# Patient Record
Sex: FEMALE | Race: WHITE | NOT HISPANIC OR LATINO | Employment: OTHER | URBAN - METROPOLITAN AREA
[De-identification: names, ages, dates, MRNs, and addresses within clinical notes are randomized per-mention and may not be internally consistent; named-entity substitution may affect disease eponyms.]

---

## 2017-05-05 ENCOUNTER — WALK IN (OUTPATIENT)
Dept: URGENT CARE | Age: 83
End: 2017-05-05

## 2017-05-05 VITALS
SYSTOLIC BLOOD PRESSURE: 91 MMHG | HEIGHT: 61 IN | RESPIRATION RATE: 20 BRPM | TEMPERATURE: 97.8 F | WEIGHT: 132.28 LBS | DIASTOLIC BLOOD PRESSURE: 52 MMHG | HEART RATE: 72 BPM | BODY MASS INDEX: 24.97 KG/M2 | OXYGEN SATURATION: 98 %

## 2017-05-05 DIAGNOSIS — A09 TRAVELER'S DIARRHEA: ICD-10-CM

## 2017-05-05 DIAGNOSIS — R19.7 DIARRHEA, UNSPECIFIED TYPE: Primary | ICD-10-CM

## 2017-05-05 PROCEDURE — G8420 CALC BMI NORM PARAMETERS: HCPCS | Performed by: EMERGENCY MEDICINE

## 2017-05-05 PROCEDURE — G8732 NO DOC OF PAIN: HCPCS | Performed by: EMERGENCY MEDICINE

## 2017-05-05 PROCEDURE — 99214 OFFICE O/P EST MOD 30 MIN: CPT | Performed by: EMERGENCY MEDICINE

## 2017-05-05 PROCEDURE — G8427 DOCREV CUR MEDS BY ELIG CLIN: HCPCS | Performed by: EMERGENCY MEDICINE

## 2017-05-05 PROCEDURE — 1036F TOBACCO NON-USER: CPT | Performed by: EMERGENCY MEDICINE

## 2017-05-05 PROCEDURE — 96360 HYDRATION IV INFUSION INIT: CPT | Performed by: EMERGENCY MEDICINE

## 2017-05-05 RX ORDER — METRONIDAZOLE 500 MG/1
750 TABLET ORAL 3 TIMES DAILY
Qty: 23 TABLET | Refills: 0 | Status: SHIPPED | OUTPATIENT
Start: 2017-05-05 | End: 2017-05-10

## 2017-05-05 RX ORDER — CIPROFLOXACIN 500 MG/1
500 TABLET, FILM COATED ORAL 2 TIMES DAILY
Qty: 6 TABLET | Refills: 0 | Status: SHIPPED | OUTPATIENT
Start: 2017-05-05 | End: 2017-05-08

## 2017-05-05 RX ORDER — DIGOXIN 125 MCG
125 TABLET ORAL EVERY OTHER DAY
COMMUNITY

## 2017-05-08 ENCOUNTER — LAB SERVICES (OUTPATIENT)
Dept: LAB | Age: 83
End: 2017-05-08

## 2017-05-08 DIAGNOSIS — A09 TRAVELER'S DIARRHEA: ICD-10-CM

## 2017-05-08 DIAGNOSIS — R19.7 DIARRHEA, UNSPECIFIED TYPE: ICD-10-CM

## 2017-05-08 LAB
C DIFF DNA SPEC QL NAA+PROBE: NOT DETECTED
SPECIMEN SOURCE: NORMAL
WBC STL QL MICRO: NEGATIVE

## 2017-05-08 PROCEDURE — 36415 COLL VENOUS BLD VENIPUNCTURE: CPT | Performed by: INTERNAL MEDICINE

## 2017-05-10 LAB
ANNOTATION COMMENT IMP: NORMAL
BACTERIA STL CULT: NORMAL
REPORT STATUS (RPT): NORMAL
SPECIMEN SOURCE: NORMAL

## 2017-05-18 ASSESSMENT — ENCOUNTER SYMPTOMS
CHEST TIGHTNESS: 0
UNEXPECTED WEIGHT CHANGE: 0
FEVER: 0
APPETITE CHANGE: 0
FATIGUE: 1
NUMBNESS: 0
COUGH: 0
ACTIVITY CHANGE: 0
DIZZINESS: 0
SHORTNESS OF BREATH: 0
CHILLS: 0
SINUS PRESSURE: 0
DIARRHEA: 1
RHINORRHEA: 0
WHEEZING: 0

## 2018-12-17 ENCOUNTER — WALK IN (OUTPATIENT)
Dept: URGENT CARE | Age: 84
End: 2018-12-17

## 2018-12-17 ENCOUNTER — IMAGING SERVICES (OUTPATIENT)
Dept: GENERAL RADIOLOGY | Age: 84
End: 2018-12-17
Attending: PHYSICIAN ASSISTANT

## 2018-12-17 VITALS
RESPIRATION RATE: 16 BRPM | WEIGHT: 116 LBS | BODY MASS INDEX: 22.78 KG/M2 | HEIGHT: 60 IN | SYSTOLIC BLOOD PRESSURE: 116 MMHG | OXYGEN SATURATION: 97 % | DIASTOLIC BLOOD PRESSURE: 64 MMHG | TEMPERATURE: 98.4 F | HEART RATE: 98 BPM

## 2018-12-17 DIAGNOSIS — M25.572 ACUTE LEFT ANKLE PAIN: Primary | ICD-10-CM

## 2018-12-17 DIAGNOSIS — M25.572 ACUTE LEFT ANKLE PAIN: ICD-10-CM

## 2018-12-17 DIAGNOSIS — S93.402A SPRAIN OF LEFT ANKLE, UNSPECIFIED LIGAMENT, INITIAL ENCOUNTER: ICD-10-CM

## 2018-12-17 PROCEDURE — 73610 X-RAY EXAM OF ANKLE: CPT | Performed by: RADIOLOGY

## 2018-12-17 PROCEDURE — 99213 OFFICE O/P EST LOW 20 MIN: CPT | Performed by: PHYSICIAN ASSISTANT

## 2020-01-01 ENCOUNTER — APPOINTMENT (EMERGENCY)
Dept: RADIOLOGY | Facility: HOSPITAL | Age: 85
DRG: 698 | End: 2020-01-01
Attending: EMERGENCY MEDICINE
Payer: MEDICARE

## 2020-01-01 ENCOUNTER — APPOINTMENT (INPATIENT)
Dept: CARDIOLOGY | Facility: HOSPITAL | Age: 85
DRG: 698 | End: 2020-01-01
Attending: NURSE PRACTITIONER
Payer: MEDICARE

## 2020-01-01 ENCOUNTER — HOSPITAL ENCOUNTER (INPATIENT)
Facility: HOSPITAL | Age: 85
LOS: 11 days | DRG: 698 | End: 2021-01-08
Attending: EMERGENCY MEDICINE | Admitting: INTERNAL MEDICINE
Payer: MEDICARE

## 2020-01-01 ENCOUNTER — APPOINTMENT (INPATIENT)
Dept: RADIOLOGY | Facility: HOSPITAL | Age: 85
DRG: 698 | End: 2020-01-01
Attending: NURSE PRACTITIONER
Payer: MEDICARE

## 2020-01-01 DIAGNOSIS — E87.5 HYPERKALEMIA: Primary | ICD-10-CM

## 2020-01-01 DIAGNOSIS — N30.01 ACUTE CYSTITIS WITH HEMATURIA: ICD-10-CM

## 2020-01-01 DIAGNOSIS — I48.91 ATRIAL FIBRILLATION, UNSPECIFIED TYPE (CMS/HCC): ICD-10-CM

## 2020-01-01 DIAGNOSIS — N17.9 ACUTE RENAL FAILURE, UNSPECIFIED ACUTE RENAL FAILURE TYPE (CMS/HCC): ICD-10-CM

## 2020-01-01 DIAGNOSIS — R65.20 SEVERE SEPSIS (CMS/HCC): ICD-10-CM

## 2020-01-01 DIAGNOSIS — A41.9 SEVERE SEPSIS (CMS/HCC): ICD-10-CM

## 2020-01-01 DIAGNOSIS — I48.20 CHRONIC ATRIAL FIBRILLATION (CMS/HCC): ICD-10-CM

## 2020-01-01 LAB
A BAUMANNII DNA SPEC QL NAA+PROBE: NOT DETECTED
ACANTHOCYTES BLD QL SMEAR: ABNORMAL
ALBUMIN SERPL-MCNC: 1.5 G/DL (ref 3.4–5)
ALBUMIN SERPL-MCNC: 1.5 G/DL (ref 3.4–5)
ALBUMIN SERPL-MCNC: 2.7 G/DL (ref 3.4–5)
ALP SERPL-CCNC: 145 IU/L (ref 35–126)
ALP SERPL-CCNC: 78 IU/L (ref 35–126)
ALP SERPL-CCNC: 93 IU/L (ref 35–126)
ALT SERPL-CCNC: 18 IU/L (ref 11–54)
ALT SERPL-CCNC: 19 IU/L (ref 11–54)
ALT SERPL-CCNC: 24 IU/L (ref 11–54)
ANION GAP SERPL CALC-SCNC: 13 MEQ/L (ref 3–15)
ANION GAP SERPL CALC-SCNC: 15 MEQ/L (ref 3–15)
ANION GAP SERPL CALC-SCNC: 16 MEQ/L (ref 3–15)
ANION GAP SERPL CALC-SCNC: 19 MEQ/L (ref 3–15)
ANION GAP SERPL CALC-SCNC: 20 MEQ/L (ref 3–15)
ANION GAP SERPL CALC-SCNC: 22 MEQ/L (ref 3–15)
AORTIC ROOT ANNULUS: 2.2 CM
AORTIC VALVE MEAN VELOCITY: 1.38 M/S
AORTIC VALVE VELOCITY TIME INTEGRAL: 37.7 CM
APTT PPP: 107 SEC (ref 23–35)
APTT PPP: 213 SEC (ref 23–35)
APTT PPP: 47 SEC (ref 23–35)
APTT PPP: 64 SEC (ref 23–35)
APTT PPP: >230 SEC (ref 23–35)
APTT PPP: >230 SEC (ref 23–35)
ASCENDING AORTA: 3.5 CM
AST SERPL-CCNC: 20 IU/L (ref 15–41)
AST SERPL-CCNC: 23 IU/L (ref 15–41)
AST SERPL-CCNC: 27 IU/L (ref 15–41)
ATRIAL RATE: 104
AV MEAN GRADIENT: 9 MMHG
AV PEAK GRADIENT: 14 MMHG
AV PEAK VELOCITY-S: 1.9 M/S
AV REG PEAK VEL: 3.29 M/S
AV REGURGITATION PRESSURE HALF TIME: 378 MS
AV VALVE AREA: 1.17 CM2
B FRAGILIS DNA BLD QL NAA+PROBE: NOT DETECTED
BACTERIA #/AREA URNS HPF: 3 /HPF
BACTERIA UR CULT: ABNORMAL
BACTERIA UR CULT: ABNORMAL
BASE EXCESS BLDA CALC-SCNC: -1.6 MEQ/L
BASE EXCESS BLDA CALC-SCNC: -14.7 MEQ/L
BASE EXCESS BLDA CALC-SCNC: -15.9 MEQ/L
BASE EXCESS BLDV CALC-SCNC: -18.7 MEQ/L
BASOPHILS # BLD: 0 K/UL (ref 0.01–0.1)
BASOPHILS # BLD: 0.02 K/UL (ref 0.01–0.1)
BASOPHILS # BLD: 0.03 K/UL (ref 0.01–0.1)
BASOPHILS # BLD: 0.05 K/UL (ref 0.01–0.1)
BASOPHILS NFR BLD: 0 %
BASOPHILS NFR BLD: 0.1 %
BASOPHILS NFR BLD: 0.1 %
BASOPHILS NFR BLD: 0.2 %
BILIRUB SERPL-MCNC: 0.7 MG/DL (ref 0.3–1.2)
BILIRUB SERPL-MCNC: 0.7 MG/DL (ref 0.3–1.2)
BILIRUB SERPL-MCNC: 1.3 MG/DL (ref 0.3–1.2)
BILIRUB UR QL STRIP.AUTO: ABNORMAL MG/DL
BLACTX-M ISLT/SPM QL: NOT DETECTED
BLAIMP ISLT/SPM QL: NOT DETECTED
BLAOXA-48-LIKE ISLT/SPM QL: NOT DETECTED
BLAVIM ISLT/SPM QL: NOT DETECTED
BSA FOR ECHO PROCEDURE: 1.49 M2
BUN SERPL-MCNC: 100 MG/DL (ref 8–20)
BUN SERPL-MCNC: 113 MG/DL (ref 8–20)
BUN SERPL-MCNC: 121 MG/DL (ref 8–20)
BUN SERPL-MCNC: 126 MG/DL (ref 8–20)
BUN SERPL-MCNC: 127 MG/DL (ref 8–20)
BUN SERPL-MCNC: 148 MG/DL (ref 8–20)
BUN SERPL-MCNC: 160 MG/DL (ref 8–20)
BUN SERPL-MCNC: 161 MG/DL (ref 8–20)
BURR CELLS BLD QL SMEAR: ABNORMAL
C ALBICANS DNA BLD QL NAA+PROBE: NOT DETECTED
C AURIS DNA BLD POS QL NAA+PROBE: NOT DETECTED
C GATTII+NEOFOR DNA BLD POS QL NAA+N-PRB: NOT DETECTED
C GLABRATA DNA BLD QL NAA+PROBE: NOT DETECTED
C KRUSEI DNA BLD QL NAA+PROBE: NOT DETECTED
C PARAP DNA BLD QL NAA+PROBE: NOT DETECTED
C TROPICLS DNA BLD QL NAA+PROBE: NOT DETECTED
CA-I BLD-SCNC: 1 MMOL/L (ref 1.15–1.27)
CA-I BLD-SCNC: 1.17 MMOL/L (ref 1.15–1.27)
CA-I BLD-SCNC: 1.22 MMOL/L (ref 1.15–1.27)
CALCIUM SERPL-MCNC: 6.6 MG/DL (ref 8.9–10.3)
CALCIUM SERPL-MCNC: 6.8 MG/DL (ref 8.9–10.3)
CALCIUM SERPL-MCNC: 7.2 MG/DL (ref 8.9–10.3)
CALCIUM SERPL-MCNC: 7.3 MG/DL (ref 8.9–10.3)
CALCIUM SERPL-MCNC: 7.4 MG/DL (ref 8.9–10.3)
CALCIUM SERPL-MCNC: 8 MG/DL (ref 8.9–10.3)
CALCIUM SERPL-MCNC: 8.8 MG/DL (ref 8.9–10.3)
CALCIUM SERPL-MCNC: 9.6 MG/DL (ref 8.9–10.3)
CHLORIDE BLDA-SCNC: 102 MEQ/L (ref 98–109)
CHLORIDE BLDA-SCNC: 103 MEQ/L (ref 98–109)
CHLORIDE BLDA-SCNC: 104 MEQ/L (ref 98–109)
CHLORIDE SERPL-SCNC: 100 MEQ/L (ref 98–109)
CHLORIDE SERPL-SCNC: 101 MEQ/L (ref 98–109)
CHLORIDE SERPL-SCNC: 101 MEQ/L (ref 98–109)
CHLORIDE SERPL-SCNC: 102 MEQ/L (ref 98–109)
CHLORIDE SERPL-SCNC: 104 MEQ/L (ref 98–109)
CHLORIDE SERPL-SCNC: 105 MEQ/L (ref 98–109)
CHLORIDE SERPL-SCNC: 97 MEQ/L (ref 98–109)
CHLORIDE SERPL-SCNC: 98 MEQ/L (ref 98–109)
CLARITY UR REFRACT.AUTO: ABNORMAL
CO2 BLDA-SCNC: 10 MEQ/L (ref 22–32)
CO2 BLDA-SCNC: 20 MEQ/L (ref 22–32)
CO2 BLDA-SCNC: 8 MEQ/L (ref 22–32)
CO2 BLDV-SCNC: 10.9 MEQ/L (ref 22–32)
CO2 SERPL-SCNC: 10 MEQ/L (ref 22–32)
CO2 SERPL-SCNC: 12 MEQ/L (ref 22–32)
CO2 SERPL-SCNC: 17 MEQ/L (ref 22–32)
CO2 SERPL-SCNC: 19 MEQ/L (ref 22–32)
CO2 SERPL-SCNC: 21 MEQ/L (ref 22–32)
CO2 SERPL-SCNC: 22 MEQ/L (ref 22–32)
CO2 SERPL-SCNC: 23 MEQ/L (ref 22–32)
CO2 SERPL-SCNC: 9 MEQ/L (ref 22–32)
COLISTIN RES MCR-1 ISLT/SPM QL: NOT DETECTED
COLOR UR AUTO: ABNORMAL
CPR GENES ISLT NAA+PROBE: NOT DETECTED
CREAT SERPL-MCNC: 4 MG/DL (ref 0.6–1.1)
CREAT SERPL-MCNC: 4.3 MG/DL (ref 0.6–1.1)
CREAT SERPL-MCNC: 4.8 MG/DL (ref 0.6–1.1)
CREAT SERPL-MCNC: 5 MG/DL (ref 0.6–1.1)
CREAT SERPL-MCNC: 5.3 MG/DL (ref 0.6–1.1)
CREAT SERPL-MCNC: 6 MG/DL (ref 0.6–1.1)
CREAT SERPL-MCNC: 6.2 MG/DL (ref 0.6–1.1)
CREAT SERPL-MCNC: 6.4 MG/DL (ref 0.6–1.1)
DIFFERENTIAL METHOD BLD: ABNORMAL
DOP CALC LVOT STROKE VOLUME: 50.82 ML
E CLOAC COMP DNA BLD POS NAA+NON-PROBE: NOT DETECTED
E COLI DNA SPEC QL NAA+PROBE: NOT DETECTED
E FAECALIS DNA SPEC QL NAA+PROBE: NOT DETECTED
E FAECIUM DNA SPEC QL NAA+PROBE: NOT DETECTED
EOSINOPHIL # BLD: 0 K/UL (ref 0.04–0.36)
EOSINOPHIL # BLD: 0.01 K/UL (ref 0.04–0.36)
EOSINOPHIL # BLD: 0.02 K/UL (ref 0.04–0.36)
EOSINOPHIL # BLD: 0.2 K/UL (ref 0.04–0.36)
EOSINOPHIL NFR BLD: 0 %
EOSINOPHIL NFR BLD: 0 %
EOSINOPHIL NFR BLD: 0.1 %
EOSINOPHIL NFR BLD: 0.9 %
ERYTHROCYTE [DISTWIDTH] IN BLOOD BY AUTOMATED COUNT: 16.7 % (ref 11.7–14.4)
ERYTHROCYTE [DISTWIDTH] IN BLOOD BY AUTOMATED COUNT: 16.9 % (ref 11.7–14.4)
ERYTHROCYTE [DISTWIDTH] IN BLOOD BY AUTOMATED COUNT: 16.9 % (ref 11.7–14.4)
ERYTHROCYTE [DISTWIDTH] IN BLOOD BY AUTOMATED COUNT: 17.9 % (ref 11.7–14.4)
ESTIMATED PASP: 51 MMHG
FIO2 ON VENT: ABNORMAL %
FRACTIONAL SHORTENING: 30.8 %
GFR SERPL CREATININE-BSD FRML MDRD: 10.4 ML/MIN/1.73M*2
GFR SERPL CREATININE-BSD FRML MDRD: 6 ML/MIN/1.73M*2
GFR SERPL CREATININE-BSD FRML MDRD: 6.3 ML/MIN/1.73M*2
GFR SERPL CREATININE-BSD FRML MDRD: 6.5 ML/MIN/1.73M*2
GFR SERPL CREATININE-BSD FRML MDRD: 7.5 ML/MIN/1.73M*2
GFR SERPL CREATININE-BSD FRML MDRD: 8 ML/MIN/1.73M*2
GFR SERPL CREATININE-BSD FRML MDRD: 8.4 ML/MIN/1.73M*2
GFR SERPL CREATININE-BSD FRML MDRD: 9.6 ML/MIN/1.73M*2
GLUCOSE BLD-MCNC: 104 MG/DL (ref 70–99)
GLUCOSE BLD-MCNC: 105 MG/DL (ref 70–99)
GLUCOSE BLD-MCNC: 109 MG/DL (ref 70–99)
GLUCOSE BLD-MCNC: 127 MG/DL (ref 70–99)
GLUCOSE BLD-MCNC: 128 MG/DL (ref 70–99)
GLUCOSE BLD-MCNC: 135 MG/DL (ref 70–99)
GLUCOSE BLD-MCNC: 144 MG/DL (ref 70–99)
GLUCOSE BLD-MCNC: 165 MG/DL (ref 70–99)
GLUCOSE BLD-MCNC: 170 MG/DL (ref 70–99)
GLUCOSE BLD-MCNC: 177 MG/DL (ref 70–99)
GLUCOSE BLD-MCNC: 181 MG/DL (ref 70–99)
GLUCOSE BLD-MCNC: 219 MG/DL (ref 70–99)
GLUCOSE BLD-MCNC: 233 MG/DL (ref 70–99)
GLUCOSE BLD-MCNC: 296 MG/DL (ref 70–99)
GLUCOSE BLD-MCNC: 37 MG/DL (ref 70–99)
GLUCOSE BLD-MCNC: 76 MG/DL (ref 70–99)
GLUCOSE BLD-MCNC: 81 MG/DL (ref 70–99)
GLUCOSE BLDA-MCNC: 231 MG/DL (ref 70–99)
GLUCOSE BLDA-MCNC: 242 MG/DL (ref 70–99)
GLUCOSE BLDA-MCNC: 252 MG/DL (ref 70–99)
GLUCOSE SERPL-MCNC: 123 MG/DL (ref 70–99)
GLUCOSE SERPL-MCNC: 132 MG/DL (ref 70–99)
GLUCOSE SERPL-MCNC: 180 MG/DL (ref 70–99)
GLUCOSE SERPL-MCNC: 225 MG/DL (ref 70–99)
GLUCOSE SERPL-MCNC: 281 MG/DL (ref 70–99)
GLUCOSE SERPL-MCNC: 288 MG/DL (ref 70–99)
GLUCOSE SERPL-MCNC: 79 MG/DL (ref 70–99)
GLUCOSE SERPL-MCNC: 88 MG/DL (ref 70–99)
GLUCOSE UR STRIP.AUTO-MCNC: ABNORMAL MG/DL
GP B STREP DNA SPEC QL NAA+PROBE: NOT DETECTED
HAEM INFLU DNA SPEC QL NAA+PROBE: NOT DETECTED
HCO3 BLDA-SCNC: 13 MEQ/L (ref 21–28)
HCO3 BLDA-SCNC: 14 MEQ/L (ref 21–28)
HCO3 BLDA-SCNC: 24 MEQ/L (ref 21–28)
HCO3 BLDV-SCNC: 9.5 MEQ/L (ref 21–28)
HCT VFR BLDCO AUTO: 23.9 % (ref 35–45)
HCT VFR BLDCO AUTO: 24 % (ref 35–45)
HCT VFR BLDCO AUTO: 24.7 % (ref 35–45)
HCT VFR BLDCO AUTO: 25 % (ref 35–45)
HCT VFR BLDCO AUTO: 25.9 % (ref 35–45)
HCT VFR BLDCO AUTO: 26.5 % (ref 35–45)
HCT VFR BLDCO AUTO: 26.6 % (ref 35–45)
HCT VFR BLDCO AUTO: 26.7 % (ref 35–45)
HCT VFR BLDCO AUTO: 26.9 % (ref 35–45)
HCT VFR BLDCO AUTO: 27.3 % (ref 35–45)
HCT VFR BLDCO AUTO: 28 % (ref 35–45)
HCT VFR BLDCO AUTO: 44.2 % (ref 35–45)
HEART RATE: 110 BPM
HGB BLD-MCNC: 13.8 G/DL (ref 11.8–15.7)
HGB BLD-MCNC: 7.5 G/DL (ref 11.8–15.7)
HGB BLD-MCNC: 7.6 G/DL (ref 11.8–15.7)
HGB BLD-MCNC: 7.7 G/DL (ref 11.8–15.7)
HGB BLD-MCNC: 7.9 G/DL (ref 11.8–15.7)
HGB BLD-MCNC: 8.2 G/DL (ref 11.8–15.7)
HGB BLD-MCNC: 8.4 G/DL (ref 11.8–15.7)
HGB BLD-MCNC: 8.5 G/DL (ref 11.8–15.7)
HGB BLD-MCNC: 8.5 G/DL (ref 11.8–15.7)
HGB BLD-MCNC: 8.6 G/DL (ref 11.8–15.7)
HGB BLD-MCNC: 9.1 G/DL (ref 11.8–15.7)
HGB BLD-MCNC: 9.3 G/DL (ref 11.8–15.7)
HGB BLDA-MCNC: 10.8 G/DL (ref 12–16)
HGB BLDA-MCNC: 9.1 G/DL (ref 12–16)
HGB BLDA-MCNC: 9.3 G/DL (ref 12–16)
HGB UR QL STRIP.AUTO: 3
HYALINE CASTS #/AREA URNS LPF: ABNORMAL /LPF
IMM GRANULOCYTES # BLD AUTO: 0.15 K/UL (ref 0–0.08)
IMM GRANULOCYTES # BLD AUTO: 0.23 K/UL (ref 0–0.08)
IMM GRANULOCYTES # BLD AUTO: 0.25 K/UL (ref 0–0.08)
IMM GRANULOCYTES NFR BLD AUTO: 0.6 %
IMM GRANULOCYTES NFR BLD AUTO: 0.9 %
IMM GRANULOCYTES NFR BLD AUTO: 1.1 %
INHALED O2 CONCENTRATION: ABNORMAL %
INR PPP: 1.2 INR
INTERVENTRICULAR SEPTUM: 0.99 CM
K OXYTOCA DNA BLD QL NAA+PROBE: NOT DETECTED
K PNEUMON DNA SPEC QL NAA+PROBE: DETECTED
K. AEROGENES DNA SPEC QL NAA+PROBE: NOT DETECTED
KETONES UR STRIP.AUTO-MCNC: ABNORMAL MG/DL
L MONOCYTOG DNA SPEC QL NAA+PROBE: NOT DETECTED
LA/AORTA RATIO: 1.64
LACTATE BLDA-SCNC: 2.2 MMOL/L (ref 0.4–1.6)
LACTATE BLDA-SCNC: 4.4 MMOL/L (ref 0.4–1.6)
LACTATE BLDA-SCNC: 5.7 MMOL/L (ref 0.4–1.6)
LACTATE SERPL-SCNC: 1.3 MMOL/L (ref 0.4–2)
LACTATE SERPL-SCNC: 2.3 MMOL/L (ref 0.4–2)
LACTATE SERPL-SCNC: 2.4 MMOL/L (ref 0.4–2)
LACTATE SERPL-SCNC: 4.1 MMOL/L (ref 0.4–2)
LACTATE SERPL-SCNC: 4.8 MMOL/L (ref 0.4–2)
LACTATE SERPL-SCNC: 6.5 MMOL/L (ref 0.4–2)
LACTATE SERPL-SCNC: 7.1 MMOL/L (ref 0.4–2)
LAD 2D: 3.6 CM
LEFT INTERNAL DIMENSION IN SYSTOLE: 1.75 CM (ref 2.23–3.37)
LEFT VENTRICULAR INTERNAL DIMENSION IN DIASTOLE: 2.53 CM (ref 3.75–5.2)
LEFT VENTRICULAR POSTERIOR WALL IN END DIASTOLE: 0.91 CM (ref 0.48–0.88)
LEUKOCYTE ESTERASE UR QL STRIP.AUTO: 3
LVOT 2D: 1.7 CM
LVOT A: 2.27 CM2
LVOT MG: 3 MMHG
LVOT MV: 0.72 M/S
LVOT PEAK VELOCITY: 0.98 M/S
LVOT VTI: 22.4 CM
LYMPHOCYTES # BLD: 0.61 K/UL (ref 1.2–3.5)
LYMPHOCYTES # BLD: 0.91 K/UL (ref 1.2–3.5)
LYMPHOCYTES # BLD: 0.95 K/UL (ref 1.2–3.5)
LYMPHOCYTES # BLD: 1.23 K/UL (ref 1.2–3.5)
LYMPHOCYTES NFR BLD: 2.1 %
LYMPHOCYTES NFR BLD: 3.9 %
LYMPHOCYTES NFR BLD: 4 %
LYMPHOCYTES NFR BLD: 4.2 %
MAGNESIUM SERPL-MCNC: 1.8 MG/DL (ref 1.8–2.5)
MAGNESIUM SERPL-MCNC: 1.8 MG/DL (ref 1.8–2.5)
MAGNESIUM SERPL-MCNC: 1.9 MG/DL (ref 1.8–2.5)
MAGNESIUM SERPL-MCNC: 2 MG/DL (ref 1.8–2.5)
MAGNESIUM SERPL-MCNC: 2.1 MG/DL (ref 1.8–2.5)
MAGNESIUM SERPL-MCNC: 2.1 MG/DL (ref 1.8–2.5)
MAGNESIUM SERPL-MCNC: 2.4 MG/DL (ref 1.8–2.5)
MAGNESIUM SERPL-MCNC: 2.6 MG/DL (ref 1.8–2.5)
MCH RBC QN AUTO: 25.3 PG (ref 28–33.2)
MCH RBC QN AUTO: 25.8 PG (ref 28–33.2)
MCH RBC QN AUTO: 26.1 PG (ref 28–33.2)
MCH RBC QN AUTO: 26.1 PG (ref 28–33.2)
MCHC RBC AUTO-ENTMCNC: 31.2 G/DL (ref 32.2–35.5)
MCHC RBC AUTO-ENTMCNC: 31.4 G/DL (ref 32.2–35.5)
MCHC RBC AUTO-ENTMCNC: 32 G/DL (ref 32.2–35.5)
MCHC RBC AUTO-ENTMCNC: 33.3 G/DL (ref 32.2–35.5)
MCV RBC AUTO: 78.2 FL (ref 83–98)
MCV RBC AUTO: 80.6 FL (ref 83–98)
MCV RBC AUTO: 81.1 FL (ref 83–98)
MCV RBC AUTO: 83.3 FL (ref 83–98)
MECA ISLT/SPM QL: ABNORMAL
MECA+MECC+MREJ ISLT/SPM QL: ABNORMAL
MONOCYTES # BLD: 0.61 K/UL (ref 0.28–0.8)
MONOCYTES # BLD: 1.44 K/UL (ref 0.28–0.8)
MONOCYTES # BLD: 1.56 K/UL (ref 0.28–0.8)
MONOCYTES # BLD: 1.83 K/UL (ref 0.28–0.8)
MONOCYTES NFR BLD: 2 %
MONOCYTES NFR BLD: 4.9 %
MONOCYTES NFR BLD: 7.2 %
MONOCYTES NFR BLD: 7.5 %
MV MEAN GRADIENT: 10 MMHG
MV PEAK GRADIENT: 20 MMHG
MV VALVE AREA BY CONTINUITY EQUATION: 1.59 CM2
MV VTI: 32 CM
N MEN DNA BLD QL NAA+PROBE: NOT DETECTED
NDM: NOT DETECTED
NEUTROPHILS # BLD: 18.61 K/UL (ref 1.7–7)
NEUTROPHILS # BLD: 21.31 K/UL (ref 1.7–7)
NEUTROPHILS # BLD: 27.04 K/UL (ref 1.7–7)
NEUTS BAND # BLD: 1.23 K/UL (ref 0–0.53)
NEUTS BAND # BLD: 27.61 K/UL (ref 1.7–7)
NEUTS BAND NFR BLD: 4 %
NEUTS SEG NFR BLD: 86.5 %
NEUTS SEG NFR BLD: 87.9 %
NEUTS SEG NFR BLD: 90 %
NEUTS SEG NFR BLD: 91.8 %
NITRITE UR QL STRIP.AUTO: ABNORMAL
NRBC BLD-RTO: 0 %
OVALOCYTES BLD QL SMEAR: ABNORMAL
P AERUGINOSA DNA SPEC QL NAA+PROBE: NOT DETECTED
PATH REV BLD -IMP: NORMAL
PCO2 BLDA: 14 MM HG (ref 35–48)
PCO2 BLDA: 18 MM HG (ref 35–48)
PCO2 BLDA: 24 MM HG (ref 35–48)
PCO2 BLDV: 32 MM HG (ref 41–51)
PDW BLD AUTO: 9.3 FL (ref 9.4–12.3)
PDW BLD AUTO: 9.4 FL (ref 9.4–12.3)
PDW BLD AUTO: 9.5 FL (ref 9.4–12.3)
PDW BLD AUTO: 9.7 FL (ref 9.4–12.3)
PH BLDA: 7.31 PH (ref 7.35–7.45)
PH BLDA: 7.33 PH (ref 7.35–7.45)
PH BLDA: 7.52 PH (ref 7.35–7.45)
PH BLDV: 7.1 [PH] (ref 7.32–7.42)
PH UR STRIP.AUTO: ABNORMAL [PH]
PLATELET # BLD AUTO: 158 K/UL (ref 150–369)
PLATELET # BLD AUTO: 204 K/UL (ref 150–369)
PLATELET # BLD AUTO: 209 K/UL (ref 150–369)
PLATELET # BLD AUTO: 319 K/UL (ref 150–369)
PLATELET # BLD EST: ABNORMAL 10*3/UL
PLATELET CLUMP BLD QL SMEAR: PRESENT
PO2 BLDA: 118 MM HG (ref 83–100)
PO2 BLDA: 85 MM HG (ref 83–100)
PO2 BLDA: 93 MM HG (ref 83–100)
PO2 BLDV: 46 MM HG (ref 25–40)
POCT PATIENT TEMPERATURE: 94.4 °F (ref 97–99)
POCT PATIENT TEMPERATURE: 97.1 °F (ref 97–99)
POCT PATIENT TEMPERATURE: 98.6 °F (ref 97–99)
POCT TEST (BLD GAS): ABNORMAL
POCT TEST: ABNORMAL
POCT TEST: NORMAL
POCT TEST: NORMAL
POSTERIOR WALL: 0.91 CM
POTASSIUM BLDA-SCNC: 4.2 MEQ/L (ref 3.4–4.5)
POTASSIUM BLDA-SCNC: 4.8 MEQ/L (ref 3.4–4.5)
POTASSIUM BLDA-SCNC: 5.9 MEQ/L (ref 3.4–4.5)
POTASSIUM SERPL-SCNC: 3.9 MEQ/L (ref 3.6–5.1)
POTASSIUM SERPL-SCNC: 3.9 MEQ/L (ref 3.6–5.1)
POTASSIUM SERPL-SCNC: 4.1 MEQ/L (ref 3.6–5.1)
POTASSIUM SERPL-SCNC: 4.3 MEQ/L (ref 3.6–5.1)
POTASSIUM SERPL-SCNC: 4.3 MEQ/L (ref 3.6–5.1)
POTASSIUM SERPL-SCNC: 5.8 MEQ/L (ref 3.6–5.1)
POTASSIUM SERPL-SCNC: 6.7 MEQ/L (ref 3.6–5.1)
POTASSIUM SERPL-SCNC: 7.9 MEQ/L (ref 3.6–5.1)
PROT SERPL-MCNC: 3.9 G/DL (ref 6–8.2)
PROT SERPL-MCNC: 4.1 G/DL (ref 6–8.2)
PROT SERPL-MCNC: 7.2 G/DL (ref 6–8.2)
PROT UR QL STRIP.AUTO: ABNORMAL
PROTEUS SP DNA BLD POS QL NAA+NON-PROBE: NOT DETECTED
PROTHROMBIN TIME: 15.2 SEC (ref 12.2–14.5)
QRS DURATION: 92
QT INTERVAL: 358
QTC CALCULATION(BAZETT): 454
R AXIS: 146
RAP: 3 MMHG
RBC # BLD AUTO: 2.87 M/UL (ref 3.93–5.22)
RBC # BLD AUTO: 3.3 M/UL (ref 3.93–5.22)
RBC # BLD AUTO: 3.49 M/UL (ref 3.93–5.22)
RBC # BLD AUTO: 5.45 M/UL (ref 3.93–5.22)
RBC #/AREA URNS HPF: ABNORMAL /HPF
S AUREUS DNA SPEC QL NAA+PROBE: NOT DETECTED
S AUREUS+CONS DNA BLD POS NAA+NON-PROBE: NOT DETECTED
S EPIDERMIDIS DNA SPEC QL NAA+PROBE: NOT DETECTED
S HAEMOLYTICUS DNA BLD QL NAA+PROBE: NOT DETECTED
S MALTOPH DNA SPEC QL NAA+PROBE: NOT DETECTED
S MARCESCENS DNA SPEC QL NAA+PROBE: NOT DETECTED
S PNEUM DNA BLD QL NAA+PROBE: NOT DETECTED
S PYO DNA SPEC NAA+PROBE: NOT DETECTED
SALMONELLA DNA SPEC QL NAA+PROBE: NOT DETECTED
SAO2 % BLDA: 100 % (ref 93–98)
SARS-COV-2 RNA RESP QL NAA+PROBE: NEGATIVE
SODIUM BLDA-SCNC: 125 MEQ/L (ref 136–145)
SODIUM BLDA-SCNC: 130 MEQ/L (ref 136–145)
SODIUM BLDA-SCNC: 136 MEQ/L (ref 136–145)
SODIUM SERPL-SCNC: 127 MEQ/L (ref 136–144)
SODIUM SERPL-SCNC: 127 MEQ/L (ref 136–144)
SODIUM SERPL-SCNC: 132 MEQ/L (ref 136–144)
SODIUM SERPL-SCNC: 132 MEQ/L (ref 136–144)
SODIUM SERPL-SCNC: 136 MEQ/L (ref 136–144)
SODIUM SERPL-SCNC: 143 MEQ/L (ref 136–144)
SODIUM SERPL-SCNC: 143 MEQ/L (ref 136–144)
SODIUM SERPL-SCNC: 144 MEQ/L (ref 136–144)
SP GR UR REFRACT.AUTO: 1.02
SQUAMOUS #/AREA URNS HPF: ABNORMAL /HPF
STREPTOCOCCUS DNA BLD QL NAA+PROBE: NOT DETECTED
T WAVE AXIS: 30
TEST PERFORMANCE INFO SPEC: ABNORMAL
TOXIC GRANULES BLD QL SMEAR: SLIGHT
TR MAX PG: 49 MMHG
TRICUSPID VALVE PEAK REGURGITATION VELOCITY: 3.51 M/S
TROPONIN I SERPL-MCNC: 0.04 NG/ML
TROPONIN I SERPL-MCNC: 0.05 NG/ML
TROPONIN I SERPL-MCNC: 0.05 NG/ML
TROPONIN I SERPL-MCNC: 0.06 NG/ML
TROPONIN I SERPL-MCNC: 0.07 NG/ML
TROPONIN I SERPL-MCNC: 0.15 NG/ML
TROPONIN I SERPL-MCNC: 0.19 NG/ML
TROPONIN I SERPL-MCNC: 0.2 NG/ML
TROPONIN I SERPL-MCNC: 0.23 NG/ML
UROBILINOGEN UR STRIP-ACNC: ABNORMAL EU/DL
VANA+VANB ISLT/SPM QL: ABNORMAL
VENTRICULAR RATE: 97
WBC # BLD AUTO: 21.53 K/UL (ref 3.8–10.5)
WBC # BLD AUTO: 24.28 K/UL (ref 3.8–10.5)
WBC # BLD AUTO: 29.41 K/UL (ref 3.8–10.5)
WBC # BLD AUTO: 30.68 K/UL (ref 3.8–10.5)
WBC #/AREA URNS HPF: ABNORMAL /HPF
Z-SCORE OF LEFT VENTRICULAR DIMENSION IN END DIASTOLE: -5.56
Z-SCORE OF LEFT VENTRICULAR DIMENSION IN END SYSTOLE: -3.54
Z-SCORE OF LEFT VENTRICULAR POSTERIOR WALL IN END DIASTOLE: 1.78

## 2020-01-01 PROCEDURE — 25800000 HC PHARMACY IV SOLUTIONS: Performed by: NURSE PRACTITIONER

## 2020-01-01 PROCEDURE — 63600000 HC DRUGS/DETAIL CODE: Performed by: INTERNAL MEDICINE

## 2020-01-01 PROCEDURE — 85025 COMPLETE CBC W/AUTO DIFF WBC: CPT | Performed by: EMERGENCY MEDICINE

## 2020-01-01 PROCEDURE — 94640 AIRWAY INHALATION TREATMENT: CPT

## 2020-01-01 PROCEDURE — 84484 ASSAY OF TROPONIN QUANT: CPT | Performed by: NURSE PRACTITIONER

## 2020-01-01 PROCEDURE — 36600 WITHDRAWAL OF ARTERIAL BLOOD: CPT

## 2020-01-01 PROCEDURE — 200200 PR NO CHARGE: Performed by: THORACIC SURGERY (CARDIOTHORACIC VASCULAR SURGERY)

## 2020-01-01 PROCEDURE — 85730 THROMBOPLASTIN TIME PARTIAL: CPT | Performed by: INTERNAL MEDICINE

## 2020-01-01 PROCEDURE — 63600000 HC DRUGS/DETAIL CODE: Performed by: NURSE PRACTITIONER

## 2020-01-01 PROCEDURE — 25000000 HC PHARMACY GENERAL: Performed by: NURSE PRACTITIONER

## 2020-01-01 PROCEDURE — 83605 ASSAY OF LACTIC ACID: CPT | Performed by: INTERNAL MEDICINE

## 2020-01-01 PROCEDURE — 25800000 HC PHARMACY IV SOLUTIONS: Performed by: INTERNAL MEDICINE

## 2020-01-01 PROCEDURE — 96365 THER/PROPH/DIAG IV INF INIT: CPT

## 2020-01-01 PROCEDURE — 85018 HEMOGLOBIN: CPT | Performed by: NURSE PRACTITIONER

## 2020-01-01 PROCEDURE — 85025 COMPLETE CBC W/AUTO DIFF WBC: CPT | Performed by: INTERNAL MEDICINE

## 2020-01-01 PROCEDURE — 83735 ASSAY OF MAGNESIUM: CPT | Performed by: NURSE PRACTITIONER

## 2020-01-01 PROCEDURE — 96361 HYDRATE IV INFUSION ADD-ON: CPT | Mod: 59

## 2020-01-01 PROCEDURE — 20000000 HC ROOM AND CARE ICU

## 2020-01-01 PROCEDURE — 80053 COMPREHEN METABOLIC PANEL: CPT | Performed by: NURSE PRACTITIONER

## 2020-01-01 PROCEDURE — 25800000 HC PHARMACY IV SOLUTIONS: Performed by: EMERGENCY MEDICINE

## 2020-01-01 PROCEDURE — 25000000 HC PHARMACY GENERAL: Performed by: EMERGENCY MEDICINE

## 2020-01-01 PROCEDURE — 80048 BASIC METABOLIC PNL TOTAL CA: CPT | Performed by: NURSE PRACTITIONER

## 2020-01-01 PROCEDURE — 93306 TTE W/DOPPLER COMPLETE: CPT

## 2020-01-01 PROCEDURE — 99285 EMERGENCY DEPT VISIT HI MDM: CPT | Mod: 25

## 2020-01-01 PROCEDURE — 83735 ASSAY OF MAGNESIUM: CPT | Performed by: INTERNAL MEDICINE

## 2020-01-01 PROCEDURE — 63700000 HC SELF-ADMINISTRABLE DRUG: Performed by: INTERNAL MEDICINE

## 2020-01-01 PROCEDURE — 85014 HEMATOCRIT: CPT | Performed by: NURSE PRACTITIONER

## 2020-01-01 PROCEDURE — 99223 1ST HOSP IP/OBS HIGH 75: CPT | Performed by: INTERNAL MEDICINE

## 2020-01-01 PROCEDURE — 63600000 HC DRUGS/DETAIL CODE: Performed by: EMERGENCY MEDICINE

## 2020-01-01 PROCEDURE — 63700000 HC SELF-ADMINISTRABLE DRUG: Performed by: NURSE PRACTITIONER

## 2020-01-01 PROCEDURE — 82803 BLOOD GASES ANY COMBINATION: CPT | Performed by: EMERGENCY MEDICINE

## 2020-01-01 PROCEDURE — 71045 X-RAY EXAM CHEST 1 VIEW: CPT

## 2020-01-01 PROCEDURE — 36415 COLL VENOUS BLD VENIPUNCTURE: CPT | Performed by: EMERGENCY MEDICINE

## 2020-01-01 PROCEDURE — 06HY33Z INSERTION OF INFUSION DEVICE INTO LOWER VEIN, PERCUTANEOUS APPROACH: ICD-10-PCS | Performed by: THORACIC SURGERY (CARDIOTHORACIC VASCULAR SURGERY)

## 2020-01-01 PROCEDURE — 25000000 HC PHARMACY GENERAL: Performed by: INTERNAL MEDICINE

## 2020-01-01 PROCEDURE — 36415 COLL VENOUS BLD VENIPUNCTURE: CPT | Performed by: INTERNAL MEDICINE

## 2020-01-01 PROCEDURE — 87077 CULTURE AEROBIC IDENTIFY: CPT | Performed by: EMERGENCY MEDICINE

## 2020-01-01 PROCEDURE — 83605 ASSAY OF LACTIC ACID: CPT | Performed by: EMERGENCY MEDICINE

## 2020-01-01 PROCEDURE — 93005 ELECTROCARDIOGRAM TRACING: CPT | Performed by: NURSE PRACTITIONER

## 2020-01-01 PROCEDURE — 27200121 HC CATH FOLEY

## 2020-01-01 PROCEDURE — 85730 THROMBOPLASTIN TIME PARTIAL: CPT | Performed by: NURSE PRACTITIONER

## 2020-01-01 PROCEDURE — 81001 URINALYSIS AUTO W/SCOPE: CPT | Performed by: EMERGENCY MEDICINE

## 2020-01-01 PROCEDURE — 36415 COLL VENOUS BLD VENIPUNCTURE: CPT | Performed by: NURSE PRACTITIONER

## 2020-01-01 PROCEDURE — 93005 ELECTROCARDIOGRAM TRACING: CPT | Performed by: EMERGENCY MEDICINE

## 2020-01-01 PROCEDURE — 74176 CT ABD & PELVIS W/O CONTRAST: CPT | Mod: MF

## 2020-01-01 PROCEDURE — 87150 DNA/RNA AMPLIFIED PROBE: CPT | Performed by: EMERGENCY MEDICINE

## 2020-01-01 PROCEDURE — 85610 PROTHROMBIN TIME: CPT | Performed by: NURSE PRACTITIONER

## 2020-01-01 PROCEDURE — 80053 COMPREHEN METABOLIC PANEL: CPT | Performed by: EMERGENCY MEDICINE

## 2020-01-01 PROCEDURE — U0002 COVID-19 LAB TEST NON-CDC: HCPCS | Performed by: EMERGENCY MEDICINE

## 2020-01-01 PROCEDURE — 84484 ASSAY OF TROPONIN QUANT: CPT | Performed by: EMERGENCY MEDICINE

## 2020-01-01 PROCEDURE — 3E033XZ INTRODUCTION OF VASOPRESSOR INTO PERIPHERAL VEIN, PERCUTANEOUS APPROACH: ICD-10-PCS | Performed by: INTERNAL MEDICINE

## 2020-01-01 RX ORDER — DEXTROSE 40 %
15-30 GEL (GRAM) ORAL AS NEEDED
Status: DISCONTINUED | OUTPATIENT
Start: 2020-01-01 | End: 2021-01-01 | Stop reason: HOSPADM

## 2020-01-01 RX ORDER — FUROSEMIDE 10 MG/ML
40 INJECTION INTRAMUSCULAR; INTRAVENOUS ONCE
Status: DISCONTINUED | OUTPATIENT
Start: 2020-01-01 | End: 2020-01-01

## 2020-01-01 RX ORDER — SODIUM CHLORIDE, SODIUM LACTATE, POTASSIUM CHLORIDE, CALCIUM CHLORIDE 600; 310; 30; 20 MG/100ML; MG/100ML; MG/100ML; MG/100ML
INJECTION, SOLUTION INTRAVENOUS CONTINUOUS
Status: DISCONTINUED | OUTPATIENT
Start: 2020-01-01 | End: 2020-01-01

## 2020-01-01 RX ORDER — SODIUM BICARBONATE 1 MEQ/ML
50 SYRINGE (ML) INTRAVENOUS ONCE
Status: COMPLETED | OUTPATIENT
Start: 2020-01-01 | End: 2020-01-01

## 2020-01-01 RX ORDER — INSULIN ASPART 100 [IU]/ML
3-11 INJECTION, SOLUTION INTRAVENOUS; SUBCUTANEOUS EVERY 6 HOURS
Status: DISCONTINUED | OUTPATIENT
Start: 2020-01-01 | End: 2021-01-01

## 2020-01-01 RX ORDER — METOPROLOL TARTRATE 1 MG/ML
5 INJECTION, SOLUTION INTRAVENOUS ONCE
Status: COMPLETED | OUTPATIENT
Start: 2020-01-01 | End: 2020-01-01

## 2020-01-01 RX ORDER — IPRATROPIUM BROMIDE 0.5 MG/2.5ML
0.5 SOLUTION RESPIRATORY (INHALATION) EVERY 6 HOURS
Status: DISCONTINUED | OUTPATIENT
Start: 2020-01-01 | End: 2020-01-01

## 2020-01-01 RX ORDER — HEPARIN SODIUM 10000 [USP'U]/100ML
0-4000 INJECTION, SOLUTION INTRAVENOUS
Status: DISCONTINUED | OUTPATIENT
Start: 2020-01-01 | End: 2020-01-01

## 2020-01-01 RX ORDER — DEXTROSE 50 % IN WATER (D50W) INTRAVENOUS SYRINGE
25 ONCE
Status: COMPLETED | OUTPATIENT
Start: 2020-01-01 | End: 2020-01-01

## 2020-01-01 RX ORDER — SODIUM CHLORIDE 9 MG/ML
INJECTION, SOLUTION INTRAVENOUS CONTINUOUS
Status: DISCONTINUED | OUTPATIENT
Start: 2020-01-01 | End: 2021-01-01

## 2020-01-01 RX ORDER — DEXTROSE MONOHYDRATE 50 MG/ML
INJECTION, SOLUTION INTRAVENOUS CONTINUOUS
Status: DISCONTINUED | OUTPATIENT
Start: 2020-01-01 | End: 2020-01-01

## 2020-01-01 RX ORDER — INSULIN ASPART 100 [IU]/ML
3-5 INJECTION, SOLUTION INTRAVENOUS; SUBCUTANEOUS EVERY 6 HOURS
Status: DISPENSED | OUTPATIENT
Start: 2020-01-01 | End: 2020-01-01

## 2020-01-01 RX ORDER — METOPROLOL TARTRATE 25 MG/1
25 TABLET, FILM COATED ORAL ONCE
Status: ON HOLD | COMMUNITY
End: 2020-01-01 | Stop reason: ENTERED-IN-ERROR

## 2020-01-01 RX ORDER — ALBUTEROL SULFATE 0.83 MG/ML
2.5 SOLUTION RESPIRATORY (INHALATION)
Status: DISCONTINUED | OUTPATIENT
Start: 2020-01-01 | End: 2020-01-01

## 2020-01-01 RX ORDER — IBUPROFEN 200 MG
16-32 TABLET ORAL AS NEEDED
Status: DISCONTINUED | OUTPATIENT
Start: 2020-01-01 | End: 2021-01-01 | Stop reason: HOSPADM

## 2020-01-01 RX ORDER — DEXTROSE 50 % IN WATER (D50W) INTRAVENOUS SYRINGE
25 AS NEEDED
Status: DISCONTINUED | OUTPATIENT
Start: 2020-01-01 | End: 2021-01-01 | Stop reason: HOSPADM

## 2020-01-01 RX ORDER — ALBUTEROL SULFATE 0.83 MG/ML
SOLUTION RESPIRATORY (INHALATION)
Status: DISPENSED
Start: 2020-01-01 | End: 2020-01-01

## 2020-01-01 RX ORDER — LEVOTHYROXINE SODIUM 50 UG/1
50 TABLET ORAL
Status: DISCONTINUED | OUTPATIENT
Start: 2020-01-01 | End: 2021-01-01 | Stop reason: HOSPADM

## 2020-01-01 RX ORDER — DIGOXIN 0.25 MG/ML
250 INJECTION INTRAMUSCULAR; INTRAVENOUS ONCE
Status: COMPLETED | OUTPATIENT
Start: 2020-01-01 | End: 2020-01-01

## 2020-01-01 RX ORDER — ATORVASTATIN CALCIUM 10 MG/1
10 TABLET, FILM COATED ORAL
Status: DISCONTINUED | OUTPATIENT
Start: 2020-01-01 | End: 2021-01-01 | Stop reason: HOSPADM

## 2020-01-01 RX ORDER — UMECLIDINIUM 62.5 UG/1
1 AEROSOL, POWDER ORAL DAILY
COMMUNITY

## 2020-01-01 RX ORDER — HEPARIN SODIUM 5000 [USP'U]/ML
5000 INJECTION, SOLUTION INTRAVENOUS; SUBCUTANEOUS EVERY 8 HOURS
Status: DISCONTINUED | OUTPATIENT
Start: 2020-01-01 | End: 2020-01-01

## 2020-01-01 RX ORDER — IPRATROPIUM BROMIDE AND ALBUTEROL SULFATE 2.5; .5 MG/3ML; MG/3ML
3 SOLUTION RESPIRATORY (INHALATION)
Status: DISCONTINUED | OUTPATIENT
Start: 2020-01-01 | End: 2021-01-01 | Stop reason: HOSPADM

## 2020-01-01 RX ORDER — NOREPINEPHRINE BITARTRATE 0.02 MG/ML
.5-3 INJECTION, SOLUTION INTRAVENOUS
Status: DISCONTINUED | OUTPATIENT
Start: 2020-01-01 | End: 2020-01-01

## 2020-01-01 RX ORDER — FUROSEMIDE 10 MG/ML
80 INJECTION INTRAMUSCULAR; INTRAVENOUS ONCE
Status: COMPLETED | OUTPATIENT
Start: 2020-01-01 | End: 2020-01-01

## 2020-01-01 RX ORDER — DEXTROSE MONOHYDRATE AND SODIUM CHLORIDE 5; .45 G/100ML; G/100ML
INJECTION, SOLUTION INTRAVENOUS CONTINUOUS
Status: DISCONTINUED | OUTPATIENT
Start: 2020-01-01 | End: 2020-01-01

## 2020-01-01 RX ORDER — BUDESONIDE 0.5 MG/2ML
0.5 INHALANT ORAL
Status: DISCONTINUED | OUTPATIENT
Start: 2020-01-01 | End: 2021-01-01 | Stop reason: HOSPADM

## 2020-01-01 RX ORDER — NYSTATIN 100000 [USP'U]/G
POWDER TOPICAL 2 TIMES DAILY
Status: DISCONTINUED | OUTPATIENT
Start: 2020-01-01 | End: 2021-01-01 | Stop reason: HOSPADM

## 2020-01-01 RX ORDER — ATORVASTATIN CALCIUM 10 MG/1
10 TABLET, FILM COATED ORAL DAILY
COMMUNITY

## 2020-01-01 RX ORDER — METOPROLOL SUCCINATE 25 MG/1
25 TABLET, EXTENDED RELEASE ORAL DAILY
COMMUNITY

## 2020-01-01 RX ORDER — SODIUM BICARBONATE 1 MEQ/ML
150 SYRINGE (ML) INTRAVENOUS ONCE
Status: COMPLETED | OUTPATIENT
Start: 2020-01-01 | End: 2020-01-01

## 2020-01-01 RX ORDER — LEVOTHYROXINE SODIUM 50 UG/1
50 TABLET ORAL
COMMUNITY

## 2020-01-01 RX ORDER — PHENYLEPHRINE HCL IN 0.9% NACL 50MG/250ML
10-200 PLASTIC BAG, INJECTION (ML) INTRAVENOUS
Status: DISCONTINUED | OUTPATIENT
Start: 2020-01-01 | End: 2021-01-01

## 2020-01-01 RX ADMIN — BUDESONIDE 0.5 MG: 0.5 INHALANT ORAL at 13:50

## 2020-01-01 RX ADMIN — BUDESONIDE 0.5 MG: 0.5 INHALANT ORAL at 20:42

## 2020-01-01 RX ADMIN — SODIUM CHLORIDE 500 ML: 9 INJECTION, SOLUTION INTRAVENOUS at 09:53

## 2020-01-01 RX ADMIN — SODIUM ZIRCONIUM CYCLOSILICATE 10 G: 10 POWDER, FOR SUSPENSION ORAL at 19:19

## 2020-01-01 RX ADMIN — LEVOTHYROXINE SODIUM 50 MCG: 0.05 TABLET ORAL at 05:51

## 2020-01-01 RX ADMIN — CEFTRIAXONE SODIUM 1 G: 1 INJECTION, POWDER, FOR SOLUTION INTRAMUSCULAR; INTRAVENOUS at 09:52

## 2020-01-01 RX ADMIN — AMIODARONE HYDROCHLORIDE 0.5 MG/MIN: 50 INJECTION, SOLUTION INTRAVENOUS at 06:16

## 2020-01-01 RX ADMIN — SODIUM CHLORIDE 250 ML: 9 INJECTION, SOLUTION INTRAVENOUS at 00:57

## 2020-01-01 RX ADMIN — MAGNESIUM SULFATE 2 G: 2 INJECTION INTRAVENOUS at 11:19

## 2020-01-01 RX ADMIN — DEXTROSE MONOHYDRATE: 50 INJECTION, SOLUTION INTRAVENOUS at 09:57

## 2020-01-01 RX ADMIN — SODIUM CHLORIDE, POTASSIUM CHLORIDE, SODIUM LACTATE AND CALCIUM CHLORIDE: 600; 310; 30; 20 INJECTION, SOLUTION INTRAVENOUS at 11:14

## 2020-01-01 RX ADMIN — INSULIN HUMAN 10 UNITS: 100 INJECTION, SOLUTION PARENTERAL at 21:48

## 2020-01-01 RX ADMIN — LEVOTHYROXINE SODIUM 50 MCG: 0.05 TABLET ORAL at 06:15

## 2020-01-01 RX ADMIN — NYSTATIN: 100000 POWDER TOPICAL at 05:10

## 2020-01-01 RX ADMIN — NYSTATIN: 100000 POWDER TOPICAL at 10:24

## 2020-01-01 RX ADMIN — Medication 15 MCG/MIN: at 10:24

## 2020-01-01 RX ADMIN — ALBUTEROL SULFATE 2.5 MG: 2.5 SOLUTION RESPIRATORY (INHALATION) at 23:12

## 2020-01-01 RX ADMIN — NYSTATIN: 100000 POWDER TOPICAL at 20:57

## 2020-01-01 RX ADMIN — IPRATROPIUM BROMIDE AND ALBUTEROL SULFATE 3 ML: 2.5; .5 SOLUTION RESPIRATORY (INHALATION) at 13:26

## 2020-01-01 RX ADMIN — SODIUM CHLORIDE 1500 ML: 9 INJECTION, SOLUTION INTRAVENOUS at 16:29

## 2020-01-01 RX ADMIN — SODIUM BICARBONATE: 84 INJECTION, SOLUTION INTRAVENOUS at 21:36

## 2020-01-01 RX ADMIN — NOREPINEPHRINE BITARTRATE 4 MG/250 ML (16 MCG/ML) IN 0.9 % NACL IV 2 MCG/MIN: at 01:11

## 2020-01-01 RX ADMIN — Medication 1 TABLET: at 08:15

## 2020-01-01 RX ADMIN — IPRATROPIUM BROMIDE AND ALBUTEROL SULFATE 3 ML: 2.5; .5 SOLUTION RESPIRATORY (INHALATION) at 13:48

## 2020-01-01 RX ADMIN — SODIUM BICARBONATE: 84 INJECTION, SOLUTION INTRAVENOUS at 05:01

## 2020-01-01 RX ADMIN — DEXTROSE MONOHYDRATE: 50 INJECTION, SOLUTION INTRAVENOUS at 17:02

## 2020-01-01 RX ADMIN — IPRATROPIUM BROMIDE AND ALBUTEROL SULFATE 3 ML: 2.5; .5 SOLUTION RESPIRATORY (INHALATION) at 03:45

## 2020-01-01 RX ADMIN — Medication 100 MCG/MIN: at 06:16

## 2020-01-01 RX ADMIN — SODIUM BICARBONATE 150 MEQ: 84 INJECTION, SOLUTION INTRAVENOUS at 01:11

## 2020-01-01 RX ADMIN — INSULIN ASPART 3 UNITS: 100 INJECTION, SOLUTION INTRAVENOUS; SUBCUTANEOUS at 16:57

## 2020-01-01 RX ADMIN — IPRATROPIUM BROMIDE AND ALBUTEROL SULFATE 3 ML: 2.5; .5 SOLUTION RESPIRATORY (INHALATION) at 19:50

## 2020-01-01 RX ADMIN — SODIUM CHLORIDE 1000 ML: 9 INJECTION, SOLUTION INTRAVENOUS at 03:06

## 2020-01-01 RX ADMIN — Medication 100 MCG/MIN: at 09:53

## 2020-01-01 RX ADMIN — BUDESONIDE 0.5 MG: 0.5 INHALANT ORAL at 08:11

## 2020-01-01 RX ADMIN — NYSTATIN: 100000 POWDER TOPICAL at 08:01

## 2020-01-01 RX ADMIN — NYSTATIN: 100000 POWDER TOPICAL at 08:15

## 2020-01-01 RX ADMIN — BUDESONIDE 0.5 MG: 0.5 INHALANT ORAL at 19:51

## 2020-01-01 RX ADMIN — DIGOXIN 250 MCG: 0.25 INJECTION INTRAMUSCULAR; INTRAVENOUS at 11:17

## 2020-01-01 RX ADMIN — Medication 10 MCG/MIN: at 12:26

## 2020-01-01 RX ADMIN — DEXTROSE MONOHYDRATE 25 G: 25 INJECTION, SOLUTION INTRAVENOUS at 21:47

## 2020-01-01 RX ADMIN — DEXTROSE AND SODIUM CHLORIDE: 5; 450 INJECTION, SOLUTION INTRAVENOUS at 01:12

## 2020-01-01 RX ADMIN — IPRATROPIUM BROMIDE AND ALBUTEROL SULFATE 3 ML: 2.5; .5 SOLUTION RESPIRATORY (INHALATION) at 20:42

## 2020-01-01 RX ADMIN — FUROSEMIDE 80 MG: 10 INJECTION, SOLUTION INTRAMUSCULAR; INTRAVENOUS at 15:31

## 2020-01-01 RX ADMIN — IPRATROPIUM BROMIDE AND ALBUTEROL SULFATE 3 ML: 2.5; .5 SOLUTION RESPIRATORY (INHALATION) at 13:50

## 2020-01-01 RX ADMIN — DEXTROSE AND SODIUM CHLORIDE: 5; 450 INJECTION, SOLUTION INTRAVENOUS at 13:39

## 2020-01-01 RX ADMIN — METOPROLOL TARTRATE 5 MG: 1 INJECTION, SOLUTION INTRAVENOUS at 01:43

## 2020-01-01 RX ADMIN — SODIUM CHLORIDE 250 ML: 9 INJECTION, SOLUTION INTRAVENOUS at 03:22

## 2020-01-01 RX ADMIN — HEPARIN SODIUM 5000 UNITS: 5000 INJECTION, SOLUTION INTRAVENOUS; SUBCUTANEOUS at 21:53

## 2020-01-01 RX ADMIN — IPRATROPIUM BROMIDE AND ALBUTEROL SULFATE 3 ML: 2.5; .5 SOLUTION RESPIRATORY (INHALATION) at 08:11

## 2020-01-01 RX ADMIN — INSULIN ASPART 3 UNITS: 100 INJECTION, SOLUTION INTRAVENOUS; SUBCUTANEOUS at 09:26

## 2020-01-01 RX ADMIN — ATORVASTATIN CALCIUM 10 MG: 10 TABLET, FILM COATED ORAL at 18:06

## 2020-01-01 RX ADMIN — IPRATROPIUM BROMIDE AND ALBUTEROL SULFATE 3 ML: 2.5; .5 SOLUTION RESPIRATORY (INHALATION) at 08:37

## 2020-01-01 RX ADMIN — HEPARIN SODIUM 5000 UNITS: 5000 INJECTION, SOLUTION INTRAVENOUS; SUBCUTANEOUS at 06:15

## 2020-01-01 RX ADMIN — AMIODARONE HYDROCHLORIDE 1 MG/MIN: 50 INJECTION, SOLUTION INTRAVENOUS at 19:22

## 2020-01-01 RX ADMIN — METHYLPREDNISOLONE SODIUM SUCCINATE 40 MG: 40 INJECTION, POWDER, FOR SOLUTION INTRAMUSCULAR; INTRAVENOUS at 10:24

## 2020-01-01 RX ADMIN — ATORVASTATIN CALCIUM 10 MG: 10 TABLET, FILM COATED ORAL at 17:02

## 2020-01-01 RX ADMIN — IPRATROPIUM BROMIDE AND ALBUTEROL SULFATE 3 ML: 2.5; .5 SOLUTION RESPIRATORY (INHALATION) at 21:03

## 2020-01-01 RX ADMIN — SODIUM CHLORIDE 1000 ML: 9 INJECTION, SOLUTION INTRAVENOUS at 23:45

## 2020-01-01 RX ADMIN — IPRATROPIUM BROMIDE AND ALBUTEROL SULFATE 3 ML: 2.5; .5 SOLUTION RESPIRATORY (INHALATION) at 09:25

## 2020-01-01 RX ADMIN — SODIUM CHLORIDE: 9 INJECTION, SOLUTION INTRAVENOUS at 06:54

## 2020-01-01 RX ADMIN — HEPARIN SODIUM 800 UNITS/HR: 10000 INJECTION, SOLUTION INTRAVENOUS at 13:25

## 2020-01-01 RX ADMIN — CEFTRIAXONE SODIUM 1 G: 1 INJECTION, POWDER, FOR SOLUTION INTRAMUSCULAR; INTRAVENOUS at 09:31

## 2020-01-01 RX ADMIN — HEPARIN SODIUM 5000 UNITS: 5000 INJECTION, SOLUTION INTRAVENOUS; SUBCUTANEOUS at 05:09

## 2020-01-01 RX ADMIN — METHYLPREDNISOLONE SODIUM SUCCINATE 40 MG: 40 INJECTION, POWDER, FOR SOLUTION INTRAMUSCULAR; INTRAVENOUS at 18:06

## 2020-01-01 RX ADMIN — VANCOMYCIN HYDROCHLORIDE 750 MG: 100 INJECTION, POWDER, LYOPHILIZED, FOR SOLUTION INTRAVENOUS at 18:55

## 2020-01-01 RX ADMIN — CALCIUM GLUCONATE 1000 MG: 98 INJECTION, SOLUTION INTRAVENOUS at 17:34

## 2020-01-01 RX ADMIN — ALBUTEROL SULFATE 2.5 MG: 2.5 SOLUTION RESPIRATORY (INHALATION) at 21:02

## 2020-01-01 RX ADMIN — HEPARIN SODIUM 5000 UNITS: 5000 INJECTION, SOLUTION INTRAVENOUS; SUBCUTANEOUS at 22:27

## 2020-01-01 RX ADMIN — SODIUM CHLORIDE 500 ML: 9 INJECTION, SOLUTION INTRAVENOUS at 09:27

## 2020-01-01 RX ADMIN — PIPERACILLIN AND TAZOBACTAM 3.38 G: 3; .375 INJECTION, POWDER, LYOPHILIZED, FOR SOLUTION INTRAVENOUS; PARENTERAL at 16:50

## 2020-01-01 RX ADMIN — Medication 110 MCG/MIN: at 22:56

## 2020-01-01 RX ADMIN — SODIUM CHLORIDE 250 ML: 9 INJECTION, SOLUTION INTRAVENOUS at 18:55

## 2020-01-01 RX ADMIN — HEPARIN SODIUM 5000 UNITS: 5000 INJECTION, SOLUTION INTRAVENOUS; SUBCUTANEOUS at 14:06

## 2020-01-01 RX ADMIN — Medication 1 TABLET: at 10:23

## 2020-01-01 RX ADMIN — NOREPINEPHRINE BITARTRATE 4 MG/250 ML (16 MCG/ML) IN 0.9 % NACL IV 10 MCG/MIN: at 09:27

## 2020-01-01 RX ADMIN — NYSTATIN: 100000 POWDER TOPICAL at 20:53

## 2020-01-01 RX ADMIN — CEFTRIAXONE SODIUM 1 G: 1 INJECTION, POWDER, FOR SOLUTION INTRAMUSCULAR; INTRAVENOUS at 10:23

## 2020-01-01 RX ADMIN — SODIUM BICARBONATE 50 MEQ: 84 INJECTION, SOLUTION INTRAVENOUS at 18:22

## 2020-01-01 RX ADMIN — NYSTATIN: 100000 POWDER TOPICAL at 19:51

## 2020-01-01 RX ADMIN — DEXTROSE MONOHYDRATE 25 G: 25 INJECTION, SOLUTION INTRAVENOUS at 18:29

## 2020-01-01 ASSESSMENT — ENCOUNTER SYMPTOMS
FATIGUE: 1
CARDIOVASCULAR NEGATIVE: 1
CHILLS: 0
EYE PAIN: 0
VOMITING: 0
SHORTNESS OF BREATH: 1
DYSURIA: 0
HEMATURIA: 1
APPETITE CHANGE: 1
RESPIRATORY NEGATIVE: 1
FEVER: 0
MUSCULOSKELETAL NEGATIVE: 1
HEMATOLOGIC/LYMPHATIC NEGATIVE: 1
ARTHRALGIAS: 0
SEIZURES: 0
GASTROINTESTINAL NEGATIVE: 1
ENDOCRINE NEGATIVE: 1
DIFFICULTY URINATING: 1
ABDOMINAL PAIN: 0
PSYCHIATRIC NEGATIVE: 1
FATIGUE: 1
SHORTNESS OF BREATH: 0
HEMATURIA: 1
COLOR CHANGE: 0
SORE THROAT: 0
PALPITATIONS: 0
WEAKNESS: 1
COUGH: 0
BACK PAIN: 0
FLANK PAIN: 0

## 2020-12-28 PROBLEM — N30.01 ACUTE CYSTITIS WITH HEMATURIA: Status: ACTIVE | Noted: 2020-01-01

## 2020-12-28 PROBLEM — R65.20 SEVERE SEPSIS (CMS/HCC): Status: ACTIVE | Noted: 2020-01-01

## 2020-12-28 PROBLEM — E87.4 ACIDOSIS, METABOLIC, WITH RESPIRATORY ACIDOSIS: Status: ACTIVE | Noted: 2020-01-01

## 2020-12-28 PROBLEM — I21.A1 TYPE 2 MI (MYOCARDIAL INFARCTION) (CMS/HCC): Status: ACTIVE | Noted: 2020-01-01

## 2020-12-28 PROBLEM — E87.1 HYPONATREMIA: Status: ACTIVE | Noted: 2020-01-01

## 2020-12-28 PROBLEM — K83.8 PNEUMOBILIA: Status: ACTIVE | Noted: 2020-01-01

## 2020-12-28 PROBLEM — J44.9 COPD (CHRONIC OBSTRUCTIVE PULMONARY DISEASE) (CMS/HCC): Status: ACTIVE | Noted: 2020-01-01

## 2020-12-28 PROBLEM — E87.5 HYPERKALEMIA: Status: ACTIVE | Noted: 2020-01-01

## 2020-12-28 PROBLEM — A41.9 SEVERE SEPSIS (CMS/HCC): Status: ACTIVE | Noted: 2020-01-01

## 2020-12-28 PROBLEM — N17.9 ACUTE ON CHRONIC RENAL FAILURE  (CMS/HCC): Status: ACTIVE | Noted: 2020-01-01

## 2020-12-28 PROBLEM — Z85.038 HISTORY OF COLON CANCER: Status: ACTIVE | Noted: 2020-01-01

## 2020-12-28 PROBLEM — G93.40 ENCEPHALOPATHY: Status: ACTIVE | Noted: 2020-01-01

## 2020-12-28 PROBLEM — J96.01 ACUTE RESPIRATORY FAILURE WITH HYPOXIA AND HYPERCAPNIA (CMS/HCC): Status: ACTIVE | Noted: 2020-01-01

## 2020-12-28 PROBLEM — E87.20 METABOLIC ACIDOSIS: Status: ACTIVE | Noted: 2020-01-01

## 2020-12-28 PROBLEM — Z86.73 HISTORY OF CVA (CEREBROVASCULAR ACCIDENT): Status: ACTIVE | Noted: 2020-01-01

## 2020-12-28 PROBLEM — N18.9 ACUTE ON CHRONIC RENAL FAILURE  (CMS/HCC): Status: ACTIVE | Noted: 2020-01-01

## 2020-12-28 PROBLEM — J96.02 ACUTE RESPIRATORY FAILURE WITH HYPOXIA AND HYPERCAPNIA (CMS/HCC): Status: ACTIVE | Noted: 2020-01-01

## 2020-12-28 PROBLEM — E16.2 HYPOGLYCEMIA: Status: ACTIVE | Noted: 2020-01-01

## 2020-12-28 NOTE — ED PROVIDER NOTES
HPI     Chief Complaint   Patient presents with   • Altered Mental Status   • Fatigue       HPI  Pt is a 89 y/o F w/ PMH of colon cancer, COPD, CKD, stroke, who presents to the ED for AMS / generalized weakness    Patient is accompanied by her son who gave most of the history.  He states the patient has been very weak for the last several days.  She is also more confused than usual.  She has had very poor PO intake for several days.  She straight caths at home and has had urinary tract infections previously.  Patient denies any abdominal pain or back pain.  No fevers or chills at home.  No cough, shortness of breath, chest pain, nausea, vomiting.  Patient has chronic diarrhea due to her previous colectomy secondary to her colon cancer.  No known sick contacts.       Patient History     Past Medical History:   Diagnosis Date   • Chronic kidney failure    • Colon cancer (CMS/HCC)    • COPD (chronic obstructive pulmonary disease) (CMS/HCC)    • Stroke (cerebrum) (CMS/HCC)    • UTI (urinary tract infection)        Past Surgical History:   Procedure Laterality Date   • CARDIAC SURGERY     • COLON SURGERY     • HIP SURGERY     • KNEE SURGERY         History reviewed. No pertinent family history.    Social History     Tobacco Use   • Smoking status: Never Smoker   • Smokeless tobacco: Never Used   Substance Use Topics   • Alcohol use: Yes   • Drug use: Not Currently       Systems Reviewed from Nursing Triage:          Review of Systems     Review of Systems   Constitutional: Positive for appetite change and fatigue. Negative for chills and fever.   HENT: Negative for ear pain and sore throat.    Eyes: Negative for pain and visual disturbance.   Respiratory: Negative for cough and shortness of breath.    Cardiovascular: Negative for chest pain and palpitations.   Gastrointestinal: Negative for abdominal pain and vomiting.   Genitourinary: Positive for hematuria. Negative for dysuria and flank pain.   Musculoskeletal:  Negative for arthralgias and back pain.   Skin: Negative for color change and rash.   Neurological: Negative for seizures and syncope.   All other systems reviewed and are negative.       Physical Exam     ED Vitals    Date/Time Temp Pulse Resp BP SpO2 [facility]   12/28/20 1900 35.9 °C (96.7 °F) 116 22 103/59 100 % LC   12/28/20 1800 -- 108 24 112/59 100 % LC   12/28/20 1631 -- 98 20 111/56 100 % LC   12/28/20 1600 -- 96 21 111/56 100 % LC   12/28/20 1522 36.1 °C (97 °F) 101 22 82/44 100 % CW                                                           Physical Exam  Vitals signs and nursing note reviewed.   Constitutional:       General: She is not in acute distress.     Comments: The pt appears frail, is answering some questions appropriately   HENT:      Head: Normocephalic and atraumatic.      Mouth/Throat:      Pharynx: Oropharynx is clear.   Eyes:      Extraocular Movements: Extraocular movements intact.      Conjunctiva/sclera: Conjunctivae normal.      Pupils: Pupils are equal, round, and reactive to light.   Neck:      Musculoskeletal: Normal range of motion and neck supple.   Cardiovascular:      Rate and Rhythm: Regular rhythm. Tachycardia present.      Heart sounds: No murmur.   Pulmonary:      Effort: Pulmonary effort is normal. No respiratory distress.      Breath sounds: Normal breath sounds.   Abdominal:      Palpations: Abdomen is soft.      Tenderness: There is no abdominal tenderness.   Skin:     General: Skin is warm and dry.   Neurological:      Mental Status: She is alert.      Comments: Pt is oriented to name and place only  5/5 strength in bilateral upper and lower extremities  Sensation intact to light touch throughout  CN II-XII intact              Procedures    Results     Procedure Component Value Units Date/Time    SARS-CoV-2 (COVID-19), PCR Nasopharynx [[id number]] Collected: 12/28/20 1840    Specimen: Nasopharyngeal Swab from Nasopharynx Updated: 12/28/20 1846    Narrative:      The following  orders were created for panel order SARS-CoV-2 (COVID-19), PCR Nasopharynx.  Procedure                               Abnormality         Status                     ---------                               -----------         ------                     SARS-CoV-2 (COVID-19), P...[[id number]]                      In process                   Please view results for these tests on the individual orders.    SARS-CoV-2 (COVID-19), PCR Nasopharynx [[id number]] Collected: 12/28/20 1840    Specimen: Nasopharyngeal Swab from Nasopharynx Updated: 12/28/20 1846    Urinalysis with Reflex Culture [[id number]]  (Abnormal) Collected: 12/28/20 1627    Specimen: Urine, Clean Catch Updated: 12/28/20 1744    Narrative:      The following orders were created for panel order Urinalysis with Reflex Culture.  Procedure                               Abnormality         Status                     ---------                               -----------         ------                     UA Reflex to Culture (Ma...[[id number]]  Abnormal            Final result               UA Microscopic[[id number]]               Abnormal            Final result                 Please view results for these tests on the individual orders.    UA Microscopic [[id number]]  (Abnormal) Collected: 12/28/20 1627    Specimen: Urine, Clean Catch Updated: 12/28/20 1744     RBC, Urine Too Numerous To Count /HPF      WBC, Urine Too Numerous To Count /HPF      Squamous Epithelial None Seen /hpf      Hyaline Casts 0 TO 2 /lpf      Bacteria, Urine +3 /HPF     UA Reflex to Culture (Macroscopic) [[id number]]  (Abnormal) Collected: 12/28/20 1627    Specimen: Urine, Clean Catch Updated: 12/28/20 1743     Color, Urine Red     Clarity, Urine Turbid     Specific Gravity, Urine 1.017     pH, Urine Proper identification not possible due to color interference.     Leukocyte Esterase +3     Nitrite, Urine Proper identification not possible due to color interference.     Protein, Urine Proper  identification not possible due to color interference.     Glucose, Urine       Proper identification not possible due to color interference.     mg/dL     Ketones, Urine       Proper identification not possible due to color interference.     mg/dL     Urobilinogen, Urine       Proper identification not possible due to color interference.     EU/dL     Bilirubin, Urine       Proper identification not possible due to color interference.     mg/dL     Blood, Urine +3     Comment: The sensitivity of the occult blood test is equivalent to approximately 4 intact RBC/HPF.       Blood Culture Blood, Venous [[id number]] Collected: 12/28/20 1648    Specimen: Blood, Venous Updated: 12/28/20 1704    Comprehensive metabolic panel [[id number]]  (Abnormal) Collected: 12/28/20 1603    Specimen: Blood, Venous Updated: 12/28/20 1702     Sodium 127 mEQ/L      Potassium 7.9 mEQ/L      Comment: Results obtained on plasma. Plasma Potassium values may be up to 0.4 mEQ/L less than serum values. The differences may be greater for patients with high platelet or white cell counts.        Chloride 98 mEQ/L      CO2 10 mEQ/L       mg/dL      Comment: Checked by dilution        Creatinine 6.4 mg/dL      Glucose 88 mg/dL      Calcium 9.6 mg/dL      AST (SGOT) 20 IU/L      ALT (SGPT) 24 IU/L      Alkaline Phosphatase 145 IU/L      Total Protein 7.2 g/dL      Comment: Test performed on plasma which typically contains approximately 0.4 g/dL more protein than serum.        Albumin 2.7 g/dL      Bilirubin, Total 1.3 mg/dL      eGFR 6.0 mL/min/1.73m*2      Anion Gap 19 mEQ/L     CBC and differential [[id number]]  (Abnormal) Collected: 12/28/20 1603    Specimen: Blood, Venous Updated: 12/28/20 1655     WBC 30.68 K/uL      Comment: RESULTS CHECKED. This result has been called to ANDRESSA VARGHESE by Jennifer Christianson on 12 28 20 at 16:38, and has been read back.         RBC 5.45 M/uL      Hemoglobin 13.8 g/dL      Hematocrit 44.2 %      MCV 81.1 fL       MCH 25.3 pg      MCHC 31.2 g/dL      RDW 17.9 %      Platelets 319 K/uL      MPV 9.4 fL      Differential Type Manu     Neutrophils 90 %      Lymphocytes 4 %      Monocytes 2 %      Eosinophils 0 %      Basophils 0 %      Bands 4 %      Neutrophils, Absolute 27.61 K/uL      Lymphocytes, Absolute 1.23 K/uL      Monocytes, Absolute 0.61 K/uL      Eosinophils, Absolute 0.00 K/uL      Basophils, Absolute 0.00 K/uL      Bands, Absolute 1.23 K/uL      Platelet Estimate Adequate (150,000-400,000)     Clumped Platelets Present     Toxic Granulation Slight     Ovalocytes Occasional     Acanthocytes 2+     Yolyn Cells 1+    Troponin I [[id number]]  (Abnormal) Collected: 12/28/20 1603    Specimen: Blood, Venous Updated: 12/28/20 1636     Troponin I 0.06 ng/mL      Comment: Result requires test to be repeated on new specimen 4-6 hours after the original.       Blood gas, venous [[id number]]  (Abnormal) Collected: 12/28/20 1603    Specimen: Blood, Venous Updated: 12/28/20 1618     pH, Venous 7.10     pCO2, Venous 32 mm Hg      pO2, Venous 46 mm Hg      HCO3, Venous 9.5 mEQ/L      Base Excess, Venous -18.7 mEQ/L      Source Of Oxygen RA     TCO2, Venous 10.9 mEQ/L      FIO2 RA    Lactic acid, Venous [[id number]]  (Abnormal) Collected: 12/28/20 1603    Specimen: Blood, Venous Updated: 12/28/20 1613     Lactate 2.4 mmol/L     RAINBOW LT BLUE [[id number]] Collected: 12/28/20 1603    Specimen: Blood, Venous Updated: 12/28/20 1611    RAINBOW RED [[id number]] Collected: 12/28/20 1603    Specimen: Blood, Venous Updated: 12/28/20 1611    [place] Draw Panel [[id number]] Collected: 12/28/20 1603    Specimen: Blood, Venous Updated: 12/28/20 1611    Narrative:      The following orders were created for panel order [place] Draw Panel.  Procedure                               Abnormality         Status                     ---------                               -----------         ------                     RAINBOW RED[[id number]]                                       In process                 ANTON VELEZ BLUE[[id number]]                                  In process                 ANTON GOLD[[id number]]                                     In process                   Please view results for these tests on the individual orders.    ANTON GOLD [[id number]] Collected: 12/28/20 1603    Specimen: Blood, Venous Updated: 12/28/20 1611    Blood Culture Blood, Venous [[id number]] Collected: 12/28/20 1603    Specimen: Blood, Venous Updated: 12/28/20 1610          Imaging Results          CT ABDOMEN PELVIS WITHOUT IV CONTRAST (Edited Result - FINAL)  Result time 12/28/20 17:55:46    Addendum 1 of 1    Additionally noted in the impression should be incidentally noted pneumobilia,  which is a nonspecific finding in a patient status post cholecystectomy.  Correlate clinically.  Further evaluation with right upper quadrant ultrasound  could be considered.               Final result                 Impression:    IMPRESSION:  1.  Limited evaluation in the absence of intravenous or oral contrast and due to  patient motion.  No definite hydronephrosis or renal calculi as clinically  questioned, noting limited evaluation of the distal ureters due to the patient's  right hip prosthesis.  2.  Circumferential bladder wall thickening and intravesicular air within the  urinary bladder, concerning for urinary tract infection.  3.  Age-indeterminate mild compression deformities of T11, T12 and L3.             Narrative:    CLINICAL HISTORY:89-year-old female with concern for urosepsis.    COMPARISON: None available    COMMENT:    TECHNIQUE: CT of the abdomen and pelvis was performed with the patient in the  supine position. Images reconstructed/reformatted in the axial, coronal and  sagittal planes.  CT DOSE:  One or more dose reduction techniques (e.g., Automated exposure  control, adjustment of the mA and/or kV according to the patient size, use of  iterative reconstruction technique)  were utilized for this examination.  ORAL CONTRAST: None.  INTRAVENOUS CONTRAST: No intravenous contrast given.    The lack of intravenous contrast limits interpretation of the solid organs,  vessels and certain processes.    LOWER CHEST: Partially imaged postsurgical changes of aortic valvuloplasty.  Extensive mitral annular calcifications are noted.  There is partially imaged  cardiomegaly.    LIVER: Within normal limits.  BILE DUCTS: Pneumobilia is noted within the common and left bile ducts.  GALLBLADDER: Surgically absent.  PANCREAS: Within normal limits.  SPLEEN: Within normal limits.  ADRENALS: Within normal limits.  KIDNEYS/URETERS/BLADDER: The kidneys are normal in size and contour. No  hydronephrosis. No renal or proximal ureteric calculi.  Evaluation of the distal  ureters is nondiagnostic due to metallic streak artifact by the right hip  prosthesis.  There is suggestion of circumferential bladder wall thickening,  with a small foci of intravesicular air, raising concern for urinary tract  infection.  Correlate with urinalysis.  REPRODUCTIVE ORGANS: Highly limited evaluation due to extensive metallic streak  artifact.    BOWEL: The stomach, and small bowel are normal in course and caliber.  Postsurgical changes of prior hemicolectomy are noted, with extensive sigmoid  diverticulosis.  No acute inflammatory or obstructive process, noting somewhat  limited evaluation the absence of intravenous or oral contrast..  PERITONEUM: No ascites or free air, no fluid collection  VESSELS: Atherosclerotic disease of the normal caliber aortoiliac system.  LYMPH NODES: within normal limits.  ABDOMINAL WALL: Within normal limits.  BONES: Severe diffuse osseous demineralization is noted.  There is a severe  dextroconvex curvature of the lumbar spine.  A right hip prosthesis is noted.  Age-indeterminate compression deformities of the L3, T11 and T12 vertebral  bodies are noted.                               X-RAY CHEST 1  VIEW (Final result)  Result time 12/28/20 16:25:25    Final result                 Impression:    IMPRESSION: Linear atelectasis or scar in the left midlung zone.    COMMENT: Erect AP portable view of the chest was performed.  We have no prior  studies for comparison.    The heart size is towards the upper limits of normal.  A TAVR prosthesis is  noted.  The pulmonary vasculature is not engorged.    Linear opacity in the left midlung zone is noted, likely an area of discoid  atelectasis or scar.  The lung fields are otherwise clear, although EKG leads  and wires could obscure small lesions.  No pleural effusions are seen.    No hilar abnormality is seen.  There is calcification of the thoracic aorta.    Bilateral rotator cuff tears are suspected.             Narrative:    CLINICAL HISTORY: Patient mental status                                ECG 12 lead         ECG 12 lead    (Results Pending)               ED Course & MDM     MDM  Pt is a 89 y/o F w/ PMH of colon cancer, COPD, CKD, stroke, who presents to the ED for AMS / generalized weakness patient is found to be in urosepsis causing significant endorgan dysfunction.  She is in acute renal failure with hyperkalemia 7.9.  She has has a very significant leukocytosis.  She was treated with IV fluids and broad-spectrum antibiotics.  Hyperkalemia was treated with calcium, sodium zirconium, bicarb.  Her metabolic acidosis is likely combination of her significant uremia as well as her lactic acidosis.  I spoke with the nephrologist who recommended against starting dialysis at this time.  Patient is admitted to the ICU for further care.  Patient is full code at this time.       ED Course as of Dec 28 2005   Mon Dec 28, 2020   1626 pH, Venous(!!): 7.10 [KH]   1626 Lactate(!): 2.4 [KH]   1626 pCO2, Venous(!): 32 [KH]   1628 IMPRESSION: Linear atelectasis or scar in the left midlung zone.   X-RAY CHEST 1 VIEW [KH]   1638 Call from lab: WBC 30.6    [LK]   1639 WBC(!!): 30.68  [KH]   1640 Hemoglobin: 13.8 [KH]   1640 Platelets: 319 [KH]   1640 Troponin I(!): 0.06 [KH]   1655 Bands, Absolute(!): 1.23 [KH]   1703 Sodium(!): 127 [KH]   1703 Potassium(!!): 7.9 [KH]   1703 CO2(!!): 10 [KH]   1703 BUN(!!): 160 [KH]   1703 Creatinine(!!): 6.4 [KH]   1704 A fib, right axis, poor R wave progression, normal QTc, no ST changes or other signs of ischemia.  No significant peaked T waves or other signs of hyperkalemia.   ECG 12 lead [KH]   1737 I spoke with the nephrologist on-call.  He stated there is no need for urgent dialysis at this time.  Recommend starting a bicarb drip at 150 mL/ hour in addition to the other treatments that were already given.    [KH]   1745 WBC, Urine(!): Too Numerous To Count [KH]   1745 RBC, Urine(!): Too Numerous To Count [KH]   1745 Leukocyte Esterase(!): +3 [KH]   1804 IMPRESSION:  1.  Limited evaluation in the absence of intravenous or oral contrast and due to  patient motion.  No definite hydronephrosis or renal calculi as clinically  questioned, noting limited evaluation of the distal ureters due to the patient's  right hip prosthesis.  2.  Circumferential bladder wall thickening and intravesicular air within the  urinary bladder, concerning for urinary tract infection.  3.  Age-indeterminate mild compression deformities of T11, T12 and L3.   CT ABDOMEN PELVIS WITHOUT IV CONTRAST [KH]   1807 I called the ICU and they will evaluate the patient    [KH]   1845 I was informed glucose was 37 BEFORE giving the insulin.  D50 given.  Insulin being held.  Will recheck.    [KH]   1845 D/w Faith from ICU team. She is in ED to evaluate pt. for ICU admission.    [LK]   1911 POCT Bedside Glucose(!): 135 [KH]      ED Course User Index  [KH] Leighton Greene MD  [LK] Yaz Gramajo MD         Clinical Impressions as of Dec 28 2005   Severe sepsis (CMS/HCC)   Acute cystitis with hematuria   Acute renal failure, unspecified acute renal failure type (CMS/HCC)   Hyperkalemia         Leighton Greene MD  Resident  12/28/20 1910       Leighton Greene MD  Resident  12/28/20 2005

## 2020-12-28 NOTE — ED ATTESTATION NOTE
-----------------------------------------------------------  ED Attending Note.  12/28/2020, 3:44 PM    I supervised care provided by the Resident (Ramona). We have discussed the case. I have reviewed their note and agree with the plan of treatment. I personally interviewed the patient and examined the patient.    Nancy Minor is a 89 y.o. female with the following   Past Medical History:   Diagnosis Date   • Chronic kidney failure    • Colon cancer (CMS/HCC)    • COPD (chronic obstructive pulmonary disease) (CMS/HCC)    • Stroke (cerebrum) (CMS/[facility])    • UTI (urinary tract infection)    , There is no problem list on file for this patient.   and   Past Surgical History:   Procedure Laterality Date   • CARDIAC SURGERY     • COLON SURGERY     • HIP SURGERY     • KNEE SURGERY      who comes to the ED today with   Chief Complaint   Patient presents with   • Altered Mental Status   • Fatigue       PMH as above c/o AMS and fatigue over the past several days. Sx are similar to when she has been dx'd with UTIs in the past.     PHYSICAL EXAM  ED Vitals    Date/Time Temp Pulse Resp BP SpO2 [facility]   12/28/20 1631 -- 98 20 111/56 100 % LC   12/28/20 1600 -- 96 21 111/56 100 % LC   12/28/20 1522 36.1 °C (97 °F) 101 22 82/44 100 % CW         Physical Exam  Vitals signs reviewed.   Constitutional:       Comments: Listless   HENT:      Head: Normocephalic and atraumatic.   Cardiovascular:      Rate and Rhythm: Normal rate and regular rhythm.   Pulmonary:      Effort: Pulmonary effort is normal. No tachypnea.      Breath sounds: Normal breath sounds.   Abdominal:      Tenderness: There is no abdominal tenderness.   Neurological:      General: No focal deficit present.       RESULTS: LABS, IMAGING, EKG  Vital sign review: SpO2: 100 % on room air. Interpretation: normal  Results     Procedure Component Value Units Date/Time    Urinalysis with Reflex Culture [[id number]]  (Abnormal) Collected: 12/28/20 1627    Specimen: Urine, Clean  Catch Updated: 12/28/20 1744    Narrative:      The following orders were created for panel order Urinalysis with Reflex Culture.  Procedure                               Abnormality         Status                     ---------                               -----------         ------                     UA Reflex to Culture (Ma...[[id number]]  Abnormal            Final result               UA Microscopic[[id number]]               Abnormal            Final result                 Please view results for these tests on the individual orders.    UA Microscopic [[id number]]  (Abnormal) Collected: 12/28/20 1627    Specimen: Urine, Clean Catch Updated: 12/28/20 1744     RBC, Urine Too Numerous To Count /HPF      WBC, Urine Too Numerous To Count /HPF      Squamous Epithelial None Seen /hpf      Hyaline Casts 0 TO 2 /lpf      Bacteria, Urine +3 /HPF     UA Reflex to Culture (Macroscopic) [[id number]]  (Abnormal) Collected: 12/28/20 1627    Specimen: Urine, Clean Catch Updated: 12/28/20 1743     Color, Urine Red     Clarity, Urine Turbid     Specific Gravity, Urine 1.017     pH, Urine Proper identification not possible due to color interference.     Leukocyte Esterase +3     Nitrite, Urine Proper identification not possible due to color interference.     Protein, Urine Proper identification not possible due to color interference.     Glucose, Urine       Proper identification not possible due to color interference.     mg/dL     Ketones, Urine       Proper identification not possible due to color interference.     mg/dL     Urobilinogen, Urine       Proper identification not possible due to color interference.     EU/dL     Bilirubin, Urine       Proper identification not possible due to color interference.     mg/dL     Blood, Urine +3     Comment: The sensitivity of the occult blood test is equivalent to approximately 4 intact RBC/HPF.       Blood Culture Blood, Venous [[id number]] Collected: 12/28/20 1648    Specimen: Blood,  Venous Updated: 12/28/20 1704    Comprehensive metabolic panel [[id number]]  (Abnormal) Collected: 12/28/20 1603    Specimen: Blood, Venous Updated: 12/28/20 1702     Sodium 127 mEQ/L      Potassium 7.9 mEQ/L      Comment: Results obtained on plasma. Plasma Potassium values may be up to 0.4 mEQ/L less than serum values. The differences may be greater for patients with high platelet or white cell counts.        Chloride 98 mEQ/L      CO2 10 mEQ/L       mg/dL      Comment: Checked by dilution        Creatinine 6.4 mg/dL      Glucose 88 mg/dL      Calcium 9.6 mg/dL      AST (SGOT) 20 IU/L      ALT (SGPT) 24 IU/L      Alkaline Phosphatase 145 IU/L      Total Protein 7.2 g/dL      Comment: Test performed on plasma which typically contains approximately 0.4 g/dL more protein than serum.        Albumin 2.7 g/dL      Bilirubin, Total 1.3 mg/dL      eGFR 6.0 mL/min/1.73m*2      Anion Gap 19 mEQ/L     CBC and differential [[id number]]  (Abnormal) Collected: 12/28/20 1603    Specimen: Blood, Venous Updated: 12/28/20 1655     WBC 30.68 K/uL      Comment: RESULTS CHECKED. This result has been called to ANDRESSA VARGHESE by Jennifer Christianson on 12 28 20 at 16:38, and has been read back.         RBC 5.45 M/uL      Hemoglobin 13.8 g/dL      Hematocrit 44.2 %      MCV 81.1 fL      MCH 25.3 pg      MCHC 31.2 g/dL      RDW 17.9 %      Platelets 319 K/uL      MPV 9.4 fL      Differential Type Manu     Neutrophils 90 %      Lymphocytes 4 %      Monocytes 2 %      Eosinophils 0 %      Basophils 0 %      Bands 4 %      Neutrophils, Absolute 27.61 K/uL      Lymphocytes, Absolute 1.23 K/uL      Monocytes, Absolute 0.61 K/uL      Eosinophils, Absolute 0.00 K/uL      Basophils, Absolute 0.00 K/uL      Bands, Absolute 1.23 K/uL      Platelet Estimate Adequate (150,000-400,000)     Clumped Platelets Present     Toxic Granulation Slight     Ovalocytes Occasional     Acanthocytes 2+     Noel Cells 1+    Troponin I [[id number]]  (Abnormal)  Collected: 12/28/20 1603    Specimen: Blood, Venous Updated: 12/28/20 1636     Troponin I 0.06 ng/mL      Comment: Result requires test to be repeated on new specimen 4-6 hours after the original.       Blood gas, venous [[id number]]  (Abnormal) Collected: 12/28/20 1603    Specimen: Blood, Venous Updated: 12/28/20 1618     pH, Venous 7.10     pCO2, Venous 32 mm Hg      pO2, Venous 46 mm Hg      HCO3, Venous 9.5 mEQ/L      Base Excess, Venous -18.7 mEQ/L      Source Of Oxygen RA     TCO2, Venous 10.9 mEQ/L      FIO2 RA    Lactic acid, Venous [[id number]]  (Abnormal) Collected: 12/28/20 1603    Specimen: Blood, Venous Updated: 12/28/20 1613     Lactate 2.4 mmol/L     RAINBOW LT BLUE [[id number]] Collected: 12/28/20 1603    Specimen: Blood, Venous Updated: 12/28/20 1611    RAINBOW RED [[id number]] Collected: 12/28/20 1603    Specimen: Blood, Venous Updated: 12/28/20 1611    [place] Draw Panel [[id number]] Collected: 12/28/20 1603    Specimen: Blood, Venous Updated: 12/28/20 1611    Narrative:      The following orders were created for panel order [place] Draw Panel.  Procedure                               Abnormality         Status                     ---------                               -----------         ------                     RAINBOW RED[[id number]]                                      In process                 RAINBOW LT BLUE[[id number]]                                  In process                 RAINBOW GOLD[[id number]]                                     In process                   Please view results for these tests on the individual orders.    RAINBOW GOLD [[id number]] Collected: 12/28/20 1603    Specimen: Blood, Venous Updated: 12/28/20 1611    Blood Culture Blood, Venous [[id number]] Collected: 12/28/20 1603    Specimen: Blood, Venous Updated: 12/28/20 1610          Imaging Results          CT ABDOMEN PELVIS WITHOUT IV CONTRAST (Edited Result - FINAL)  Result time 12/28/20 17:55:46    Addendum 1 of 1     Additionally noted in the impression should be incidentally noted pneumobilia,  which is a nonspecific finding in a patient status post cholecystectomy.  Correlate clinically.  Further evaluation with right upper quadrant ultrasound  could be considered.               Final result                 Impression:    IMPRESSION:  1.  Limited evaluation in the absence of intravenous or oral contrast and due to  patient motion.  No definite hydronephrosis or renal calculi as clinically  questioned, noting limited evaluation of the distal ureters due to the patient's  right hip prosthesis.  2.  Circumferential bladder wall thickening and intravesicular air within the  urinary bladder, concerning for urinary tract infection.  3.  Age-indeterminate mild compression deformities of T11, T12 and L3.             Narrative:    CLINICAL HISTORY:89-year-old female with concern for urosepsis.    COMPARISON: None available    COMMENT:    TECHNIQUE: CT of the abdomen and pelvis was performed with the patient in the  supine position. Images reconstructed/reformatted in the axial, coronal and  sagittal planes.  CT DOSE:  One or more dose reduction techniques (e.g., Automated exposure  control, adjustment of the mA and/or kV according to the patient size, use of  iterative reconstruction technique) were utilized for this examination.  ORAL CONTRAST: None.  INTRAVENOUS CONTRAST: No intravenous contrast given.    The lack of intravenous contrast limits interpretation of the solid organs,  vessels and certain processes.    LOWER CHEST: Partially imaged postsurgical changes of aortic valvuloplasty.  Extensive mitral annular calcifications are noted.  There is partially imaged  cardiomegaly.    LIVER: Within normal limits.  BILE DUCTS: Pneumobilia is noted within the common and left bile ducts.  GALLBLADDER: Surgically absent.  PANCREAS: Within normal limits.  SPLEEN: Within normal limits.  ADRENALS: Within normal  limits.  KIDNEYS/URETERS/BLADDER: The kidneys are normal in size and contour. No  hydronephrosis. No renal or proximal ureteric calculi.  Evaluation of the distal  ureters is nondiagnostic due to metallic streak artifact by the right hip  prosthesis.  There is suggestion of circumferential bladder wall thickening,  with a small foci of intravesicular air, raising concern for urinary tract  infection.  Correlate with urinalysis.  REPRODUCTIVE ORGANS: Highly limited evaluation due to extensive metallic streak  artifact.    BOWEL: The stomach, and small bowel are normal in course and caliber.  Postsurgical changes of prior hemicolectomy are noted, with extensive sigmoid  diverticulosis.  No acute inflammatory or obstructive process, noting somewhat  limited evaluation the absence of intravenous or oral contrast..  PERITONEUM: No ascites or free air, no fluid collection  VESSELS: Atherosclerotic disease of the normal caliber aortoiliac system.  LYMPH NODES: within normal limits.  ABDOMINAL WALL: Within normal limits.  BONES: Severe diffuse osseous demineralization is noted.  There is a severe  dextroconvex curvature of the lumbar spine.  A right hip prosthesis is noted.  Age-indeterminate compression deformities of the L3, T11 and T12 vertebral  bodies are noted.                               X-RAY CHEST 1 VIEW (Final result)  Result time 12/28/20 16:25:25    Final result                 Impression:    IMPRESSION: Linear atelectasis or scar in the left midlung zone.    COMMENT: Erect AP portable view of the chest was performed.  We have no prior  studies for comparison.    The heart size is towards the upper limits of normal.  A TAVR prosthesis is  noted.  The pulmonary vasculature is not engorged.    Linear opacity in the left midlung zone is noted, likely an area of discoid  atelectasis or scar.  The lung fields are otherwise clear, although EKG leads  and wires could obscure small lesions.  No pleural effusions are  seen.    No hilar abnormality is seen.  There is calcification of the thoracic aorta.    Bilateral rotator cuff tears are suspected.             Narrative:    CLINICAL HISTORY: Patient mental status                               ED Course as of Dec 28 1819   Mon Dec 28, 2020   1626 pH, Venous(!!): 7.10 [KH]   1626 Lactate(!): 2.4 [KH]   1626 pCO2, Venous(!): 32 [KH]   1628 IMPRESSION: Linear atelectasis or scar in the left midlung zone.   X-RAY CHEST 1 VIEW [KH]   1638 Call from lab: WBC 30.6    [LK]   1639 WBC(!!): 30.68 [KH]   1640 Hemoglobin: 13.8 [KH]   1640 Platelets: 319 [KH]   1640 Troponin I(!): 0.06 [KH]   1655 Bands, Absolute(!): 1.23 [KH]   1703 Sodium(!): 127 [KH]   1703 Potassium(!!): 7.9 [KH]   1703 CO2(!!): 10 [KH]   1703 BUN(!!): 160 [KH]   1703 Creatinine(!!): 6.4 [KH]   1704 A fib, right axis, poor R wave progression, normal QTc, no ST changes or other signs of ischemia.  No significant peaked T waves or other signs of hyperkalemia.   ECG 12 lead [KH]   1737 I spoke with the nephrologist on-call.  He stated there is no need for urgent dialysis at this time.  Recommend starting a bicarb drip at 150 mL/ hour in addition to the other treatments that were already given.    [KH]   1745 WBC, Urine(!): Too Numerous To Count [KH]   1745 RBC, Urine(!): Too Numerous To Count [KH]   1745 Leukocyte Esterase(!): +3 [KH]   1804 IMPRESSION:  1.  Limited evaluation in the absence of intravenous or oral contrast and due to  patient motion.  No definite hydronephrosis or renal calculi as clinically  questioned, noting limited evaluation of the distal ureters due to the patient's  right hip prosthesis.  2.  Circumferential bladder wall thickening and intravesicular air within the  urinary bladder, concerning for urinary tract infection.  3.  Age-indeterminate mild compression deformities of T11, T12 and L3.   CT ABDOMEN PELVIS WITHOUT IV CONTRAST [KH]   1807 I called the ICU and they will evaluate the patient    [KH]       ED Course User Index  [KH] Leighton Greene MD  [LK] Yaz Gramajo MD         Clinical Impressions as of Dec 28 1819   Severe sepsis (CMS/[facility])   Acute cystitis with hematuria   Acute renal failure, unspecified acute renal failure type (CMS/[facility])   Hyperkalemia       CRITICAL CARE NOTE:  Upon my evaluation, this patient had a high probability of imminent or life-threatening deterioration due to Sepsis with severe hyperkalemia requiring emergent stabilizing treatment in the ED, which required my direct attention, intervention, and personal management.    I have personally provided 45 minutes of critical care time exclusive of time spent on separately billable procedures. Time includes review of all data, discussion with consultants/admitting physicians, re-evaluation of patient, discussion with patient and/or family, review of all results, and monitoring for potential decompensation.       -----------------------------  Yaz Gramajo MD  12/28/2020, 3:44 PM  -----------------------------------------------------------     Yaz Gramajo MD  12/30/20 5920

## 2020-12-29 PROBLEM — I48.20 CHRONIC ATRIAL FIBRILLATION (CMS/HCC): Status: ACTIVE | Noted: 2020-01-01

## 2020-12-29 PROBLEM — A41.9 SEPTIC SHOCK (CMS/HCC): Status: ACTIVE | Noted: 2020-01-01

## 2020-12-29 PROBLEM — R65.21 SEPTIC SHOCK (CMS/HCC): Status: ACTIVE | Noted: 2020-01-01

## 2020-12-29 NOTE — PLAN OF CARE
Problem: Adult Inpatient Plan of Care  Goal: Readiness for Transition of Care  Intervention: Mutually Develop Transition Plan  Flowsheets (Taken 12/29/2020 1415)  Anticipated Discharge Disposition:  • home with home health services VS  • skilled nursing facility  Assistive Device/Animal Currently Used at Home:  • walker, front-wheeled  • raised toilet  • shower chair  Anticipated Changes Related to Illness: none  Outpatient/Agency/Support Group Needs:  • homecare agency  • skilled nursing facility  Transportation Concerns: car, none  Current Discharge Risk: dependent with mobility/activities of daily living  Readmission Within the Last 30 Days: no previous admission in last 30 days  Patient/Family Anticipated Services at Transition:  • home health care  • skilled nursing  Patient/Family Anticipates Transition to: home with family  Transportation Anticipated: family or friend will provide  Concerns to be Addressed: care coordination/care conferences  RNCC reviewed chart and discussed status in rounds. Attempted to meet w/pt however she was under aerosol precautions and very somnolent. Called dtr Arabella and explained role/contact. Verified demographics, PCP, insurance, and pharmacy information. Pt was in area visiting her son. She lives in [facility] w/dtr [facility] and [facility] [place] [facility] [place]. They have purchased a home with 0 GUILLERMO and are preparing to move in late January. Pt was Ax1 for transfers and toileting pta. Occasionally incontinent of stool. Feeds self and washes self. Usually ambulates once a day 10-15' w/RW. Family provides transport and shops. Arabella stated the plan is for pt to d/c to 1st fl set up at son Andrew's home w/HC upon d/c. Andrew's address is:  [address] [place] [address]  [id number]    She stated they use [facility] fo prescriptions due to pt's [employer] Part D plan. We discussed HC vs SNF. She was in agreement to wait and see if pt can progress enough to go home w/HC. She advised they would want [facility]  upon d/c. Let her know we will check to see if they can work under PCP in [place]. She will also call pt's PCP to see if they can provide telehealth follow up visits while pt remains in [place] until end of January. Otherwise her two brothers who are local are working on getting pt set up with a local PCP. We also discussed [facility] as [facility] is a  of a [employer] and exploring options for assistance through the local Dept of [facility]. Dtr denied barriers to accessing f/u care after d/c.   [facility] [place] [facility] their ability to work under out of state PCP. Will follow for ongoing transition of care needs.

## 2020-12-29 NOTE — PATIENT CARE CONFERENCE
Care Progression Rounds Note  Date: 12/29/2020  Time: 11:56 AM     Patient Name: Nancy Minor     Medical Record Number: [id number]   YOB: 1923  Sex: Female      Room/Bed: 3211    Admitting Diagnosis: Hyperkalemia [E87.5]  Acute cystitis with hematuria [N30.01]  Severe sepsis (CMS/HCC) [A41.9, R65.20]  Acute renal failure, unspecified acute renal failure type (CMS/HCC) [N17.9]   Admit Date/Time: 12/28/2020  3:35 PM    Primary Diagnosis: Metabolic acidosis  Principal Problem: Metabolic acidosis    GMLOS: pending  Anticipated Discharge Date: 1/4/2021    AM-PAC  Mobility Score:      Discharge Planning:       Barriers to Discharge:  Barriers to Discharge: Medical issues not resolved  Comment: levo, iv abx, nephrology/HD    Participants:  advanced practice provider, , dietitian/nutrition services, nursing, pharmacy, social work/services

## 2020-12-29 NOTE — CONSULTS
NEPHROLOGY CONSULTATION    Reason for consult: Acidosis, acute kidney injury, hyperkalemia.    Consulting Physician: Ingrid Acevedo    HPI: Thank you for this consultation on Nancy Minor who is a 89 y.o. female.  She has a past medical history significant for chronic kidney disease, COPD, colon cancer.  She was admitted to the hospital yesterday with hematuria, weakness, confusion, poor oral intake for several days.  She lives with her son.  When I saw her she was quite confused and was unable to provide history.  All the history was obtained from medical record.  In the emergency room department yesterday her labs showed evidence for urinary tract infection, sepsis with lactate and leukocytosis, acute kidney injury, hyperkalemia, severe metabolic acidosis.  She was given bicarbonate and her hyperkalemia was treated in the usual manner with improvement in both her bicarbonate and potassium levels this morning.    Review of Systems   Unable to perform ROS: Mental status change       Past Medical History:   Diagnosis Date   • Atrial fibrillation (CMS/HCC)    • Chronic hypotension    • Chronic kidney disease    • Chronic kidney failure    • Colon cancer (CMS/HCC)    • COPD (chronic obstructive pulmonary disease) (CMS/HCC)    • Low BP    • Stroke (cerebrum) (CMS/HCC)    • UTI (urinary tract infection)        Past Surgical History:   Procedure Laterality Date   • CARDIAC SURGERY     • CENTRAL LINE  12/29/2020        • COLON SURGERY     • HIP SURGERY     • JOINT REPLACEMENT     • KNEE SURGERY         Social History     Tobacco Use   • Smoking status: Never Smoker   • Smokeless tobacco: Never Used   Substance Use Topics   • Alcohol use: Yes   • Drug use: Not Currently       Family History   Family history unknown: Yes       No Known Allergies    Home Medication List:  •  atorvastatin, Take 10 mg by mouth daily.  •  levothyroxine, Take 50 mcg by mouth daily.  •  metoprolol succinate XL, Take 25 mg by mouth daily.  •   INCRUSE ELLIPTA, Inhale 1 puff daily.      Current Facility-Administered Medications:   •  atorvastatin (LIPITOR) tablet 10 mg, 10 mg, oral, Daily (6p), Chastity Wahl CRNP  •  calcium gluconate 1,000 mg in sodium chloride 0.9 % 50 mL IVPB, 1 g, intravenous, 2x daily PRN, Chastity Wahl CRNP  •  cefTRIAXone (ROCEPHIN) IVPB 1 g in 100 mL NSS vial in bag, 1 g, intravenous, q24h INT, Barrett Stern MD, Last Rate: 200 mL/hr at 12/29/20 0931, 1 g at 12/29/20 0931  •  glucose chewable tablet 16-32 g of dextrose, 16-32 g of dextrose, oral, PRN **OR** dextrose 40 % oral gel 15-30 g of dextrose, 15-30 g of dextrose, oral, PRN **OR** glucagon (GLUCAGEN) injection 1 mg, 1 mg, intramuscular, PRN **OR** dextrose in water injection 12.5 g, 25 mL, intravenous, PRN, Chastity Wahl CRNP  •  heparin (porcine) 5,000 unit/mL injection 5,000 Units, 5,000 Units, subcutaneous, q8h Maryuri LOPEZ Lynn B, CRNP, 5,000 Units at 12/29/20 0509  •  insulin aspart U-100 (NovoLOG) pen 3-5 Units, 3-5 Units, subcutaneous, q6h Maryuri LOPEZ Lynn B, CRNP, 3 Units at 12/29/20 0926  •  ipratropium-albuteroL (DUO-NEB) 0.5-2.5 mg/3 mL nebulizer solution 3 mL, 3 mL, nebulization, q6H JESSICAMaryuri Lynn B, CRNP, 3 mL at 12/29/20 0925  •  levothyroxine (SYNTHROID) tablet 50 mcg, 50 mcg, oral, Daily (6:30a), Dipika Crisostomo MD  •  magnesium sulfate IVPB 2g in 50 mL NSS/D5W/SWFI, 2 g, intravenous, PRN, Chastity Wahl CRNP  •  norepinephrine (LEVOPHED) 4 mg/250 mL (16 mcg/mL) in 0.9 % NaCl infusion, 0.5-30 mcg/min, intravenous, Titrated, Dipika Crisostomo MD, Last Rate: 37.5 mL/hr at 12/29/20 0927, 10 mcg/min at 12/29/20 0927  •  nystatin (MYCOSTATIN) 100,000 unit/gram topical powder, , Topical, BID, Dipika Crisostomo MD, Given at 12/29/20 0801  •  sodium chloride 0.9 % bolus 500 mL, 500 mL, intravenous, Once, Chastity Wahl CRNP, Last Rate: 250 mL/hr at 12/29/20 0927, 500 mL at 12/29/20 0927    PHYSICAL EXAM:  Vitals:    12/29/20 0645 12/29/20 0700 12/29/20 0745  12/29/20 0945   BP: 103/75 (!) 100/55 (!) 100/55 (!) 84/65   BP Location:  Right upper arm     Patient Position:  Lying     Pulse: (!) 141 (!) 137 (!) 137 (!) 137   Resp: (!) 21 20 (!) 24 (!) 29   Temp:  36.9 °C (98.5 °F)     TempSrc:  Oral     SpO2: 94% 93% 94% 96%   Weight:       Height:             Intake/Output Summary (Last 24 hours) at 12/29/2020 1031  Last data filed at 12/29/2020 1000  Gross per 24 hour   Intake 5304.51 ml   Output 398 ml   Net 4906.51 ml       Physical Exam  Vitals signs reviewed.   Constitutional:       General: She is not in acute distress.  HENT:      Head: Normocephalic.      Right Ear: External ear normal.      Left Ear: External ear normal.   Eyes:      General: No scleral icterus.  Cardiovascular:      Heart sounds: No friction rub.   Pulmonary:      Effort: No respiratory distress.   Abdominal:      General: There is no distension.   Genitourinary:     Comments: Samson catheter draining bloody cloudy urine  Musculoskeletal:         General: No deformity.   Skin:     Coloration: Skin is pale.   Neurological:      Comments: Confused and disoriented. mumbles softly but unintelligibly   Psychiatric:      Comments: Difficult to assess due to confusion         Results from last 7 days   Lab Units 12/29/20  0924 12/29/20  0524  12/28/20  1603   SODIUM mEQ/L 143 143   < > 127*   POTASSIUM mEQ/L 4.3 4.3   < > 7.9*   CHLORIDE mEQ/L 101 102   < > 98   CO2 mEQ/L 22 19*   < > 10*   BUN mg/dL 126* 127*   < > 160*   CREATININE mg/dL 5.0* 5.3*   < > 6.4*   EGFR mL/min/1.73m*2 8.0* 7.5*   < > 6.0*   GLUCOSE mg/dL 180* 225*   < > 88   CALCIUM mg/dL 7.4* 7.3*   < > 9.6   ALBUMIN g/dL  --   --   --  2.7*   WBC K/uL  --  29.41*  --  30.68*   HEMOGLOBIN g/dL  --  9.1*  --  13.8   HEMATOCRIT %  --  27.3*  --  44.2   PLATELETS K/uL  --  209  --  319    < > = values in this interval not displayed.       Pertinent radiology and labs reviewed    ASSESSMENT:  Principal Problem:    Metabolic acidosis  Active  Problems:    Severe sepsis (CMS/[facility])    Encephalopathy    Pneumobilia    Acute on chronic renal failure (CMS/[facility])    Hyperkalemia    Acute cystitis with hematuria    Hypoglycemia    COPD (chronic obstructive pulmonary disease) (CMS/[facility])    History of colon cancer    Type 2 MI (myocardial infarction) (CMS/[facility])    Hyponatremia    History of CVA (cerebrovascular accident)    Chronic atrial fibrillation (CMS/[facility])    Septic shock (CMS/[facility])      PLAN:  1. Acute kidney injury due to septic shock and volume depletion: Creatinine and BUN are improving with IV fluids and antibiotics.  Creatinine was 6.4 now down to 5.0.  Her baseline creatinine is 1.4 as of June 2019.  Continue IV fluids with lactated Ringer's.  Basic metabolic panel daily.  Avoid nephrotoxins and hypotension.  Continue supportive care in ICU.  2. Hyperkalemia due to acidosis, acute kidney injury.  Potassium is down to 4.3  3. Metabolic acidosis: I wonder if she might have had diarrhea at home and subsequent GI losses of bicarbonate.  She her bicarbonate corrected quickly overnight with IV bicarbonate infusion which is now discontinued.  Her bicarbonate is now up to 22.  She had a pH of 7.52 on blood gas this morning.  4. UTI/septic shock agree with empiric antibiotics.  Initially had a dose of vancomycin and Zosyn but now on ceftriaxone.  Infectious disease is consulted.    Favian Quinn MD

## 2020-12-29 NOTE — CONSULTS
General Surgery Consult    Patient is a 89-year-old white female admitted for lethargy, change in mental status, and hematuria.  Patient lives with her son.  She does get catheterized twice a day for urinary retention though is able to urinate somewhat on her own.  She is on anticoagulants and the thought was that catheterization was causing her hematuria.  But for the last 4 days or so she has been uninterested in activity and not her usual sharp self despite her age.  She has a history of urinary tract infections in the past.  Patient has multiple recent surgeries including a cholecystectomy in 2013 and a colectomy in 2016.  She underwent another colectomy in 2006, both of colectomies were for colon cancer.  Patient's son does not believe she ever had an ERCP.  Currently patient is very poor historian.    Medical History:   Past Medical History:   Diagnosis Date   • Chronic kidney failure    • Colon cancer (CMS/HCC)    • COPD (chronic obstructive pulmonary disease) (CMS/HCC)    • Stroke (cerebrum) (CMS/HCC)    • UTI (urinary tract infection)        Surgical History:   Past Surgical History:   Procedure Laterality Date   • CARDIAC SURGERY     • COLON SURGERY     • HIP SURGERY     • JOINT REPLACEMENT     • KNEE SURGERY         Social History:   Social History     Social History Narrative    Lives at home, with son- care giver.       Family History:   Family History   Family history unknown: Yes       Allergies: Patient has no known allergies.    Home Medications:  •  atorvastatin, Take 10 mg by mouth daily.  •  levothyroxine, Take 50 mcg by mouth daily.  •  metoprolol succinate XL, Take 25 mg by mouth daily.  •  INCRUSE ELLIPTA, Inhale 1 puff daily.    Current Medications:  •  albuterol, , ,   •  albuterol, 2.5 mg, nebulization, q4h JESSICA  •  glucose, 16-32 g of dextrose, oral, PRN **OR** dextrose, 15-30 g of dextrose, oral, PRN **OR** glucagon, 1 mg, intramuscular, PRN **OR** dextrose in water, 25 mL, intravenous,  PRN  •  dextrose in water, 25 g, intravenous, Once **FOLLOWED BY** insulin regular, 10 Units, intravenous, Once  •  heparin (porcine), 5,000 Units, subcutaneous, q8h JESSICA  •  magnesium sulfate, 2 g, intravenous, PRN  •  nystatin, , Topical, BID  •  sodium bicarbonate infusion, , intravenous, Continuous    Review of Systems  Review of systems not obtained due to patient factors.    Physicial Exam  Visit Vitals  BP (!) 163/107   Pulse (!) 123   Temp (!) 34.7 °C (94.4 °F) (Rectal)   Resp 19   Wt 48.5 kg (107 lb)   SpO2 100%       General appearance: Alert and oriented x2, no acute distress  Head: normocephalic, without obvious abnormality, atraumatic  Eyes: conjunctivae/corneas clear. PERRL, EOM's intact. Fundi benign.  HEENT: NCAT OPM P+M  Neck: no adenopathy, no carotid bruit, no JVD, supple, symmetrical, trachea midline and thyroid not enlarged, symmetric, no tenderness/mass/nodules  Lungs: clear to auscultation bilaterally  Heart: regular rate and rhythm, S1, S2 normal, no murmur, click, rub or gallop  Abdomen: soft, non-tender; bowel sounds normal; no masses, no organomegaly  Extremities: Feet cool and mildly cyanotic  Skin: Skin color, texture, turgor normal. No rashes or lesions  Neurologic: Grossly normal      Labs  Results from last 7 days   Lab Units 12/28/20  1603   WBC K/uL 30.68*   HEMOGLOBIN g/dL 13.8   HEMATOCRIT % 44.2   PLATELETS K/uL 319     Results from last 7 days   Lab Units 12/28/20  1928 12/28/20  1603   SODIUM mEQ/L 132* 127*   POTASSIUM mEQ/L 6.7* 7.9*   CHLORIDE mEQ/L 101 98   CO2 mEQ/L 12* 10*   BUN mg/dL 161* 160*   CREATININE mg/dL 6.2* 6.4*   CALCIUM mg/dL 8.8* 9.6   ALBUMIN g/dL  --  2.7*   BILIRUBIN TOTAL mg/dL  --  1.3*   ALK PHOS IU/L  --  145*   ALT IU/L  --  24   AST IU/L  --  20   GLUCOSE mg/dL 281* 88         No results found for: LIPASE      Imaging  Ct Abdomen Pelvis Without Iv Contrast    Addendum Date: 12/28/2020    Additionally noted in the impression should be incidentally  noted pneumobilia, which is a nonspecific finding in a patient status post cholecystectomy. Correlate clinically.  Further evaluation with right upper quadrant ultrasound could be considered.    Result Date: 12/28/2020  IMPRESSION: 1.  Limited evaluation in the absence of intravenous or oral contrast and due to patient motion.  No definite hydronephrosis or renal calculi as clinically questioned, noting limited evaluation of the distal ureters due to the patient's right hip prosthesis. 2.  Circumferential bladder wall thickening and intravesicular air within the urinary bladder, concerning for urinary tract infection. 3.  Age-indeterminate mild compression deformities of T11, T12 and L3.    X-ray Chest 1 View    Result Date: 12/28/2020  IMPRESSION: Linear atelectasis or scar in the left midlung zone. COMMENT: Erect AP portable view of the chest was performed.  We have no prior studies for comparison. The heart size is towards the upper limits of normal.  A TAVR prosthesis is noted.  The pulmonary vasculature is not engorged. Linear opacity in the left midlung zone is noted, likely an area of discoid atelectasis or scar.  The lung fields are otherwise clear, although EKG leads and wires could obscure small lesions.  No pleural effusions are seen. No hilar abnormality is seen.  There is calcification of the thoracic aorta. Bilateral rotator cuff tears are suspected.          Assessment: Urosepsis  Pneumobilia  History of colon cancer    Plan: Pneumobilia is likely related to prior cholecystectomy.  I do not believe it has any bearing upon the patient's sepsis which is obviously from a urinary source.  Will sign off, recall if needed    Zhang Poole, DO

## 2020-12-29 NOTE — ASSESSMENT & PLAN NOTE
History of UTIs in the past  Found to be severe septic in the emergency room with UTI  CT abdomen also showing incidental pneumobilia however no abdominal symptoms  Blood cultures drawn  Urine culture sent  Receiving antibiotics  We will follow microbiology results  Consulting ID

## 2020-12-29 NOTE — PLAN OF CARE
Nutrition Interventions/ Recommendations:   1. Continue full liquid diet as tolerated  2. Trial Boost Plus (360 kcals / 14 g protein each) BID  3. Monitor and treat labs/lytes  - renal MVI  4. Daily weights

## 2020-12-29 NOTE — PLAN OF CARE
As per the son, patient has low blood pressures at baseline  Ordering levophed to maintain MAP of 60-65  D/w RN    Central in place

## 2020-12-29 NOTE — CONSULTS
Nutrition Note           Clinical Course: Patient is a 89 y.o. female who was admitted on 12/28/2020 with a diagnosis of Hyperkalemia [E87.5]  Acute cystitis with hematuria [N30.01]  Severe sepsis (CMS/HCC) [A41.9, R65.20]  Acute renal failure, unspecified acute renal failure type (CMS/HCC) [N17.9].   Past Medical History:   Diagnosis Date   • Atrial fibrillation (CMS/HCC)    • Chronic hypotension    • Chronic kidney disease    • Chronic kidney failure    • Colon cancer (CMS/HCC)    • COPD (chronic obstructive pulmonary disease) (CMS/HCC)    • Low BP    • Stroke (cerebrum) (CMS/HCC)    • UTI (urinary tract infection)      Past Surgical History:   Procedure Laterality Date   • CARDIAC SURGERY     • CENTRAL LINE  12/29/2020        • COLON SURGERY     • HIP SURGERY     • JOINT REPLACEMENT     • KNEE SURGERY         Nutrition Interventions/ Recommendations:   1. Continue full liquid diet as tolerated  2. Trial Boost Plus (360 kcals / 14 g protein each) BID  3. Monitor and treat labs/lytes  - renal MVI  4. Daily weights        Nutrition Status Classification: Moderate nutritional compromise       Dietary Orders   (From admission, onward)             Start     Ordered    12/29/20 1234  Adult Diet Full Liquids; RD/LDN may adjust order  Diet effective now     Question Answer Comment   Diet Texture Full Liquids    Delegation of Authority. Diet orders written by PA/CRThang may not be adjusted by RD/LDNs. RD/LDN may adjust order        12/29/20 1235                    Reason for Assessment  Reason For Assessment: physician consult  Diagnosis: renal disease    MST Nutrition Screen Tool  Has patient lost weight without trying?: 0-->No  If yes,how much weight has been lost?: 0-->Patient has not lost weight  Has patient been eating poorly due to decreased appetite?: 0-->No  MST Nutrition Screen Score: 0    Nutrition/Diet History  Intake (%): 0%  Factors Affecting Nutritional Intake: other (see comments)(advanced age)    Physical  Findings  Overall Physical Appearance: (frail-elderly appearing)  Last Bowel Movement: 12/29/20  Skin: surgical incision(stage 2 - location not documented)    Last Bowel Movement: 12/29/20    Nutrition Order  Nutrition Order Review: meets nutritional requirements  Nutrition Order Comments: full liquid    Anthropometrics  Height: 152.4 cm (5')    Weights (last 7 days)     Date/Time   Weight   Drug Calculation Weight (Dosing Weight)    12/28/20 2130   45.5 kg (100 lb 3.2 oz)   45.5 kg (100 lb 3.2 oz)    12/28/20 1653   48.5 kg (107 lb)   --                   Current Weight  Weight Method: Bed scale  Weight: 45.5 kg (100 lb 3.2 oz)    Ideal Body Weight (IBW)  Ideal Body Weight (IBW) (kg): 45.86  % Ideal Body Weight: 99.11         Body Mass Index (BMI)  BMI (Calculated): 19.6  BMI (kg/m2): 19.61  BMI Assessment: BMI 18.5-24.9: normal                        Labs/Procedures/Meds  Lab Results Reviewed: reviewed      Results from last 7 days   Lab Units 12/29/20  0924 12/29/20  0524 12/28/20  2245   SODIUM mEQ/L 143 143 132*   POTASSIUM mEQ/L 4.3 4.3 5.8*   CHLORIDE mEQ/L 101 102 104   CO2 mEQ/L 22 19* 9*   BUN mg/dL 126* 127* 148*   CREATININE mg/dL 5.0* 5.3* 6.0*   GLUCOSE mg/dL 180* 225* 288*   CALCIUM mg/dL 7.4* 7.3* 8.0*      Results from last 7 days   Lab Units 12/28/20  1603   ALK PHOS IU/L 145*   BILIRUBIN TOTAL mg/dL 1.3*   ALBUMIN g/dL 2.7*   ALT IU/L 24   AST IU/L 20          No results found for: HGBA1C  No results found for: [id number]  Lab Results   Component Value Date    CALCIUM 7.4 (L) 12/29/2020     Results from last 7 days   Lab Units 12/29/20  0524 12/28/20  1603   WBC K/uL 29.41* 30.68*   HEMOGLOBIN g/dL 9.1* 13.8   HEMATOCRIT % 27.3* 44.2   PLATELETS K/uL 209 319               Results from last 7 days   Lab Units 12/29/20  0924 12/29/20  0524 12/28/20  2245   MAGNESIUM mg/dL 1.8 1.8 2.1               Medications  Pertinent Medications Reviewed: reviewed  • atorvastatin  10 mg oral Daily (6p)   •  cefTRIAXone  1 g intravenous q24h INT   • heparin (porcine)  5,000 Units subcutaneous q8h JESSICA   • insulin aspart U-100  3-5 Units subcutaneous q6h JESSICA   • ipratropium-albuteroL  3 mL nebulization q6H JESSICA   • levothyroxine  50 mcg oral Daily (6:30a)   • nystatin   Topical BID     • lactated ringer's   80 mL/hr at 12/29/20 1200   • phenylephrine   mcg/min 20 mcg/min (12/29/20 1248)       Estimated/Assessed Needs  Additional Documentation: Calorie Requirements (Group), Protein Requirements (Group), Fluid Requirements (Group)    Calorie Requirements  Estimated kCal Needs: Actual Body Weight(46kg)  Estimated Calorie Need Method: kcal/kg  Calorie/kg Recommended: 30-35  Calorie Recommendations: 8925-2839                Protein Requirements  Recommended Dosing Weight (Estimated Protein Needs): Actual Body Weight  Est Protein Requirement Amount (gms/kg): (.6-.8)  Protein Recommendations: 28-37    Fluid Requirements  Fluid Recommendation (mL): 25-30ml  Recommended Fluid Needs Dosing Weight: Actual Body Weight  Fluid Requirements (mL/day): (1787-4220)                                                                      PES  Statement: PES Statement  Nutrition Diagnosis: Altered Nutrition-Related Laboratory Values  Related To:: Organ Dysfunction  As Evidenced By:: hyperkalemia on admission  Nutritional Needs Met?: No                                         Clinical Comments:     Admitted with AMS and fatigue. Found to have septic shock. Hyperkalemia in setting of DANY, K+ improving. Diet advanced this afternoon from NPO to full liquids. Per EMR , pt eating poorly for several days PTA.           Goals:  PO intake to meet 50%-75% of estimated needs within the next 48-72 hrs    Monitor and Evaluation:   1. % PO intake  2. Trends in weights/labs/lytes  3. Medications  4. GI function  5. I/O  6. Medical course    Discussed with : critical care team during icu rounds                                     Date: 12/29/20  Signature:  Ingrid Eason RD

## 2020-12-29 NOTE — CONSULTS
REASON FOR CONSULT: Rapid afib     CONSULT FROM: Ingrid Acevedo DO    PRIMARY CARDIOLOGIST:     ------------------------------------------------------------------------------------------------------------------------------------------  HISTORY OF PRESENTING ILLNESS  ------------------------------------------------------------------------------------------------------------------------------------------  Nancy Minor is a 89 y.o. female who is admitted with urosepsis. Cardiology consulted for rapid atrial fibrillation.     # cardiac risk factors: advanced age  # no CAD/CHF  # AVR   # afib [previously on Eliquis]  # PMH: COPD, CVA 2019, hypotension, CKD, colon cancer, CIC 2x/day    Echo 6/2019: normal LV size, EF 68%, mild LVH, bioprosthetic aortic valve [mean 8 mmHg, peak 15 mmHg], mod calcified mitral annulus [mean 6 mmHg],     At baseline, the patient denies exertional chest pain, shortness of breath, orthopnea, PND, ankle edema, palpitations, or syncope.  Patient lives with her son.    The patient presents to [facility] with hematuria, change in mental status and poor oral intake for several days. She performs CIC at home and has had several infections recently. Work-up in ER revealed evidence of urinary tract infection, elevated lactate 2.4, and leukocytosis WBC 30K, acute kidney injury, hyperkalemia and severe metabolic acidosis. She was started on IV antibiotics, IVF, requiring pressor support with levophed. Remains in atrial fibrillation with rapid rates in the 130s.     ------------------------------------------------------------------------------------------------------------------------------------------  PAST MEDICAL HISTORY  ------------------------------------------------------------------------------------------------------------------------------------------  Past Medical History:   Diagnosis Date   • Atrial fibrillation (CMS/HCC)    • Chronic hypotension    • Chronic kidney disease     • Chronic kidney failure    • Colon cancer (CMS/[facility])    • COPD (chronic obstructive pulmonary disease) (CMS/[facility])    • Low BP    • Stroke (cerebrum) (CMS/[facility])    • UTI (urinary tract infection)      Past Surgical History:   Procedure Laterality Date   • CARDIAC SURGERY     • CENTRAL LINE  12/29/2020        • COLON SURGERY     • HIP SURGERY     • JOINT REPLACEMENT     • KNEE SURGERY         ------------------------------------------------------------------------------------------------------------------------------------------  MEDICATIONS  ------------------------------------------------------------------------------------------------------------------------------------------  Home medications    •  atorvastatin, Take 10 mg by mouth daily.  •  levothyroxine, Take 50 mcg by mouth daily.  •  metoprolol succinate XL, Take 25 mg by mouth daily.  •  INCRUSE ELLIPTA, Inhale 1 puff daily.    Inpatient Medications    •  atorvastatin, 10 mg, oral, Daily (6p)  •  calcium gluconate, 1 g, intravenous, 2x daily PRN  •  cefTRIAXone, 1 g, intravenous, q24h INT  •  glucose, 16-32 g of dextrose, oral, PRN **OR** dextrose, 15-30 g of dextrose, oral, PRN **OR** glucagon, 1 mg, intramuscular, PRN **OR** dextrose in water, 25 mL, intravenous, PRN  •  digoxin, 250 mcg, intravenous, Once  •  heparin (porcine), 5,000 Units, subcutaneous, q8h JESSICA  •  insulin aspart U-100, 3-5 Units, subcutaneous, q6h JESSICA  •  ipratropium-albuteroL, 3 mL, nebulization, q6H JESSICA  •  lactated ringer's, , intravenous, Continuous  •  levothyroxine, 50 mcg, oral, Daily (6:30a)  •  magnesium sulfate, 2 g, intravenous, PRN  •  norepinephrine, 0.5-30 mcg/min, intravenous, Titrated  •  nystatin, , Topical, BID  •  sodium chloride, 500 mL, intravenous,  Once    ------------------------------------------------------------------------------------------------------------------------------------------  ALLERGIES  ------------------------------------------------------------------------------------------------------------------------------------------  Patient has no known allergies.    ------------------------------------------------------------------------------------------------------------------------------------------  SOCIAL HISTORY  ------------------------------------------------------------------------------------------------------------------------------------------  No smoking or alcohol    ------------------------------------------------------------------------------------------------------------------------------------------  FAMILY HISTORY  ------------------------------------------------------------------------------------------------------------------------------------------  No premature CAD    ------------------------------------------------------------------------------------------------------------------------------------------  REVIEW OF SYSTEMS  ------------------------------------------------------------------------------------------------------------------------------------------  Constitutional: - fever, - chills, - weakness, - weight loss  HEENT: - blurred vision, - sore throat, - hoarseness  Respiratory: - dyspnea, - cough, - hemoptysis  Cardiovascular: - chest pain, - dyspnea, - orthopnea, - PND, - edema, - palpitations, - syncope  Gastrointestinal: - nausea, - vomiting, - diarrhea, - hematemesis, - melena  Genitourinary: - dysuria, - frequency  Integument: - rash, - itching  Hematologic/lymphatic:  - bruising, - petechiae  Musculoskeletal: - arthalgias, - myalgias  Neurological: - vertigo, - tremors, - headache, - speech deficit, - focal weakness  Behavioral/Psych: - anxiety, - depression  Endocrine: - cold intolerance, - heat intolerance, -  weight change    ------------------------------------------------------------------------------------------------------------------------------------------  PHYSICAL EXAM  ------------------------------------------------------------------------------------------------------------------------------------------  VITAL SIGNS:  Temp:  [34.1 °C (93.3 °F)-36.9 °C (98.5 °F)] 36.9 °C (98.5 °F)  Heart Rate:  [] 137  Resp:  [18-36] 29  BP: ()/(36-75) 84/65  FiO2 (%) (Set):  [21 %] 21 %  SaO2: 96%    Intake/Output Summary (Last 24 hours) at 12/29/2020 1100  Last data filed at 12/29/2020 1000  Gross per 24 hour   Intake 5304.51 ml   Output 398 ml   Net 4906.51 ml       PHYSICAL EXAM:  General appearance: lethargic, opens eyes to voice, not following commands  Head: without obvious abnormality  Eyes: PERRLA, extraocular movements intact  Neck: No JVD, carotid bruits, thyromegaly  Lungs: clear to auscultation bilaterally, no crackles or wheezing  Heart: irregular irregular rate and rhythm, S1-S2 normal, no murmurs, clicks, rubs or gallops  Abdomen: soft, non-tender, bowel sounds normal, tyson with blood tinged urine  Extremities: trace pedal edema, peripheral pulses present  Skin: Skin color, texture, turgor normal. No rashes or lesions  Neurologic: lethargic, no focal deficits    ------------------------------------------------------------------------------------------------------------------------------------------  LABS / IMAGING / EKG / TELEMETRY  ------------------------------------------------------------------------------------------------------------------------------------------  LABS:  Results from last 7 days   Lab Units 12/29/20  0924 12/29/20  0524 12/28/20  6749  12/28/20  1601   SODIUM mEQ/L 143 143 132*   < > 127*   POTASSIUM mEQ/L 4.3 4.3 5.8*   < > 7.9*   MAGNESIUM mg/dL 1.8 1.8 2.1  --   --    CHLORIDE mEQ/L 101 102 104   < > 98   CO2 mEQ/L 22 19* 9*   < > 10*   BUN mg/dL 126* 127* 148*   < > 160*    CREATININE mg/dL 5.0* 5.3* 6.0*   < > 6.4*   AST IU/L  --   --   --   --  20   ALT IU/L  --   --   --   --  24   TROPONIN I ng/mL 0.07* 0.05* 0.04   < > 0.06*    < > = values in this interval not displayed.     Results from last 7 days   Lab Units 20  0524 20  1603   WBC K/uL 29.41* 30.68*   HEMOGLOBIN g/dL 9.1* 13.8   HEMATOCRIT % 27.3* 44.2   PLATELETS K/uL 209 319     No results found for: HGBA1C, TSH  No results found for: CHOL, LDLCALC, HDL, TRIG  No results found for: BNP    IMAGIN2020 CXR: IMPRESSION: Linear atelectasis or scar in the left midlung zone.    2020 CT abd/pelvis wo IV contrast: IMPRESSION:  1.  Limited evaluation in the absence of intravenous or oral contrast and due to  patient motion.  No definite hydronephrosis or renal calculi as clinically  questioned, noting limited evaluation of the distal ureters due to the patient's  right hip prosthesis.  2.  Circumferential bladder wall thickening and intravesicular air within the  urinary bladder, concerning for urinary tract infection.  3.  Age-indeterminate mild compression deformities of T11, T12 and L3.  Additionally noted in the impression should be incidentally noted pneumobilia,  which is a nonspecific finding in a patient status post cholecystectomy.  Correlate clinically.  Further evaluation with right upper quadrant ultrasound  could be considered.    EC2020 1603 afib 97, right axis deviation, nonspecific ST abnormality     TELEMETRY:  afib 135    ------------------------------------------------------------------------------------------------------------------------------------------  ASSESSMENT AND PLAN  ------------------------------------------------------------------------------------------------------------------------------------------  Urosepsis  -cultures pending, on abx, IVF, requiring pressor support with levophed  -consider switching levophed to phenylephrine    Atrial fibrillation   -hx of  chronic afib, rates in 130s. Received IV digoxin without improvement of rates  -continue treating underlying infection- IVF, abx. Hold off on starting amiodarone at this time  -previously had been on Eliquis, no longer taking for unclear reasons, with hematuria will hold off on restarting at this time  -will check echo tomorrow     Elevated troponin  -mild elevation with flat trend in setting of sepsis, metabolic acidosis. EKG with nonspecific changes. Likely demand supply mismatch    DANY  -Creatinine was 6.4 now down to 5.0, baseline Cr 1.4 June 2019. Receiving IVF, nephrology following            DREAD Koehler  12/29/2020    Primary Care Doctor: Elizabeth Cabral MD

## 2020-12-29 NOTE — PROCEDURES
Procedure note    Procedure: Central Line Placement    Indication:  Septic shock, metabolic acidosis and hyperkalemia secondary to urinary tract infection.    Procedure:  After receiving informed consent from the patient's son, the patient was placed in the supine position.  The right femoral vein was identified utilizing anatomic landmarks and confirmed with ultrasonography.   Chlorhexidine solution was used to prepare the insertion site.  Once dry, Sterile attire was donned and drapes were applied. The right femoral vein was identified and cannulated using realtime guidance.  The triple-lumen catheter was placed utilizing a modified seldinger technique without difficulty.  All lumens had satisfacty blood return and were flushed with 10cc sterile saline solution.  The Catheter was sutured in place and a central line dressing was applied.    Disposition:  Pt tolerated procedure well, and the procedure was without complication.    Damir Clarke PA-C

## 2020-12-29 NOTE — PROGRESS NOTES
Afib with RVR persists today. Heart rate now consistently  in 140s, only rarely dip to 130s.      Discussed Amiodarone bolus and gtt with Cardiology earlier today. Updated Dr Goncalves at joan 7 pm, he approves Amiodarone 150 mg bolus with Amio gtt to follow.     Neosynephrine replaced Levophed to avoid B1 activity earlier today. Presently, pt requires 100 mcg dose of Neosynephrine gtt. SBP in 90s, which is her baseline. Per Dr Goncalves, may administer cautious fluid boluses of  ml; patient is clinically very 'dry.'

## 2020-12-29 NOTE — CONSULTS
Infectious Disease Consult Note    Patient Name: Nancy Minor  MR#: [id number]  : 10/6/1923  Admission Date: 2020  Consult Date: 20 9:20 AM   Consultant: Barrett Stern MD    Reason for Consult: severe sepsis  Referring Provider: Dipika Crisostomo      History of Present Illness     Nancy Minor is a 89 y.o. female with PMH of CKD, COPD, colon ca who was admitted on 2020 with generalized weakness, confusion in the setting of decreased PO intake. She is unable to provide any history now. On admission, she was hypothermic, hypotensive requiring pressor with lactate of 2.4 and WBC count of 30K. UA showed WBC TNTC. CT A/P showed circumferential bladder wall thickening and intravesicular air within the urinary bladder. CXR showed linear atelectasis or scar in the left midlung zone. Blood cx and urine cx are pending. She was started on pip-tazo.    Outside records were reviewed.    Allergies: No Known Allergies    Medical History:   Past Medical History:   Diagnosis Date   • Atrial fibrillation (CMS/HCC)    • Chronic hypotension    • Chronic kidney disease    • Chronic kidney failure    • Colon cancer (CMS/HCC)    • COPD (chronic obstructive pulmonary disease) (CMS/HCC)    • Low BP    • Stroke (cerebrum) (CMS/HCC)    • UTI (urinary tract infection)        Surgical History:   Past Surgical History:   Procedure Laterality Date   • CARDIAC SURGERY     • CENTRAL LINE  2020        • COLON SURGERY     • HIP SURGERY     • JOINT REPLACEMENT     • KNEE SURGERY         Social History:   Social History     Socioeconomic History   • Marital status:      Spouse name: None   • Number of children: None   • Years of education: None   • Highest education level: None   Occupational History   • None   Social Needs   • Financial resource strain: None   • Food insecurity     Worry: None     Inability: None   • Transportation needs     Medical: None     Non-medical: None   Tobacco Use   • Smoking  status: Never Smoker   • Smokeless tobacco: Never Used   Substance and Sexual Activity   • Alcohol use: Yes   • Drug use: Not Currently   • Sexual activity: None   Lifestyle   • Physical activity     Days per week: None     Minutes per session: None   • Stress: None   Relationships   • Social connections     Talks on phone: None     Gets together: None     Attends Hindu service: None     Active member of club or organization: None     Attends meetings of clubs or organizations: None     Relationship status: None   • Intimate partner violence     Fear of current or ex partner: None     Emotionally abused: None     Physically abused: None     Forced sexual activity: None   Other Topics Concern   • None   Social History Narrative    Lives at home, with son- care giver.          Travel Exposure:   Travel and Exposure Screening      Most Recent Value   Travel Screening   Overnight hospitalization outside the [place] in the last year?  No Filed On: 12/28/2020 1937   Exposure Screening   Symptoms        Family History:   Family History   Family history unknown: Yes       Review of Systems    Review of systems not obtained due to patient factors.    Medications:    Current IP Meds (From admission, onward)        Frequency     atorvastatin (LIPITOR) tablet 10 mg      Daily (6p)     ipratropium (ATROVENT) nebulizer solution 0.5 mg  Status:  Discontinued      Every 6 hours     ipratropium-albuteroL (DUO-NEB) 0.5-2.5 mg/3 mL nebulizer solution 3 mL      Every 6 hours (2a, 8a, 2p, 8p)     piperacillin-tazobactam (ZOSYN) 2.25 g in 100 mL NSS vial in bag      Every 8 hours interval     sodium chloride 0.9 % bolus 500 mL      Once     insulin aspart U-100 (NovoLOG) pen 3-5 Units      Every 6 hours     calcium gluconate 1,000 mg in sodium chloride 0.9 % 50 mL IVPB      2 times daily PRN     levothyroxine (SYNTHROID) tablet 50 mcg      Daily (6:30a)     sodium chloride 0.9 % bolus 1,000 mL      Once     metoprolol (LOPRESSOR)  injection 5 mg      Once     norepinephrine (LEVOPHED) 4 mg/250 mL (16 mcg/mL) in 0.9 % NaCl infusion      Titrated     sodium bicarbonate 8.4 % (1 mEq/mL) injection 150 mEq      Once     sodium chloride 0.9 % bolus 1,000 mL      Once     heparin (porcine) 5,000 unit/mL injection 5,000 Units  (Assessed at increased VTE risk (Padua score greater than or equal to 4))      Every 8 hours     nystatin (MYCOSTATIN) 100,000 unit/gram topical powder      2 times daily     dextrose in water injection 25 g  (Insulin, Dextrose, and POC glucose monitoring)      Once     insulin regular (HumuLIN R,NovoLIN R) injection 10 Units  (Insulin, Dextrose, and POC glucose monitoring)      Once     albuterol 2.5 mg /3 mL (0.083 %) nebulizer solution  - Pyxis Override Pull     Note to Pharmacy: Rhonda Melendez   : cabinet override         albuterol nebulizer solution 2.5 mg  Status:  Discontinued      Every 4 hours (2a, 6a, 10a, 2p, 6p, 10p)     magnesium sulfate IVPB 2g in 50 mL NSS/D5W/SWFI      As needed     glucose chewable tablet 16-32 g of dextrose  (Hypoglycemia Treatment Protocol and Hyperglycemia Validation Protocol)      As needed     dextrose 40 % oral gel 15-30 g of dextrose  (Hypoglycemia Treatment Protocol and Hyperglycemia Validation Protocol)      As needed     glucagon (GLUCAGEN) injection 1 mg  (Hypoglycemia Treatment Protocol and Hyperglycemia Validation Protocol)      As needed     dextrose in water injection 12.5 g  (Hypoglycemia Treatment Protocol and Hyperglycemia Validation Protocol)      As needed     sodium bicarbonate 8.4 % (1 mEq/mL) 150 mEq in dextrose 5 % 1,000 mL infusion  Status:  Discontinued      Continuous     calcium gluconate 1,000 mg in sodium chloride 0.9 % 50 mL IVPB      Once     sodium bicarbonate 8.4 % (1 mEq/mL) injection 50 mEq      Once     dextrose in water injection 25 g  (Insulin, Dextrose, and POC glucose monitoring)      Once     insulin regular U-100 (HumuLIN R,NovoLIN R) injection 5  Units  (Insulin, Dextrose, and POC glucose monitoring)  Status:  Discontinued      Once     sodium zirconium cyclosilicate (LOKELMA) oral powder packet 10 g      Once     vancomycin 750 mg/250 mL IVPB in NSS      Once     piperacillin-tazobactam (ZOSYN) 3.375 g/100 mL IVPB in NSS      Once     cefTRIAXone (ROCEPHIN) IVPB 1 g in 100 mL NSS vial in bag  Status:  Discontinued      Once     sodium chloride 0.9 % bolus 500 mL  Status:  Discontinued      Once     sodium chloride 0.9 % bolus 1,500 mL      Once          Anti-infectives (From admission, onward)    Start     Dose/Rate Route Frequency Ordered Stop    12/29/20 0915  piperacillin-tazobactam (ZOSYN) 2.25 g in 100 mL NSS vial in bag      2.25 g  200 mL/hr over 30 Minutes intravenous Every 8 hours interval 12/29/20 0826 12/28/20 2145  nystatin (MYCOSTATIN) 100,000 unit/gram topical powder       Topical 2 times daily 12/28/20 2045              Objective     Vital Signs:    Patient Vitals for the past 72 hrs:   BP Temp Temp src Pulse Resp SpO2 Height Weight   12/29/20 0745 (!) 100/55 -- -- (!) 137 (!) 24 94 % -- --   12/29/20 0700 (!) 100/55 36.9 °C (98.5 °F) Oral (!) 137 20 93 % -- --   12/29/20 0645 103/75 -- -- (!) 141 (!) 21 94 % -- --   12/29/20 0615 (!) 89/69 36.5 °C (97.7 °F) Axillary (!) 136 (!) 21 94 % -- --   12/29/20 0545 (!) 80/49 -- -- (!) 135 (!) 21 94 % -- --   12/29/20 0515 (!) 78/52 -- -- (!) 136 20 95 % -- --   12/29/20 0445 (!) 79/50 -- -- (!) 134 (!) 21 94 % -- --   12/29/20 0415 (!) 81/40 -- -- (!) 134 19 95 % -- --   12/29/20 0400 -- -- -- (!) 131 -- -- -- --   12/29/20 0300 (!) 68/44 -- -- (!) 130 18 -- -- --   12/29/20 0230 107/64 -- -- (!) 130 (!) 31 100 % -- --   12/29/20 0220 94/69 -- -- (!) 132 (!) 27 -- -- --   12/29/20 0200 -- -- -- (!) 127 (!) 24 -- -- --   12/29/20 0130 (!) 73/51 -- -- (!) 140 (!) 23 -- -- --   12/29/20 0100 (!) 77/47 -- -- (!) 126 (!) 25 -- -- --   12/29/20 0030 (!) 78/50 -- -- (!) 131 (!) 26 -- -- --   12/29/20  0000 (!) 87/50 -- -- (!) 131 (!) 23 -- -- --   20 2330 110/65 -- -- (!) 129 (!) 36 -- -- --   20 2300 (!) 89/51 36.2 °C (97.1 °F) Axillary (!) 123 (!) 27 (!) 81 % -- --   20 2250 (!) 79/52 -- -- (!) 128 (!) 23 (!) 85 % -- --   20 222 (!) 61/40 -- -- (!) 125 (!) 24 94 % -- --   20 (!) 66/36 -- -- (!) 117 19 98 % -- --   20 (!) 95/54 -- -- (!) 115 20 98 % -- --   20 (!) 60/36 -- -- (!) 118 20 98 % 1.524 m (5') 45.5 kg (100 lb 3.2 oz)   20 -- -- -- (!) 123 19 100 % -- --   20 (!) 87/ -- -- (!) 121 (!) 22 99 % -- --   20 (!) 87/59 (!) 34.7 °C (94.4 °F) Rectal (!) 117 (!) 23 100 % -- --   20 91/64 (!) 34.1 °C (93.3 °F) Axillary (!) 132 (!) 26 -- -- --   20 194 (!) 99/54 36.1 °C (97 °F) -- (!) 114 (!) 22 100 % -- --   20 190 (!) 103/59 (!) 35.9 °C (96.7 °F) Temporal (!) 116 (!) 22 100 % -- --   20 1800 (!) 112/59 -- -- (!) 108 (!) 24 100 % -- --   20 1653 -- -- -- -- -- -- -- 48.5 kg (107 lb)   20 1631 (!)  -- -- 98 20 100 % -- --   20 1600 (!) 111 -- -- 96 (!) 21 100 % -- --   20 1522 (!) 82/44 36.1 °C (97 °F) Oral (!) 101 (!) 22 100 % -- --       Temp (72hrs), Av.8 °C (96.5 °F), Min:34.1 °C (93.3 °F), Max:36.9 °C (98.5 °F)      Physical Exam:    General appearance: lethargic  Head: normocephalic, without obvious abnormality, atraumatic  Lungs: diminished breath sounds bilateral  Heart: regular rate and rhythm  Abdomen: soft, non-tender  Extremities: edema none  Skin: no rashes  Neurologic: not following commands    Lines, Drains, Airways, Wounds:  Peripheral IV 20 Left Antecubital (Active)   Number of days: 1       Urethral Catheter 16 Fr (Active)   Number of days: 1       Rash 20 0001 Right lateral hip patch (Active)   Number of days: 0       Wound (Active)   Number of days: 0       Labs:    CBC Results       20                        0524 1603          WBC 29.41 30.68          RBC 3.49 5.45          HGB 9.1 13.8          HCT 27.3 44.2          MCV 78.2 81.1          MCH 26.1 25.3          MCHC 33.3 31.2           319          Comment for WBC at 1603 on 12/28/20: RESULTS CHECKED. This result has been called to ANDRESSA VARGHESE by Jennifer Christianson on 12 28 20 at 16:38, and has been read back.     Comment for HGB at 0524 on 12/29/20: ALL RESULTS HAVE BEEN CHECKED      BMP Results       12/29/20 12/28/20 12/28/20                    0524 2245 1928          132 132         K 4.3 5.8 6.7         Cl 102 104 101         CO2 19 9 12         Glucose 225 288 281          148 161         Creatinine 5.3 6.0 6.2         Calcium 7.3 8.0 8.8         Anion Gap 22 19 19         EGFR 7.5 6.5 6.3         Comment for K at 1928 on 12/28/20: Results obtained on plasma. Plasma Potassium values may be up to 0.4 mEQ/L less than serum values. The differences may be greater for patients with high platelet or white cell counts.    Comment for BUN at 0524 on 12/29/20: Checked by dilution      Comment for BUN at 2245 on 12/28/20: Checked by dilution    Comment for BUN at 1928 on 12/28/20: Checked by dilution      PT/PTT Results     No lab values to display.      UA Results       12/28/20                          1627           Color Red           Clarity Turbid           Glucose Proper identification not possible due to color interference.           Bilirubin Proper identification not possible due to color interference.           Ketones Proper identification not possible due to color interference.           Sp Grav 1.017           Blood +3           Ph Proper identification not possible due to color interference.           Protein Proper identification not possible due to color interference.           Urobilinogen Proper identification not possible due to color interference.           Nitrite Proper identification not possible due to color interference.            Leuk Est +3           WBC Too Numerous To Count           RBC Too Numerous To Count           Bacteria +3           Comment for Blood at 1627 on 12/28/20: The sensitivity of the occult blood test is equivalent to approximately 4 intact RBC/HPF.      Lactate Results       12/29/20 12/29/20 12/28/20                    0524 0247 2245         Lactate 6.5 7.1 4.8                       Microbiology Results     Procedure Component Value Units Date/Time    SARS-CoV-2 (COVID-19), PCR Nasopharynx [[id number]]  (Normal) Collected: 12/28/20 1840    Specimen: Nasopharyngeal Swab from Nasopharynx Updated: 12/28/20 2018    Narrative:      The following orders were created for panel order SARS-CoV-2 (COVID-19), PCR Nasopharynx.  Procedure                               Abnormality         Status                     ---------                               -----------         ------                     SARS-CoV-2 (COVID-19), P...[[id number]]  Normal              Final result                 Please view results for these tests on the individual orders.    SARS-CoV-2 (COVID-19), PCR Nasopharynx [[id number]]  (Normal) Collected: 12/28/20 1840    Specimen: Nasopharyngeal Swab from Nasopharynx Updated: 12/28/20 2018     SARS-CoV-2 (COVID-19) Negative    Narrative:      Nursing instructions: Obtain nasopharyngeal swab ONLY.  Send swab in viral transport media.          Pathology Results     ** No results found for the last 720 hours. **          Echo:          Imaging:    Radiology Imaging   XR CHEST 1 VW    Narrative CLINICAL HISTORY: Patient mental status      Impression IMPRESSION: Linear atelectasis or scar in the left midlung zone.    COMMENT: Erect AP portable view of the chest was performed.  We have no prior  studies for comparison.    The heart size is towards the upper limits of normal.  A TAVR prosthesis is  noted.  The pulmonary vasculature is not engorged.    Linear opacity in the left midlung zone is noted, likely an area  of discoid  atelectasis or scar.  The lung fields are otherwise clear, although EKG leads  and wires could obscure small lesions.  No pleural effusions are seen.    No hilar abnormality is seen.  There is calcification of the thoracic aorta.    Bilateral rotator cuff tears are suspected.       Assessment     1. Septic shock due to UTI - CTA/P showed circumferential bladder wall thickening and intravesicular air within the urinary bladder. Urine cx and blood cx are in process. CXR only showed linear atelectasis or scar in the left midlung zone so, less likely pneumonia. Pt is requiring pressor.    2. Lactic acidosis, leukocytosis        Plan     1. D/C pip-tazo and start ceftriaxone while waiting for blood cx and urine cx.

## 2020-12-29 NOTE — CONSULTS
"Cardiac Surgery Consult Note    Subjective     Nancy Minor is a 89 y.o. female who was admitted for Hyperkalemia [E87.5]  Acute cystitis with hematuria [N30.01]  Severe sepsis (CMS/HCC) [A41.9, R65.20]  Acute renal failure, unspecified acute renal failure type (CMS/HCC) [N17.9]. Patient was referred by Dr. Crisostomo ([facility] [place]) for central venous IV access for probable multiple vasopressor support. Patient is a 89 year-old female admitted via the ER today with changes in mental status and acute cystitis, who is now showing signs of septic shock, requiring central line insertion.  Son at bedside earlier and advised the CRNP on the acute care team that she is a full code and \"wants all modalities of treatment done,  Including the possibility of central venous access and possible dialysis if necessary.\"    Pertinent lab results reviewed.    Medical History:   Past Medical History:   Diagnosis Date   • Chronic hypotension    • Chronic kidney disease    • Chronic kidney failure    • Colon cancer (CMS/HCC)    • COPD (chronic obstructive pulmonary disease) (CMS/HCC)    • Stroke (cerebrum) (CMS/HCC)    • UTI (urinary tract infection)        Surgical History:   Past Surgical History:   Procedure Laterality Date   • CARDIAC SURGERY     • COLON SURGERY     • HIP SURGERY     • JOINT REPLACEMENT     • KNEE SURGERY         Allergies: Patient has no known allergies.    Current Inpatient Medications   Medication Dose Route Frequency Provider Last Rate Last Admin   • albuterol 2.5 mg /3 mL (0.083 %) nebulizer solution  - Pyxis Override Pull            • albuterol nebulizer solution 2.5 mg  2.5 mg nebulization q4h [facility] Dipika Crisostomo MD   2.5 mg at 12/28/20 2102   • glucose chewable tablet 16-32 g of dextrose  16-32 g of dextrose oral PRN Chastity Wahl CRNP        Or   • dextrose 40 % oral gel 15-30 g of dextrose  15-30 g of dextrose oral PRN Chastity Wahl CRNP        Or   • glucagon (GLUCAGEN) injection 1 mg  1 mg intramuscular PRN " Chastity Wahl CRNP        Or   • dextrose in water injection 12.5 g  25 mL intravenous PRN Chatsity Wahl CRNP       • heparin (porcine) 5,000 unit/mL injection 5,000 Units  5,000 Units subcutaneous q8h [facility] Chastity Wahl CRNP   5,000 Units at 12/28/20 2153   • [START ON 12/29/2020] levothyroxine (SYNTHROID) tablet 50 mcg  50 mcg oral Daily (6:30a) Dipika Crisostomo MD       • magnesium sulfate IVPB 2g in 50 mL NSS/D5W/SWFI  2 g intravenous PRN Chastity Wahl CRNP       • nystatin (MYCOSTATIN) 100,000 unit/gram topical powder   Topical BID Dipika Crisostomo MD       • sodium bicarbonate 8.4 % (1 mEq/mL) 150 mEq in dextrose 5 % 1,000 mL infusion   intravenous Continuous Leighton Greene  mL/hr at 12/28/20 2136 New Bag at 12/28/20 2136        Social History:   Social History     Socioeconomic History   • Marital status:      Spouse name: None   • Number of children: None   • Years of education: None   • Highest education level: None   Occupational History   • None   Social Needs   • Financial resource strain: None   • Food insecurity     Worry: None     Inability: None   • Transportation needs     Medical: None     Non-medical: None   Tobacco Use   • Smoking status: Never Smoker   • Smokeless tobacco: Never Used   Substance and Sexual Activity   • Alcohol use: Yes   • Drug use: Not Currently   • Sexual activity: None   Lifestyle   • Physical activity     Days per week: None     Minutes per session: None   • Stress: None   Relationships   • Social connections     Talks on phone: None     Gets together: None     Attends Yazdanism service: None     Active member of club or organization: None     Attends meetings of clubs or organizations: None     Relationship status: None   • Intimate partner violence     Fear of current or ex partner: None     Emotionally abused: None     Physically abused: None     Forced sexual activity: None   Other Topics Concern   • None   Social History Narrative    Lives at home, with  son- care giver.       Family History:   Family History   Family history unknown: Yes       Review of Systems  All other systems reviewed and negative except as noted in the HPI.    Vital signs in last 24 hours:  Temp:  [34.1 °C (93.3 °F)-36.1 °C (97 °F)] 34.7 °C (94.4 °F)  Heart Rate:  [] 123  Resp:  [19-27] 27  BP: ()/(36-64) 89/51    Objective     Physical Exam  Constitutional:       General: She is not in acute distress.     Comments: The pt appears frail, is answering some questions appropriately   HENT:      Head: Normocephalic and atraumatic.      Mouth/Throat:      Pharynx: Oropharynx is clear.   Eyes: .      Pupils: Pupils are equal, round, and reactive to light.   Neck:      Musculoskeletal: Normal range of motion and neck supple.   Cardiovascular:      Rate and Rhythm: Regular rhythm. Tachycardia present.      Heart sounds: No murmur.   Pulmonary:      Effort: Pulmonary effort is normal. No respiratory distress.      Breath sounds: Normal breath sounds.   Abdominal:      Palpations: Abdomen is soft.      Tenderness: There is no abdominal tenderness.   Skin:     General: Skin is warm and dry.   Neurological:      Mental Status: She is alert.     Labs  Results for EZEKIEL ARCHULETA (MRN [id number]) as of 12/28/2020 23:22   Ref. Range 12/28/2020 19:28   Sodium Latest Ref Range: 136 - 144 mEQ/L 132 (L)   Potassium Latest Ref Range: 3.6 - 5.1 mEQ/L 6.7 (HH)   Chloride Latest Ref Range: 98 - 109 mEQ/L 101   CO2 Latest Ref Range: 22 - 32 mEQ/L 12 (L)   BUN Latest Ref Range: 8 - 20 mg/dL 161 (HH)   Creatinine Latest Ref Range: 0.6 - 1.1 mg/dL 6.2 (HH)   Glucose Latest Ref Range: 70 - 99 mg/dL 281 (H)   Calcium Latest Ref Range: 8.9 - 10.3 mg/dL 8.8 (L)   eGFR Latest Ref Range: >=60.0 mL/min/1.73m*2 6.3 (L)   Anion Gap Latest Ref Range: 3 - 15 mEQ/L 19 (H)     Results for EZEKIEL ARCHULETA (MRN [id number]) as of 12/28/2020 23:22   Ref. Range 12/28/2020 20:51   pH, Arterial Latest Ref Range: 7.35 -  7.45 pH 7.33 (L)   pCO2, Arterial Latest Ref Range: 35 - 48 mm Hg 14 (LL)   pO2, Arterial Latest Ref Range: 83 - 100 mm Hg 118 (H)   HCO3, Arterial Latest Ref Range: 21 - 28 mEQ/L 13 (L)   Base Excess, Arterial Latest Units: mEQ/L -15.9   O2 Sat, Arterial Latest Ref Range: 93 - 98 % 100 (H)   TCO2, Arterial Latest Ref Range: 22 - 32 mEQ/L 8 (LL)   Lactate, Arterial Latest Ref Range: 0.4 - 1.6 mmol/L 2.2 (H)   Glucose, Arterial Latest Ref Range: 70 - 99 mg/dL 252 (H)   Sodium, Arterial Latest Ref Range: 136 - 145 mEQ/L 125 (LL)   Potassium, Arterial Latest Ref Range: 3.4 - 4.5 mEQ/L 5.9 (H)   Chloride, Arterial Latest Ref Range: 98 - 109 mEQ/L 102   Ionized Calcium, Arterial Latest Ref Range: 1.15 - 1.27 mmol/L 1.22   Hemoglobin, Arterial Latest Ref Range: 12.0 - 16.0 g/dL 10.8 (L)     [facility] [id number] as of 12/28/2020 23:22   Ref. Range 12/28/2020 18:40   SARS-CoV-2 (COVID-19) Latest Ref Range: Negative  Negative     Results for KATHE ALBERTA (MRN [id number]) as of 12/28/2020 23:22   Ref. Range 12/28/2020 16:03   WBC Latest Ref Range: 3.80 - 10.50 K/uL 30.68 (HH)   RBC Latest Ref Range: 3.93 - 5.22 M/uL 5.45 (H)   Hemoglobin Latest Ref Range: 11.8 - 15.7 g/dL 13.8   Hematocrit Latest Ref Range: 35.0 - 45.0 % 44.2   MCV Latest Ref Range: 83.0 - 98.0 fL 81.1 (L)   MCH Latest Ref Range: 28.0 - 33.2 pg 25.3 (L)   MCHC Latest Ref Range: 32.2 - 35.5 g/dL 31.2 (L)   RDW Latest Ref Range: 11.7 - 14.4 % 17.9 (H)   Platelets Latest Ref Range: 150 - 369 K/uL 319   MPV Latest Ref Range: 9.4 - 12.3 fL 9.4     Imaging  Not applicable    ECG/Telemetry  Atrial fibrillation @123 bpm    Assessment   89 y.o. female being consulted for central line access       Plan     Attempt femoral access.    Damir Clarke PA-C

## 2020-12-29 NOTE — ASSESSMENT & PLAN NOTE
Received hyperkalemia protocol in the emergency room with calcium gluconate on bicarb drip  IV Insulin and dextrose  Potassium trending down  If no improvement with the the protocol will need hemodialysis in the next 12 hours   no significant hyperkalemia related EKG changes.

## 2020-12-29 NOTE — CONSULTS
Critical Care Consult     Reason for Consult: Septic shock    History of Present Illness:  89-year-old female with a past medical history of CKD, stroke, COPD, remote history of colon cancer, presented to the emergency room with several days of progressive weakness, confusion, decreased p.o. intake.  Yesterday, her son with whom she lives, noted that her urine has become darker and was now bloody, and she was therefore presented to the ER for evaluation.    Upon arrival to the ER, patient was afebrile, subsequently became hypothermic with a temperature of 93.3.  Blood pressure 82/44 on arrival, 100% on room air.  Initial work-up significant for metabolic acidosis with a pH of 7.1, acute kidney injury with a BUN of 160, creatinine 6.4, potassium 7.9, sodium 127.  WBC was elevated at 30.6, and initial lactate 2.4.  UA showed too numerous to count RBCs and WBCs, and CT abdomen showed bladder wall thickening and intravesicular air concerning for infection.     Patient received IV fluid resuscitation, remained hypotensive and was started on Levophed through a triple-lumen catheter that was placed in the groin.  She was started on broad-spectrum antibiotics with vancomycin and Zosyn, and admitted to the intensive care unit.    Patient seen and examined this morning.  Oriented to person, not to place or time.  Denied complaints.  Of note, history obtained from chart review primarily    Past Medical History:   Diagnosis Date   • Atrial fibrillation (CMS/HCC)    • Chronic hypotension    • Chronic kidney disease    • Chronic kidney failure    • Colon cancer (CMS/HCC)    • COPD (chronic obstructive pulmonary disease) (CMS/HCC)    • Low BP    • Stroke (cerebrum) (CMS/HCC)    • UTI (urinary tract infection)      Past Surgical History:   Procedure Laterality Date   • CARDIAC SURGERY     • CENTRAL LINE  12/29/2020        • COLON SURGERY     • HIP SURGERY     • JOINT REPLACEMENT     • KNEE SURGERY       Social History      Socioeconomic History   • Marital status:      Spouse name: None   • Number of children: None   • Years of education: None   • Highest education level: None   Occupational History   • None   Social Needs   • Financial resource strain: None   • Food insecurity     Worry: None     Inability: None   • Transportation needs     Medical: None     Non-medical: None   Tobacco Use   • Smoking status: Never Smoker   • Smokeless tobacco: Never Used   Substance and Sexual Activity   • Alcohol use: Yes   • Drug use: Not Currently   • Sexual activity: None   Lifestyle   • Physical activity     Days per week: None     Minutes per session: None   • Stress: None   Relationships   • Social connections     Talks on phone: None     Gets together: None     Attends Mandae[employer] service: None     Active member of club or organization: None     Attends meetings of clubs or organizations: None     Relationship status: None   • Intimate partner violence     Fear of current or ex partner: None     Emotionally abused: None     Physically abused: None     Forced sexual activity: None   Other Topics Concern   • None   Social History Narrative    Lives at home, with son- care giver.     Family History   Family history unknown: Yes       Allergies: Patient has no known allergies.    HOME MEDS  •  atorvastatin, Take 10 mg by mouth daily.  •  levothyroxine, Take 50 mcg by mouth daily.  •  metoprolol succinate XL, Take 25 mg by mouth daily.  •  INCRUSE ELLIPTA, Inhale 1 puff daily.    CURRENT MEDS  •  atorvastatin, 10 mg, oral, Daily (6p)  •  calcium gluconate, 1 g, intravenous, 2x daily PRN  •  cefTRIAXone, 1 g, intravenous, q24h INT  •  glucose, 16-32 g of dextrose, oral, PRN **OR** dextrose, 15-30 g of dextrose, oral, PRN **OR** glucagon, 1 mg, intramuscular, PRN **OR** dextrose in water, 25 mL, intravenous, PRN  •  heparin (porcine), 5,000 Units, subcutaneous, q8h JESSICA  •  insulin aspart U-100, 3-5 Units, subcutaneous, q6h JESSICA  •   ipratropium-albuteroL, 3 mL, nebulization, q6H JESSICA  •  lactated ringer's, , intravenous, Continuous  •  levothyroxine, 50 mcg, oral, Daily (6:30a)  •  magnesium sulfate, 2 g, intravenous, PRN  •  norepinephrine, 0.5-30 mcg/min, intravenous, Titrated  •  nystatin, , Topical, BID    REVIEW OF SYSTEMS  Review of Systems   Unable to perform ROS: Mental status change       VITAL SIGNS  Vitals:    12/29/20 0700 12/29/20 0745 12/29/20 0945 12/29/20 1045   BP: (!) 100/55 (!) 100/55 (!) 84/65 (!) 87/50   BP Location: Right upper arm      Patient Position: Lying      Pulse: (!) 137 (!) 137 (!) 137 (!) 137   Resp: 20 (!) 24 (!) 29 18   Temp: 36.9 °C (98.5 °F)      TempSrc: Oral      SpO2: 93% 94% 96% 96%   Weight:       Height:         VENTILATOR SETTINGS  FiO2 (%) (Set):  [21 %] 21 %    INTAKE/OUTPUT    Intake/Output Summary (Last 24 hours) at 12/29/2020 1122  Last data filed at 12/29/2020 1108  Gross per 24 hour   Intake 5804.51 ml   Output 398 ml   Net 5406.51 ml       Lines, Drains, Airways, Wounds:  Peripheral IV 12/28/20 Left Antecubital (Active)   Number of days: 1       Urethral Catheter 16 Fr (Active)   Number of days: 1       Rash 12/29/20 0001 Right lateral hip patch (Active)   Number of days: 0       Wound (Active)   Number of days: 0       PHYSICAL EXAM  Physical Exam  Vitals signs reviewed.   Constitutional:       Comments: Frail-appearing   HENT:      Head: Normocephalic and atraumatic.      Mouth/Throat:      Comments: Mucous membranes dry  Eyes:      General: No scleral icterus.     Conjunctiva/sclera: Conjunctivae normal.   Cardiovascular:      Rate and Rhythm: Tachycardia present. Rhythm irregular.      Heart sounds: Normal heart sounds. No murmur. No friction rub. No gallop.    Pulmonary:      Breath sounds: No rhonchi or rales.      Comments: Diffuse expiratory wheeze bilaterally  Abdominal:      General: Bowel sounds are normal. There is no distension.      Palpations: Abdomen is soft.      Tenderness:  There is no abdominal tenderness.   Musculoskeletal:      Right lower leg: No edema.      Left lower leg: No edema.   Skin:     General: Skin is warm.   Neurological:      Mental Status: She is alert.      Comments: Oriented to person, not to place or time         LAB RESULTS  ABG  Results from last 7 days   Lab Units 12/29/20  0612 12/29/20  0018 12/28/20 2051 12/28/20  1603   PH ART pH 7.52* 7.31* 7.33*  --    PCO2 ART mm Hg 24* 18* 14*  --    PO2 ART mm Hg 85 93 118*  --    HCO3 ART mEQ/L 24 14* 13*  --    O2 SAT ART % 100* 100* 100*  --    BASE EXC ART mEQ/L -1.6 -14.7 -15.9  --    SOURCE OF OXYGEN   --   --   --  RA     CBC  Results from last 7 days   Lab Units 12/29/20  0524 12/28/20  1603   WBC K/uL 29.41* 30.68*   RBC M/uL 3.49* 5.45*   HEMOGLOBIN g/dL 9.1* 13.8   HEMATOCRIT % 27.3* 44.2   MCV fL 78.2* 81.1*   MCH pg 26.1* 25.3*   MCHC g/dL 33.3 31.2*   PLATELETS K/uL 209 319   RDW % 16.7* 17.9*   MPV fL 9.3* 9.4     BMP  Results from last 7 days   Lab Units 12/29/20  0924 12/29/20  0524 12/28/20 2245  12/28/20  1603   SODIUM mEQ/L 143 143 132*   < > 127*   POTASSIUM mEQ/L 4.3 4.3 5.8*   < > 7.9*   CHLORIDE mEQ/L 101 102 104   < > 98   CO2 mEQ/L 22 19* 9*   < > 10*   BUN mg/dL 126* 127* 148*   < > 160*   CREATININE mg/dL 5.0* 5.3* 6.0*   < > 6.4*   CALCIUM mg/dL 7.4* 7.3* 8.0*   < > 9.6   ALBUMIN g/dL  --   --   --   --  2.7*   BILIRUBIN TOTAL mg/dL  --   --   --   --  1.3*   ALK PHOS IU/L  --   --   --   --  145*   ALT IU/L  --   --   --   --  24   AST IU/L  --   --   --   --  20   GLUCOSE mg/dL 180* 225* 288*   < > 88    < > = values in this interval not displayed.     Coag      IMAGING  Imaging personally reviewed  CXR (12/29/2020):  IMPRESSION: Linear atelectasis or scar in the left midlung zone    CT Abd/pelvis (12/29/20):  IMPRESSION:  1.  Limited evaluation in the absence of intravenous or oral contrast and due to  patient motion.  No definite hydronephrosis or renal calculi as  clinically  questioned, noting limited evaluation of the distal ureters due to the patient's  right hip prosthesis.  2.  Circumferential bladder wall thickening and intravesicular air within the  urinary bladder, concerning for urinary tract infection.  3.  Age-indeterminate mild compression deformities of T11, T12 and L3.  4. Additionally noted in the impression should be incidentally noted pneumobilia,  which is a nonspecific finding in a patient status post cholecystectomy.      ASSESSMENT & PLAN  89-year-old female presenting with weakness, hematuria, found to have septic shock     1.  Septic shock, suspect  etiology.  UA with too numerous to count WBCs, RBCs, and CT demonstrating circumferential bladder wall thickening and intravesicular air  -Received vancomycin and Zosyn, narrowed to ceftriaxone per ID  -Follow-up cultures  -Continue Levophed to maintain MAP greater than 65    2.  Acute kidney injury, suspect secondary to hypovolemia in the setting of septic shock  -Trending down with IV fluid resuscitation  - Renal following, no need for HD at this time     3. Lactic acidosis, 2/2 septic shock  - Remains elevated but trending down    4. Hyperkalemia, in the setting of acute renal failure.    - K down to 4.3    5.  Metabolic acidosis, in the setting of diarrhea and acute kidney injury.  Overcorrected with bicarbonate, bicarb discontinued this AM    6. Leukocytosis.  Markedly elevated WBC of 29,000.  No evidence of hydronephrosis on CT, but there is intravesicular air within the urinary bladder  -Will trend    7.  Hematuria, likely secondary to UTI with anticoagulation  -Continue to hold anticoagulation for now  -Consider urology consult if persistent    8. Anemia.   -Hemoglobin trending down, likely some degree of hemodilution, but also with hematuria  -Trend H&H     9. Atrial Fibrillation with RVR  - s/p 1 dose of digoxin,.  Will hold further doses with DANY   - Consider amiodarone gtt if needed for  sustained tachycardia  - Hold AC in setting of hematuria  - d/w cardiology      CODE STATUS  Full Code    The case was reviewed this morning at the patient's beside multidisciplinary rounds with the patient's nurse, dietician, pharmacist, respiratory therapist, physical therapist and critical care nurse coordinator. The patient's clinical status with regards to diagnosis, treatment plans including disposition of any IV, arterial lines, Samson and tubes were discussed, as well as dietary, respiratory therapy and mobilization needs.     This case was discussed with consultants.    Total critical time spent managing septic shock, acute kidney injury on this patient excluding any procedure time totals 75 minutes      Ingrid Acevedo,   12/29/2020

## 2020-12-29 NOTE — ASSESSMENT & PLAN NOTE
Patient presenting with generalized weakness, confusion, decreased oral intake to the emergency room  Lives at home with the son  ER work-up significant for profound metabolic acidosis, hyperkalemia and severe renal failure  Nephrology contacted and started on bicarb drip  Watching for improvement on bicarb drip otherwise would need hemodialysis  Son w would want everything to be tried  Continue bicarb drip  Monitoring EKG  Checking BMP, ABG every 6 hours.

## 2020-12-29 NOTE — ASSESSMENT & PLAN NOTE
Due to severe hypovolemia and severe sepsis  Continuing IV fluid resuscitation  Checking BMP every 6 hours.

## 2020-12-29 NOTE — PROGRESS NOTES
12/29/20 [facility]. Received inquiry from CM to see if [facility] able to see patient with PCP that is not local (PCP is in [place]).  [facility] is unable to set up home care without a local PCP.  Signing off.  If patient does get set up with a local PCP that is willing to sign initial and ongoing home care orders, please send over a new home care referral, if patient would like to be set up with home care with [facility]. Ernestina Erickson [phone]

## 2020-12-29 NOTE — ASSESSMENT & PLAN NOTE
Secondary to UTI with Klebsiella bacteremia  Improving  Currently on Rocephin.  Can switch to Ceftin per ID.  Total duration 14 days  History of UTIs in the past  CT abdomen also showing circumferential bladder wall thickening and intravesicular air incidental pneumobilia which is a nonspecific finding in a patient who had cholecystectomy  Blood cultures and urine cultures grew Klebsiella   Status post Levophed in ICU  PT/OT eval  Seen by speech and recommending puréed diet with nectar thick liquids  Palliative care consult appreciated.  Patient is limited code- she does not want chest compressions or mechanical ventilation.  Ok for medications and shock  Heparin subcu for DVT prophylaxis  Updated daughter (POA) discussed poor prognosis. She wants to continue diuresis and see whether she will improve. She may need hospice placement, if she does not improve  Poor prognosis sec to multiple medical problems, poor po inake, failure to thrive  Discussed with  and

## 2020-12-29 NOTE — ASSESSMENT & PLAN NOTE
History of atrial fibrillation in the past  Was taking Eliquis in the past which has been discontinued  Was supposed to take baby aspirin which she is not on on home medication  Currently in rapid atrial fibrillation  Given her renal functions not ideal to start on amiodarone drip or give digoxin  Will give IV beta-blocker along with fluid boluses, currently on Levophed for septic shock.

## 2020-12-30 NOTE — PROGRESS NOTES
Infectious Disease Progress Note    Patient Name: Nancy Minor  MR#: [id number]  : 10/6/1923  Admission Date: 2020  Date: 20   Time: 10:32 AM   Author: Barrett Stern MD    Major Events: none    Antibiotics:    Anti-infectives (From admission, onward)    Start     Dose/Rate Route Frequency Ordered Stop    20 1015  cefTRIAXone (ROCEPHIN) IVPB 1 g in 100 mL NSS vial in bag      1 g  200 mL/hr over 30 Minutes intravenous Every 24 hours interval 20 0925      20  nystatin (MYCOSTATIN) 100,000 unit/gram topical powder       Topical 2 times daily 20            Subjective     Review of Systems    Remains on pressor but denies any pain    Objective     Vital Signs:    Patient Vitals for the past 72 hrs:   BP Temp Temp src Pulse Resp SpO2 Height Weight   20 1015 -- -- -- (!) 101 14 97 % -- --   20 1000 (!) 85/53 -- -- (!) 116 15 97 % -- --   20 0930 (!) 77/48 -- -- (!) 119 15 96 % -- --   20 0915 (!) 143/77 -- -- (!) 111 15 97 % -- --   20 0900 (!) 92/56 -- -- (!) 103 13 98 % -- --   20 0845 96/69 36.7 °C (98 °F) Axillary (!) 109 15 98 % -- --   20 0830 (!) 93/51 -- -- (!) 113 16 97 % -- --   20 0815 (!) 78/50 -- -- (!) 117 16 97 % -- --   20 0800 (!) 88/52 -- -- (!) 112 14 97 % -- --   20 0745 (!) 90/55 -- -- (!) 107 16 99 % 1.524 m (5') 52.6 kg (116 lb)   20 0733 -- -- -- (!) 108 -- -- -- --   20 0730 (!) 92/50 -- -- (!) 114 18 97 % -- --   20 0715 (!) 100/56 -- -- (!) 108 14 98 % -- --   20 0700 (!) 103/52 -- -- (!) 102 14 97 % -- --   20 0645 (!) 95/52 -- -- (!) 110 14 97 % -- --   20 0630 (!) 87/55 -- -- (!) 116 13 97 % -- --   20 0600 (!) 87/56 -- -- (!) 109 13 98 % -- 52.7 kg (116 lb 2.9 oz)   20 0500 (!) 100/59 -- -- (!) 122 17 97 % -- --   20 0400 95/66 36.5 °C (97.7 °F) Oral (!) 115 14 99 % -- --   20 0300 (!) 93/52 -- -- (!) 111 14 98 % -- --    12/30/20 0200 (!) 87/55 -- -- (!) 114 15 97 % -- --   12/30/20 0100 (!) 103/50 -- -- (!) 112 17 95 % -- --   12/30/20 0000 (!) 99/53 36.6 °C (97.9 °F) Oral (!) 118 15 96 % -- --   12/29/20 2300 (!) 123/59 -- -- (!) 131 (!) 43 97 % -- --   12/29/20 2200 (!) 93/50 -- -- (!) 139 16 97 % -- --   12/29/20 2100 (!) 89/60 -- -- (!) 122 (!) 26 96 % -- --   12/29/20 2000 (!) 97/48 36.7 °C (98.1 °F) Oral (!) 107 18 98 % -- --   12/29/20 1900 (!) 97/54 -- -- (!) 129 19 97 % -- --   12/29/20 1845 (!) 80/53 -- -- (!) 139 18 94 % -- --   12/29/20 1830 (!) 85/51 -- -- (!) 143 (!) 25 96 % -- --   12/29/20 1815 (!) 101/56 -- -- (!) 131 19 95 % -- --   12/29/20 1800 (!) 102/56 -- -- (!) 136 17 95 % -- --   12/29/20 1745 (!) 96/55 -- -- (!) 131 18 97 % -- --   12/29/20 1730 (!) 98/47 -- -- (!) 140 18 (!) 90 % -- --   12/29/20 1715 (!) 106/53 -- -- (!) 142 (!) 28 93 % -- --   12/29/20 1700 (!) 83/54 -- -- (!) 142 (!) 21 93 % -- --   12/29/20 1645 (!) 87/50 -- -- (!) 142 (!) 24 93 % -- --   12/29/20 1630 (!) 98/54 -- -- (!) 146 19 94 % -- --   12/29/20 1615 (!) 96/47 -- -- (!) 147 (!) 21 94 % -- --   12/29/20 1600 (!) 97/51 -- -- (!) 144 18 94 % -- --   12/29/20 1545 (!) 106/51 -- -- (!) 144 16 94 % -- --   12/29/20 1530 (!) 105/46 -- -- (!) 142 16 93 % -- --   12/29/20 1515 (!) 95/50 37 °C (98.6 °F) Axillary (!) 143 16 94 % -- --   12/29/20 1500 -- -- -- (!) 139 16 97 % -- --   12/29/20 1445 (!) 82/40 -- -- (!) 141 16 98 % -- --   12/29/20 1430 -- -- -- (!) 141 17 98 % -- --   12/29/20 1415 (!) 104/49 -- -- (!) 142 16 97 % -- --   12/29/20 1345 (!) 100/53 -- -- (!) 140 14 97 % -- --   12/29/20 1315 (!) 101/46 -- -- (!) 136 15 97 % -- --   12/29/20 1245 (!) 111/53 37.2 °C (98.9 °F) Axillary (!) 142 16 97 % -- --   12/29/20 1230 -- -- -- (!) 138 20 98 % -- --   12/29/20 1200 (!) 92/50 -- -- (!) 142 20 97 % -- --   12/29/20 1145 (!) 93/51 -- -- (!) 137 19 98 % -- --   12/29/20 1045 (!) 87/50 37 °C (98.6 °F) Oral (!) 137 18 96 % -- --    12/29/20 0945 (!) 84/65 -- -- (!) 137 (!) 29 96 % -- --   12/29/20 0845 (!) 95/52 -- -- (!) 140 19 93 % -- --   12/29/20 0800 -- -- -- (!) 137 -- 94 % -- --   12/29/20 0745 (!) 100/55 -- -- (!) 137 (!) 24 94 % -- --   12/29/20 0700 (!) 100/55 36.9 °C (98.5 °F) Oral (!) 137 20 93 % -- --   12/29/20 0645 103/75 -- -- (!) 141 (!) 21 94 % -- --   12/29/20 0615 (!) 89/69 36.5 °C (97.7 °F) Axillary (!) 136 (!) 21 94 % -- --   12/29/20 0545 (!) 80/49 -- -- (!) 135 (!) 21 94 % -- --   12/29/20 0515 (!) 78/52 -- -- (!) 136 20 95 % -- --   12/29/20 0445 (!) 79/50 -- -- (!) 134 (!) 21 94 % -- --   12/29/20 0415 (!) 81/40 -- -- (!) 134 19 95 % -- --   12/29/20 0400 -- -- -- (!) 131 -- -- -- --   12/29/20 0300 (!) 68/44 -- -- (!) 130 18 -- -- --   12/29/20 0230 107/64 -- -- (!) 130 (!) 31 100 % -- --   12/29/20 0220 94/69 -- -- (!) 132 (!) 27 -- -- --   12/29/20 0200 -- -- -- (!) 127 (!) 24 -- -- --   12/29/20 0130 (!) 73/51 -- -- (!) 140 (!) 23 -- -- --   12/29/20 0100 (!) 77/47 -- -- (!) 126 (!) 25 -- -- --   12/29/20 0030 (!) 78/50 -- -- (!) 131 (!) 26 -- -- --   12/29/20 0000 (!) 87/50 -- -- (!) 131 (!) 23 -- -- --   12/28/20 2330 110/65 -- -- (!) 129 (!) 36 -- -- --   12/28/20 2300 (!) 89/51 36.2 °C (97.1 °F) Axillary (!) 123 (!) 27 (!) 81 % -- --   12/28/20 2250 (!) 79/52 -- -- (!) 128 (!) 23 (!) 85 % -- --   12/28/20 2220 (!) 61/40 -- -- (!) 125 (!) 24 94 % -- --   12/28/20 2200 (!) 66/36 -- -- (!) 117 19 98 % -- --   12/28/20 2140 (!) 95/54 -- -- (!) 115 20 98 % -- --   12/28/20 2130 (!) 60/36 -- -- (!) 118 20 98 % 1.524 m (5') 45.5 kg (100 lb 3.2 oz)   12/28/20 2120 -- -- -- (!) 123 19 100 % -- --   12/28/20 2110 (!) 87/59 -- -- (!) 121 (!) 22 99 % -- --   12/28/20 2100 (!) 87/59 (!) 34.7 °C (94.4 °F) Rectal (!) 117 (!) 23 100 % -- --   12/28/20 2025 91/64 (!) 34.1 °C (93.3 °F) Axillary (!) 132 (!) 26 -- -- --   12/28/20 1945 (!) 99/54 36.1 °C (97 °F) -- (!) 114 (!) 22 100 % -- --   12/28/20 1900 (!) 103/59 (!) 35.9  °C (96.7 °F) Temporal (!) 116 (!) 22 100 % -- --   20 1800 (!) 112/59 -- -- (!) 108 (!) 24 100 % -- --   20 1653 -- -- -- -- -- -- -- 48.5 kg (107 lb)   20 1631 (!) 111/56 -- -- 98 20 100 % -- --   20 1600 (!) 111/56 -- -- 96 (!) 21 100 % -- --   20 1522 (!) 82/44 36.1 °C (97 °F) Oral (!) 101 (!) 22 100 % -- --       Temp (72hrs), Av.3 °C (97.3 °F), Min:34.1 °C (93.3 °F), Max:37.2 °C (98.9 °F)      Physical Exam:    General appearance: lethargic  Head: NCAT  Lungs: wheezing bilateral  Heart: regular rate and rhythm  Abdomen: soft, non-tender  Extremities: edema none  Skin: no rashes  Neurologic: following commands    Lines, Drains, Airways, Wounds:  CVC Triple Lumen 20 Non-tunneled Right Femoral (Active)   Number of days: 1       Peripheral IV 20 Left Antecubital (Active)   Number of days: 2       Peripheral IV 20 Anterior;Left;Upper Arm (Active)   Number of days: 2       Urethral Catheter 16 Fr (Active)   Number of days: 2       Rash 20 0001 Right lateral hip patch (Active)   Number of days: 1       Wound (Active)   Number of days: 1       Labs:    CBC Results       20                    1012 0433 2244         WBC -- 24.28 --         RBC -- 3.30 --         HGB 7.9 8.5 8.6         HCT 25.0 26.6 26.5         MCV -- 80.6 --         MCH -- 25.8 --         MCHC -- 32.0 --         PLT -- 204 --                   BMP Results       20                    0433 1511 0924          144 143         K 3.9 4.1 4.3         Cl 100 105 101         CO2 21 23 22         Glucose 123 79 180          121 126         Creatinine 4.3 4.8 5.0         Calcium 6.8 7.2 7.4         Anion Gap 15 16 20         EGFR 9.6 8.4 8.0         Comment for NA at 0433 on 20: Electrolytes have been rechecked      Comment for BUN at 0433 on 20: Checked by dilution  Consistent with previous results      Comment for BUN at 1511 on  12/29/20: Checked by dilution    Comment for BUN at 0924 on 12/29/20: Checked by dilution      PT/PTT Results     No lab values to display.      UA Results       12/28/20                          1627           Color Red           Clarity Turbid           Glucose Proper identification not possible due to color interference.           Bilirubin Proper identification not possible due to color interference.           Ketones Proper identification not possible due to color interference.           Sp Grav 1.017           Blood +3           Ph Proper identification not possible due to color interference.           Protein Proper identification not possible due to color interference.           Urobilinogen Proper identification not possible due to color interference.           Nitrite Proper identification not possible due to color interference.           Leuk Est +3           WBC Too Numerous To Count           RBC Too Numerous To Count           Bacteria +3           Comment for Blood at 1627 on 12/28/20: The sensitivity of the occult blood test is equivalent to approximately 4 intact RBC/HPF.      Lactate Results       12/29/20 12/29/20 12/29/20                    0924 0524 0247         Lactate 4.1 6.5 7.1                       Microbiology Results     Procedure Component Value Units Date/Time    SARS-CoV-2 (COVID-19), PCR Nasopharynx [[id number]]  (Normal) Collected: 12/28/20 1840    Specimen: Nasopharyngeal Swab from Nasopharynx Updated: 12/28/20 2018    Narrative:      The following orders were created for panel order SARS-CoV-2 (COVID-19), PCR Nasopharynx.  Procedure                               Abnormality         Status                     ---------                               -----------         ------                     SARS-CoV-2 (COVID-19), P...[[id number]]  Normal              Final result                 Please view results for these tests on the individual orders.    SARS-CoV-2 (COVID-19), PCR Nasopharynx  [[id number]]  (Normal) Collected: 12/28/20 1840    Specimen: Nasopharyngeal Swab from Nasopharynx Updated: 12/28/20 2018     SARS-CoV-2 (COVID-19) Negative    Narrative:      Nursing instructions: Obtain nasopharyngeal swab ONLY.  Send swab in viral transport media.    Blood Culture Blood, Venous [[id number]] Collected: 12/28/20 1648    Specimen: Blood, Venous Updated: 12/29/20 2100     Culture No growth at 18-24 hours    Blood Culture Blood, Venous [[id number]]  (Abnormal) Collected: 12/28/20 1603    Specimen: Blood, Venous Updated: 12/30/20 0940     Culture **Positive Culture**     Gram Stain Result Gram negative bacilli    Blood Culture PCR Panel Blood, Venous [[id number]]  (Abnormal) Collected: 12/28/20 1603    Specimen: Blood, Venous Updated: 12/29/20 1309     Acinetobacter calcoaceticus-baumannii Not Detected     Candida albicans Not Detected     Candida auris Not Detected     Candida glabrata Not Detected     Candida krusei Not Detected     Candida parapsilosis Not Detected     Candida tropicalis Not Detected     Cryptococcus neoformans/gattii Not Detected     Enterococcus faecalis Not Detected     Enterococcus faecium Not Detected     Enterobacter cloacae complex Not Detected     Escherichia coli Not Detected     Klebsiella oxytoca Not Detected     Klebsiella pneumoniae Detected     Klebsiella aerogenes Not Detected     Proteus Not Detected     Salmonella species Not Detected     Serratia marcescens Not Detected     Haemophilus influenzae Not Detected     Listeria monocytogenes Not Detected     Neiseria meningitidis Not Detected     Pseudomonas aeruginosa Not Detected     Stenotrophomonas maltophilia Not Detected     Bacteroides fragilis Not Detected     Staph (not aureus) Not Detected     Staphylococcus aureus Not Detected     Staphylococcus ludgnensis Not Detected     Staphylococcus epidermidis Not Detected     Streptococcus Not Detected     Streptococcus agalactiae (Group B) Not Detected     Streptococcus  pneumoniae Not Detected     Streptococcus pyogenes (Group A) Not Detected     KPC (Carbapenem Resistance Gene) Not Detected     Damon/B (Vancomycin Resistance Gene) Not Applicable     mecA/C Not Applicable     mecA/C and MREJ (MRSA) Not Applicable     CTX-M (ESBL) Not Detected     IMP Not Detected     mcr-1 Not Detected     NDM Not Detected     OXA-48-like Not Detected     VIM Not Detected     Comment: See below    Narrative:      This positive blood culture was tested with a rapid molecular panel that detects Enterococcus faecalis, Enterococcus faecium, Listeria monocytogenes, Staphylococcus species, Staphylococcus aureus, Staphylococcus lugdunensis, Staphylococcus epidermidis, Streptococcus agalactiae (Group B), Streptococcus pneumonia, Streptococcus pyogenes (Group A), Acinetobacter calcoaceticus-baumannii complex, Bacteroides fragilis, Haemophilus influenza, Neisseria meningitides, Pseudomonas aeruginosa, Stenotrophomonas maltophilia , Salmonella spp.,  Enterobacter cloacae complex, Escherichia coli, Klebsiella oxytoca, Klebsiella pneumoniae group, Klebsiella aerogenes , Proteus species, Serratia marcescens, Candida albicans, Candida auris, Candida glabrata, Candida krusei, Cryptococcus neoformans/gattii, Candida parapsilosis, and Candida tropicalis.          Pathology Results     ** No results found for the last 720 hours. **          Echo:     Cardiac Imaging   TRANSTHORACIC ECHO (TTE) COMPLETE 12/30/2020    Narrative · Mitral valve thickening. Severe mitral annular calcification.  · Severe mitral valve stenosis.  · Mean gradient = 10.00 mmHg. HR = 110 bpm.  · Hyperdynamic LV systolic function. Estimated EF >75%.  · Mildly dilated LA.  · Tricuspid valve structure is grossly normal. Mild tricuspid valve   regurgitation.  · Aortic valve structure is normal. Mild aortic valve regurgitation. No   aortic valve stenosis. A bioprosthetic aortic valve is present.  · Moderately dilated RA.  · Moderately dilated RV.  Mildly reduced RV systolic function.  · Mild mitral valve regurgitation.     Estimated PASP= 51mmHg by TR jet method.         Imaging:    Radiology Imaging   XR CHEST 1 VW    Narrative CLINICAL HISTORY: Patient mental status      Impression IMPRESSION: Linear atelectasis or scar in the left midlung zone.    COMMENT: Erect AP portable view of the chest was performed.  We have no prior  studies for comparison.    The heart size is towards the upper limits of normal.  A TAVR prosthesis is  noted.  The pulmonary vasculature is not engorged.    Linear opacity in the left midlung zone is noted, likely an area of discoid  atelectasis or scar.  The lung fields are otherwise clear, although EKG leads  and wires could obscure small lesions.  No pleural effusions are seen.    No hilar abnormality is seen.  There is calcification of the thoracic aorta.    Bilateral rotator cuff tears are suspected.       Assessment     1. Septic shock with Klebsiella bacteremia due to UTI - pt remains afebrile. Remains on pressor.     2. Lactic acidosis, leukocytosis - improving     Plan      1. Continue ceftriaxone while waiting for Klebsiella susceptibilities and will monitor WBC count for further improvement.

## 2020-12-30 NOTE — NURSING NOTE
Pt. With low urine output, paged Dr. Crisostomo, 250ml NS bolus verbal order, will cont. To monitor

## 2020-12-30 NOTE — PROGRESS NOTES
HC POA paperwork received from dtfiorella Bell via secure e-mail, printed, and provided to unit secretary for inclusion in chart. Replied to dtr via e-mail advising HC will need local PCP in order to receive HC services while visiting in [place].

## 2020-12-30 NOTE — PATIENT CARE CONFERENCE
Care Progression Rounds Note  Date: 12/30/2020  Time: 12:55 PM     Patient Name: Nancy Minor     Medical Record Number: [id number]   YOB: 1923  Sex: Female      Room/Bed: 3211    Admitting Diagnosis: Hyperkalemia [E87.5]  Acute cystitis with hematuria [N30.01]  Severe sepsis (CMS/HCC) [A41.9, R65.20]  Acute renal failure, unspecified acute renal failure type (CMS/HCC) [N17.9]   Admit Date/Time: 12/28/2020  3:35 PM    Primary Diagnosis: Metabolic acidosis  Principal Problem: Metabolic acidosis    GMLOS: 4.8  Anticipated Discharge Date: 1/4/2021    AM-PAC  Mobility Score:      Discharge Planning:  Anticipated Discharge Disposition: home with home health services, skilled nursing facility    Barriers to Discharge:  Barriers to Discharge: Medical issues not resolved  Comment: IV abx, IV solumedrol, O2 1L, PT rec SNF    Participants:  advanced practice provider, , dietitian/nutrition services, nursing, pharmacy, physician, social work/services

## 2020-12-30 NOTE — CONSULTS
Wound Ostomy Continence Note    Subjective    HPI Patient is a 89 y.o. female who was admitted on 12/28/2020 with a diagnosis of Hyperkalemia [E87.5]  Acute cystitis with hematuria [N30.01]  Severe sepsis (CMS/HCC) [A41.9, R65.20]  Acute renal failure, unspecified acute renal failure type (CMS/HCC) [N17.9].    Problem list Problem List:   Patient Active Problem List   Diagnosis   • Severe sepsis (CMS/HCC)   • Encephalopathy   • Pneumobilia   • Acute on chronic renal failure (CMS/HCC)   • Hyperkalemia   • Metabolic acidosis   • Acute cystitis with hematuria   • Hypoglycemia   • COPD (chronic obstructive pulmonary disease) (CMS/HCC)   • History of colon cancer   • Type 2 MI (myocardial infarction) (CMS/HCC)   • Hyponatremia   • History of CVA (cerebrovascular accident)   • Acidosis, metabolic, with respiratory acidosis   • Acute respiratory failure with hypoxia and hypercapnia (CMS/HCC)   • Chronic atrial fibrillation (CMS/HCC)   • Septic shock (CMS/HCC)      PMH/PSH Medical History:   Past Medical History:   Diagnosis Date   • Atrial fibrillation (CMS/HCC)    • Chronic hypotension    • Chronic kidney disease    • Chronic kidney failure    • Colon cancer (CMS/HCC)    • COPD (chronic obstructive pulmonary disease) (CMS/HCC)    • Low BP    • Stroke (cerebrum) (CMS/HCC)    • UTI (urinary tract infection)      Surgical History:   Past Surgical History:   Procedure Laterality Date   • CARDIAC SURGERY     • CENTRAL LINE  12/29/2020        • COLON SURGERY     • HIP SURGERY     • JOINT REPLACEMENT     • KNEE SURGERY        Assessment and Recommendation     Wound Other (comment) Buttock (Active)   Dressing Appearance dry;intact 12/30/20 1104   Wound Appearance reddened 12/30/20 1104   Periwound Appearance Reddened;Intact 12/30/20 1104   Drainage Amount none 12/30/20 1104   Wound Care soap and water;cleansed with 12/30/20 1104   Wound Interventions barrier applied 12/30/20 1104   Dressing foam 12/30/20 1104   Frequency of  Dressing Change 2 times a week 12/30/20 1104   Recommendation Keep area clean and dry. Apply zinc barrier ointment to perineum/buttocks PRN. Foam mepilex dressing to sacrum-change 2x/wk and PRN but assess skin under dressing qshift 12/30/20 1104      Consult by nursing due to BPA trigger for low Ga score. Patient's Ga at last bedside nursing assessment=11. Patient has no active wounds at this time but has extensive MASD on perineum from liquid stool and urine incontinence. Patient now has FMS and indwelling tyson catheter. No evidence of satellite lesions noted. Recommend: Maintain PI PREVENT bundle, keep HOB as low as safely possible, apply foam mepilex dressing to sacrum-change 2x/wk and PRN but assess skin under dressing qshift, B/L EHOB waffle boots while in bed, utilize pillows or wedge for turning patient q2hrs/PRN, utilize zinc barrier ointment to perineum PRN.  Wound care to sign off. Discussed with bedside RN. Please re consult if needed.    Date: 12/30/20  Signature: Rosalinda Hernandez RN

## 2020-12-30 NOTE — PROGRESS NOTES
Critical Care Progress Note     SUBJECTIVE  Interval History:   Patient more alert today, feels better  Remains on phenylephrine at 100  Started on amiodarone yesterday evening for rapid A. Fib; still in A. fib, rates better controlled today    Allergies: Patient has no known allergies.    CURRENT MEDS  •  [] amiodarone, 1 mg/min, intravenous, Continuous **FOLLOWED BY** amiodarone, 0.5 mg/min, intravenous, Continuous  •  atorvastatin, 10 mg, oral, Daily (6p)  •  budesonide, 0.5 mg, nebulization, BID (6a, 6p)  •  calcium gluconate, 1 g, intravenous, 2x daily PRN  •  cefTRIAXone, 1 g, intravenous, q24h INT  •  glucose, 16-32 g of dextrose, oral, PRN **OR** dextrose, 15-30 g of dextrose, oral, PRN **OR** glucagon, 1 mg, intramuscular, PRN **OR** dextrose in water, 25 mL, intravenous, PRN  •  dextrose 5 %, , intravenous, Continuous  •  heparin (porcine), 5,000 Units, subcutaneous, q8h JESSICA  •  insulin aspart U-100, 3-11 Units, subcutaneous, q6h  •  ipratropium-albuteroL, 3 mL, nebulization, q6H JESSICA  •  levothyroxine, 50 mcg, oral, Daily (6:30a)  •  magnesium sulfate, 2 g, intravenous, PRN  •  nystatin, , Topical, BID  •  phenylephrine,  mcg/min, intravenous, Titrated  •  renal multivitamin, 1 tablet, oral, Daily    VITAL SIGNS  Vitals:    20 1015 20 1045 20 1100 20 1115   BP:  (!) 97/52 (!) 96/51    BP Location:       Patient Position:       Pulse: (!) 101 (!) 105 (!) 109 (!) 106   Resp: 14 16 15 17   Temp:       TempSrc:       SpO2: 97% 96% 97% 92%   Weight:       Height:           VENTILATOR SETTINGS       INTAKE/OUTPUT    Intake/Output Summary (Last 24 hours) at 2020 1135  Last data filed at 2020 1104  Gross per 24 hour   Intake 4497.63 ml   Output 750 ml   Net 3747.63 ml       Lines, Drains, Airways, Wounds:  CVC Triple Lumen 20 Non-tunneled Right Femoral (Active)   Number of days: 1       Peripheral IV 20 Left Antecubital (Active)   Number of days: 2        Peripheral IV 12/28/20 Anterior;Left;Upper Arm (Active)   Number of days: 2       Urethral Catheter 16 Fr (Active)   Number of days: 2       Rash 12/29/20 0001 Right lateral hip patch (Active)   Number of days: 1       Wound Other (comment) Buttock (Active)   Number of days: 1       PHYSICAL EXAM  Physical Exam  Vitals signs reviewed.   Constitutional:       Appearance: She is not toxic-appearing.   HENT:      Head: Normocephalic and atraumatic.      Mouth/Throat:      Mouth: Mucous membranes are dry.   Eyes:      General: No scleral icterus.     Conjunctiva/sclera: Conjunctivae normal.   Cardiovascular:      Rate and Rhythm: Tachycardia present. Rhythm irregular.      Heart sounds: Normal heart sounds. No murmur. No friction rub. No gallop.    Pulmonary:      Breath sounds: Normal breath sounds. No wheezing, rhonchi or rales.   Abdominal:      General: Bowel sounds are normal. There is no distension.      Palpations: Abdomen is soft.      Tenderness: There is no abdominal tenderness.   Genitourinary:     Comments: Blood-tinged urine  Musculoskeletal:      Right lower leg: No edema.      Left lower leg: No edema.   Neurological:      Mental Status: She is alert.      Comments: Alert, oriented to person and place, not to time         LAB RESULTS  ABG  Results from last 7 days   Lab Units 12/29/20  0612 12/29/20  0018 12/28/20  2051 12/28/20  1603   PH ART pH 7.52* 7.31* 7.33*  --    PCO2 ART mm Hg 24* 18* 14*  --    PO2 ART mm Hg 85 93 118*  --    HCO3 ART mEQ/L 24 14* 13*  --    O2 SAT ART % 100* 100* 100*  --    BASE EXC ART mEQ/L -1.6 -14.7 -15.9  --    SOURCE OF OXYGEN   --   --   --  RA     CBC  Results from last 7 days   Lab Units 12/30/20  1012 12/30/20  0433 12/29/20  2244  12/29/20  0524 12/28/20  1603   WBC K/uL  --  24.28*  --   --  29.41* 30.68*   RBC M/uL  --  3.30*  --   --  3.49* 5.45*   HEMOGLOBIN g/dL 7.9* 8.5* 8.6*   < > 9.1* 13.8   HEMATOCRIT % 25.0* 26.6* 26.5*   < > 27.3* 44.2   MCV fL  --   80.6*  --   --  78.2* 81.1*   MCH pg  --  25.8*  --   --  26.1* 25.3*   MCHC g/dL  --  32.0*  --   --  33.3 31.2*   PLATELETS K/uL  --  204  --   --  209 319   RDW %  --  16.9*  --   --  16.7* 17.9*   MPV fL  --  9.7  --   --  9.3* 9.4    < > = values in this interval not displayed.     BMP  Results from last 7 days   Lab Units 12/30/20  0433 12/29/20  1511 12/29/20  0924  12/28/20  1603   SODIUM mEQ/L 136 144 143   < > 127*   POTASSIUM mEQ/L 3.9 4.1 4.3   < > 7.9*   CHLORIDE mEQ/L 100 105 101   < > 98   CO2 mEQ/L 21* 23 22   < > 10*   BUN mg/dL 113* 121* 126*   < > 160*   CREATININE mg/dL 4.3* 4.8* 5.0*   < > 6.4*   CALCIUM mg/dL 6.8* 7.2* 7.4*   < > 9.6   ALBUMIN g/dL 1.5*  --   --   --  2.7*   BILIRUBIN TOTAL mg/dL 0.7  --   --   --  1.3*   ALK PHOS IU/L 93  --   --   --  145*   ALT IU/L 18  --   --   --  24   AST IU/L 23  --   --   --  20   GLUCOSE mg/dL 123* 79 180*   < > 88    < > = values in this interval not displayed.     Coag      IMAGING  Imaging personally reviewed  CXR (12/29/2020):  IMPRESSION: Linear atelectasis or scar in the left midlung zone     CT Abd/pelvis (12/29/20):  IMPRESSION:  1.  Limited evaluation in the absence of intravenous or oral contrast and due to  patient motion.  No definite hydronephrosis or renal calculi as clinically  questioned, noting limited evaluation of the distal ureters due to the patient's  right hip prosthesis.  2.  Circumferential bladder wall thickening and intravesicular air within the  urinary bladder, concerning for urinary tract infection.  3.  Age-indeterminate mild compression deformities of T11, T12 and L3.  4. Additionally noted in the impression should be incidentally noted pneumobilia,  which is a nonspecific finding in a patient status post cholecystectomy.      ASSESSMENT & PLAN  89-year-old female presenting with weakness, hematuria, found to have septic shock     1. Septic shock, suspect  etiology.  UA with too numerous to count WBCs, RBCs, and CT  demonstrating circumferential bladder wall thickening and intravesicular air  -Received vancomycin and Zosyn, narrowed to ceftriaxone per ID  -Blood cultures with Klebsiella pneumonia, urine culture with gram-negative rods  -Continue ceftriaxone pending further sensitivities  -Received IV fluid resuscitation  -Wean phenylephrine to maintain MAP greater than 65    2. Acute kidney injury, suspect secondary to hypovolemia in the setting of septic shock. No CKD  -Trending down with IV fluid resuscitation  - Bolus with NSS today  - remains on D5/w     3. Hyponatremia, hypovolemic hypotonic hyponatremia in steting of sepsis   - Overcorrected with saline  - cont D5/W    4. Lactic acidosis, 2/2 septic shock  -repeat lactate     5. Elevated troponin. C/w demand ischemia, no MI    6. Leukocytosis, trending down    7. Hematuria, likely secondary to UTI with anticoagulation  -Continue to hold anticoagulation for now  - improving  - will start IV heparin and closely monitor    8. Atrial Fibrillation with RVR  - rates better controlled with amiodarone  - restart anticoagulation with IV heparin for now to ensure she tolerates it     9. Anemia. Likely some component of hemodilution    10.  Encephalopathy.  Toxic metabolic in the setting of septic shock.  Slowly improving, but still somewhat confused today      CODE STATUS  Full Code    The case was reviewed this morning at the patient's beside multidisciplinary rounds with the patient's nurse, dietician, pharmacist, respiratory therapist, physical therapist and critical care nurse coordinator. The patient's clinical status with regards to diagnosis, treatment plans including disposition of any IV, arterial lines, Samson and tubes were discussed, as well as dietary, respiratory therapy and mobilization needs.     This case was discussed with consultants.    Total critical time spent managing septic shock, acute kidney injury, lactic acidosis on this patient excluding any procedure  time totals 45 minutes      Ingrid Acevedo, DO  12/30/2020

## 2020-12-30 NOTE — PROGRESS NOTES
Spoke with patient's daughter (POA) Arabella Cruz [phone] and provided update of patient's condition. Arabella lives in [place], but is knowledgeable about her mother's medical problems and her home medication regimen.    Patient is slowly improving; we continue to provide aggressive care. I explained that as long as she remains on pressor therapy, she will remain in the ICU.    Her daughter indicated her understanding and is grateful for the care being provided to her mother.

## 2020-12-30 NOTE — PROGRESS NOTES
NEPHROLOGY PROGRESS NOTE    Subjective:   Patient is being seen in follow-up for acute kidney injury.She is alert and awake.      Review of Systems   Constitutional: Positive for fatigue.   HENT: Negative.    Respiratory: Negative.    Cardiovascular: Negative.    Gastrointestinal: Negative.    Endocrine: Negative.    Genitourinary: Negative.    Musculoskeletal: Negative.    Skin: Negative.    Neurological: Positive for weakness.   Hematological: Negative.    Psychiatric/Behavioral: Negative.        Vitals:    12/30/20 1130 12/30/20 1145 12/30/20 1215 12/30/20 1230   BP: (!) 111/56 (!) 94/51 (!) 98/47 (!) 92/59   BP Location:       Patient Position:       Pulse: (!) 107 (!) 106 (!) 108 (!) 105   Resp: 15 15 18 15   Temp:   36.7 °C (98 °F)    TempSrc:   Axillary    SpO2: 97% 95% 97% 98%   Weight:       Height:             Intake/Output Summary (Last 24 hours) at 12/30/2020 1307  Last data filed at 12/30/2020 1200  Gross per 24 hour   Intake 4729.17 ml   Output 705 ml   Net 4024.17 ml       Physical Exam  Vitals signs reviewed.   Constitutional:       Appearance: She is well-developed.   HENT:      Head: Normocephalic and atraumatic.   Eyes:      Pupils: Pupils are equal, round, and reactive to light.   Cardiovascular:      Rate and Rhythm: Normal rate and regular rhythm.      Heart sounds: Normal heart sounds.   Pulmonary:      Effort: Pulmonary effort is normal.      Breath sounds: Normal breath sounds.   Abdominal:      General: Bowel sounds are normal.      Palpations: Abdomen is soft.   Genitourinary:     Comments: Samson catheter with good urine output.  Musculoskeletal: Normal range of motion.   Skin:     General: Skin is warm and dry.   Neurological:      Mental Status: She is alert and oriented to person, place, and time.   Psychiatric:         Behavior: Behavior normal.         LABS:  Results from last 7 days   Lab Units 12/30/20  1012 12/30/20  0433   SODIUM mEQ/L  --  136   POTASSIUM mEQ/L  --  3.9    CHLORIDE mEQ/L  --  100   CO2 mEQ/L  --  21*   BUN mg/dL  --  113*   CREATININE mg/dL  --  4.3*   EGFR mL/min/1.73m*2  --  9.6*   GLUCOSE mg/dL  --  123*   CALCIUM mg/dL  --  6.8*   ALBUMIN g/dL  --  1.5*   WBC K/uL  --  24.28*   HEMOGLOBIN g/dL 7.9* 8.5*   HEMATOCRIT % 25.0* 26.6*   PLATELETS K/uL  --  204       Meds:    Current Facility-Administered Medications:   •  [] amiodarone (CORDARONE) 750 mg in dextrose 5 % 500 mL (1.5 mg/mL) infusion, 1 mg/min, intravenous, Continuous, Stopped at 20 0128 **FOLLOWED BY** amiodarone (CORDARONE) 750 mg in dextrose 5 % 500 mL (1.5 mg/mL) infusion, 0.5 mg/min, intravenous, Continuous, Chastity Wahl CRNP, Last Rate: 20 mL/hr at 20 1200, 0.5 mg/min at 20 1200  •  atorvastatin (LIPITOR) tablet 10 mg, 10 mg, oral, Daily (6p), Chastity Wahl CRNP  •  budesonide (PULMICORT) 0.5 mg/2 mL nebulizer solution 0.5 mg, 0.5 mg, nebulization, BID (6a, 6p), Chastity Wahl CRNP  •  calcium gluconate 1,000 mg in sodium chloride 0.9 % 50 mL IVPB, 1 g, intravenous, 2x daily PRN, Chastity Wahl CRNP  •  cefTRIAXone (ROCEPHIN) IVPB 1 g in 100 mL NSS vial in bag, 1 g, intravenous, q24h INT, Barrett Stern MD, Stopped at 20 1030  •  glucose chewable tablet 16-32 g of dextrose, 16-32 g of dextrose, oral, PRN **OR** dextrose 40 % oral gel 15-30 g of dextrose, 15-30 g of dextrose, oral, PRN **OR** glucagon (GLUCAGEN) injection 1 mg, 1 mg, intramuscular, PRN **OR** dextrose in water injection 12.5 g, 25 mL, intravenous, PRN, Chastity Wahl CRNP  •  dextrose 5 % infusion, , intravenous, Continuous, Chastity Wahl CRNP, Last Rate: 60 mL/hr at 20 1200, Rate Verify at 20 1200  •  heparin (porcine) bolus from bag 800-1,600 Units, 15-30 Units/kg, intravenous, q6h PRN, Chastity Wahl CRNP  •  heparin infusion in 0.45%  units/mL, 0-4,000 Units/hr, intravenous, Titrated, Chastity Wahl CRNP  •  insulin aspart U-100 (NovoLOG) pen 3-11 Units, 3-11 Units,  subcutaneous, q6h, Dipika Crisostomo MD  •  ipratropium-albuteroL (DUO-NEB) 0.5-2.5 mg/3 mL nebulizer solution 3 mL, 3 mL, nebulization, q6H JESSICAMaryuri Lynn B, CRNP, 3 mL at 12/30/20 0837  •  levothyroxine (SYNTHROID) tablet 50 mcg, 50 mcg, oral, Daily (6:30a), Dipika Crisostomo MD, 50 mcg at 12/30/20 0615  •  magnesium sulfate IVPB 2g in 50 mL NSS/D5W/SWFI, 2 g, intravenous, PRN, Chastity Wahl CRNP, Stopped at 12/29/20 1336  •  nystatin (MYCOSTATIN) 100,000 unit/gram topical powder, , Topical, BID, Dipika Crisostomo MD, Given at 12/30/20 0815  •  phenylephrine HCl in 0.9% NaCl (FUENTES-SYNEPHRINE) 50 mg/250 mL (200 mcg/mL) infusion,  mcg/min, intravenous, Titrated, Chastity Wahl CRNP, Last Rate: 22.5 mL/hr at 12/30/20 1200, 75 mcg/min at 12/30/20 1200  •  renal multivitamin tablet 1 tablet, 1 tablet, oral, Daily, Chastity Wahl CRNP, 1 tablet at 12/30/20 0815    ASSESSMENT:  Principal Problem:    Metabolic acidosis  Active Problems:    Severe sepsis (CMS/HCC)    Encephalopathy    Pneumobilia    Acute on chronic renal failure (CMS/HCC)    Hyperkalemia    Acute cystitis with hematuria    Hypoglycemia    COPD (chronic obstructive pulmonary disease) (CMS/HCC)    History of colon cancer    Type 2 MI (myocardial infarction) (CMS/HCC)    Hyponatremia    History of CVA (cerebrovascular accident)    Chronic atrial fibrillation (CMS/HCC): On amiodarone.    Septic shock (CMS/HCC)      PLAN:  1. Kidney injury is improving.  2. Continue IV fluids.  3. Hyponatremia has improved.  She was started on IV D5W secondary to overcorrection with normal saline. We will switch to half-normal saline now.  4. Hematuria improving. Anticoagulants on hold.        Annalisa Nunez MD

## 2020-12-30 NOTE — PROGRESS NOTES
Referral received and chart reviewed. Per Amy patient son is working on setting up a new PCP in this area. Called pts son Andrew and LM to discuss,

## 2020-12-30 NOTE — PROGRESS NOTES
Daily Progress Note (LOS: 2)    Interval History:   Afib better controlled overnight but still tachycardic.  Patient lethargic.     Review of Systems:  All organ systems normal except as per HPI.    Current Medications:  • atorvastatin  10 mg oral Daily (6p)   • cefTRIAXone  1 g intravenous q24h INT   • heparin (porcine)  5,000 Units subcutaneous q8h JESSICA   • insulin aspart U-100  3-11 Units subcutaneous q6h   • ipratropium-albuteroL  3 mL nebulization q6H JESSICA   • levothyroxine  50 mcg oral Daily (6:30a)   • nystatin   Topical BID   • renal multivitamin  1 tablet oral Daily       Physical Exam:  Visit Vitals  BP (!) 100/59   Pulse (!) 122   Temp 36.5 °C (97.7 °F) (Oral)   Resp 17   Ht 1.524 m (5')   Wt 45.5 kg (100 lb 3.2 oz)   SpO2 97%   BMI 19.57 kg/m²     Gen: NAD, confused   HEENT: no JVD, no thyromegaly  Heart: tachycardic, irregularly irregular, nl S1, S2, no m/r/g  Lungs: Clear bilaterally  Abd: soft, nt, nd, +bs  Ext: no edema      I&Os:    Intake/Output Summary (Last 24 hours) at 12/30/2020 0610  Last data filed at 12/30/2020 0500  Gross per 24 hour   Intake 4167.8 ml   Output 700 ml   Net 3467.8 ml       Weights:   Wt Readings from Last 3 Encounters:   12/28/20 45.5 kg (100 lb 3.2 oz)       Labs:  Results from last 7 days   Lab Units 12/30/20  0433 12/29/20  1511 12/29/20  0924 12/29/20  0524   SODIUM mEQ/L  --  144 143 143   POTASSIUM mEQ/L  --  4.1 4.3 4.3   CHLORIDE mEQ/L  --  105 101 102   CO2 mEQ/L  --  23 22 19*   BUN mg/dL  --  121* 126* 127*   CREATININE mg/dL  --  4.8* 5.0* 5.3*   GLUCOSE mg/dL  --  79 180* 225*   CALCIUM mg/dL  --  7.2* 7.4* 7.3*   MAGNESIUM mg/dL 2.4 2.6* 1.8 1.8     Results from last 7 days   Lab Units 12/30/20  0433 12/29/20 2244 12/29/20  1511   TROPONIN I ng/mL 0.23* 0.20* 0.15*         Results from last 7 days   Lab Units 12/30/20  0433 12/29/20 2244 12/29/20  1511 12/29/20  0524 12/28/20  1603   WBC K/uL 24.28*  --   --  29.41* 30.68*   HEMOGLOBIN g/dL 8.5* 8.6* 9.3* 9.1*  13.8   HEMATOCRIT % 26.6* 26.5* 28.0* 27.3* 44.2   PLATELETS K/uL 204  --   --  209 319       A/p: 89-year-old woman with past medical history of bioprosthetic AVR, paroxysmal atrial fibrillation, previously on Eliquis, COPD, CVA in 2019, chronic hypotension, chronic kidney disease, intermittent self-cathing who presents to the hospital with hematuria, poor oral intake, and change in mental status, diagnosed with UTI and septic shock.     Regarding the patient's rapid atrial fibrillation, I suspect this is mostly reactive to her severe illness.  She has a urinary tract infection and has septic shock requiring pressors.  She still appears dry.  No O2 requirement.  Would consider additional bolus of IV fluids.  Would continue phenylephrine (changed from levophed to reduced beta agonist activity) as blood pressure necessitates.  Would avoid additional digoxin given her renal insufficiency.  Would avoid other rate controlling agents such as diltiazem or metoprolol due to her hypotension requiring pressors.  Amiodarone drip started for rate control purposes.  OK to continue.  Can likely d/c later today or tomorrow as sepsis physiology resolves.  Resume AC when able. Echo today. Cardiology will follow with you.

## 2020-12-31 PROBLEM — E87.20 LACTIC ACIDOSIS: Status: ACTIVE | Noted: 2020-01-01

## 2020-12-31 PROBLEM — R31.9 HEMATURIA: Status: ACTIVE | Noted: 2020-01-01

## 2020-12-31 PROBLEM — D64.9 ANEMIA: Status: ACTIVE | Noted: 2020-01-01

## 2020-12-31 PROBLEM — D72.829 LEUKOCYTOSIS: Status: ACTIVE | Noted: 2020-01-01

## 2020-12-31 NOTE — PROGRESS NOTES
Critical Care Progress Note     SUBJECTIVE  Interval History:   Patient seen and examined this morning.  Complains of trouble breathing.  More awake, alert.  Remains on Ifeanyi-Synephrine 100    Allergies: Patient has no known allergies.    CURRENT MEDS  •  [] amiodarone, 1 mg/min, intravenous, Continuous **FOLLOWED BY** amiodarone, 0.5 mg/min, intravenous, Continuous  •  atorvastatin, 10 mg, oral, Daily (6p)  •  budesonide, 0.5 mg, nebulization, BID (6a, 6p)  •  calcium gluconate, 1 g, intravenous, 2x daily PRN  •  cefTRIAXone, 1 g, intravenous, q24h INT  •  glucose, 16-32 g of dextrose, oral, PRN **OR** dextrose, 15-30 g of dextrose, oral, PRN **OR** glucagon, 1 mg, intramuscular, PRN **OR** dextrose in water, 25 mL, intravenous, PRN  •  furosemide, 80 mg, intravenous, Once  •  insulin aspart U-100, 3-11 Units, subcutaneous, q6h  •  ipratropium-albuteroL, 3 mL, nebulization, q6H JESSICA  •  levothyroxine, 50 mcg, oral, Daily (6:30a)  •  magnesium sulfate, 2 g, intravenous, PRN  •  methylPREDNISolone sodium succinate, 40 mg, intravenous, q8h INT  •  nystatin, , Topical, BID  •  phenylephrine,  mcg/min, intravenous, Titrated  •  renal multivitamin, 1 tablet, oral, Daily  •  sodium chloride 0.9 %, , intravenous, Continuous    VITAL SIGNS  Vitals:    20 1000 20 1100 20 1200 20 1300   BP: (!) 93/51 (!) 92/52 (!) 98/54 (!) 103/58   BP Location:       Patient Position:       Pulse: (!) 116 (!) 118 (!) 109 (!) 109   Resp: (!) 21 18 (!) 22 16   Temp:       TempSrc:       SpO2: 97% 97% 98% 98%   Weight:       Height:           VENTILATOR SETTINGS       INTAKE/OUTPUT    Intake/Output Summary (Last 24 hours) at 2020 1407  Last data filed at 2020 1300  Gross per 24 hour   Intake 3029.12 ml   Output 1025 ml   Net 2004.12 ml       Lines, Drains, Airways, Wounds:  CVC Triple Lumen 20 Non-tunneled Right Femoral (Active)   Number of days: 2       Peripheral IV 20 Left  Antecubital (Active)   Number of days: 3       Peripheral IV 12/28/20 Anterior;Left;Upper Arm (Active)   Number of days: 3       Urethral Catheter 16 Fr (Active)   Number of days: 3       Rash 12/29/20 0001 Right lateral hip patch (Active)   Number of days: 2       Wound Other (comment) Buttock (Active)   Number of days: 2       PHYSICAL EXAM  Physical Exam  Vitals signs reviewed.   Constitutional:       Comments: Frail-appearing   HENT:      Head: Normocephalic and atraumatic.      Mouth/Throat:      Mouth: Mucous membranes are moist.   Eyes:      General: No scleral icterus.     Conjunctiva/sclera: Conjunctivae normal.   Cardiovascular:      Rate and Rhythm: Tachycardia present. Rhythm irregular.      Heart sounds: No murmur. No friction rub. No gallop.    Pulmonary:      Comments: Diffuse expiratory wheeze bilaterally  Abdominal:      General: Bowel sounds are normal. There is no distension.      Palpations: Abdomen is soft.      Tenderness: There is no abdominal tenderness.   Musculoskeletal:      Right lower leg: Edema present.      Left lower leg: Edema present.   Skin:     General: Skin is warm.   Neurological:      Mental Status: She is alert and oriented to person, place, and time.         LAB RESULTS  ABG  Results from last 7 days   Lab Units 12/29/20  0612 12/29/20  0018 12/28/20  2051 12/28/20  1603   PH ART pH 7.52* 7.31* 7.33*  --    PCO2 ART mm Hg 24* 18* 14*  --    PO2 ART mm Hg 85 93 118*  --    HCO3 ART mEQ/L 24 14* 13*  --    O2 SAT ART % 100* 100* 100*  --    BASE EXC ART mEQ/L -1.6 -14.7 -15.9  --    SOURCE OF OXYGEN   --   --   --  RA     CBC  Results from last 7 days   Lab Units 12/31/20  1050 12/31/20  0400 12/30/20  2228  12/30/20  0433  12/29/20  0524   WBC K/uL  --  21.53*  --   --  24.28*  --  29.41*   RBC M/uL  --  2.87*  --   --  3.30*  --  3.49*   HEMOGLOBIN g/dL 7.7* 7.5* 7.6*   < > 8.5*   < > 9.1*   HEMATOCRIT % 24.7* 23.9* 24.0*   < > 26.6*   < > 27.3*   MCV fL  --  83.3  --   --   80.6*  --  78.2*   MCH pg  --  26.1*  --   --  25.8*  --  26.1*   MCHC g/dL  --  31.4*  --   --  32.0*  --  33.3   PLATELETS K/uL  --  158  --   --  204  --  209   RDW %  --  16.9*  --   --  16.9*  --  16.7*   MPV fL  --  9.5  --   --  9.7  --  9.3*    < > = values in this interval not displayed.     BMP  Results from last 7 days   Lab Units 12/31/20  0400 12/30/20  0433 12/29/20  1511  12/28/20  1603   SODIUM mEQ/L 127* 136 144   < > 127*   POTASSIUM mEQ/L 3.9 3.9 4.1   < > 7.9*   CHLORIDE mEQ/L 97* 100 105   < > 98   CO2 mEQ/L 17* 21* 23   < > 10*   BUN mg/dL 100* 113* 121*   < > 160*   CREATININE mg/dL 4.0* 4.3* 4.8*   < > 6.4*   CALCIUM mg/dL 6.6* 6.8* 7.2*   < > 9.6   ALBUMIN g/dL 1.5* 1.5*  --   --  2.7*   BILIRUBIN TOTAL mg/dL 0.7 0.7  --   --  1.3*   ALK PHOS IU/L 78 93  --   --  145*   ALT IU/L 19 18  --   --  24   AST IU/L 27 23  --   --  20   GLUCOSE mg/dL 132* 123* 79   < > 88    < > = values in this interval not displayed.     Coag  Results from last 7 days   Lab Units 12/31/20  0716 12/31/20  0041 12/30/20  2336  12/30/20  1230   PROTIME sec  --   --   --   --  15.2*   INR INR  --   --   --   --  1.2   PTT sec >230* 64* 107*   < > 47*    < > = values in this interval not displayed.       IMAGING  Imaging personally reviewed  Chest x-ray (12/31/2020):  IMPRESSION:  1.  Patchy interstitial opacity, with pulmonary vascular congestion, which may  represent multifocal airspace disease or pulmonary edema.  2.  Left basilar airspace disease, new/increased from prior    CXR (12/29/2020):  IMPRESSION: Linear atelectasis or scar in the left midlung zone     CT Abd/pelvis (12/29/20):  IMPRESSION:  1.  Limited evaluation in the absence of intravenous or oral contrast and due to  patient motion.  No definite hydronephrosis or renal calculi as clinically  questioned, noting limited evaluation of the distal ureters due to the patient's  right hip prosthesis.  2.  Circumferential bladder wall thickening and  intravesicular air within the  urinary bladder, concerning for urinary tract infection.  3.  Age-indeterminate mild compression deformities of T11, T12 and L3.  4. Additionally noted in the impression should be incidentally noted pneumobilia,  which is a nonspecific finding in a patient status post cholecystectomy    Echo (12/29/2020):  Interpretation Summary       · Mitral valve thickening. Severe mitral annular calcification.  · Severe mitral valve stenosis.  · Mean gradient = 10.00 mmHg. HR = 110 bpm.  · Hyperdynamic LV systolic function. Estimated EF >75%.  · Mildly dilated LA.  · Tricuspid valve structure is grossly normal. Mild tricuspid valve regurgitation.  · Aortic valve structure is normal. Mild aortic valve regurgitation. No aortic valve stenosis. A bioprosthetic aortic valve is present.  · Moderately dilated RA.  · Moderately dilated RV. Mildly reduced RV systolic function.  · Mild mitral valve regurgitation.     Estimated PASP= 51mmHg by TR jet method.            ASSESSMENT & PLAN  89-year-old female presenting with weakness, hematuria, found to have septic shock     1.  Septic shock, in the setting of UTI with Klebsiella bacteremia.  UA with too numerous to count WBCs, CT demonstrating circumferential bladder wall thickening and intravesicular air  -Continue ceftriaxone  -Continues to require phenylephrine, but decreasing requirements; pressors as tolerated    2.  Acute kidney injury, secondary to septic shock.  Now with evidence of volume overload following aggressive IV fluid resuscitation  -Received 80 mg IV Lasix today per renal  -Continue to follow closely    3.  Hyponatremia, with initial overcorrection requiring D5W.  Now sodium 127 this morning  -D5W discontinued  -Continue to trend BMP    4.  Lactic acidosis, in the setting of septic shock.  Resolved    5.  Atrial fibrillation, with rapid ventricular response.  Rates better controlled on amiodarone  -On IV heparin yesterday, but with ongoing  hematuria and drop in hemoglobin to 7.5, heparin discontinued for now    6.  Leukocytosis, trending down    7.  Hematuria, in the setting of UTI and anticoagulation  -Consult urology    8.  Anemia.  Multifactorial, with some component of hemodilution.  Also with blood loss in the setting of hematuria    9.  Toxic metabolic encephalopathy in the setting of septic shock.  Resolving, more awake and alert    10.  COPD, with acute exacerbation.  Significant wheeze on exam  -Continue nebs, and budesonide  -Add Solu-Medrol 40 mg every 8 hours    11.  Pulmonary infiltrates.  Suspect volume overload  -Received 80 mg IV Lasix this a.m.    12.  Severe mitral stenosis.  Cardiology following, with plan for outpatient evaluation    CODE STATUS  Full Code    The case was reviewed this morning at the patient's beside multidisciplinary rounds with the patient's nurse, dietician, pharmacist, respiratory therapist, physical therapist and critical care nurse coordinator. The patient's clinical status with regards to diagnosis, treatment plans including disposition of any IV, arterial lines, Samson and tubes were discussed, as well as dietary, respiratory therapy and mobilization needs.     This case was discussed with consultants.    Total critical time spent managing septic shock, DANY, A. fib on this patient excluding any procedure time totals 35 minutes      Ingrid Acevedo,   12/31/2020

## 2020-12-31 NOTE — NURSING NOTE
Pt had critically high PTT of >230 upon 2000 blood draw. Heparin gtt stopped and protocol followed, Dr. Crisostomo made aware.    At 2200 hemoglobin check pt's hgb was 7.6. Dr. Crisostomo made aware and verbalized acknowledgement, no further orders at this time. Will continue to monitor.    Pt's urine output 20-25ccs/hr for a few hours and HR remaining in 120s. MD made aware and 250mL bolus administered. Will continue to monitor.

## 2020-12-31 NOTE — PLAN OF CARE
Problem: Adult Inpatient Plan of Care  Goal: Plan of Care Review  Flowsheets (Taken 12/31/2020 4509)  Plan of Care Reviewed With: other (see comments)   RNCC reviewed chart and discussed status in care progression rounds. Pt w/+BC and +urine cx, fluid bolus for low BP, PTT >230 and hematuria, amio gtt, chaparro gtt, IV solumedrol, IV abx. No w/e dc anticipated. Called dtr Arabella and LM reinforcing pt will need local PCP to be eligible for HC svcs in [place]. Requested c/b to confirm family is working on setting up a PCP visit in this area. [facility] following. Will follow for ongoing CC needs.

## 2020-12-31 NOTE — CONSULTS
Visited with Ms. Minor as I was making rounds in ICU.  Offered her an opportunity for a supportive conversation.  She appeared to have difficulty with talking because of her breathing.  She stated she is a [employer], and we prayed the Lord's Prayer together.  Chaplain Shaniqua

## 2020-12-31 NOTE — PROGRESS NOTES
Daily Progress Note (LOS: 3)    Interval History:   More alert today.  No cardiac complaints.  Afib on telemetry, still tachycardic but improved slightly.     Review of Systems:  All organ systems normal except as per HPI.    Current Medications:  • atorvastatin  10 mg oral Daily (6p)   • budesonide  0.5 mg nebulization BID (6a, 6p)   • cefTRIAXone  1 g intravenous q24h INT   • insulin aspart U-100  3-11 Units subcutaneous q6h   • ipratropium-albuteroL  3 mL nebulization q6H JESSICA   • levothyroxine  50 mcg oral Daily (6:30a)   • nystatin   Topical BID   • renal multivitamin  1 tablet oral Daily       Physical Exam:  Visit Vitals  BP (!) 98/50 (BP Location: Right upper arm, Patient Position: Lying)   Pulse (!) 118   Temp 36.4 °C (97.5 °F) (Oral)   Resp 15   Ht 1.524 m (5')   Wt 55.4 kg (122 lb 1.6 oz)   SpO2 96%   BMI 23.85 kg/m²       Gen: NAD  HEENT: no JVD, no thyromegaly  Heart: tachycardic, irregularly irregular, nl S1, S2, no m/r/g  Lungs: Clear bilaterally  Abd: soft, nt, nd, +bs  Ext: no edema      I&Os:    Intake/Output Summary (Last 24 hours) at 12/31/2020 0703  Last data filed at 12/31/2020 0600  Gross per 24 hour   Intake 4392.79 ml   Output 1105 ml   Net 3287.79 ml       Weights:   Wt Readings from Last 3 Encounters:   12/31/20 55.4 kg (122 lb 1.6 oz)       Labs:  Results from last 7 days   Lab Units 12/31/20  0400 12/30/20  1012 12/30/20  0433 12/29/20  1511   SODIUM mEQ/L 127*  --  136 144   POTASSIUM mEQ/L 3.9  --  3.9 4.1   CHLORIDE mEQ/L 97*  --  100 105   CO2 mEQ/L 17*  --  21* 23   BUN mg/dL 100*  --  113* 121*   CREATININE mg/dL 4.0*  --  4.3* 4.8*   GLUCOSE mg/dL 132*  --  123* 79   CALCIUM mg/dL 6.6*  --  6.8* 7.2*   MAGNESIUM mg/dL 2.0 2.1 2.4 2.6*     Results from last 7 days   Lab Units 12/30/20  1012 12/30/20  0433 12/29/20  2244   TROPONIN I ng/mL 0.19* 0.23* 0.20*         Results from last 7 days   Lab Units 12/31/20  0400 12/30/20  2228 12/30/20  1537  12/30/20  0433  12/29/20  0524   WBC  K/uL 21.53*  --   --   --  24.28*  --  29.41*   HEMOGLOBIN g/dL 7.5* 7.6* 8.2*   < > 8.5*   < > 9.1*   HEMATOCRIT % 23.9* 24.0* 25.9*   < > 26.6*   < > 27.3*   PLATELETS K/uL 158  --   --   --  204  --  209    < > = values in this interval not displayed.     No results found for: BNP  Lab Results   Component Value Date    INR 1.2 12/30/2020       A/p: 89-year-old woman with past medical history of bioprosthetic AVR, paroxysmal atrial fibrillation, previously on Eliquis, COPD, CVA in 2019, chronic hypotension, chronic kidney disease, intermittent self-cathing who presents to the hospital with hematuria, poor oral intake, and change in mental status, diagnosed UTI, septic shock, Klebsiella bacteremia.     Regarding the patient's rapid atrial fibrillation, I suspect this is mostly reactive to her severe illness.  She has a urinary tract infection, GNR bacteremia, and has septic shock requiring pressors.  Her septic physiology is starting to resolve although she is still requiring phenylephrine (changed from levophed to reduce beta agonist activity), although amount required is less.  Amiodarone drip started for rate control purposes.  OK to continue.  Can possibly d/c when patient improves further.  On heparin gtt for now to make sure she tolerates AC. Echo showed EF 75%, mild TR, severe mitral stenosis, mild MR, mild AR, bio AVR, biatrial enlargement, moderately dilated RV with mildly reduced RV function.  No history of heart failure and no heart failure currently.  Further workup of MS deferred to outpatient setting with her cardiologist.  Cardiology will follow with you over the holiday weekend.

## 2020-12-31 NOTE — PROGRESS NOTES
Infectious Disease Progress Note    Patient Name: Nancy Minor  MR#: [id number]  : 10/6/1923  Admission Date: 2020  Date: 20   Time: 2:24 PM   Author: Barrett Stern MD    Major Events: none    Antibiotics:    Anti-infectives (From admission, onward)    Start     Dose/Rate Route Frequency Ordered Stop    20 1015  cefTRIAXone (ROCEPHIN) IVPB 1 g in 100 mL NSS vial in bag      1 g  200 mL/hr over 30 Minutes intravenous Every 24 hours interval 20 0925      20  nystatin (MYCOSTATIN) 100,000 unit/gram topical powder       Topical 2 times daily 20            Subjective     Review of Systems    Remains fatigued but afebrile.    Objective     Vital Signs:    Patient Vitals for the past 72 hrs:   BP Temp Temp src Pulse Resp SpO2 Height Weight   20 1300 (!) 103/58 -- -- (!) 109 16 98 % -- --   20 1200 (!) 98/54 -- -- (!) 109 (!) 22 98 % -- --   20 1100 (!) 92/52 -- -- (!) 118 18 97 % -- --   20 1000 (!) 93/51 -- -- (!) 116 (!) 21 97 % -- --   20 0900 (!) 88/52 -- -- (!) 121 18 96 % -- --   20 0800 (!) 99/53 -- -- (!) 108 16 97 % -- --   20 0700 (!) 98/50 -- -- (!) 118 15 96 % -- --   20 0645 (!) 89/57 -- -- (!) 109 14 98 % -- --   20 0630 (!) 94/53 -- -- (!) 105 (!) 21 (!) 85 % -- --   20 0615 (!) 102/54 -- -- (!) 115 14 96 % -- --   20 0600 (!) 93/55 -- -- (!) 113 13 96 % -- --   20 0548 -- -- -- -- -- -- -- 55.4 kg (122 lb 1.6 oz)   20 0545 (!) 78/47 -- -- (!) 114 14 95 % -- --   20 0530 (!) 82/44 -- -- (!) 113 20 95 % -- --   20 0515 (!) 83/46 -- -- (!) 111 17 95 % -- --   20 0500 (!) 81/44 -- -- (!) 108 13 95 % -- --   20 0445 (!) 86/45 -- -- (!) 105 17 95 % -- --   20 0430 (!) 82/46 -- -- (!) 111 16 94 % -- --   20 0415 (!) 79/43 -- -- (!) 112 15 95 % -- --   20 0400 (!) 91/48 36.4 °C (97.5 °F) Oral (!) 119 17 98 % -- --   20 0345 (!) 95/52 --  -- (!) 114 15 98 % -- --   12/31/20 0330 (!) 85/53 -- -- (!) 114 15 96 % -- --   12/31/20 0315 (!) 93/50 -- -- (!) 116 14 97 % -- --   12/31/20 0300 (!) 91/49 -- -- (!) 115 13 97 % -- --   12/31/20 0245 (!) 93/55 -- -- (!) 111 14 97 % -- --   12/31/20 0230 (!) 90/55 -- -- (!) 116 15 94 % -- --   12/31/20 0215 (!) 89/50 -- -- (!) 110 16 95 % -- --   12/31/20 0200 (!) 94/53 -- -- (!) 118 17 96 % -- --   12/31/20 0145 (!) 90/54 -- -- (!) 117 15 96 % -- --   12/31/20 0134 (!) 95/52 -- -- (!) 110 -- -- -- --   12/31/20 0130 (!) 95/52 -- -- (!) 116 15 97 % -- --   12/31/20 0115 (!) 97/55 -- -- (!) 120 16 96 % -- --   12/31/20 0100 (!) 90/55 -- -- (!) 116 16 96 % -- --   12/31/20 0045 (!) 89/52 -- -- (!) 117 15 97 % -- --   12/31/20 0030 (!) 89/51 -- -- (!) 121 16 92 % -- --   12/31/20 0015 (!) 79/45 -- -- (!) 126 16 95 % -- --   12/31/20 0000 (!) 79/41 36.3 °C (97.4 °F) Oral (!) 121 17 94 % -- --   12/30/20 2345 (!) 91/53 -- -- (!) 110 (!) 25 98 % -- --   12/30/20 2330 (!) 90/55 -- -- (!) 128 18 99 % -- --   12/30/20 2315 (!) 97/51 -- -- (!) 119 15 99 % -- --   12/30/20 2300 (!) 95/55 -- -- (!) 122 17 98 % -- --   12/30/20 2245 (!) 88/51 -- -- (!) 119 20 97 % -- --   12/30/20 2230 (!) 76/46 -- -- (!) 120 20 99 % -- --   12/30/20 2215 (!) 88/53 -- -- (!) 121 16 99 % -- --   12/30/20 2200 (!) 94/52 -- -- (!) 124 18 98 % -- --   12/30/20 2130 (!) 90/54 -- -- (!) 118 (!) 22 98 % -- --   12/30/20 2115 (!) 82/53 -- -- (!) 128 17 98 % -- --   12/30/20 2100 (!) 86/56 -- -- (!) 128 (!) 22 98 % -- --   12/30/20 2045 (!) 89/52 -- -- (!) 127 (!) 51 98 % -- --   12/30/20 2030 (!) 88/51 -- -- (!) 125 19 98 % -- --   12/30/20 2015 (!) 87/51 -- -- (!) 122 (!) 22 97 % -- --   12/30/20 2000 (!) 90/52 36.4 °C (97.6 °F) Oral (!) 114 18 99 % -- --   12/30/20 1945 111/62 -- -- (!) 120 (!) 25 97 % -- --   12/30/20 1930 (!) 83/49 -- -- (!) 113 18 97 % -- --   12/30/20 1918 -- -- -- (!) 112 18 97 % -- --   12/30/20 1915 (!) 78/51 -- -- (!) 119  (!) 26 98 % -- --   12/30/20 1903 (!) 84/49 -- -- (!) 113 16 98 % -- --   12/30/20 1848 (!) 95/52 -- -- (!) 115 18 97 % -- --   12/30/20 1833 (!) 89/50 -- -- (!) 118 15 98 % -- --   12/30/20 1818 (!) 82/45 -- -- (!) 112 17 93 % -- --   12/30/20 1803 (!) 88/52 -- -- (!) 111 17 96 % -- --   12/30/20 1748 (!) 83/50 -- -- (!) 114 16 95 % -- --   12/30/20 1718 (!) 81/48 -- -- (!) 110 14 (!) 91 % -- --   12/30/20 1703 (!) 86/50 -- -- (!) 109 15 94 % -- --   12/30/20 1648 (!) 85/56 -- -- (!) 104 15 93 % -- --   12/30/20 1633 (!) 100/55 -- -- (!) 105 15 (!) 91 % -- --   12/30/20 1618 (!) 102/53 -- -- (!) 105 16 (!) 91 % -- --   12/30/20 1603 (!) 86/54 -- -- (!) 108 15 93 % -- --   12/30/20 1548 (!) 90/57 -- -- (!) 110 16 93 % -- --   12/30/20 1533 (!) 93/55 -- -- (!) 111 15 94 % -- --   12/30/20 1518 (!) 96/51 -- -- (!) 117 (!) 27 95 % -- --   12/30/20 1503 (!) 84/54 36.6 °C (97.8 °F) Axillary (!) 118 (!) 35 97 % -- --   12/30/20 1448 (!) 94/53 -- -- (!) 113 16 96 % -- --   12/30/20 1433 (!) 94/49 -- -- (!) 114 20 96 % -- --   12/30/20 1418 (!) 112/52 -- -- (!) 120 17 97 % -- --   12/30/20 1403 (!) 111/56 -- -- (!) 106 16 98 % -- --   12/30/20 1348 (!) 98/53 -- -- (!) 108 15 97 % -- --   12/30/20 1333 (!) 91/51 -- -- (!) 101 16 97 % -- --   12/30/20 1318 (!) 92/46 -- -- (!) 102 16 97 % -- --   12/30/20 1303 (!) 93/53 -- -- (!) 109 16 97 % -- --   12/30/20 1230 (!) 92/59 -- -- (!) 105 15 98 % -- --   12/30/20 1215 (!) 98/47 36.7 °C (98 °F) Axillary (!) 108 18 97 % -- --   12/30/20 1203 (!) 98/53 -- -- (!) 107 16 97 % -- --   12/30/20 1200 -- -- -- (!) 111 -- 97 % -- --   12/30/20 1145 (!) 94/51 -- -- (!) 106 15 95 % -- --   12/30/20 1130 (!) 111/56 -- -- (!) 107 15 97 % -- --   12/30/20 1115 (!) 94/54 -- -- (!) 106 17 92 % -- --   12/30/20 1100 (!) 96/51 -- -- (!) 109 15 97 % -- --   12/30/20 1045 (!) 97/52 -- -- (!) 105 16 96 % -- --   12/30/20 1015 -- -- -- (!) 101 14 97 % -- --   12/30/20 1000 (!) 85/53 -- -- (!) 116 15  97 % -- --   12/30/20 0930 (!) 77/48 -- -- (!) 119 15 96 % -- --   12/30/20 0915 (!) 143/77 -- -- (!) 111 15 97 % -- --   12/30/20 0900 (!) 92/56 -- -- (!) 103 13 98 % -- --   12/30/20 0845 96/69 36.7 °C (98 °F) Axillary (!) 109 15 98 % -- --   12/30/20 0830 (!) 93/51 -- -- (!) 113 16 97 % -- --   12/30/20 0815 (!) 78/50 -- -- (!) 117 16 97 % -- --   12/30/20 0800 (!) 88/52 -- -- (!) 112 14 97 % -- --   12/30/20 0745 (!) 90/55 -- -- (!) 107 16 99 % 1.524 m (5') 52.6 kg (116 lb)   12/30/20 0733 -- -- -- (!) 108 -- -- -- --   12/30/20 0730 (!) 92/50 -- -- (!) 114 18 97 % -- --   12/30/20 0715 (!) 100/56 -- -- (!) 108 14 98 % -- --   12/30/20 0700 (!) 103/52 -- -- (!) 102 14 97 % -- --   12/30/20 0645 (!) 95/52 -- -- (!) 110 14 97 % -- --   12/30/20 0630 (!) 87/55 -- -- (!) 116 13 97 % -- --   12/30/20 0600 (!) 87/56 -- -- (!) 109 13 98 % -- 52.7 kg (116 lb 2.9 oz)   12/30/20 0500 (!) 100/59 -- -- (!) 122 17 97 % -- --   12/30/20 0400 95/66 36.5 °C (97.7 °F) Oral (!) 115 14 99 % -- --   12/30/20 0300 (!) 93/52 -- -- (!) 111 14 98 % -- --   12/30/20 0200 (!) 87/55 -- -- (!) 114 15 97 % -- --   12/30/20 0100 (!) 103/50 -- -- (!) 112 17 95 % -- --   12/30/20 0000 (!) 99/53 36.6 °C (97.9 °F) Oral (!) 118 15 96 % -- --   12/29/20 2300 (!) 123/59 -- -- (!) 131 (!) 43 97 % -- --   12/29/20 2200 (!) 93/50 -- -- (!) 139 16 97 % -- --   12/29/20 2100 (!) 89/60 -- -- (!) 122 (!) 26 96 % -- --   12/29/20 2000 (!) 97/48 36.7 °C (98.1 °F) Oral (!) 107 18 98 % -- --   12/29/20 1900 (!) 97/54 -- -- (!) 129 19 97 % -- --   12/29/20 1845 (!) 80/53 -- -- (!) 139 18 94 % -- --   12/29/20 1830 (!) 85/51 -- -- (!) 143 (!) 25 96 % -- --   12/29/20 1815 (!) 101/56 -- -- (!) 131 19 95 % -- --   12/29/20 1800 (!) 102/56 -- -- (!) 136 17 95 % -- --   12/29/20 1745 (!) 96/55 -- -- (!) 131 18 97 % -- --   12/29/20 1730 (!) 98/47 -- -- (!) 140 18 (!) 90 % -- --   12/29/20 1715 (!) 106/53 -- -- (!) 142 (!) 28 93 % -- --   12/29/20 1700 (!) 83/54 --  -- (!) 142 (!) 21 93 % -- --   12/29/20 1645 (!) 87/50 -- -- (!) 142 (!) 24 93 % -- --   12/29/20 1630 (!) 98/54 -- -- (!) 146 19 94 % -- --   12/29/20 1615 (!) 96/47 -- -- (!) 147 (!) 21 94 % -- --   12/29/20 1600 (!) 97/51 -- -- (!) 144 18 94 % -- --   12/29/20 1545 (!) 106/51 -- -- (!) 144 16 94 % -- --   12/29/20 1530 (!) 105/46 -- -- (!) 142 16 93 % -- --   12/29/20 1515 (!) 95/50 37 °C (98.6 °F) Axillary (!) 143 16 94 % -- --   12/29/20 1500 -- -- -- (!) 139 16 97 % -- --   12/29/20 1445 (!) 82/40 -- -- (!) 141 16 98 % -- --   12/29/20 1430 -- -- -- (!) 141 17 98 % -- --   12/29/20 1415 (!) 104/49 -- -- (!) 142 16 97 % -- --   12/29/20 1345 (!) 100/53 -- -- (!) 140 14 97 % -- --   12/29/20 1315 (!) 101/46 -- -- (!) 136 15 97 % -- --   12/29/20 1245 (!) 111/53 37.2 °C (98.9 °F) Axillary (!) 142 16 97 % -- --   12/29/20 1230 -- -- -- (!) 138 20 98 % -- --   12/29/20 1200 (!) 92/50 -- -- (!) 142 20 97 % -- --   12/29/20 1145 (!) 93/51 -- -- (!) 137 19 98 % -- --   12/29/20 1045 (!) 87/50 37 °C (98.6 °F) Oral (!) 137 18 96 % -- --   12/29/20 0945 (!) 84/65 -- -- (!) 137 (!) 29 96 % -- --   12/29/20 0845 (!) 95/52 -- -- (!) 140 19 93 % -- --   12/29/20 0800 -- -- -- (!) 137 -- 94 % -- --   12/29/20 0745 (!) 100/55 -- -- (!) 137 (!) 24 94 % -- --   12/29/20 0700 (!) 100/55 36.9 °C (98.5 °F) Oral (!) 137 20 93 % -- --   12/29/20 0645 103/75 -- -- (!) 141 (!) 21 94 % -- --   12/29/20 0615 (!) 89/69 36.5 °C (97.7 °F) Axillary (!) 136 (!) 21 94 % -- --   12/29/20 0545 (!) 80/49 -- -- (!) 135 (!) 21 94 % -- --   12/29/20 0515 (!) 78/52 -- -- (!) 136 20 95 % -- --   12/29/20 0445 (!) 79/50 -- -- (!) 134 (!) 21 94 % -- --   12/29/20 0415 (!) 81/40 -- -- (!) 134 19 95 % -- --   12/29/20 0400 -- -- -- (!) 131 -- -- -- --   12/29/20 0300 (!) 68/44 -- -- (!) 130 18 -- -- --   12/29/20 0230 107/64 -- -- (!) 130 (!) 31 100 % -- --   12/29/20 0220 94/69 -- -- (!) 132 (!) 27 -- -- --   12/29/20 0200 -- -- -- (!) 127 (!) 24 -- --  --   20 0130 (!) 73/51 -- -- (!) 140 (!) 23 -- -- --   20 0100 (!) 77/47 -- -- (!) 126 (!) 25 -- -- --   20 0030 (!) 78/50 -- -- (!) 131 (!) 26 -- -- --   20 0000 (!) 87/50 -- -- (!) 131 (!) 23 -- -- --   20 2330 110/65 -- -- (!) 129 (!) 36 -- -- --   20 2300 (!) 89/51 36.2 °C (97.1 °F) Axillary (!) 123 (!) 27 (!) 81 % -- --   20 2250 (!) 79/52 -- -- (!) 128 (!) 23 (!) 85 % -- --   20 2220 (!) 61/40 -- -- (!) 125 (!) 24 94 % -- --   200 (!) 66/36 -- -- (!) 117 19 98 % -- --   20 (!) 95/54 -- -- (!) 115 20 98 % -- --   20 (!) 60/36 -- -- (!) 118 20 98 % 1.524 m (5') 45.5 kg (100 lb 3.2 oz)   20 -- -- -- (!) 123 19 100 % -- --   20 (!) 87/59 -- -- (!) 121 (!) 22 99 % -- --   20 (!) 87/59 (!) 34.7 °C (94.4 °F) Rectal (!) 117 (!) 23 100 % -- --   20 91/64 (!) 34.1 °C (93.3 °F) Axillary (!) 132 (!) 26 -- -- --   20 194 (!) 99/54 36.1 °C (97 °F) -- (!) 114 (!) 22 100 % -- --   20 1900 (!) 103/59 (!) 35.9 °C (96.7 °F) Temporal (!) 116 (!) 22 100 % -- --   20 1800 (!) 112/59 -- -- (!) 108 (!) 24 100 % -- --   20 1653 -- -- -- -- -- -- -- 48.5 kg (107 lb)   20 1631 (!) 111/56 -- -- 98 20 100 % -- --   20 1600 (!) 111/56 -- -- 96 (!) 21 100 % -- --   20 1522 (!) 82/44 36.1 °C (97 °F) Oral (!) 101 (!) 22 100 % -- --       Temp (72hrs), Av.3 °C (97.4 °F), Min:34.1 °C (93.3 °F), Max:37.2 °C (98.9 °F)      Physical Exam:  General appearance: lethargic  Head: NCAT  Lungs: wheezing bilateral  Heart: regular rate and rhythm  Abdomen: soft, non-tender  Extremities: edema none  Skin: no rashes  Neurologic: grossly normal    Lines, Drains, Airways, Wounds:  CVC Triple Lumen 20 Non-tunneled Right Femoral (Active)   Number of days: 2       Peripheral IV 20 Left Antecubital (Active)   Number of days: 3       Peripheral IV 20 Anterior;Left;Upper Arm  (Active)   Number of days: 3       Urethral Catheter 16 Fr (Active)   Number of days: 3       Rash 12/29/20 0001 Right lateral hip patch (Active)   Number of days: 2       Wound Other (comment) Buttock (Active)   Number of days: 2       Labs:    CBC Results       12/31/20 12/31/20 12/30/20                    1050 0400 2228         WBC -- 21.53 --         RBC -- 2.87 --         HGB 7.7 7.5 7.6         HCT 24.7 23.9 24.0         MCV -- 83.3 --         MCH -- 26.1 --         MCHC -- 31.4 --         PLT -- 158 --                   BMP Results       12/31/20 12/30/20 12/29/20                    0400 0433 1511          136 144         K 3.9 3.9 4.1         Cl 97 100 105         CO2 17 21 23         Glucose 132 123 79          113 121         Creatinine 4.0 4.3 4.8         Calcium 6.6 6.8 7.2         Anion Gap 13 15 16         EGFR 10.4 9.6 8.4         Comment for NA at 0433 on 12/30/20: Electrolytes have been rechecked      Comment for BUN at 0400 on 12/31/20: Consistent with previous results    Comment for BUN at 0433 on 12/30/20: Checked by dilution  Consistent with previous results      Comment for BUN at 1511 on 12/29/20: Checked by dilution      PT/PTT Results       12/31/20 12/31/20 12/30/20                    0716 0041 2336         PTT >230 64 107         Comment for PTT at 0716 on 12/31/20: The Standard Therapeutic Range for Heparin is 68 to 101 seconds.    Comment for PTT at 0041 on 12/31/20: The Standard Therapeutic Range for Heparin is 68 to 101 seconds.    Comment for PTT at 2336 on 12/30/20: The Standard Therapeutic Range for Heparin is 68 to 101 seconds.      UA Results       12/28/20                          1627           Color Red           Clarity Turbid           Glucose Proper identification not possible due to color interference.           Bilirubin Proper identification not possible due to color interference.           Ketones Proper identification not possible due to color interference.            Sp Grav 1.017           Blood +3           Ph Proper identification not possible due to color interference.           Protein Proper identification not possible due to color interference.           Urobilinogen Proper identification not possible due to color interference.           Nitrite Proper identification not possible due to color interference.           Leuk Est +3           WBC Too Numerous To Count           RBC Too Numerous To Count           Bacteria +3           Comment for Blood at 1627 on 12/28/20: The sensitivity of the occult blood test is equivalent to approximately 4 intact RBC/HPF.      Lactate Results       12/30/20 12/29/20 12/29/20                    1205 0924 0524         Lactate 1.3 4.1 6.5                       Microbiology Results     Procedure Component Value Units Date/Time    SARS-CoV-2 (COVID-19), PCR Nasopharynx [[id number]]  (Normal) Collected: 12/28/20 1840    Specimen: Nasopharyngeal Swab from Nasopharynx Updated: 12/28/20 2018    Narrative:      The following orders were created for panel order SARS-CoV-2 (COVID-19), PCR Nasopharynx.  Procedure                               Abnormality         Status                     ---------                               -----------         ------                     SARS-CoV-2 (COVID-19), P...[[id number]]  Normal              Final result                 Please view results for these tests on the individual orders.    SARS-CoV-2 (COVID-19), PCR Nasopharynx [[id number]]  (Normal) Collected: 12/28/20 1840    Specimen: Nasopharyngeal Swab from Nasopharynx Updated: 12/28/20 2018     SARS-CoV-2 (COVID-19) Negative    Narrative:      Nursing instructions: Obtain nasopharyngeal swab ONLY.  Send swab in viral transport media.    Blood Culture Blood, Venous [[id number]]  (Abnormal) Collected: 12/28/20 1648    Specimen: Blood, Venous Updated: 12/31/20 0829     Culture **Positive Culture**      Klebsiella pneumoniae ssp pneumoniae     Gram Stain  Result Gram negative bacilli    Urine culture Urine, Clean Catch [[id number]]  (Abnormal)  (Susceptibility) Collected: 12/28/20 1627    Specimen: Urine, Clean Catch Updated: 12/31/20 0830     Urine Culture **Positive Culture**      >1 x 10^5 CFU/mL Klebsiella pneumoniae ssp pneumoniae    Blood Culture Blood, Venous [[id number]]  (Abnormal)  (Susceptibility) Collected: 12/28/20 1603    Specimen: Blood, Venous Updated: 12/31/20 0825     Culture **Positive Culture**      Klebsiella pneumoniae ssp pneumoniae     Gram Stain Result Gram negative bacilli    Blood Culture PCR Panel Blood, Venous [[id number]]  (Abnormal) Collected: 12/28/20 1603    Specimen: Blood, Venous Updated: 12/29/20 1309     Acinetobacter calcoaceticus-baumannii Not Detected     Candida albicans Not Detected     Candida auris Not Detected     Candida glabrata Not Detected     Candida krusei Not Detected     Candida parapsilosis Not Detected     Candida tropicalis Not Detected     Cryptococcus neoformans/gattii Not Detected     Enterococcus faecalis Not Detected     Enterococcus faecium Not Detected     Enterobacter cloacae complex Not Detected     Escherichia coli Not Detected     Klebsiella oxytoca Not Detected     Klebsiella pneumoniae Detected     Klebsiella aerogenes Not Detected     Proteus Not Detected     Salmonella species Not Detected     Serratia marcescens Not Detected     Haemophilus influenzae Not Detected     Listeria monocytogenes Not Detected     Neiseria meningitidis Not Detected     Pseudomonas aeruginosa Not Detected     Stenotrophomonas maltophilia Not Detected     Bacteroides fragilis Not Detected     Staph (not aureus) Not Detected     Staphylococcus aureus Not Detected     Staphylococcus ludgnensis Not Detected     Staphylococcus epidermidis Not Detected     Streptococcus Not Detected     Streptococcus agalactiae (Group B) Not Detected     Streptococcus pneumoniae Not Detected     Streptococcus pyogenes (Group A) Not Detected      KPC (Carbapenem Resistance Gene) Not Detected     Damon/B (Vancomycin Resistance Gene) Not Applicable     mecA/C Not Applicable     mecA/C and MREJ (MRSA) Not Applicable     CTX-M (ESBL) Not Detected     IMP Not Detected     mcr-1 Not Detected     NDM Not Detected     OXA-48-like Not Detected     VIM Not Detected     Comment: See below    Narrative:      This positive blood culture was tested with a rapid molecular panel that detects Enterococcus faecalis, Enterococcus faecium, Listeria monocytogenes, Staphylococcus species, Staphylococcus aureus, Staphylococcus lugdunensis, Staphylococcus epidermidis, Streptococcus agalactiae (Group B), Streptococcus pneumonia, Streptococcus pyogenes (Group A), Acinetobacter calcoaceticus-baumannii complex, Bacteroides fragilis, Haemophilus influenza, Neisseria meningitides, Pseudomonas aeruginosa, Stenotrophomonas maltophilia , Salmonella spp.,  Enterobacter cloacae complex, Escherichia coli, Klebsiella oxytoca, Klebsiella pneumoniae group, Klebsiella aerogenes , Proteus species, Serratia marcescens, Candida albicans, Candida auris, Candida glabrata, Candida krusei, Cryptococcus neoformans/gattii, Candida parapsilosis, and Candida tropicalis.          Pathology Results     ** No results found for the last 720 hours. **          Echo:     Cardiac Imaging   TRANSTHORACIC ECHO (TTE) COMPLETE 12/30/2020    Narrative · Mitral valve thickening. Severe mitral annular calcification.  · Severe mitral valve stenosis.  · Mean gradient = 10.00 mmHg. HR = 110 bpm.  · Hyperdynamic LV systolic function. Estimated EF >75%.  · Mildly dilated LA.  · Tricuspid valve structure is grossly normal. Mild tricuspid valve   regurgitation.  · Aortic valve structure is normal. Mild aortic valve regurgitation. No   aortic valve stenosis. A bioprosthetic aortic valve is present.  · Moderately dilated RA.  · Moderately dilated RV. Mildly reduced RV systolic function.  · Mild mitral valve regurgitation.      Estimated PASP= 51mmHg by TR jet method.         Imaging:    Radiology Imaging   XR CHEST 1 VW    Narrative CLINICAL HISTORY:  89-year-old female with wheezing    COMMENT:  A portable erect AP view of the chest was obtained at 10:00 AM on  12/31/2020    COMPARISON:  Chest radiograph from 12/28/2020    Cardiac leads overlie the chest.  The left costophrenic angle is excluded from  the field-of-view.  Postprocedural changes of aortic valvuloplasty are again  noted.  There is patchy interstitial opacity is noted, with pulmonary vascular  congestion, suggesting edema.  There is vague left lower lobe opacity, which may  represent airspace disease or atelectasis.    Cardiomegaly is noted.      Impression IMPRESSION:  1.  Patchy interstitial opacity, with pulmonary vascular congestion, which may  represent multifocal airspace disease or pulmonary edema.  2.  Left basilar airspace disease, new/increased from prior.             Assessment     1. Septic shock with Klebsiella bacteremia due to UTI - WBC count is slowly improving and pt remains afebrile     Plan      1. Continue ceftriaxone and once further clinical improvement is noted, can switch to cefuroxime to complete 14 day course.

## 2020-12-31 NOTE — PATIENT CARE CONFERENCE
Care Progression Rounds Note  Date: 12/31/2020  Time: 11:36 AM     Patient Name: Nancy Minor     Medical Record Number: [id number]   YOB: 1923  Sex: Female      Room/Bed: 3211    Admitting Diagnosis: Hyperkalemia [E87.5]  Acute cystitis with hematuria [N30.01]  Severe sepsis (CMS/HCC) [A41.9, R65.20]  Acute renal failure, unspecified acute renal failure type (CMS/HCC) [N17.9]   Admit Date/Time: 12/28/2020  3:35 PM    Primary Diagnosis: Metabolic acidosis  Principal Problem: Metabolic acidosis    GMLOS: 4.8  Anticipated Discharge Date: 1/5/2021    AM-PAC  Mobility Score:      Discharge Planning:  Anticipated Discharge Disposition: home with home health services, skilled nursing facility    Barriers to Discharge:  Barriers to Discharge: Medical issues not resolved  Comment: amio gtt, IVF + bolus for low BP, IV abx, IV solumedrol, PTT >230, hematuria, O2 @ 2L    Participants:  advanced practice provider, , dietitian/nutrition services, nursing, pharmacy, social work/services

## 2020-12-31 NOTE — PROGRESS NOTES
Several messages left for son to discuss home care and the need for a local PCP to sign home care orders. Per Amy no weekend dc anticipated. If patient is dc home before Monday please obtain a PCP for patient and call referral in to central access at [phone] to complete the referral.

## 2020-12-31 NOTE — PROGRESS NOTES
NEPHROLOGY PROGRESS NOTE    Subjective:   Pt seen re: DANY and electrolyte disturbances.    UO 1040/24hrs - urine pink tinged   Pt c/o SOB this am.   FiO2 2L via NC  BP 99/53. -115 atrial fibrillation   On neosyneprhine gtt    Review of Systems   Respiratory: Positive for shortness of breath.    Genitourinary: Positive for difficulty urinating (intermittently catheterizes at home ) and hematuria.       Vitals:    12/31/20 0800 12/31/20 0900 12/31/20 1000 12/31/20 1100   BP: (!) 99/53 (!) 88/52 (!) 93/51 (!) 92/52   BP Location:       Patient Position:       Pulse: (!) 108 (!) 121 (!) 116 (!) 118   Resp: 16 18 (!) 21 18   Temp:       TempSrc:       SpO2: 97% 96% 97% 97%   Weight:       Height:             Intake/Output Summary (Last 24 hours) at 12/31/2020 1216  Last data filed at 12/31/2020 1200  Gross per 24 hour   Intake 3442.29 ml   Output 1040 ml   Net 2402.29 ml       Physical Exam  HENT:      Head: Atraumatic.      Mouth/Throat:      Pharynx: No oropharyngeal exudate.   Eyes:      Pupils: Pupils are equal, round, and reactive to light.   Cardiovascular:      Rate and Rhythm: Tachycardia present. Rhythm irregular.   Pulmonary:      Breath sounds: Wheezing (slight end expiratory wheeze ) present. No rales.      Comments: tachypneic   Abdominal:      Palpations: Abdomen is soft.   Genitourinary:     Comments: Samson w/ pink tinged urine   Musculoskeletal:         General: Swelling (1+ dependent edema lower legs) present.   Skin:     General: Skin is warm.   Neurological:      Mental Status: She is alert.         LABS:  Results from last 7 days   Lab Units 12/31/20  1050 12/31/20  0400   SODIUM mEQ/L  --  127*   POTASSIUM mEQ/L  --  3.9   CHLORIDE mEQ/L  --  97*   CO2 mEQ/L  --  17*   BUN mg/dL  --  100*   CREATININE mg/dL  --  4.0*   EGFR mL/min/1.73m*2  --  10.4*   GLUCOSE mg/dL  --  132*   CALCIUM mg/dL  --  6.6*   ALBUMIN g/dL  --  1.5*   WBC K/uL  --  21.53*   HEMOGLOBIN g/dL 7.7* 7.5*   HEMATOCRIT %  24.7* 23.9*   PLATELETS K/uL  --  158       Meds:    Current Facility-Administered Medications:   •  [] amiodarone (CORDARONE) 750 mg in dextrose 5 % 500 mL (1.5 mg/mL) infusion, 1 mg/min, intravenous, Continuous, Stopped at 20 0128 **FOLLOWED BY** amiodarone (CORDARONE) 750 mg in dextrose 5 % 500 mL (1.5 mg/mL) infusion, 0.5 mg/min, intravenous, Continuous, Chastity Wahl CRNP, Last Rate: 20 mL/hr at 20 1200, 0.5 mg/min at 20 1200  •  atorvastatin (LIPITOR) tablet 10 mg, 10 mg, oral, Daily (6p), Chastity Wahl CRNP, 10 mg at 20 1702  •  budesonide (PULMICORT) 0.5 mg/2 mL nebulizer solution 0.5 mg, 0.5 mg, nebulization, BID (6a, 6p), Chastity Wahl CRNP, 0.5 mg at 20 0811  •  calcium gluconate 1,000 mg in sodium chloride 0.9 % 50 mL IVPB, 1 g, intravenous, 2x daily PRN, Chastity Wahl CRNP  •  cefTRIAXone (ROCEPHIN) IVPB 1 g in 100 mL NSS vial in bag, 1 g, intravenous, q24h INT, Barrett Stern MD, Stopped at 20 1053  •  glucose chewable tablet 16-32 g of dextrose, 16-32 g of dextrose, oral, PRN **OR** dextrose 40 % oral gel 15-30 g of dextrose, 15-30 g of dextrose, oral, PRN **OR** glucagon (GLUCAGEN) injection 1 mg, 1 mg, intramuscular, PRN **OR** dextrose in water injection 12.5 g, 25 mL, intravenous, PRN, Chastity Wahl CRNP  •  insulin aspart U-100 (NovoLOG) pen 3-11 Units, 3-11 Units, subcutaneous, q6h, Dipika Crisostomo MD, 3 Units at 20 1657  •  ipratropium-albuteroL (DUO-NEB) 0.5-2.5 mg/3 mL nebulizer solution 3 mL, 3 mL, nebulization, q6H JESSICA, Chastity Wahl CRNP, 3 mL at 20 0811  •  levothyroxine (SYNTHROID) tablet 50 mcg, 50 mcg, oral, Daily (6:30a), Dipika Crisostomo MD, 50 mcg at 20 0551  •  magnesium sulfate IVPB 2g in 50 mL NSS/D5W/SWFI, 2 g, intravenous, PRN, Chastity Wahl CRNP, Stopped at 20 1336  •  methylPREDNISolone sod suc(PF) (Solu-MEDROL) injection 40 mg, 40 mg, intravenous, q8h INT, Stacey Miranda CRNP, 40 mg at 20  1024  •  nystatin (MYCOSTATIN) 100,000 unit/gram topical powder, , Topical, BID, Dipika Crisostomo MD, Given at 12/31/20 1024  •  phenylephrine HCl in 0.9% NaCl (FUENTES-SYNEPHRINE) 50 mg/250 mL (200 mcg/mL) infusion,  mcg/min, intravenous, Titrated, Chastity Wahl CRNP, Last Rate: 4.5 mL/hr at 12/31/20 1200, 15 mcg/min at 12/31/20 1200  •  renal multivitamin tablet 1 tablet, 1 tablet, oral, Daily, Chastity Wahl CRNP, 1 tablet at 12/31/20 1023  •  sodium chloride 0.9 % infusion, , intravenous, Continuous, Dipika Crisostomo MD, Last Rate: 40 mL/hr at 12/31/20 1200, Rate Verify at 12/31/20 1200    ASSESSMENT:  Principal Problem:    Metabolic acidosis  Active Problems:    Severe sepsis (CMS/HCC)    Encephalopathy    Pneumobilia    Acute on chronic renal failure (CMS/HCC)    Hyperkalemia    Acute cystitis with hematuria    Hypoglycemia    COPD (chronic obstructive pulmonary disease) (CMS/HCC)    History of colon cancer    Type 2 MI (myocardial infarction) (CMS/HCC)    Hyponatremia    History of CVA (cerebrovascular accident)    Chronic atrial fibrillation (CMS/HCC)    Septic shock (CMS/HCC)      PLAN:  1. DANY on CKD: in the context of septic shock. Non-oliguric. Creatinine downtrending.   - pt has a narrow therapeutic window w/ comorbidities informing volume management. Noted hx chronic hypotension and has severe MS on echo w/ rapid afib requiring amiodarone gtt on the monitor. Also continues to require some pressors this am.   - Appears to be volume overloaded this am and w/ patchy infiltrates on am CXR. Agree w/ holding IVF other than requisite drips.    - give bolus doses lasix 80mg IVP 1-2x/day if clinically deteriorates. Will give one dose IV Lasix this am.     2. Hyponatremia: Initial over-correction requiring D5W. Na 127 this am and D5W discontinued.      3. Hyperkalemia: this is resolved. Renal function improving.     4. Uro-sepsis: On ceftriaxone IV     Inderjit Sutherland MD

## 2021-01-01 ENCOUNTER — APPOINTMENT (INPATIENT)
Dept: RADIOLOGY | Facility: HOSPITAL | Age: 86
DRG: 698 | End: 2021-01-01
Attending: PHYSICIAN ASSISTANT
Payer: MEDICARE

## 2021-01-01 ENCOUNTER — HOSPITAL ENCOUNTER (INPATIENT)
Facility: HOSPITAL | Age: 86
LOS: 1 days | DRG: 951 | End: 2021-01-09
Attending: INTERNAL MEDICINE | Admitting: HOSPITALIST
Payer: OTHER MISCELLANEOUS

## 2021-01-01 ENCOUNTER — APPOINTMENT (INPATIENT)
Dept: RADIOLOGY | Facility: HOSPITAL | Age: 86
DRG: 698 | End: 2021-01-01
Attending: INTERNAL MEDICINE
Payer: MEDICARE

## 2021-01-01 VITALS
OXYGEN SATURATION: 100 % | DIASTOLIC BLOOD PRESSURE: 55 MMHG | SYSTOLIC BLOOD PRESSURE: 110 MMHG | BODY MASS INDEX: 22.58 KG/M2 | HEIGHT: 60 IN | WEIGHT: 115 LBS | TEMPERATURE: 96.5 F | HEART RATE: 99 BPM | RESPIRATION RATE: 17 BRPM

## 2021-01-01 VITALS
DIASTOLIC BLOOD PRESSURE: 40 MMHG | TEMPERATURE: 97.7 F | SYSTOLIC BLOOD PRESSURE: 61 MMHG | HEART RATE: 99 BPM | RESPIRATION RATE: 18 BRPM | OXYGEN SATURATION: 79 %

## 2021-01-01 LAB
ACANTHOCYTES BLD QL SMEAR: ABNORMAL
ACANTHOCYTES BLD QL SMEAR: ABNORMAL
ALBUMIN SERPL-MCNC: 1.8 G/DL (ref 3.4–5)
ALBUMIN SERPL-MCNC: 1.9 G/DL (ref 3.4–5)
ALBUMIN SERPL-MCNC: 2 G/DL (ref 3.4–5)
ALBUMIN SERPL-MCNC: 2.1 G/DL (ref 3.4–5)
ALBUMIN SERPL-MCNC: 2.2 G/DL (ref 3.4–5)
ALBUMIN SERPL-MCNC: 2.3 G/DL (ref 3.4–5)
ALBUMIN SERPL-MCNC: 2.3 G/DL (ref 3.4–5)
ALBUMIN SERPL-MCNC: 2.5 G/DL (ref 3.4–5)
ALP SERPL-CCNC: 68 IU/L (ref 35–126)
ALP SERPL-CCNC: 71 IU/L (ref 35–126)
ALP SERPL-CCNC: 72 IU/L (ref 35–126)
ALP SERPL-CCNC: 73 IU/L (ref 35–126)
ALP SERPL-CCNC: 74 IU/L (ref 35–126)
ALP SERPL-CCNC: 78 IU/L (ref 35–126)
ALP SERPL-CCNC: 84 IU/L (ref 35–126)
ALP SERPL-CCNC: 93 IU/L (ref 35–126)
ALT SERPL-CCNC: 24 IU/L (ref 11–54)
ALT SERPL-CCNC: 25 IU/L (ref 11–54)
ALT SERPL-CCNC: 26 IU/L (ref 11–54)
ALT SERPL-CCNC: 27 IU/L (ref 11–54)
ANION GAP SERPL CALC-SCNC: 14 MEQ/L (ref 3–15)
ANION GAP SERPL CALC-SCNC: 15 MEQ/L (ref 3–15)
ANION GAP SERPL CALC-SCNC: 16 MEQ/L (ref 3–15)
ANION GAP SERPL CALC-SCNC: 17 MEQ/L (ref 3–15)
ANISOCYTOSIS BLD QL SMEAR: ABNORMAL
ANISOCYTOSIS BLD QL SMEAR: ABNORMAL
APTT PPP: 34 SEC (ref 23–35)
APTT PPP: 65 SEC (ref 23–35)
APTT PPP: 68 SEC (ref 23–35)
APTT PPP: 76 SEC (ref 23–35)
AST SERPL-CCNC: 14 IU/L (ref 15–41)
AST SERPL-CCNC: 15 IU/L (ref 15–41)
AST SERPL-CCNC: 16 IU/L (ref 15–41)
AST SERPL-CCNC: 17 IU/L (ref 15–41)
AST SERPL-CCNC: 17 IU/L (ref 15–41)
AST SERPL-CCNC: 19 IU/L (ref 15–41)
AST SERPL-CCNC: 24 IU/L (ref 15–41)
AST SERPL-CCNC: 29 IU/L (ref 15–41)
ATRIAL RATE: 98
BACTERIA BLD CULT: ABNORMAL
BACTERIA URNS QL MICRO: ABNORMAL /HPF
BASE EXCESS BLDA CALC-SCNC: -3.5 MEQ/L
BASE EXCESS BLDA CALC-SCNC: -4.1 MEQ/L
BASO STIPL BLD QL SMEAR: ABNORMAL
BASOPHILS # BLD: 0 K/UL (ref 0.01–0.1)
BASOPHILS # BLD: 0.01 K/UL (ref 0.01–0.1)
BASOPHILS # BLD: 0.02 K/UL (ref 0.01–0.1)
BASOPHILS NFR BLD: 0 %
BASOPHILS NFR BLD: 0 %
BASOPHILS NFR BLD: 0.1 %
BILIRUB SERPL-MCNC: 0.2 MG/DL (ref 0.3–1.2)
BILIRUB SERPL-MCNC: 0.4 MG/DL (ref 0.3–1.2)
BILIRUB SERPL-MCNC: 0.5 MG/DL (ref 0.3–1.2)
BILIRUB SERPL-MCNC: 0.6 MG/DL (ref 0.3–1.2)
BILIRUB SERPL-MCNC: 0.6 MG/DL (ref 0.3–1.2)
BILIRUB SERPL-MCNC: 0.7 MG/DL (ref 0.3–1.2)
BILIRUB SERPL-MCNC: 0.8 MG/DL (ref 0.3–1.2)
BILIRUB SERPL-MCNC: 0.8 MG/DL (ref 0.3–1.2)
BILIRUB UR QL STRIP.AUTO: ABNORMAL MG/DL
BNP SERPL-MCNC: 882 PG/ML
BUN SERPL-MCNC: 101 MG/DL (ref 8–20)
BUN SERPL-MCNC: 102 MG/DL (ref 8–20)
BUN SERPL-MCNC: 106 MG/DL (ref 8–20)
BUN SERPL-MCNC: 112 MG/DL (ref 8–20)
BUN SERPL-MCNC: 115 MG/DL (ref 8–20)
BUN SERPL-MCNC: 133 MG/DL (ref 8–20)
BUN SERPL-MCNC: 97 MG/DL (ref 8–20)
BUN SERPL-MCNC: 97 MG/DL (ref 8–20)
BURR CELLS BLD QL SMEAR: ABNORMAL
CALCIUM SERPL-MCNC: 7.7 MG/DL (ref 8.9–10.3)
CALCIUM SERPL-MCNC: 7.7 MG/DL (ref 8.9–10.3)
CALCIUM SERPL-MCNC: 8.1 MG/DL (ref 8.9–10.3)
CALCIUM SERPL-MCNC: 8.4 MG/DL (ref 8.9–10.3)
CALCIUM SERPL-MCNC: 8.5 MG/DL (ref 8.9–10.3)
CALCIUM SERPL-MCNC: 8.6 MG/DL (ref 8.9–10.3)
CALCIUM SERPL-MCNC: 8.7 MG/DL (ref 8.9–10.3)
CALCIUM SERPL-MCNC: 8.9 MG/DL (ref 8.9–10.3)
CHLORIDE SERPL-SCNC: 101 MEQ/L (ref 98–109)
CHLORIDE SERPL-SCNC: 103 MEQ/L (ref 98–109)
CHLORIDE SERPL-SCNC: 104 MEQ/L (ref 98–109)
CHLORIDE SERPL-SCNC: 104 MEQ/L (ref 98–109)
CHLORIDE SERPL-SCNC: 107 MEQ/L (ref 98–109)
CHLORIDE SERPL-SCNC: 97 MEQ/L (ref 98–109)
CHLORIDE SERPL-SCNC: 98 MEQ/L (ref 98–109)
CHLORIDE SERPL-SCNC: 98 MEQ/L (ref 98–109)
CLARITY UR REFRACT.AUTO: ABNORMAL
CO2 SERPL-SCNC: 15 MEQ/L (ref 22–32)
CO2 SERPL-SCNC: 15 MEQ/L (ref 22–32)
CO2 SERPL-SCNC: 16 MEQ/L (ref 22–32)
CO2 SERPL-SCNC: 17 MEQ/L (ref 22–32)
CO2 SERPL-SCNC: 19 MEQ/L (ref 22–32)
CO2 SERPL-SCNC: 21 MEQ/L (ref 22–32)
COLOR UR AUTO: ABNORMAL
CREAT SERPL-MCNC: 3.8 MG/DL (ref 0.6–1.1)
CREAT SERPL-MCNC: 3.9 MG/DL (ref 0.6–1.1)
CREAT SERPL-MCNC: 4.1 MG/DL (ref 0.6–1.1)
CREAT SERPL-MCNC: 4.1 MG/DL (ref 0.6–1.1)
CREAT SERPL-MCNC: 4.3 MG/DL (ref 0.6–1.1)
CREAT SERPL-MCNC: 4.3 MG/DL (ref 0.6–1.1)
CREAT SERPL-MCNC: 4.4 MG/DL (ref 0.6–1.1)
CREAT SERPL-MCNC: 4.4 MG/DL (ref 0.6–1.1)
DIFFERENTIAL METHOD BLD: ABNORMAL
DOHLE BOD BLD QL SMEAR: ABNORMAL
EOSINOPHIL # BLD: 0 K/UL (ref 0.04–0.36)
EOSINOPHIL # BLD: 0.01 K/UL (ref 0.04–0.36)
EOSINOPHIL # BLD: 0.01 K/UL (ref 0.04–0.36)
EOSINOPHIL # BLD: 0.03 K/UL (ref 0.04–0.36)
EOSINOPHIL NFR BLD: 0 %
EOSINOPHIL NFR BLD: 0.1 %
ERYTHROCYTE [DISTWIDTH] IN BLOOD BY AUTOMATED COUNT: 17 % (ref 11.7–14.4)
ERYTHROCYTE [DISTWIDTH] IN BLOOD BY AUTOMATED COUNT: 17.1 % (ref 11.7–14.4)
ERYTHROCYTE [DISTWIDTH] IN BLOOD BY AUTOMATED COUNT: 17.2 % (ref 11.7–14.4)
ERYTHROCYTE [DISTWIDTH] IN BLOOD BY AUTOMATED COUNT: 17.6 % (ref 11.7–14.4)
ERYTHROCYTE [DISTWIDTH] IN BLOOD BY AUTOMATED COUNT: 18 % (ref 11.7–14.4)
ERYTHROCYTE [DISTWIDTH] IN BLOOD BY AUTOMATED COUNT: 18.2 % (ref 11.7–14.4)
ERYTHROCYTE [DISTWIDTH] IN BLOOD BY AUTOMATED COUNT: 18.6 % (ref 11.7–14.4)
ERYTHROCYTE [DISTWIDTH] IN BLOOD BY AUTOMATED COUNT: 19.5 % (ref 11.7–14.4)
FERRITIN SERPL-MCNC: 48 NG/ML (ref 11–250)
FIO2 ON VENT: 100 %
FIO2 ON VENT: ABNORMAL %
FOLATE SERPL-MCNC: >20 NG/ML
GFR SERPL CREATININE-BSD FRML MDRD: 10.1 ML/MIN/1.73M*2
GFR SERPL CREATININE-BSD FRML MDRD: 10.1 ML/MIN/1.73M*2
GFR SERPL CREATININE-BSD FRML MDRD: 10.7 ML/MIN/1.73M*2
GFR SERPL CREATININE-BSD FRML MDRD: 11 ML/MIN/1.73M*2
GFR SERPL CREATININE-BSD FRML MDRD: 9.3 ML/MIN/1.73M*2
GFR SERPL CREATININE-BSD FRML MDRD: 9.3 ML/MIN/1.73M*2
GFR SERPL CREATININE-BSD FRML MDRD: 9.6 ML/MIN/1.73M*2
GFR SERPL CREATININE-BSD FRML MDRD: 9.6 ML/MIN/1.73M*2
GLUCOSE BLD-MCNC: 112 MG/DL (ref 70–99)
GLUCOSE BLD-MCNC: 117 MG/DL (ref 70–99)
GLUCOSE BLD-MCNC: 125 MG/DL (ref 70–99)
GLUCOSE BLD-MCNC: 129 MG/DL (ref 70–99)
GLUCOSE BLD-MCNC: 131 MG/DL (ref 70–99)
GLUCOSE BLD-MCNC: 137 MG/DL (ref 70–99)
GLUCOSE BLD-MCNC: 138 MG/DL (ref 70–99)
GLUCOSE BLD-MCNC: 140 MG/DL (ref 70–99)
GLUCOSE BLD-MCNC: 140 MG/DL (ref 70–99)
GLUCOSE BLD-MCNC: 142 MG/DL (ref 70–99)
GLUCOSE BLD-MCNC: 147 MG/DL (ref 70–99)
GLUCOSE BLD-MCNC: 147 MG/DL (ref 70–99)
GLUCOSE BLD-MCNC: 150 MG/DL (ref 70–99)
GLUCOSE BLD-MCNC: 153 MG/DL (ref 70–99)
GLUCOSE BLD-MCNC: 153 MG/DL (ref 70–99)
GLUCOSE BLD-MCNC: 154 MG/DL (ref 70–99)
GLUCOSE BLD-MCNC: 158 MG/DL (ref 70–99)
GLUCOSE BLD-MCNC: 159 MG/DL (ref 70–99)
GLUCOSE BLD-MCNC: 164 MG/DL (ref 70–99)
GLUCOSE BLD-MCNC: 165 MG/DL (ref 70–99)
GLUCOSE BLD-MCNC: 165 MG/DL (ref 70–99)
GLUCOSE BLD-MCNC: 176 MG/DL (ref 70–99)
GLUCOSE BLD-MCNC: 178 MG/DL (ref 70–99)
GLUCOSE BLD-MCNC: 179 MG/DL (ref 70–99)
GLUCOSE BLD-MCNC: 191 MG/DL (ref 70–99)
GLUCOSE BLD-MCNC: 197 MG/DL (ref 70–99)
GLUCOSE BLD-MCNC: 207 MG/DL (ref 70–99)
GLUCOSE BLD-MCNC: 207 MG/DL (ref 70–99)
GLUCOSE BLD-MCNC: 208 MG/DL (ref 70–99)
GLUCOSE BLD-MCNC: 209 MG/DL (ref 70–99)
GLUCOSE SERPL-MCNC: 116 MG/DL (ref 70–99)
GLUCOSE SERPL-MCNC: 118 MG/DL (ref 70–99)
GLUCOSE SERPL-MCNC: 123 MG/DL (ref 70–99)
GLUCOSE SERPL-MCNC: 134 MG/DL (ref 70–99)
GLUCOSE SERPL-MCNC: 134 MG/DL (ref 70–99)
GLUCOSE SERPL-MCNC: 152 MG/DL (ref 70–99)
GLUCOSE SERPL-MCNC: 158 MG/DL (ref 70–99)
GLUCOSE SERPL-MCNC: 239 MG/DL (ref 70–99)
GLUCOSE UR STRIP.AUTO-MCNC: ABNORMAL MG/DL
GRAM STN SPEC: ABNORMAL
GRAM STN SPEC: ABNORMAL
HCO3 BLDA-SCNC: 21.8 MEQ/L (ref 21–28)
HCO3 BLDA-SCNC: 21.9 MEQ/L (ref 21–28)
HCT VFR BLDCO AUTO: 22.7 % (ref 35–45)
HCT VFR BLDCO AUTO: 23.2 % (ref 35–45)
HCT VFR BLDCO AUTO: 23.3 % (ref 35–45)
HCT VFR BLDCO AUTO: 23.3 % (ref 35–45)
HCT VFR BLDCO AUTO: 23.6 % (ref 35–45)
HCT VFR BLDCO AUTO: 23.8 % (ref 35–45)
HCT VFR BLDCO AUTO: 24 % (ref 35–45)
HCT VFR BLDCO AUTO: 24 % (ref 35–45)
HCT VFR BLDCO AUTO: 24.3 % (ref 35–45)
HCT VFR BLDCO AUTO: 24.7 % (ref 35–45)
HCT VFR BLDCO AUTO: 25 % (ref 35–45)
HCT VFR BLDCO AUTO: 25.1 % (ref 35–45)
HCT VFR BLDCO AUTO: 25.4 % (ref 35–45)
HCT VFR BLDCO AUTO: 26.5 % (ref 35–45)
HGB BLD-MCNC: 7.1 G/DL (ref 11.8–15.7)
HGB BLD-MCNC: 7.2 G/DL (ref 11.8–15.7)
HGB BLD-MCNC: 7.3 G/DL (ref 11.8–15.7)
HGB BLD-MCNC: 7.3 G/DL (ref 11.8–15.7)
HGB BLD-MCNC: 7.4 G/DL (ref 11.8–15.7)
HGB BLD-MCNC: 7.4 G/DL (ref 11.8–15.7)
HGB BLD-MCNC: 7.5 G/DL (ref 11.8–15.7)
HGB BLD-MCNC: 7.7 G/DL (ref 11.8–15.7)
HGB BLD-MCNC: 7.8 G/DL (ref 11.8–15.7)
HGB BLD-MCNC: 8 G/DL (ref 11.8–15.7)
HGB BLD-MCNC: 8.1 G/DL (ref 11.8–15.7)
HGB BLD-MCNC: 8.1 G/DL (ref 11.8–15.7)
HGB UR QL STRIP.AUTO: 3
HYALINE CASTS #/AREA URNS LPF: ABNORMAL /LPF
HYPOCHROMIA BLD QL SMEAR: ABNORMAL
IMM GRANULOCYTES # BLD AUTO: 0.14 K/UL (ref 0–0.08)
IMM GRANULOCYTES # BLD AUTO: 0.15 K/UL (ref 0–0.08)
IMM GRANULOCYTES # BLD AUTO: 0.17 K/UL (ref 0–0.08)
IMM GRANULOCYTES # BLD AUTO: 0.19 K/UL (ref 0–0.08)
IMM GRANULOCYTES # BLD AUTO: 0.23 K/UL (ref 0–0.08)
IMM GRANULOCYTES # BLD AUTO: 0.24 K/UL (ref 0–0.08)
IMM GRANULOCYTES # BLD AUTO: 0.24 K/UL (ref 0–0.08)
IMM GRANULOCYTES NFR BLD AUTO: 0.9 %
IMM GRANULOCYTES NFR BLD AUTO: 1.1 %
IMM GRANULOCYTES NFR BLD AUTO: 1.1 %
IMM GRANULOCYTES NFR BLD AUTO: 1.3 %
IMM GRANULOCYTES NFR BLD AUTO: 1.4 %
INHALED O2 CONCENTRATION: ABNORMAL %
INHALED O2 CONCENTRATION: ABNORMAL %
INR PPP: 1.3 INR
IRON SATN MFR SERPL: 5 % (ref 15–45)
IRON SERPL-MCNC: 17 UG/DL (ref 35–150)
KETONES UR STRIP.AUTO-MCNC: ABNORMAL MG/DL
LACTATE SERPL-SCNC: 2.8 MMOL/L (ref 0.4–2)
LEUKOCYTE ESTERASE UR QL STRIP.AUTO: 3
LYMPHOCYTES # BLD: 0.18 K/UL (ref 1.2–3.5)
LYMPHOCYTES # BLD: 0.19 K/UL (ref 1.2–3.5)
LYMPHOCYTES # BLD: 0.19 K/UL (ref 1.2–3.5)
LYMPHOCYTES # BLD: 0.22 K/UL (ref 1.2–3.5)
LYMPHOCYTES # BLD: 0.23 K/UL (ref 1.2–3.5)
LYMPHOCYTES # BLD: 0.24 K/UL (ref 1.2–3.5)
LYMPHOCYTES # BLD: 0.27 K/UL (ref 1.2–3.5)
LYMPHOCYTES # BLD: 0.63 K/UL (ref 1.2–3.5)
LYMPHOCYTES NFR BLD: 0.9 %
LYMPHOCYTES NFR BLD: 1 %
LYMPHOCYTES NFR BLD: 1.2 %
LYMPHOCYTES NFR BLD: 1.5 %
LYMPHOCYTES NFR BLD: 1.6 %
LYMPHOCYTES NFR BLD: 1.7 %
LYMPHOCYTES NFR BLD: 1.9 %
LYMPHOCYTES NFR BLD: 2 %
MAGNESIUM SERPL-MCNC: 1.9 MG/DL (ref 1.8–2.5)
MAGNESIUM SERPL-MCNC: 2.2 MG/DL (ref 1.8–2.5)
MAGNESIUM SERPL-MCNC: 2.2 MG/DL (ref 1.8–2.5)
MAGNESIUM SERPL-MCNC: 2.3 MG/DL (ref 1.8–2.5)
MAGNESIUM SERPL-MCNC: 2.5 MG/DL (ref 1.8–2.5)
MCH RBC QN AUTO: 25.4 PG (ref 28–33.2)
MCH RBC QN AUTO: 25.7 PG (ref 28–33.2)
MCH RBC QN AUTO: 25.8 PG (ref 28–33.2)
MCH RBC QN AUTO: 25.8 PG (ref 28–33.2)
MCH RBC QN AUTO: 26 PG (ref 28–33.2)
MCH RBC QN AUTO: 26.2 PG (ref 28–33.2)
MCH RBC QN AUTO: 26.4 PG (ref 28–33.2)
MCH RBC QN AUTO: 26.6 PG (ref 28–33.2)
MCHC RBC AUTO-ENTMCNC: 30.6 G/DL (ref 32.2–35.5)
MCHC RBC AUTO-ENTMCNC: 30.9 G/DL (ref 32.2–35.5)
MCHC RBC AUTO-ENTMCNC: 30.9 G/DL (ref 32.2–35.5)
MCHC RBC AUTO-ENTMCNC: 31.2 G/DL (ref 32.2–35.5)
MCHC RBC AUTO-ENTMCNC: 31.2 G/DL (ref 32.2–35.5)
MCHC RBC AUTO-ENTMCNC: 31.3 G/DL (ref 32.2–35.5)
MCHC RBC AUTO-ENTMCNC: 31.9 G/DL (ref 32.2–35.5)
MCHC RBC AUTO-ENTMCNC: 31.9 G/DL (ref 32.2–35.5)
MCV RBC AUTO: 82 FL (ref 83–98)
MCV RBC AUTO: 82.2 FL (ref 83–98)
MCV RBC AUTO: 82.3 FL (ref 83–98)
MCV RBC AUTO: 82.8 FL (ref 83–98)
MCV RBC AUTO: 83.3 FL (ref 83–98)
MCV RBC AUTO: 83.4 FL (ref 83–98)
MCV RBC AUTO: 83.5 FL (ref 83–98)
MCV RBC AUTO: 86.3 FL (ref 83–98)
MICROCYTES BLD QL SMEAR: ABNORMAL
MICROCYTES BLD QL SMEAR: ABNORMAL
MONOCYTES # BLD: 0.11 K/UL (ref 0.28–0.8)
MONOCYTES # BLD: 0.39 K/UL (ref 0.28–0.8)
MONOCYTES # BLD: 0.42 K/UL (ref 0.28–0.8)
MONOCYTES # BLD: 0.5 K/UL (ref 0.28–0.8)
MONOCYTES # BLD: 0.51 K/UL (ref 0.28–0.8)
MONOCYTES # BLD: 0.6 K/UL (ref 0.28–0.8)
MONOCYTES # BLD: 0.73 K/UL (ref 0.28–0.8)
MONOCYTES # BLD: 1.26 K/UL (ref 0.28–0.8)
MONOCYTES NFR BLD: 0.9 %
MONOCYTES NFR BLD: 2.9 %
MONOCYTES NFR BLD: 2.9 %
MONOCYTES NFR BLD: 3.1 %
MONOCYTES NFR BLD: 3.2 %
MONOCYTES NFR BLD: 3.4 %
MONOCYTES NFR BLD: 3.5 %
MONOCYTES NFR BLD: 4 %
NEUTROPHILS # BLD: 11.57 K/UL (ref 1.7–7)
NEUTROPHILS # BLD: 12.03 K/UL (ref 1.7–7)
NEUTROPHILS # BLD: 12.62 K/UL (ref 1.7–7)
NEUTROPHILS # BLD: 16.38 K/UL (ref 1.7–7)
NEUTROPHILS # BLD: 16.74 K/UL (ref 1.7–7)
NEUTROPHILS # BLD: 16.76 K/UL (ref 1.7–7)
NEUTROPHILS # BLD: 19.59 K/UL (ref 1.7–7)
NEUTS BAND # BLD: 29.54 K/UL (ref 1.7–7)
NEUTS SEG NFR BLD: 93.8 %
NEUTS SEG NFR BLD: 93.9 %
NEUTS SEG NFR BLD: 93.9 %
NEUTS SEG NFR BLD: 94 %
NEUTS SEG NFR BLD: 94 %
NEUTS SEG NFR BLD: 94.6 %
NEUTS SEG NFR BLD: 94.6 %
NEUTS SEG NFR BLD: 96 %
NEUTS VAC BLD QL SMEAR: ABNORMAL
NITRITE UR QL STRIP.AUTO: ABNORMAL
NRBC BLD MANUAL-RTO: 1 /100 WBC
NRBC BLD-RTO: 0 %
NRBC BLD-RTO: 0.1 %
NRBC BLD-RTO: 0.1 %
NRBC BLD-RTO: 0.2 %
OVALOCYTES BLD QL SMEAR: ABNORMAL
OVALOCYTES BLD QL SMEAR: ABNORMAL
PCO2 BLDA: 41 MM HG (ref 35–48)
PCO2 BLDA: 44 MM HG (ref 35–48)
PDW BLD AUTO: 10 FL (ref 9.4–12.3)
PDW BLD AUTO: 10.2 FL (ref 9.4–12.3)
PDW BLD AUTO: 10.5 FL (ref 9.4–12.3)
PDW BLD AUTO: 10.6 FL (ref 9.4–12.3)
PDW BLD AUTO: 9.5 FL (ref 9.4–12.3)
PDW BLD AUTO: 9.5 FL (ref 9.4–12.3)
PDW BLD AUTO: 9.6 FL (ref 9.4–12.3)
PDW BLD AUTO: 9.7 FL (ref 9.4–12.3)
PH BLDA: 7.31 [PH] (ref 7.35–7.45)
PH BLDA: 7.34 [PH] (ref 7.35–7.45)
PH UR STRIP.AUTO: ABNORMAL [PH]
PLAT MORPH BLD: NORMAL
PLATELET # BLD AUTO: 172 K/UL (ref 150–369)
PLATELET # BLD AUTO: 172 K/UL (ref 150–369)
PLATELET # BLD AUTO: 176 K/UL (ref 150–369)
PLATELET # BLD AUTO: 191 K/UL (ref 150–369)
PLATELET # BLD AUTO: 200 K/UL (ref 150–369)
PLATELET # BLD AUTO: 234 K/UL (ref 150–369)
PLATELET # BLD AUTO: 257 K/UL (ref 150–369)
PLATELET # BLD AUTO: 317 K/UL (ref 150–369)
PLATELET # BLD EST: ABNORMAL 10*3/UL
PLATELET # BLD EST: ABNORMAL 10*3/UL
PLATELET CLUMP BLD QL SMEAR: PRESENT
PO2 BLDA: 319 MM HG (ref 83–100)
PO2 BLDA: 46 MM HG (ref 83–100)
POCT TEST: ABNORMAL
POIKILOCYTOSIS BLD QL SMEAR: ABNORMAL
POIKILOCYTOSIS BLD QL SMEAR: ABNORMAL
POLYCHROMASIA BLD QL SMEAR: ABNORMAL
POTASSIUM SERPL-SCNC: 3.4 MEQ/L (ref 3.6–5.1)
POTASSIUM SERPL-SCNC: 3.4 MEQ/L (ref 3.6–5.1)
POTASSIUM SERPL-SCNC: 3.5 MEQ/L (ref 3.6–5.1)
POTASSIUM SERPL-SCNC: 3.8 MEQ/L (ref 3.6–5.1)
POTASSIUM SERPL-SCNC: 3.8 MEQ/L (ref 3.6–5.1)
POTASSIUM SERPL-SCNC: 4.1 MEQ/L (ref 3.6–5.1)
PROT SERPL-MCNC: 4.5 G/DL (ref 6–8.2)
PROT SERPL-MCNC: 4.5 G/DL (ref 6–8.2)
PROT SERPL-MCNC: 4.7 G/DL (ref 6–8.2)
PROT SERPL-MCNC: 4.8 G/DL (ref 6–8.2)
PROT SERPL-MCNC: 4.8 G/DL (ref 6–8.2)
PROT SERPL-MCNC: 5.1 G/DL (ref 6–8.2)
PROT UR QL STRIP.AUTO: ABNORMAL
PROTHROMBIN TIME: 16 SEC (ref 12.2–14.5)
QRS DURATION: 78
QT INTERVAL: 358
QTC CALCULATION(BAZETT): 528
R AXIS: 57
RBC # BLD AUTO: 2.76 M/UL (ref 3.93–5.22)
RBC # BLD AUTO: 2.84 M/UL (ref 3.93–5.22)
RBC # BLD AUTO: 2.91 M/UL (ref 3.93–5.22)
RBC # BLD AUTO: 2.96 M/UL (ref 3.93–5.22)
RBC # BLD AUTO: 3.02 M/UL (ref 3.93–5.22)
RBC # BLD AUTO: 3.05 M/UL (ref 3.93–5.22)
RBC # BLD AUTO: 3.05 M/UL (ref 3.93–5.22)
RBC # BLD AUTO: 3.07 M/UL (ref 3.93–5.22)
RBC #/AREA URNS HPF: ABNORMAL /HPF
SARS-COV-2 RNA RESP QL NAA+PROBE: NEGATIVE
SCHISTOCYTES BLD QL SMEAR: ABNORMAL
SCHISTOCYTES BLD QL SMEAR: ABNORMAL
SODIUM SERPL-SCNC: 127 MEQ/L (ref 136–144)
SODIUM SERPL-SCNC: 128 MEQ/L (ref 136–144)
SODIUM SERPL-SCNC: 129 MEQ/L (ref 136–144)
SODIUM SERPL-SCNC: 133 MEQ/L (ref 136–144)
SODIUM SERPL-SCNC: 134 MEQ/L (ref 136–144)
SODIUM SERPL-SCNC: 139 MEQ/L (ref 136–144)
SODIUM SERPL-SCNC: 140 MEQ/L (ref 136–144)
SODIUM SERPL-SCNC: 143 MEQ/L (ref 136–144)
SP GR UR REFRACT.AUTO: 1.01
SQUAMOUS URNS QL MICRO: 1 /HPF
T WAVE AXIS: 258
TARGETS BLD QL SMEAR: ABNORMAL
TARGETS BLD QL SMEAR: ABNORMAL
TIBC SERPL-MCNC: 336 UG/DL (ref 270–460)
TOXIC GRANULES BLD QL SMEAR: ABNORMAL
TROPONIN I SERPL-MCNC: 0.29 NG/ML
UIBC SERPL-MCNC: 319 UG/DL (ref 180–360)
UROBILINOGEN UR STRIP-ACNC: ABNORMAL EU/DL
VENTRICULAR RATE: 131
VIT B12 SERPL-MCNC: >1500 PG/ML (ref 180–914)
WBC # BLD AUTO: 12.33 K/UL (ref 3.8–10.5)
WBC # BLD AUTO: 12.53 K/UL (ref 3.8–10.5)
WBC # BLD AUTO: 13.44 K/UL (ref 3.8–10.5)
WBC # BLD AUTO: 17.4 K/UL (ref 3.8–10.5)
WBC # BLD AUTO: 17.71 K/UL (ref 3.8–10.5)
WBC # BLD AUTO: 17.81 K/UL (ref 3.8–10.5)
WBC # BLD AUTO: 20.74 K/UL (ref 3.8–10.5)
WBC # BLD AUTO: 31.43 K/UL (ref 3.8–10.5)
WBC #/AREA URNS HPF: ABNORMAL /HPF

## 2021-01-01 PROCEDURE — 87040 BLOOD CULTURE FOR BACTERIA: CPT | Performed by: PHYSICIAN ASSISTANT

## 2021-01-01 PROCEDURE — 63600000 HC DRUGS/DETAIL CODE: Performed by: INTERNAL MEDICINE

## 2021-01-01 PROCEDURE — 25800000 HC PHARMACY IV SOLUTIONS: Performed by: HOSPITALIST

## 2021-01-01 PROCEDURE — 63700000 HC SELF-ADMINISTRABLE DRUG: Performed by: HOSPITALIST

## 2021-01-01 PROCEDURE — 25000000 HC PHARMACY GENERAL: Performed by: NURSE PRACTITIONER

## 2021-01-01 PROCEDURE — 92526 ORAL FUNCTION THERAPY: CPT | Mod: GN

## 2021-01-01 PROCEDURE — 63600000 HC DRUGS/DETAIL CODE: Performed by: HOSPITALIST

## 2021-01-01 PROCEDURE — 20600000 HC ROOM AND CARE INTERMEDIATE/TELEMETRY

## 2021-01-01 PROCEDURE — 85014 HEMATOCRIT: CPT | Performed by: NURSE PRACTITIONER

## 2021-01-01 PROCEDURE — 25000000 HC PHARMACY GENERAL: Performed by: INTERNAL MEDICINE

## 2021-01-01 PROCEDURE — 63700000 HC SELF-ADMINISTRABLE DRUG: Performed by: NURSE PRACTITIONER

## 2021-01-01 PROCEDURE — 82247 BILIRUBIN TOTAL: CPT | Performed by: NURSE PRACTITIONER

## 2021-01-01 PROCEDURE — 85018 HEMOGLOBIN: CPT | Performed by: NURSE PRACTITIONER

## 2021-01-01 PROCEDURE — 25800000 HC PHARMACY IV SOLUTIONS: Performed by: INTERNAL MEDICINE

## 2021-01-01 PROCEDURE — 99233 SBSQ HOSP IP/OBS HIGH 50: CPT | Performed by: NURSE PRACTITIONER

## 2021-01-01 PROCEDURE — 99238 HOSP IP/OBS DSCHRG MGMT 30/<: CPT | Mod: GV | Performed by: HOSPITALIST

## 2021-01-01 PROCEDURE — 85730 THROMBOPLASTIN TIME PARTIAL: CPT | Performed by: NURSE PRACTITIONER

## 2021-01-01 PROCEDURE — 94660 CPAP INITIATION&MGMT: CPT

## 2021-01-01 PROCEDURE — 83605 ASSAY OF LACTIC ACID: CPT | Performed by: PHYSICIAN ASSISTANT

## 2021-01-01 PROCEDURE — 97166 OT EVAL MOD COMPLEX 45 MIN: CPT | Mod: GO

## 2021-01-01 PROCEDURE — 97530 THERAPEUTIC ACTIVITIES: CPT | Mod: GP,CQ

## 2021-01-01 PROCEDURE — 20000000 HC ROOM AND CARE ICU

## 2021-01-01 PROCEDURE — 63700000 HC SELF-ADMINISTRABLE DRUG: Performed by: INTERNAL MEDICINE

## 2021-01-01 PROCEDURE — 94640 AIRWAY INHALATION TREATMENT: CPT

## 2021-01-01 PROCEDURE — 85730 THROMBOPLASTIN TIME PARTIAL: CPT | Performed by: INTERNAL MEDICINE

## 2021-01-01 PROCEDURE — 80053 COMPREHEN METABOLIC PANEL: CPT | Performed by: NURSE PRACTITIONER

## 2021-01-01 PROCEDURE — C1751 CATH, INF, PER/CENT/MIDLINE: HCPCS

## 2021-01-01 PROCEDURE — 36415 COLL VENOUS BLD VENIPUNCTURE: CPT | Performed by: NURSE PRACTITIONER

## 2021-01-01 PROCEDURE — 83735 ASSAY OF MAGNESIUM: CPT | Performed by: HOSPITALIST

## 2021-01-01 PROCEDURE — 63600000 HC DRUGS/DETAIL CODE: Performed by: NURSE PRACTITIONER

## 2021-01-01 PROCEDURE — 83735 ASSAY OF MAGNESIUM: CPT | Performed by: NURSE PRACTITIONER

## 2021-01-01 PROCEDURE — 85025 COMPLETE CBC W/AUTO DIFF WBC: CPT | Performed by: PHYSICIAN ASSISTANT

## 2021-01-01 PROCEDURE — 200200 PR NO CHARGE: Performed by: NURSE PRACTITIONER

## 2021-01-01 PROCEDURE — 99233 SBSQ HOSP IP/OBS HIGH 50: CPT | Performed by: HOSPITALIST

## 2021-01-01 PROCEDURE — 85025 COMPLETE CBC W/AUTO DIFF WBC: CPT | Performed by: NURSE PRACTITIONER

## 2021-01-01 PROCEDURE — 12000000 HC ROOM AND CARE MED/SURG

## 2021-01-01 PROCEDURE — 82746 ASSAY OF FOLIC ACID SERUM: CPT | Performed by: HOSPITALIST

## 2021-01-01 PROCEDURE — 83735 ASSAY OF MAGNESIUM: CPT | Performed by: PHYSICIAN ASSISTANT

## 2021-01-01 PROCEDURE — 25800000 HC PHARMACY IV SOLUTIONS: Performed by: NURSE PRACTITIONER

## 2021-01-01 PROCEDURE — 63600000 HC DRUGS/DETAIL CODE: Performed by: SPECIALIST

## 2021-01-01 PROCEDURE — 200200 PR NO CHARGE: Performed by: HOSPITALIST

## 2021-01-01 PROCEDURE — 82803 BLOOD GASES ANY COMBINATION: CPT | Performed by: HOSPITALIST

## 2021-01-01 PROCEDURE — 71045 X-RAY EXAM CHEST 1 VIEW: CPT

## 2021-01-01 PROCEDURE — 82728 ASSAY OF FERRITIN: CPT | Performed by: HOSPITALIST

## 2021-01-01 PROCEDURE — 83880 ASSAY OF NATRIURETIC PEPTIDE: CPT | Performed by: PHYSICIAN ASSISTANT

## 2021-01-01 PROCEDURE — U0003 INFECTIOUS AGENT DETECTION BY NUCLEIC ACID (DNA OR RNA); SEVERE ACUTE RESPIRATORY SYNDROME CORONAVIRUS 2 (SARS-COV-2) (CORONAVIRUS DISEASE [COVID-19]), AMPLIFIED PROBE TECHNIQUE, MAKING USE OF HIGH THROUGHPUT TECHNOLOGIES AS DESCRIBED BY CMS-2020-01-R: HCPCS | Performed by: HOSPITALIST

## 2021-01-01 PROCEDURE — 36415 COLL VENOUS BLD VENIPUNCTURE: CPT | Performed by: PHYSICIAN ASSISTANT

## 2021-01-01 PROCEDURE — 93005 ELECTROCARDIOGRAM TRACING: CPT | Performed by: HOSPITALIST

## 2021-01-01 PROCEDURE — 82803 BLOOD GASES ANY COMBINATION: CPT | Performed by: PHYSICIAN ASSISTANT

## 2021-01-01 PROCEDURE — 83540 ASSAY OF IRON: CPT | Performed by: HOSPITALIST

## 2021-01-01 PROCEDURE — 97535 SELF CARE MNGMENT TRAINING: CPT | Mod: GO

## 2021-01-01 PROCEDURE — 92610 EVALUATE SWALLOWING FUNCTION: CPT | Mod: GN

## 2021-01-01 PROCEDURE — 85025 COMPLETE CBC W/AUTO DIFF WBC: CPT | Performed by: INTERNAL MEDICINE

## 2021-01-01 PROCEDURE — 99223 1ST HOSP IP/OBS HIGH 75: CPT | Performed by: NURSE PRACTITIONER

## 2021-01-01 PROCEDURE — 82607 VITAMIN B-12: CPT | Performed by: HOSPITALIST

## 2021-01-01 PROCEDURE — 80053 COMPREHEN METABOLIC PANEL: CPT | Performed by: PHYSICIAN ASSISTANT

## 2021-01-01 PROCEDURE — 81003 URINALYSIS AUTO W/O SCOPE: CPT | Performed by: HOSPITALIST

## 2021-01-01 PROCEDURE — 97110 THERAPEUTIC EXERCISES: CPT | Mod: GP

## 2021-01-01 PROCEDURE — 97530 THERAPEUTIC ACTIVITIES: CPT | Mod: GP

## 2021-01-01 PROCEDURE — 99358 PROLONG SERVICE W/O CONTACT: CPT | Performed by: NURSE PRACTITIONER

## 2021-01-01 PROCEDURE — 99291 CRITICAL CARE FIRST HOUR: CPT | Performed by: HOSPITALIST

## 2021-01-01 PROCEDURE — 36573 INSJ PICC RS&I 5 YR+: CPT

## 2021-01-01 PROCEDURE — 36600 WITHDRAWAL OF ARTERIAL BLOOD: CPT

## 2021-01-01 PROCEDURE — 93005 ELECTROCARDIOGRAM TRACING: CPT | Performed by: PHYSICIAN ASSISTANT

## 2021-01-01 PROCEDURE — 97162 PT EVAL MOD COMPLEX 30 MIN: CPT | Mod: GP

## 2021-01-01 PROCEDURE — 25800000 HC PHARMACY IV SOLUTIONS: Performed by: PHYSICIAN ASSISTANT

## 2021-01-01 PROCEDURE — 25000000 HC PHARMACY GENERAL: Performed by: PHYSICIAN ASSISTANT

## 2021-01-01 PROCEDURE — 84484 ASSAY OF TROPONIN QUANT: CPT | Performed by: PHYSICIAN ASSISTANT

## 2021-01-01 PROCEDURE — 85610 PROTHROMBIN TIME: CPT | Performed by: PHYSICIAN ASSISTANT

## 2021-01-01 RX ORDER — LORAZEPAM 2 MG/ML
1 INJECTION INTRAMUSCULAR EVERY 4 HOURS PRN
Status: CANCELLED | OUTPATIENT
Start: 2021-01-01

## 2021-01-01 RX ORDER — HEPARIN SODIUM 5000 [USP'U]/ML
5000 INJECTION, SOLUTION INTRAVENOUS; SUBCUTANEOUS EVERY 8 HOURS
Status: DISCONTINUED | OUTPATIENT
Start: 2021-01-01 | End: 2021-01-01 | Stop reason: HOSPADM

## 2021-01-01 RX ORDER — HEPARIN SODIUM 10000 [USP'U]/100ML
0-4000 INJECTION, SOLUTION INTRAVENOUS
Status: DISCONTINUED | OUTPATIENT
Start: 2021-01-01 | End: 2021-01-01

## 2021-01-01 RX ORDER — LORAZEPAM 2 MG/ML
1 INJECTION INTRAMUSCULAR EVERY 4 HOURS PRN
Status: DISCONTINUED | OUTPATIENT
Start: 2021-01-01 | End: 2021-01-01 | Stop reason: HOSPADM

## 2021-01-01 RX ORDER — HYDROMORPHONE HCL IN 0.9% NACL 0.2 MG/ML
PLASTIC BAG, INJECTION (ML) INTRAVENOUS CONTINUOUS
Status: DISCONTINUED | OUTPATIENT
Start: 2021-01-01 | End: 2021-01-01 | Stop reason: HOSPADM

## 2021-01-01 RX ORDER — POTASSIUM CHLORIDE 750 MG/1
20 TABLET, FILM COATED, EXTENDED RELEASE ORAL AS NEEDED
Status: DISCONTINUED | OUTPATIENT
Start: 2021-01-01 | End: 2021-01-01 | Stop reason: HOSPADM

## 2021-01-01 RX ORDER — DILTIAZEM HCL IN NACL,ISO-OSM 125 MG/125
5-15 PLASTIC BAG, INJECTION (ML) INTRAVENOUS
Status: DISCONTINUED | OUTPATIENT
Start: 2021-01-01 | End: 2021-01-01 | Stop reason: HOSPADM

## 2021-01-01 RX ORDER — BISACODYL 10 MG/1
10 SUPPOSITORY RECTAL DAILY PRN
Status: CANCELLED | OUTPATIENT
Start: 2021-01-01

## 2021-01-01 RX ORDER — GUAIFENESIN 600 MG/1
600 TABLET, EXTENDED RELEASE ORAL 2 TIMES DAILY
Status: DISCONTINUED | OUTPATIENT
Start: 2021-01-01 | End: 2021-01-01

## 2021-01-01 RX ORDER — BISACODYL 10 MG/1
10 SUPPOSITORY RECTAL DAILY PRN
Status: DISCONTINUED | OUTPATIENT
Start: 2021-01-01 | End: 2021-01-01 | Stop reason: HOSPADM

## 2021-01-01 RX ORDER — INSULIN ASPART 100 [IU]/ML
3-11 INJECTION, SOLUTION INTRAVENOUS; SUBCUTANEOUS
Status: DISCONTINUED | OUTPATIENT
Start: 2021-01-01 | End: 2021-01-01 | Stop reason: HOSPADM

## 2021-01-01 RX ORDER — FUROSEMIDE 10 MG/ML
40 INJECTION INTRAMUSCULAR; INTRAVENOUS ONCE
Status: COMPLETED | OUTPATIENT
Start: 2021-01-01 | End: 2021-01-01

## 2021-01-01 RX ORDER — FUROSEMIDE 10 MG/ML
80 INJECTION INTRAMUSCULAR; INTRAVENOUS ONCE
Status: COMPLETED | OUTPATIENT
Start: 2021-01-01 | End: 2021-01-01

## 2021-01-01 RX ORDER — GLYCOPYRROLATE 0.6MG/3ML
0.2 SYRINGE (ML) INTRAVENOUS EVERY 4 HOURS PRN
Status: CANCELLED | OUTPATIENT
Start: 2021-01-01

## 2021-01-01 RX ORDER — POTASSIUM CHLORIDE 750 MG/1
40 TABLET, FILM COATED, EXTENDED RELEASE ORAL AS NEEDED
Status: DISCONTINUED | OUTPATIENT
Start: 2021-01-01 | End: 2021-01-01 | Stop reason: HOSPADM

## 2021-01-01 RX ORDER — DILTIAZEM HYDROCHLORIDE 5 MG/ML
5 INJECTION INTRAVENOUS ONCE
Status: COMPLETED | OUTPATIENT
Start: 2021-01-01 | End: 2021-01-01

## 2021-01-01 RX ORDER — ALBUTEROL SULFATE 0.83 MG/ML
2.5 SOLUTION RESPIRATORY (INHALATION) ONCE
Status: ACTIVE | OUTPATIENT
Start: 2021-01-01 | End: 2021-01-01

## 2021-01-01 RX ORDER — LIDOCAINE HYDROCHLORIDE 20 MG/ML
5 SOLUTION OROPHARYNGEAL 3 TIMES DAILY PRN
Status: DISCONTINUED | OUTPATIENT
Start: 2021-01-01 | End: 2021-01-01 | Stop reason: HOSPADM

## 2021-01-01 RX ORDER — GLYCOPYRROLATE 0.6MG/3ML
0.2 SYRINGE (ML) INTRAVENOUS EVERY 4 HOURS PRN
Status: DISCONTINUED | OUTPATIENT
Start: 2021-01-01 | End: 2021-01-01 | Stop reason: HOSPADM

## 2021-01-01 RX ORDER — POTASSIUM CHLORIDE 14.9 MG/ML
20 INJECTION INTRAVENOUS AS NEEDED
Status: DISCONTINUED | OUTPATIENT
Start: 2021-01-01 | End: 2021-01-01 | Stop reason: HOSPADM

## 2021-01-01 RX ORDER — MORPHINE SULFATE 2 MG/ML
2 INJECTION, SOLUTION INTRAMUSCULAR; INTRAVENOUS
Status: DISCONTINUED | OUTPATIENT
Start: 2021-01-01 | End: 2021-01-01 | Stop reason: HOSPADM

## 2021-01-01 RX ORDER — HYDROMORPHONE HCL IN 0.9% NACL 0.2 MG/ML
PLASTIC BAG, INJECTION (ML) INTRAVENOUS CONTINUOUS
Status: CANCELLED | OUTPATIENT
Start: 2021-01-01 | End: 2021-01-22

## 2021-01-01 RX ORDER — ACETAMINOPHEN 650 MG/1
650 SUPPOSITORY RECTAL EVERY 6 HOURS PRN
Status: CANCELLED | OUTPATIENT
Start: 2021-01-01

## 2021-01-01 RX ORDER — NYSTATIN 100000 [USP'U]/ML
500000 SUSPENSION ORAL 4 TIMES DAILY
Status: DISCONTINUED | OUTPATIENT
Start: 2021-01-01 | End: 2021-01-01 | Stop reason: HOSPADM

## 2021-01-01 RX ORDER — ACETAMINOPHEN 325 MG/1
650 TABLET ORAL EVERY 4 HOURS PRN
Status: DISCONTINUED | OUTPATIENT
Start: 2021-01-01 | End: 2021-01-01 | Stop reason: HOSPADM

## 2021-01-01 RX ORDER — LEVOFLOXACIN 5 MG/ML
500 INJECTION, SOLUTION INTRAVENOUS
Status: DISCONTINUED | OUTPATIENT
Start: 2021-01-01 | End: 2021-01-01 | Stop reason: HOSPADM

## 2021-01-01 RX ORDER — GUAIFENESIN 100 MG/5ML
400 SOLUTION ORAL EVERY 4 HOURS PRN
Status: DISCONTINUED | OUTPATIENT
Start: 2021-01-01 | End: 2021-01-01 | Stop reason: HOSPADM

## 2021-01-01 RX ORDER — CEFUROXIME AXETIL 250 MG/1
250 TABLET ORAL DAILY
Status: DISCONTINUED | OUTPATIENT
Start: 2021-01-01 | End: 2021-01-01

## 2021-01-01 RX ORDER — FUROSEMIDE 10 MG/ML
80 INJECTION INTRAMUSCULAR; INTRAVENOUS EVERY 12 HOURS
Status: DISCONTINUED | OUTPATIENT
Start: 2021-01-01 | End: 2021-01-01 | Stop reason: HOSPADM

## 2021-01-01 RX ORDER — METRONIDAZOLE 500 MG/100ML
500 INJECTION, SOLUTION INTRAVENOUS
Status: DISCONTINUED | OUTPATIENT
Start: 2021-01-01 | End: 2021-01-01

## 2021-01-01 RX ORDER — METOPROLOL TARTRATE 1 MG/ML
2.5 INJECTION, SOLUTION INTRAVENOUS EVERY 6 HOURS PRN
Status: DISCONTINUED | OUTPATIENT
Start: 2021-01-01 | End: 2021-01-01 | Stop reason: HOSPADM

## 2021-01-01 RX ORDER — ACETAMINOPHEN 650 MG/1
650 SUPPOSITORY RECTAL EVERY 6 HOURS PRN
Status: DISCONTINUED | OUTPATIENT
Start: 2021-01-01 | End: 2021-01-01 | Stop reason: HOSPADM

## 2021-01-01 RX ORDER — FUROSEMIDE 10 MG/ML
40 INJECTION INTRAMUSCULAR; INTRAVENOUS EVERY 12 HOURS
Status: COMPLETED | OUTPATIENT
Start: 2021-01-01 | End: 2021-01-01

## 2021-01-01 RX ORDER — SODIUM CHLORIDE 0.9 % (FLUSH) 0.9 %
10 SYRINGE (ML) INJECTION AS NEEDED
Status: DISCONTINUED | OUTPATIENT
Start: 2021-01-01 | End: 2021-01-01 | Stop reason: HOSPADM

## 2021-01-01 RX ORDER — HYDROMORPHONE HCL IN 0.9% NACL 0.2 MG/ML
PLASTIC BAG, INJECTION (ML) INTRAVENOUS CONTINUOUS
Status: DISCONTINUED | OUTPATIENT
Start: 2021-01-01 | End: 2021-01-01

## 2021-01-01 RX ORDER — SODIUM CHLORIDE 0.9 % (FLUSH) 0.9 %
10 SYRINGE (ML) INJECTION
Status: DISCONTINUED | OUTPATIENT
Start: 2021-01-01 | End: 2021-01-01 | Stop reason: HOSPADM

## 2021-01-01 RX ORDER — LEVOFLOXACIN 5 MG/ML
500 INJECTION, SOLUTION INTRAVENOUS ONCE
Status: COMPLETED | OUTPATIENT
Start: 2021-01-01 | End: 2021-01-01

## 2021-01-01 RX ADMIN — METHYLPREDNISOLONE SODIUM SUCCINATE 60 MG: 125 INJECTION, POWDER, FOR SOLUTION INTRAMUSCULAR; INTRAVENOUS at 08:46

## 2021-01-01 RX ADMIN — BUDESONIDE 0.5 MG: 0.5 INHALANT ORAL at 07:56

## 2021-01-01 RX ADMIN — METHYLPREDNISOLONE SODIUM SUCCINATE 40 MG: 40 INJECTION, POWDER, FOR SOLUTION INTRAMUSCULAR; INTRAVENOUS at 09:59

## 2021-01-01 RX ADMIN — METOPROLOL TARTRATE 2.5 MG: 1 INJECTION, SOLUTION INTRAVENOUS at 05:47

## 2021-01-01 RX ADMIN — METHYLPREDNISOLONE SODIUM SUCCINATE 60 MG: 125 INJECTION, POWDER, FOR SOLUTION INTRAMUSCULAR; INTRAVENOUS at 05:57

## 2021-01-01 RX ADMIN — METHYLPREDNISOLONE SODIUM SUCCINATE 60 MG: 125 INJECTION, POWDER, FOR SOLUTION INTRAMUSCULAR; INTRAVENOUS at 13:53

## 2021-01-01 RX ADMIN — HEPARIN SODIUM 5000 UNITS: 5000 INJECTION, SOLUTION INTRAVENOUS; SUBCUTANEOUS at 13:56

## 2021-01-01 RX ADMIN — METHYLPREDNISOLONE SODIUM SUCCINATE 60 MG: 125 INJECTION, POWDER, FOR SOLUTION INTRAMUSCULAR; INTRAVENOUS at 06:31

## 2021-01-01 RX ADMIN — HEPARIN SODIUM 5000 UNITS: 5000 INJECTION, SOLUTION INTRAVENOUS; SUBCUTANEOUS at 14:18

## 2021-01-01 RX ADMIN — Medication 10 ML: at 16:57

## 2021-01-01 RX ADMIN — SODIUM CHLORIDE 125 MG: 9 INJECTION, SOLUTION INTRAVENOUS at 10:07

## 2021-01-01 RX ADMIN — HEPARIN SODIUM 5000 UNITS: 5000 INJECTION, SOLUTION INTRAVENOUS; SUBCUTANEOUS at 22:29

## 2021-01-01 RX ADMIN — NYSTATIN: 100000 POWDER TOPICAL at 22:02

## 2021-01-01 RX ADMIN — METHYLPREDNISOLONE SODIUM SUCCINATE 60 MG: 125 INJECTION, POWDER, FOR SOLUTION INTRAMUSCULAR; INTRAVENOUS at 16:14

## 2021-01-01 RX ADMIN — IPRATROPIUM BROMIDE AND ALBUTEROL SULFATE 3 ML: 2.5; .5 SOLUTION RESPIRATORY (INHALATION) at 08:28

## 2021-01-01 RX ADMIN — NYSTATIN: 100000 POWDER TOPICAL at 21:57

## 2021-01-01 RX ADMIN — NYSTATIN 500000 UNITS: 500000 SUSPENSION ORAL at 14:21

## 2021-01-01 RX ADMIN — LEVOTHYROXINE SODIUM 50 MCG: 0.05 TABLET ORAL at 06:31

## 2021-01-01 RX ADMIN — IPRATROPIUM BROMIDE AND ALBUTEROL SULFATE 3 ML: 2.5; .5 SOLUTION RESPIRATORY (INHALATION) at 02:52

## 2021-01-01 RX ADMIN — BUDESONIDE 0.5 MG: 0.5 INHALANT ORAL at 08:40

## 2021-01-01 RX ADMIN — IPRATROPIUM BROMIDE AND ALBUTEROL SULFATE 3 ML: 2.5; .5 SOLUTION RESPIRATORY (INHALATION) at 08:40

## 2021-01-01 RX ADMIN — NYSTATIN 500000 UNITS: 500000 SUSPENSION ORAL at 18:25

## 2021-01-01 RX ADMIN — Medication 1 TABLET: at 09:30

## 2021-01-01 RX ADMIN — METHYLPREDNISOLONE SODIUM SUCCINATE 60 MG: 125 INJECTION, POWDER, FOR SOLUTION INTRAMUSCULAR; INTRAVENOUS at 00:18

## 2021-01-01 RX ADMIN — INSULIN ASPART 3 UNITS: 100 INJECTION, SOLUTION INTRAVENOUS; SUBCUTANEOUS at 16:52

## 2021-01-01 RX ADMIN — MAGNESIUM SULFATE 2 G: 2 INJECTION INTRAVENOUS at 06:32

## 2021-01-01 RX ADMIN — Medication 10 ML: at 02:45

## 2021-01-01 RX ADMIN — HEPARIN SODIUM 5000 UNITS: 5000 INJECTION, SOLUTION INTRAVENOUS; SUBCUTANEOUS at 05:30

## 2021-01-01 RX ADMIN — METHYLPREDNISOLONE SODIUM SUCCINATE 40 MG: 40 INJECTION, POWDER, FOR SOLUTION INTRAMUSCULAR; INTRAVENOUS at 22:29

## 2021-01-01 RX ADMIN — AMIODARONE HYDROCHLORIDE 0.5 MG/MIN: 50 INJECTION, SOLUTION INTRAVENOUS at 16:45

## 2021-01-01 RX ADMIN — BUDESONIDE 0.5 MG: 0.5 INHALANT ORAL at 19:25

## 2021-01-01 RX ADMIN — IPRATROPIUM BROMIDE AND ALBUTEROL SULFATE 3 ML: 2.5; .5 SOLUTION RESPIRATORY (INHALATION) at 09:33

## 2021-01-01 RX ADMIN — INSULIN ASPART 3 UNITS: 100 INJECTION, SOLUTION INTRAVENOUS; SUBCUTANEOUS at 12:12

## 2021-01-01 RX ADMIN — BUDESONIDE 0.5 MG: 0.5 INHALANT ORAL at 08:28

## 2021-01-01 RX ADMIN — HEPARIN SODIUM 5000 UNITS: 5000 INJECTION, SOLUTION INTRAVENOUS; SUBCUTANEOUS at 05:57

## 2021-01-01 RX ADMIN — METHYLPREDNISOLONE SODIUM SUCCINATE 60 MG: 125 INJECTION, POWDER, FOR SOLUTION INTRAMUSCULAR; INTRAVENOUS at 14:37

## 2021-01-01 RX ADMIN — LEVOFLOXACIN 500 MG: 500 INJECTION, SOLUTION INTRAVENOUS at 06:30

## 2021-01-01 RX ADMIN — NYSTATIN 500000 UNITS: 500000 SUSPENSION ORAL at 13:29

## 2021-01-01 RX ADMIN — METOPROLOL TARTRATE 2.5 MG: 1 INJECTION, SOLUTION INTRAVENOUS at 22:19

## 2021-01-01 RX ADMIN — GUAIFENESIN 400 MG: 100 SOLUTION ORAL at 03:36

## 2021-01-01 RX ADMIN — NYSTATIN: 100000 POWDER TOPICAL at 21:42

## 2021-01-01 RX ADMIN — IPRATROPIUM BROMIDE AND ALBUTEROL SULFATE 3 ML: 2.5; .5 SOLUTION RESPIRATORY (INHALATION) at 14:22

## 2021-01-01 RX ADMIN — SODIUM CHLORIDE 125 MG: 9 INJECTION, SOLUTION INTRAVENOUS at 09:59

## 2021-01-01 RX ADMIN — NYSTATIN 500000 UNITS: 500000 SUSPENSION ORAL at 10:35

## 2021-01-01 RX ADMIN — HEPARIN SODIUM 5000 UNITS: 5000 INJECTION, SOLUTION INTRAVENOUS; SUBCUTANEOUS at 21:49

## 2021-01-01 RX ADMIN — IPRATROPIUM BROMIDE AND ALBUTEROL SULFATE 3 ML: 2.5; .5 SOLUTION RESPIRATORY (INHALATION) at 03:40

## 2021-01-01 RX ADMIN — IPRATROPIUM BROMIDE AND ALBUTEROL SULFATE 3 ML: 2.5; .5 SOLUTION RESPIRATORY (INHALATION) at 20:39

## 2021-01-01 RX ADMIN — IPRATROPIUM BROMIDE AND ALBUTEROL SULFATE 3 ML: 2.5; .5 SOLUTION RESPIRATORY (INHALATION) at 07:56

## 2021-01-01 RX ADMIN — CEFTRIAXONE SODIUM 1 G: 1 INJECTION, POWDER, FOR SOLUTION INTRAMUSCULAR; INTRAVENOUS at 09:30

## 2021-01-01 RX ADMIN — BUDESONIDE 0.5 MG: 0.5 INHALANT ORAL at 20:18

## 2021-01-01 RX ADMIN — ATORVASTATIN CALCIUM 10 MG: 10 TABLET, FILM COATED ORAL at 17:36

## 2021-01-01 RX ADMIN — IPRATROPIUM BROMIDE AND ALBUTEROL SULFATE 3 ML: 2.5; .5 SOLUTION RESPIRATORY (INHALATION) at 19:25

## 2021-01-01 RX ADMIN — METHYLPREDNISOLONE SODIUM SUCCINATE 60 MG: 125 INJECTION, POWDER, FOR SOLUTION INTRAMUSCULAR; INTRAVENOUS at 14:18

## 2021-01-01 RX ADMIN — METHYLPREDNISOLONE SODIUM SUCCINATE 40 MG: 40 INJECTION, POWDER, FOR SOLUTION INTRAMUSCULAR; INTRAVENOUS at 05:59

## 2021-01-01 RX ADMIN — NYSTATIN 500000 UNITS: 500000 SUSPENSION ORAL at 13:53

## 2021-01-01 RX ADMIN — ATORVASTATIN CALCIUM 10 MG: 10 TABLET, FILM COATED ORAL at 17:50

## 2021-01-01 RX ADMIN — Medication 15 MCG/MIN: at 02:26

## 2021-01-01 RX ADMIN — NYSTATIN: 100000 POWDER TOPICAL at 09:00

## 2021-01-01 RX ADMIN — HEPARIN SODIUM 5000 UNITS: 5000 INJECTION, SOLUTION INTRAVENOUS; SUBCUTANEOUS at 21:55

## 2021-01-01 RX ADMIN — CEFUROXIME AXETIL 250 MG: 250 TABLET ORAL at 09:59

## 2021-01-01 RX ADMIN — METHYLPREDNISOLONE SODIUM SUCCINATE 60 MG: 125 INJECTION, POWDER, FOR SOLUTION INTRAMUSCULAR; INTRAVENOUS at 00:25

## 2021-01-01 RX ADMIN — HEPARIN SODIUM 5000 UNITS: 5000 INJECTION, SOLUTION INTRAVENOUS; SUBCUTANEOUS at 05:32

## 2021-01-01 RX ADMIN — NYSTATIN 500000 UNITS: 500000 SUSPENSION ORAL at 08:40

## 2021-01-01 RX ADMIN — IPRATROPIUM BROMIDE AND ALBUTEROL SULFATE 3 ML: 2.5; .5 SOLUTION RESPIRATORY (INHALATION) at 20:29

## 2021-01-01 RX ADMIN — ATORVASTATIN CALCIUM 10 MG: 10 TABLET, FILM COATED ORAL at 18:48

## 2021-01-01 RX ADMIN — BUDESONIDE 0.5 MG: 0.5 INHALANT ORAL at 08:34

## 2021-01-01 RX ADMIN — ATORVASTATIN CALCIUM 10 MG: 10 TABLET, FILM COATED ORAL at 16:51

## 2021-01-01 RX ADMIN — HEPARIN SODIUM 5000 UNITS: 5000 INJECTION, SOLUTION INTRAVENOUS; SUBCUTANEOUS at 13:29

## 2021-01-01 RX ADMIN — LEVOTHYROXINE SODIUM 50 MCG: 0.05 TABLET ORAL at 05:57

## 2021-01-01 RX ADMIN — METHYLPREDNISOLONE SODIUM SUCCINATE 60 MG: 125 INJECTION, POWDER, FOR SOLUTION INTRAMUSCULAR; INTRAVENOUS at 21:56

## 2021-01-01 RX ADMIN — METOPROLOL TARTRATE 2.5 MG: 1 INJECTION, SOLUTION INTRAVENOUS at 00:19

## 2021-01-01 RX ADMIN — SODIUM CHLORIDE 125 MG: 9 INJECTION, SOLUTION INTRAVENOUS at 08:43

## 2021-01-01 RX ADMIN — METOPROLOL TARTRATE 2.5 MG: 1 INJECTION, SOLUTION INTRAVENOUS at 16:51

## 2021-01-01 RX ADMIN — METHYLPREDNISOLONE SODIUM SUCCINATE 60 MG: 125 INJECTION, POWDER, FOR SOLUTION INTRAMUSCULAR; INTRAVENOUS at 05:32

## 2021-01-01 RX ADMIN — Medication 10 ML: at 01:00

## 2021-01-01 RX ADMIN — HEPARIN SODIUM 500 UNITS/HR: 10000 INJECTION, SOLUTION INTRAVENOUS at 11:49

## 2021-01-01 RX ADMIN — ATORVASTATIN CALCIUM 10 MG: 10 TABLET, FILM COATED ORAL at 17:13

## 2021-01-01 RX ADMIN — IPRATROPIUM BROMIDE AND ALBUTEROL SULFATE 3 ML: 2.5; .5 SOLUTION RESPIRATORY (INHALATION) at 14:25

## 2021-01-01 RX ADMIN — IPRATROPIUM BROMIDE AND ALBUTEROL SULFATE 3 ML: 2.5; .5 SOLUTION RESPIRATORY (INHALATION) at 07:51

## 2021-01-01 RX ADMIN — SODIUM CHLORIDE 125 MG: 9 INJECTION, SOLUTION INTRAVENOUS at 10:42

## 2021-01-01 RX ADMIN — HEPARIN SODIUM 5000 UNITS: 5000 INJECTION, SOLUTION INTRAVENOUS; SUBCUTANEOUS at 22:22

## 2021-01-01 RX ADMIN — CEFUROXIME AXETIL 250 MG: 250 TABLET ORAL at 08:43

## 2021-01-01 RX ADMIN — FUROSEMIDE 40 MG: 10 INJECTION, SOLUTION INTRAMUSCULAR; INTRAVENOUS at 13:16

## 2021-01-01 RX ADMIN — INSULIN ASPART 3 UNITS: 100 INJECTION, SOLUTION INTRAVENOUS; SUBCUTANEOUS at 11:57

## 2021-01-01 RX ADMIN — HEPARIN SODIUM 5000 UNITS: 5000 INJECTION, SOLUTION INTRAVENOUS; SUBCUTANEOUS at 05:53

## 2021-01-01 RX ADMIN — BUDESONIDE 0.5 MG: 0.5 INHALANT ORAL at 09:34

## 2021-01-01 RX ADMIN — METHYLPREDNISOLONE SODIUM SUCCINATE 60 MG: 125 INJECTION, POWDER, FOR SOLUTION INTRAMUSCULAR; INTRAVENOUS at 21:49

## 2021-01-01 RX ADMIN — FUROSEMIDE 40 MG: 10 INJECTION, SOLUTION INTRAMUSCULAR; INTRAVENOUS at 12:07

## 2021-01-01 RX ADMIN — NYSTATIN: 100000 POWDER TOPICAL at 21:28

## 2021-01-01 RX ADMIN — METHYLPREDNISOLONE SODIUM SUCCINATE 60 MG: 125 INJECTION, POWDER, FOR SOLUTION INTRAMUSCULAR; INTRAVENOUS at 04:19

## 2021-01-01 RX ADMIN — IPRATROPIUM BROMIDE AND ALBUTEROL SULFATE 3 ML: 2.5; .5 SOLUTION RESPIRATORY (INHALATION) at 08:31

## 2021-01-01 RX ADMIN — Medication 10 ML: at 17:13

## 2021-01-01 RX ADMIN — METHYLPREDNISOLONE SODIUM SUCCINATE 60 MG: 125 INJECTION, POWDER, FOR SOLUTION INTRAMUSCULAR; INTRAVENOUS at 14:15

## 2021-01-01 RX ADMIN — IPRATROPIUM BROMIDE AND ALBUTEROL SULFATE 3 ML: 2.5; .5 SOLUTION RESPIRATORY (INHALATION) at 15:03

## 2021-01-01 RX ADMIN — BUDESONIDE 0.5 MG: 0.5 INHALANT ORAL at 21:40

## 2021-01-01 RX ADMIN — Medication 1 TABLET: at 08:43

## 2021-01-01 RX ADMIN — IPRATROPIUM BROMIDE AND ALBUTEROL SULFATE 3 ML: 2.5; .5 SOLUTION RESPIRATORY (INHALATION) at 19:52

## 2021-01-01 RX ADMIN — HEPARIN SODIUM 5000 UNITS: 5000 INJECTION, SOLUTION INTRAVENOUS; SUBCUTANEOUS at 13:53

## 2021-01-01 RX ADMIN — Medication 10 ML: at 00:16

## 2021-01-01 RX ADMIN — Medication 10 ML: at 09:01

## 2021-01-01 RX ADMIN — CEFUROXIME AXETIL 250 MG: 250 TABLET ORAL at 08:40

## 2021-01-01 RX ADMIN — INSULIN ASPART 3 UNITS: 100 INJECTION, SOLUTION INTRAVENOUS; SUBCUTANEOUS at 12:49

## 2021-01-01 RX ADMIN — Medication 10 ML: at 02:00

## 2021-01-01 RX ADMIN — Medication 10 ML: at 09:30

## 2021-01-01 RX ADMIN — DILTIAZEM HYDROCHLORIDE 5 MG: 5 INJECTION INTRAVENOUS at 04:43

## 2021-01-01 RX ADMIN — ACETAMINOPHEN 650 MG: 325 TABLET, FILM COATED ORAL at 05:43

## 2021-01-01 RX ADMIN — IPRATROPIUM BROMIDE AND ALBUTEROL SULFATE 3 ML: 2.5; .5 SOLUTION RESPIRATORY (INHALATION) at 20:18

## 2021-01-01 RX ADMIN — BUDESONIDE 0.5 MG: 0.5 INHALANT ORAL at 20:32

## 2021-01-01 RX ADMIN — LEVOTHYROXINE SODIUM 50 MCG: 0.05 TABLET ORAL at 05:37

## 2021-01-01 RX ADMIN — BUDESONIDE 0.5 MG: 0.5 INHALANT ORAL at 20:39

## 2021-01-01 RX ADMIN — HEPARIN SODIUM 5000 UNITS: 5000 INJECTION, SOLUTION INTRAVENOUS; SUBCUTANEOUS at 21:27

## 2021-01-01 RX ADMIN — IPRATROPIUM BROMIDE AND ALBUTEROL SULFATE 3 ML: 2.5; .5 SOLUTION RESPIRATORY (INHALATION) at 20:32

## 2021-01-01 RX ADMIN — Medication 10 ML: at 00:52

## 2021-01-01 RX ADMIN — HEPARIN SODIUM 5000 UNITS: 5000 INJECTION, SOLUTION INTRAVENOUS; SUBCUTANEOUS at 06:30

## 2021-01-01 RX ADMIN — NYSTATIN: 100000 POWDER TOPICAL at 08:55

## 2021-01-01 RX ADMIN — CEFUROXIME AXETIL 250 MG: 250 TABLET ORAL at 10:39

## 2021-01-01 RX ADMIN — METOPROLOL TARTRATE 2.5 MG: 1 INJECTION, SOLUTION INTRAVENOUS at 23:00

## 2021-01-01 RX ADMIN — SODIUM CHLORIDE 125 MG: 9 INJECTION, SOLUTION INTRAVENOUS at 10:31

## 2021-01-01 RX ADMIN — Medication 1 TABLET: at 08:02

## 2021-01-01 RX ADMIN — Medication 10 ML: at 16:55

## 2021-01-01 RX ADMIN — LEVOTHYROXINE SODIUM 50 MCG: 0.05 TABLET ORAL at 05:35

## 2021-01-01 RX ADMIN — BUDESONIDE 0.5 MG: 0.5 INHALANT ORAL at 08:35

## 2021-01-01 RX ADMIN — BUDESONIDE 0.5 MG: 0.5 INHALANT ORAL at 07:51

## 2021-01-01 RX ADMIN — CEFTRIAXONE SODIUM 1 G: 1 INJECTION, POWDER, FOR SOLUTION INTRAMUSCULAR; INTRAVENOUS at 09:57

## 2021-01-01 RX ADMIN — FUROSEMIDE 80 MG: 10 INJECTION, SOLUTION INTRAMUSCULAR; INTRAVENOUS at 21:24

## 2021-01-01 RX ADMIN — BUDESONIDE 0.5 MG: 0.5 INHALANT ORAL at 20:29

## 2021-01-01 RX ADMIN — MORPHINE SULFATE 2 MG: 2 INJECTION, SOLUTION INTRAMUSCULAR; INTRAVENOUS at 06:06

## 2021-01-01 RX ADMIN — IPRATROPIUM BROMIDE AND ALBUTEROL SULFATE 3 ML: 2.5; .5 SOLUTION RESPIRATORY (INHALATION) at 02:55

## 2021-01-01 RX ADMIN — ATORVASTATIN CALCIUM 10 MG: 10 TABLET, FILM COATED ORAL at 18:25

## 2021-01-01 RX ADMIN — FUROSEMIDE 40 MG: 10 INJECTION, SOLUTION INTRAMUSCULAR; INTRAVENOUS at 20:28

## 2021-01-01 RX ADMIN — Medication: at 17:25

## 2021-01-01 RX ADMIN — IPRATROPIUM BROMIDE AND ALBUTEROL SULFATE 3 ML: 2.5; .5 SOLUTION RESPIRATORY (INHALATION) at 14:42

## 2021-01-01 RX ADMIN — NYSTATIN 500000 UNITS: 500000 SUSPENSION ORAL at 21:27

## 2021-01-01 RX ADMIN — METHYLPREDNISOLONE SODIUM SUCCINATE 60 MG: 125 INJECTION, POWDER, FOR SOLUTION INTRAMUSCULAR; INTRAVENOUS at 09:10

## 2021-01-01 RX ADMIN — NYSTATIN: 100000 POWDER TOPICAL at 10:35

## 2021-01-01 RX ADMIN — FUROSEMIDE 80 MG: 10 INJECTION, SOLUTION INTRAMUSCULAR; INTRAVENOUS at 22:00

## 2021-01-01 RX ADMIN — ATORVASTATIN CALCIUM 10 MG: 10 TABLET, FILM COATED ORAL at 17:06

## 2021-01-01 RX ADMIN — Medication 1 TABLET: at 08:55

## 2021-01-01 RX ADMIN — POTASSIUM CHLORIDE 40 MEQ: 149 INJECTION, SOLUTION, CONCENTRATE INTRAVENOUS at 12:07

## 2021-01-01 RX ADMIN — IPRATROPIUM BROMIDE AND ALBUTEROL SULFATE 3 ML: 2.5; .5 SOLUTION RESPIRATORY (INHALATION) at 14:36

## 2021-01-01 RX ADMIN — NYSTATIN: 100000 POWDER TOPICAL at 09:37

## 2021-01-01 RX ADMIN — NYSTATIN: 100000 POWDER TOPICAL at 08:18

## 2021-01-01 RX ADMIN — NYSTATIN 500000 UNITS: 500000 SUSPENSION ORAL at 16:58

## 2021-01-01 RX ADMIN — LEVOTHYROXINE SODIUM 50 MCG: 0.05 TABLET ORAL at 06:38

## 2021-01-01 RX ADMIN — FUROSEMIDE 80 MG: 10 INJECTION, SOLUTION INTRAMUSCULAR; INTRAVENOUS at 09:30

## 2021-01-01 RX ADMIN — Medication 1 TABLET: at 08:40

## 2021-01-01 RX ADMIN — Medication 10 ML: at 16:27

## 2021-01-01 RX ADMIN — GENTAMICIN SULFATE 140 MG: 40 INJECTION, SOLUTION INTRAMUSCULAR; INTRAVENOUS at 05:25

## 2021-01-01 RX ADMIN — IPRATROPIUM BROMIDE AND ALBUTEROL SULFATE 3 ML: 2.5; .5 SOLUTION RESPIRATORY (INHALATION) at 15:01

## 2021-01-01 RX ADMIN — NYSTATIN 500000 UNITS: 500000 SUSPENSION ORAL at 20:33

## 2021-01-01 RX ADMIN — NYSTATIN 1 APPLICATION.: 100000 POWDER TOPICAL at 19:46

## 2021-01-01 RX ADMIN — ACETAMINOPHEN 650 MG: 325 TABLET, FILM COATED ORAL at 14:13

## 2021-01-01 RX ADMIN — INSULIN ASPART 3 UNITS: 100 INJECTION, SOLUTION INTRAVENOUS; SUBCUTANEOUS at 21:55

## 2021-01-01 RX ADMIN — FUROSEMIDE 80 MG: 10 INJECTION, SOLUTION INTRAMUSCULAR; INTRAVENOUS at 08:40

## 2021-01-01 RX ADMIN — LEVOTHYROXINE SODIUM 50 MCG: 0.05 TABLET ORAL at 06:24

## 2021-01-01 RX ADMIN — BUDESONIDE 0.5 MG: 0.5 INHALANT ORAL at 19:51

## 2021-01-01 RX ADMIN — HEPARIN SODIUM 5000 UNITS: 5000 INJECTION, SOLUTION INTRAVENOUS; SUBCUTANEOUS at 13:16

## 2021-01-01 RX ADMIN — SODIUM CHLORIDE 500 ML: 9 INJECTION, SOLUTION INTRAVENOUS at 04:44

## 2021-01-01 RX ADMIN — METHYLPREDNISOLONE SODIUM SUCCINATE 60 MG: 125 INJECTION, POWDER, FOR SOLUTION INTRAMUSCULAR; INTRAVENOUS at 22:22

## 2021-01-01 RX ADMIN — METHYLPREDNISOLONE SODIUM SUCCINATE 60 MG: 125 INJECTION, POWDER, FOR SOLUTION INTRAMUSCULAR; INTRAVENOUS at 16:58

## 2021-01-01 RX ADMIN — HEPARIN SODIUM 5000 UNITS: 5000 INJECTION, SOLUTION INTRAVENOUS; SUBCUTANEOUS at 06:22

## 2021-01-01 RX ADMIN — NYSTATIN 500000 UNITS: 500000 SUSPENSION ORAL at 21:49

## 2021-01-01 RX ADMIN — AMIODARONE HYDROCHLORIDE 0.5 MG/MIN: 50 INJECTION, SOLUTION INTRAVENOUS at 15:11

## 2021-01-01 RX ADMIN — Medication 10 ML: at 17:35

## 2021-01-01 RX ADMIN — CEFTRIAXONE SODIUM 1 G: 1 INJECTION, POWDER, FOR SOLUTION INTRAMUSCULAR; INTRAVENOUS at 09:58

## 2021-01-01 RX ADMIN — IPRATROPIUM BROMIDE AND ALBUTEROL SULFATE 3 ML: 2.5; .5 SOLUTION RESPIRATORY (INHALATION) at 08:36

## 2021-01-01 RX ADMIN — METHYLPREDNISOLONE SODIUM SUCCINATE 60 MG: 125 INJECTION, POWDER, FOR SOLUTION INTRAMUSCULAR; INTRAVENOUS at 05:29

## 2021-01-01 RX ADMIN — Medication 10 ML: at 08:51

## 2021-01-01 RX ADMIN — HEPARIN SODIUM 5000 UNITS: 5000 INJECTION, SOLUTION INTRAVENOUS; SUBCUTANEOUS at 14:20

## 2021-01-01 RX ADMIN — METHYLPREDNISOLONE SODIUM SUCCINATE 60 MG: 125 INJECTION, POWDER, FOR SOLUTION INTRAMUSCULAR; INTRAVENOUS at 17:13

## 2021-01-01 RX ADMIN — FUROSEMIDE 80 MG: 10 INJECTION, SOLUTION INTRAMUSCULAR; INTRAVENOUS at 15:09

## 2021-01-01 RX ADMIN — METHYLPREDNISOLONE SODIUM SUCCINATE 40 MG: 40 INJECTION, POWDER, FOR SOLUTION INTRAMUSCULAR; INTRAVENOUS at 02:25

## 2021-01-01 RX ADMIN — NYSTATIN: 100000 POWDER TOPICAL at 08:50

## 2021-01-01 RX ADMIN — IPRATROPIUM BROMIDE AND ALBUTEROL SULFATE 3 ML: 2.5; .5 SOLUTION RESPIRATORY (INHALATION) at 21:40

## 2021-01-01 RX ADMIN — IPRATROPIUM BROMIDE AND ALBUTEROL SULFATE 3 ML: 2.5; .5 SOLUTION RESPIRATORY (INHALATION) at 08:25

## 2021-01-01 RX ADMIN — METHYLPREDNISOLONE SODIUM SUCCINATE 60 MG: 125 INJECTION, POWDER, FOR SOLUTION INTRAMUSCULAR; INTRAVENOUS at 22:12

## 2021-01-01 RX ADMIN — ACETAMINOPHEN 650 MG: 325 TABLET, FILM COATED ORAL at 03:36

## 2021-01-01 RX ADMIN — Medication 1 TABLET: at 09:59

## 2021-01-01 RX ADMIN — BUDESONIDE 0.5 MG: 0.5 INHALANT ORAL at 08:31

## 2021-01-01 RX ADMIN — METOPROLOL TARTRATE 2.5 MG: 1 INJECTION, SOLUTION INTRAVENOUS at 12:11

## 2021-01-01 RX ADMIN — METOPROLOL TARTRATE 2.5 MG: 1 INJECTION, SOLUTION INTRAVENOUS at 05:28

## 2021-01-01 RX ADMIN — Medication 10 ML: at 10:00

## 2021-01-01 RX ADMIN — FUROSEMIDE 80 MG: 10 INJECTION, SOLUTION INTRAMUSCULAR; INTRAVENOUS at 10:05

## 2021-01-01 RX ADMIN — NYSTATIN 500000 UNITS: 500000 SUSPENSION ORAL at 09:59

## 2021-01-01 RX ADMIN — HEPARIN SODIUM 5000 UNITS: 5000 INJECTION, SOLUTION INTRAVENOUS; SUBCUTANEOUS at 21:29

## 2021-01-01 RX ADMIN — NYSTATIN: 100000 POWDER TOPICAL at 08:44

## 2021-01-01 RX ADMIN — Medication 10 ML: at 09:34

## 2021-01-01 RX ADMIN — METOPROLOL TARTRATE 2.5 MG: 1 INJECTION, SOLUTION INTRAVENOUS at 15:08

## 2021-01-01 RX ADMIN — Medication 5 MG/HR: at 04:44

## 2021-01-01 RX ADMIN — FUROSEMIDE 40 MG: 10 INJECTION, SOLUTION INTRAMUSCULAR; INTRAVENOUS at 11:32

## 2021-01-01 RX ADMIN — Medication 5 MG/HR: at 10:36

## 2021-01-01 RX ADMIN — NYSTATIN: 100000 POWDER TOPICAL at 20:28

## 2021-01-01 RX ADMIN — LEVOTHYROXINE SODIUM 50 MCG: 0.05 TABLET ORAL at 05:43

## 2021-01-01 ASSESSMENT — COGNITIVE AND FUNCTIONAL STATUS - GENERAL
REMEMBERING WHERE THINGS ARE: 3 - A LITTLE
EATING MEALS: 3 - A LITTLE
FOLLOWS FAMILIAR CONVERSATION: 4 - NONE
REMEMBERING 5 ERRANDS WITH NO LIST: 1 - UNABLE
TOILETING: 1 - TOTAL
REMEMBERING WHERE THINGS ARE: 2 - A LOT
HELP NEEDED FOR BATHING: 1 - TOTAL
FOLLOWS FAMILIAR CONVERSATION: 4 - NONE
FOLLOWS FAMILIAR CONVERSATION: 4 - NONE
TOILETING: 1 - TOTAL
REMEMBERING 5 ERRANDS WITH NO LIST: 1 - UNABLE
REMEMBERING 5 ERRANDS WITH NO LIST: 1 - UNABLE
TAKING CARE OF COMPLICATED TASKS: 1 - UNABLE
HELP NEEDED FOR PERSONAL GROOMING: 2 - A LOT
HELP NEEDED FOR PERSONAL GROOMING: 2 - A LOT
AFFECT: CONFUSED
CLIMB 3 TO 5 STEPS WITH RAILING: 1 - TOTAL
REMEMBERING TO TAKE MEDICATION: 1 - UNABLE
CLIMB 3 TO 5 STEPS WITH RAILING: 1 - TOTAL
HELP NEEDED FOR PERSONAL GROOMING: 2 - A LOT
DRESSING REGULAR LOWER BODY CLOTHING: 1 - TOTAL
HELP NEEDED FOR PERSONAL GROOMING: 2 - A LOT
FOLLOWS FAMILIAR CONVERSATION: 4 - NONE
MOVING TO AND FROM BED TO CHAIR: 1 - TOTAL
HELP NEEDED FOR BATHING: 1 - TOTAL
WALKING IN HOSPITAL ROOM: 1 - TOTAL
UNDERSTANDING 10 TO 15 MIN SPEECH: 3 - A LITTLE
TOILETING: 1 - TOTAL
STANDING UP FROM CHAIR USING ARMS: 1 - TOTAL
DRESSING REGULAR UPPER BODY CLOTHING: 2 - A LOT
CLIMB 3 TO 5 STEPS WITH RAILING: 1 - TOTAL
EATING MEALS: 3 - A LITTLE
AFFECT: CONFUSED
REMEMBERING TO TAKE MEDICATION: 2 - A LOT
HELP NEEDED FOR BATHING: 1 - TOTAL
DRESSING REGULAR LOWER BODY CLOTHING: 1 - TOTAL
AFFECT: CONFUSED
UNDERSTANDING 10 TO 15 MIN SPEECH: 3 - A LITTLE
TAKING CARE OF COMPLICATED TASKS: 1 - UNABLE
AFFECT: CONFUSED
STANDING UP FROM CHAIR USING ARMS: 1 - TOTAL
UNDERSTANDING 10 TO 15 MIN SPEECH: 2 - A LOT
AFFECT: FLAT/BLUNTED AFFECT
DRESSING REGULAR LOWER BODY CLOTHING: 1 - TOTAL
AFFECT: CONFUSED
EATING MEALS: 3 - A LITTLE
REMEMBERING TO TAKE MEDICATION: 2 - A LOT
WALKING IN HOSPITAL ROOM: 1 - TOTAL
REMEMBERING 5 ERRANDS WITH NO LIST: 1 - UNABLE
REMEMBERING 5 ERRANDS WITH NO LIST: 1 - UNABLE
UNDERSTANDING 10 TO 15 MIN SPEECH: 3 - A LITTLE
AFFECT: FLAT/BLUNTED AFFECT
EATING MEALS: 3 - A LITTLE
TAKING CARE OF COMPLICATED TASKS: 1 - UNABLE
DRESSING REGULAR UPPER BODY CLOTHING: 2 - A LOT
DRESSING REGULAR UPPER BODY CLOTHING: 2 - A LOT
DRESSING REGULAR LOWER BODY CLOTHING: 1 - TOTAL
REMEMBERING TO TAKE MEDICATION: 1 - UNABLE
TAKING CARE OF COMPLICATED TASKS: 1 - UNABLE
AFFECT: CONFUSED
UNDERSTANDING 10 TO 15 MIN SPEECH: 2 - A LOT
HELP NEEDED FOR BATHING: 1 - TOTAL
MOVING TO AND FROM BED TO CHAIR: 1 - TOTAL
REMEMBERING TO TAKE MEDICATION: 2 - A LOT
STANDING UP FROM CHAIR USING ARMS: 1 - TOTAL
AFFECT: CONFUSED
STANDING UP FROM CHAIR USING ARMS: 1 - TOTAL
UNDERSTANDING 10 TO 15 MIN SPEECH: 3 - A LITTLE
TOILETING: 1 - TOTAL
REMEMBERING TO TAKE MEDICATION: 2 - A LOT
TAKING CARE OF COMPLICATED TASKS: 1 - UNABLE
WALKING IN HOSPITAL ROOM: 1 - TOTAL
MOVING TO AND FROM BED TO CHAIR: 1 - TOTAL
TAKING CARE OF COMPLICATED TASKS: 1 - UNABLE
WALKING IN HOSPITAL ROOM: 1 - TOTAL
FOLLOWS FAMILIAR CONVERSATION: 3 - A LITTLE
CLIMB 3 TO 5 STEPS WITH RAILING: 1 - TOTAL
FOLLOWS FAMILIAR CONVERSATION: 3 - A LITTLE
MOVING TO AND FROM BED TO CHAIR: 1 - TOTAL
REMEMBERING WHERE THINGS ARE: 2 - A LOT
AFFECT: CONFUSED
DRESSING REGULAR UPPER BODY CLOTHING: 2 - A LOT
REMEMBERING WHERE THINGS ARE: 2 - A LOT
REMEMBERING 5 ERRANDS WITH NO LIST: 1 - UNABLE
REMEMBERING WHERE THINGS ARE: 3 - A LITTLE
REMEMBERING WHERE THINGS ARE: 2 - A LOT

## 2021-01-01 ASSESSMENT — ENCOUNTER SYMPTOMS
SHORTNESS OF BREATH: 1
CHILLS: 1
EYES NEGATIVE: 1
ACTIVITY CHANGE: 1
WHEEZING: 1
HEMATOLOGIC/LYMPHATIC NEGATIVE: 1
ACTIVITY CHANGE: 1
FEVER: 0
DIFFICULTY URINATING: 1
WEAKNESS: 1
APPETITE CHANGE: 1
MUSCULOSKELETAL NEGATIVE: 1
HEMATURIA: 1
GASTROINTESTINAL NEGATIVE: 1
DIFFICULTY URINATING: 1
SHORTNESS OF BREATH: 1
CONFUSION: 1
APPETITE CHANGE: 1
PALPITATIONS: 0
BRUISES/BLEEDS EASILY: 1
WEAKNESS: 1
SHORTNESS OF BREATH: 1
ENDOCRINE NEGATIVE: 1
HEMATURIA: 1

## 2021-01-01 NOTE — PROGRESS NOTES
SUBJECTIVE      Awake and alert. No complaints. Still requiring pressor support    OBJECTIVE     VITAL SIGNS:  Temp:  [36.5 °C (97.7 °F)] 36.5 °C (97.7 °F)  Heart Rate:  [] 107  Resp:  [14-28] 19  BP: ()/(50-70) 108/56  SPO2 100%    Intake/Output Summary (Last 24 hours) at 1/1/2021 0730  Last data filed at 1/1/2021 0700  Gross per 24 hour   Intake 2148 ml   Output 1400 ml   Net 748 ml       PHYSICAL EXAM:  General appearance: alert and cooperative  Head: without obvious abnormality  Neck: No JVD, carotid bruits, thyromegaly  Lungs: clear to auscultation bilaterally, no crackles or wheezing  Heart: Irreg irreg, S1-S2 normal, no murmurs, clicks, rubs or gallops. HS distant  Abdomen: soft, non-tender, bowel sounds normal  Extremities: no edema, peripheral pulses present  Skin: Skin color, texture, turgor normal. No rashes or lesions  Neurologic: Alert and oriented X 3, no focal deficits    LABS / IMAGING / EKG / TELEMETRY     LABS:  Results from last 7 days   Lab Units 01/01/21  0431 12/31/20  1730 12/31/20  0400 12/30/20  1012 12/30/20  0433 12/29/20  2244   SODIUM mEQ/L 128*  --  127*  --  136  --    POTASSIUM mEQ/L 4.1  --  3.9  --  3.9  --    MAGNESIUM mg/dL 1.9 1.9 2.0 2.1 2.4  --    CHLORIDE mEQ/L 98  --  97*  --  100  --    CO2 mEQ/L 15*  --  17*  --  21*  --    BUN mg/dL 97*  --  100*  --  113*  --    CREATININE mg/dL 4.1*  --  4.0*  --  4.3*  --    AST IU/L 29  --  27  --  23  --    ALT IU/L 25  --  19  --  18  --    TROPONIN I ng/mL  --   --   --  0.19* 0.23* 0.20*     Results from last 7 days   Lab Units 01/01/21  0430 12/31/20  2152 12/31/20  1730  12/31/20  0400  12/30/20  1230  12/30/20  0433   WBC K/uL 12.53*  --   --   --  21.53*  --   --   --  24.28*   HEMOGLOBIN g/dL 8.0* 8.5* 8.4*   < > 7.5*   < >  --    < > 8.5*   HEMATOCRIT % 25.1* 26.9* 26.7*   < > 23.9*   < >  --    < > 26.6*   PLATELETS K/uL 172  --   --   --  158  --   --   --  204   INR INR  --   --   --   --   --   --  1.2   --   --     < > = values in this interval not displayed.     No results found for: HGBA1C, TSH  No results found for: CHOL, LDLCALC, HDL, TRIG  No results found for: BNP    IMAGING:      ECG:       TELEMETRY: AF      MEDICATIONS        • atorvastatin  10 mg oral Daily (6p)   • budesonide  0.5 mg nebulization BID (6a, 6p)   • cefTRIAXone  1 g intravenous q24h INT   • insulin aspart U-100  3-11 Units subcutaneous q6h   • ipratropium-albuteroL  3 mL nebulization q6H JESSICA   • levothyroxine  50 mcg oral Daily (6:30a)   • methylPREDNISolone sodium succinate  40 mg intravenous q8h INT   • nystatin   Topical BID   • renal multivitamin  1 tablet oral Daily       ASSESSMENT AND PLAN     AF: chronic. On amio for rate control. Not currently on AC due to drop in Hgb    Urosepsis: On Abs and pressor support    Hx of AS. S/P AVR    MS (severe): Watch volume status    Abelino Medina MD  1/1/2021    Primary Care Doctor: Elizabeth Cabral MD

## 2021-01-01 NOTE — CONSULTS
This is a 89 y/o female with known PMH of atrial fibrillation, CKD, colon cancer, COPD, CVA hx of recurrent UTIs, who was admitted with altered mental status, decreased PO  and severe sepsis. Pt reports frequent utis, reports difficulty breathing, denies abdominal pain, denies fever or vomiting. Urology has been asked to evaluate pt for hematuria.    Unable to review outside records..    Medical History:   Past Medical History:   Diagnosis Date   • Atrial fibrillation (CMS/HCC)    • Chronic hypotension    • Chronic kidney disease    • Chronic kidney failure    • Colon cancer (CMS/HCC)    • COPD (chronic obstructive pulmonary disease) (CMS/HCC)    • Low BP    • Stroke (cerebrum) (CMS/HCC)    • UTI (urinary tract infection)        Surgical History:   Past Surgical History:   Procedure Laterality Date   • CARDIAC SURGERY     • CENTRAL LINE  12/29/2020        • COLON SURGERY     • HIP SURGERY     • JOINT REPLACEMENT     • KNEE SURGERY         Social History:   Social History     Social History Narrative    Lives at home, with son- care giver.       Family History:   Family History   Family history unknown: Yes       Allergies: Patient has no known allergies.    Current Facility-Administered Medications   Medication Dose Route Frequency Provider Last Rate Last Admin   • amiodarone (CORDARONE) 750 mg in dextrose 5 % 500 mL (1.5 mg/mL) infusion  0.5 mg/min intravenous Continuous Chastity Wahl CRNP 20 mL/hr at 01/01/21 0700 0.5 mg/min at 01/01/21 0700   • atorvastatin (LIPITOR) tablet 10 mg  10 mg oral Daily (6p) Chastity Wahl CRNP   10 mg at 12/31/20 1806   • budesonide (PULMICORT) 0.5 mg/2 mL nebulizer solution 0.5 mg  0.5 mg nebulization BID (6a, 6p) Chastity Wahl CRNP   0.5 mg at 12/31/20 2042   • calcium gluconate 1,000 mg in sodium chloride 0.9 % 50 mL IVPB  1 g intravenous 2x daily PRN Chastity Wahl CRNP       • cefTRIAXone (ROCEPHIN) IVPB 1 g in 100 mL NSS vial in bag  1 g intravenous q24h INT Barrett Stern MD    Stopped at 12/31/20 1053   • glucose chewable tablet 16-32 g of dextrose  16-32 g of dextrose oral PRN Chastity Wahl CRNP        Or   • dextrose 40 % oral gel 15-30 g of dextrose  15-30 g of dextrose oral PRN Chastity Wahl CRNP        Or   • glucagon (GLUCAGEN) injection 1 mg  1 mg intramuscular PRN Chastity Wahl CRNP        Or   • dextrose in water injection 12.5 g  25 mL intravenous PRN Chastity Wahl CRNP       • insulin aspart U-100 (NovoLOG) pen 3-11 Units  3-11 Units subcutaneous q6h Dipika Crisostomo MD   3 Units at 12/30/20 1657   • ipratropium-albuteroL (DUO-NEB) 0.5-2.5 mg/3 mL nebulizer solution 3 mL  3 mL nebulization q6H JESSICA Chastity Wahl CRNP   3 mL at 01/01/21 0340   • levothyroxine (SYNTHROID) tablet 50 mcg  50 mcg oral Daily (6:30a) Dipika Crisostomo MD   50 mcg at 01/01/21 0638   • magnesium sulfate IVPB 2g in 50 mL NSS/D5W/SWFI  2 g intravenous PRN Chastity Wahl CRNP   Stopped at 12/29/20 1336   • methylPREDNISolone sod suc(PF) (Solu-MEDROL) injection 40 mg  40 mg intravenous q8h INT Stacey Miranda CRNP   40 mg at 01/01/21 0225   • nystatin (MYCOSTATIN) 100,000 unit/gram topical powder   Topical BID Dipika Crisostomo MD   Given at 12/31/20 2053   • phenylephrine HCl in 0.9% NaCl (FUENTES-SYNEPHRINE) 50 mg/250 mL (200 mcg/mL) infusion   mcg/min intravenous Titrated Chastity Wahl CRNP 4.5 mL/hr at 01/01/21 0700 15 mcg/min at 01/01/21 0700   • renal multivitamin tablet 1 tablet  1 tablet oral Daily Chastity Wahl CRNP   1 tablet at 12/31/20 1023   • sodium chloride 0.9 % infusion   intravenous Continuous Dipika Crisostomo MD 40 mL/hr at 01/01/21 0700 Rate Verify at 01/01/21 0700        Medication List      ASK your doctor about these medications    atorvastatin 10 mg tablet  Commonly known as: LIPITOR  Take 10 mg by mouth daily.  Dose: 10 mg     INCRUSE ELLIPTA 62.5 mcg/actuation blister with device  Inhale 1 puff daily.  Dose: 1 puff  Generic drug: umeclidinium     levothyroxine 50 mcg  tablet  Commonly known as: SYNTHROID  Take 50 mcg by mouth daily.  Dose: 50 mcg     metoprolol succinate XL 25 mg 24 hr tablet  Commonly known as: TOPROL-XL  Take 25 mg by mouth daily.  Dose: 25 mg          Review of Systems  Pertinent items are noted in HPI.        Imaging  CLINICAL HISTORY:89-year-old female with concern for urosepsis.     COMPARISON: None available     COMMENT:     TECHNIQUE: CT of the abdomen and pelvis was performed with the patient in the  supine position. Images reconstructed/reformatted in the axial, coronal and  sagittal planes.  CT DOSE:  One or more dose reduction techniques (e.g., Automated exposure  control, adjustment of the mA and/or kV according to the patient size, use of  iterative reconstruction technique) were utilized for this examination.  ORAL CONTRAST: None.  INTRAVENOUS CONTRAST: No intravenous contrast given.     The lack of intravenous contrast limits interpretation of the solid organs,  vessels and certain processes.     LOWER CHEST: Partially imaged postsurgical changes of aortic valvuloplasty.  Extensive mitral annular calcifications are noted.  There is partially imaged  cardiomegaly.     LIVER: Within normal limits.  BILE DUCTS: Pneumobilia is noted within the common and left bile ducts.  GALLBLADDER: Surgically absent.  PANCREAS: Within normal limits.  SPLEEN: Within normal limits.  ADRENALS: Within normal limits.  KIDNEYS/URETERS/BLADDER: The kidneys are normal in size and contour. No  hydronephrosis. No renal or proximal ureteric calculi.  Evaluation of the distal  ureters is nondiagnostic due to metallic streak artifact by the right hip  prosthesis.  There is suggestion of circumferential bladder wall thickening,  with a small foci of intravesicular air, raising concern for urinary tract  infection.  Correlate with urinalysis.  REPRODUCTIVE ORGANS: Highly limited evaluation due to extensive metallic streak  artifact.     BOWEL: The stomach, and small bowel are  normal in course and caliber.  Postsurgical changes of prior hemicolectomy are noted, with extensive sigmoid  diverticulosis.  No acute inflammatory or obstructive process, noting somewhat  limited evaluation the absence of intravenous or oral contrast..  PERITONEUM: No ascites or free air, no fluid collection  VESSELS: Atherosclerotic disease of the normal caliber aortoiliac system.  LYMPH NODES: within normal limits.  ABDOMINAL WALL: Within normal limits.  BONES: Severe diffuse osseous demineralization is noted.  There is a severe  dextroconvex curvature of the lumbar spine.  A right hip prosthesis is noted.  Age-indeterminate compression deformities of the L3, T11 and T12 vertebral  bodies are noted.     --  IMPRESSION:  1.  Limited evaluation in the absence of intravenous or oral contrast and due to  patient motion.  No definite hydronephrosis or renal calculi as clinically  questioned, noting limited evaluation of the distal ureters due to the patient's  right hip prosthesis.  2.  Circumferential bladder wall thickening and intravesicular air within the  urinary bladder, concerning for urinary tract infection.  3.  Age-indeterminate mild compression deformities of T11, T12 and L3.      Lab Results   Component Value Date    WBC 12.53 (H) 01/01/2021    HGB 8.0 (L) 01/01/2021    HCT 25.1 (L) 01/01/2021     01/01/2021    ALT 25 01/01/2021    AST 29 01/01/2021     (L) 01/01/2021    K 4.1 01/01/2021    CL 98 01/01/2021    CREATININE 4.1 (H) 01/01/2021    BUN 97 (HH) 01/01/2021    CO2 15 (L) 01/01/2021    INR 1.2 12/30/2020         Physicial Exam  GEN: NAD  HEENT: NC/AT, sclerae anicteric, mucosa moist  NECK: Supple, trachea midline  RESP : labored respirations, on NC  ABD: Soft, NTTP  : + 16 fr tyson draining light pink urine  SKIN: Intact, + ecchymosis  EXT: B/L dry, scd in place  NEURO: AAOx 2    A/P: 89 y/o female admit with severe urosepsis,  hematuria, DANY on CKD    -- Tyson draining light pink  urine,   -- urine culture and blood culture showing klebsiella pneumoniae,  ID following pt currently on rocephin  -- ct scan showed circumferential bladder wall thickening and intravesical air withing the bladder concerning for UTI.  -- .Monitor creat, nephrology following  -- wbc trending down.   Thank you     ELANA Viramontes  1/1/2021  7:59 AM

## 2021-01-01 NOTE — PROGRESS NOTES
Critical Care Progress Note     SUBJECTIVE  Interval History:   Patient remains on low-dose phenylephrine, and on IV amiodarone  Complains of shortness of breath, cough and wheeze      Allergies: Patient has no known allergies.    CURRENT MEDS  •  [] amiodarone, 1 mg/min, intravenous, Continuous **FOLLOWED BY** amiodarone, 0.5 mg/min, intravenous, Continuous  •  atorvastatin, 10 mg, oral, Daily (6p)  •  budesonide, 0.5 mg, nebulization, BID (6a, 6p)  •  calcium gluconate, 1 g, intravenous, 2x daily PRN  •  cefTRIAXone, 1 g, intravenous, q24h INT  •  glucose, 16-32 g of dextrose, oral, PRN **OR** dextrose, 15-30 g of dextrose, oral, PRN **OR** glucagon, 1 mg, intramuscular, PRN **OR** dextrose in water, 25 mL, intravenous, PRN  •  heparin (porcine), 15-30 Units/kg (Adjusted), intravenous, q6h PRN  •  heparin infusion - MAR calculator by PTT, 0-4,000 Units/hr, intravenous, Titrated  •  insulin aspart U-100, 3-11 Units, subcutaneous, q6h  •  ipratropium-albuteroL, 3 mL, nebulization, q6H JESSICA  •  levothyroxine, 50 mcg, oral, Daily (6:30a)  •  magnesium sulfate, 2 g, intravenous, PRN  •  methylPREDNISolone sodium succinate, 60 mg, intravenous, q6h INT  •  nystatin, , Topical, BID  •  phenylephrine,  mcg/min, intravenous, Titrated  •  renal multivitamin, 1 tablet, oral, Daily  •  sodium chloride 0.9 %, , intravenous, Continuous    VITAL SIGNS  Vitals:    21 0600 21 0630 21 0700 21 0800   BP: 107/62 105/70 (!) 108/56 110/63   Pulse: (!) 101 (!) 104 (!) 107 (!) 104   Resp: (!) 23 19 19 (!) 29   Temp:       TempSrc:       SpO2: 100% 100% 100% 98%   Weight:       Height:           VENTILATOR SETTINGS       INTAKE/OUTPUT    Intake/Output Summary (Last 24 hours) at 2021 1131  Last data filed at 2021 0900  Gross per 24 hour   Intake 1919 ml   Output 1425 ml   Net 494 ml       Lines, Drains, Airways, Wounds:  CVC Triple Lumen 20 Non-tunneled Right Femoral (Active)   Number of  days: 3       Peripheral IV 12/28/20 Left Antecubital (Active)   Number of days: 4       Peripheral IV 12/28/20 Anterior;Left;Upper Arm (Active)   Number of days: 4       Urethral Catheter 16 Fr (Active)   Number of days: 4       Rash 12/29/20 0001 Right lateral hip patch (Active)   Number of days: 3       Wound Other (comment) Buttock (Active)   Number of days: 3       PHYSICAL EXAM  Physical Exam  Vitals signs reviewed.   Constitutional:       General: She is not in acute distress.     Comments: Frail-appearing   HENT:      Head: Normocephalic and atraumatic.      Mouth/Throat:      Mouth: Mucous membranes are moist.   Eyes:      General: No scleral icterus.     Conjunctiva/sclera: Conjunctivae normal.   Cardiovascular:      Rate and Rhythm: Normal rate and regular rhythm.      Heart sounds: No murmur. No friction rub. No gallop.    Pulmonary:      Comments: Diffuse exp wheeze b/l  Abdominal:      General: Bowel sounds are normal. There is no distension.      Palpations: Abdomen is soft.      Tenderness: There is no abdominal tenderness.   Musculoskeletal:      Right lower leg: No edema.      Left lower leg: No edema.   Skin:     General: Skin is warm.   Neurological:      Mental Status: She is alert and oriented to person, place, and time.         LAB RESULTS  ABG  Results from last 7 days   Lab Units 12/29/20  0612 12/29/20  0018 12/28/20  2051 12/28/20  1603   PH ART pH 7.52* 7.31* 7.33*  --    PCO2 ART mm Hg 24* 18* 14*  --    PO2 ART mm Hg 85 93 118*  --    HCO3 ART mEQ/L 24 14* 13*  --    O2 SAT ART % 100* 100* 100*  --    BASE EXC ART mEQ/L -1.6 -14.7 -15.9  --    SOURCE OF OXYGEN   --   --   --  RA     CBC  Results from last 7 days   Lab Units 01/01/21  1056 01/01/21  0430 12/31/20  2152  12/31/20  0400  12/30/20  0433   WBC K/uL  --  12.53*  --   --  21.53*  --  24.28*   RBC M/uL  --  3.05*  --   --  2.87*  --  3.30*   HEMOGLOBIN g/dL 7.3* 8.0* 8.5*   < > 7.5*   < > 8.5*   HEMATOCRIT % 23.3* 25.1* 26.9*    < > 23.9*   < > 26.6*   MCV fL  --  82.3*  --   --  83.3  --  80.6*   MCH pg  --  26.2*  --   --  26.1*  --  25.8*   MCHC g/dL  --  31.9*  --   --  31.4*  --  32.0*   PLATELETS K/uL  --  172  --   --  158  --  204   RDW %  --  17.2*  --   --  16.9*  --  16.9*   MPV fL  --  9.5  --   --  9.5  --  9.7    < > = values in this interval not displayed.     BMP  Results from last 7 days   Lab Units 01/01/21  0431 12/31/20  0400 12/30/20  0433   SODIUM mEQ/L 128* 127* 136   POTASSIUM mEQ/L 4.1 3.9 3.9   CHLORIDE mEQ/L 98 97* 100   CO2 mEQ/L 15* 17* 21*   BUN mg/dL 97* 100* 113*   CREATININE mg/dL 4.1* 4.0* 4.3*   CALCIUM mg/dL 7.7* 6.6* 6.8*   ALBUMIN g/dL 1.8* 1.5* 1.5*   BILIRUBIN TOTAL mg/dL 0.4 0.7 0.7   ALK PHOS IU/L 93 78 93   ALT IU/L 25 19 18   AST IU/L 29 27 23   GLUCOSE mg/dL 158* 132* 123*     Coag  Results from last 7 days   Lab Units 01/01/21  1056 12/31/20  0716 12/31/20  0041  12/30/20  1230   PROTIME sec  --   --   --   --  15.2*   INR INR  --   --   --   --  1.2   PTT sec 34 >230* 64*   < > 47*    < > = values in this interval not displayed.       IMAGING  Chest x-ray (12/31/2020):  IMPRESSION:  1.  Patchy interstitial opacity, with pulmonary vascular congestion, which may  represent multifocal airspace disease or pulmonary edema.  2.  Left basilar airspace disease, new/increased from prior     CXR (12/29/2020):  IMPRESSION: Linear atelectasis or scar in the left midlung zone     CT Abd/pelvis (12/29/20):  IMPRESSION:  1.  Limited evaluation in the absence of intravenous or oral contrast and due to  patient motion.  No definite hydronephrosis or renal calculi as clinically  questioned, noting limited evaluation of the distal ureters due to the patient's  right hip prosthesis.  2.  Circumferential bladder wall thickening and intravesicular air within the  urinary bladder, concerning for urinary tract infection.  3.  Age-indeterminate mild compression deformities of T11, T12 and L3.  4. Additionally noted in  the impression should be incidentally noted pneumobilia,  which is a nonspecific finding in a patient status post cholecystectomy     Echo (12/29/2020):  Interpretation Summary        · Mitral valve thickening. Severe mitral annular calcification.  · Severe mitral valve stenosis.  · Mean gradient = 10.00 mmHg. HR = 110 bpm.  · Hyperdynamic LV systolic function. Estimated EF >75%.  · Mildly dilated LA.  · Tricuspid valve structure is grossly normal. Mild tricuspid valve regurgitation.  · Aortic valve structure is normal. Mild aortic valve regurgitation. No aortic valve stenosis. A bioprosthetic aortic valve is present.  · Moderately dilated RA.  · Moderately dilated RV. Mildly reduced RV systolic function.  · Mild mitral valve regurgitation.     Estimated PASP= 51mmHg by TR jet method.                ASSESSMENT & PLAN  89-year-old female presenting with weakness, hematuria, found to have septic shock      1.  Septic shock, in the setting of UTI with Klebsiella bacteremia.  UA with too numerous to count WBCs, CT demonstrating circumferential bladder wall thickening and intravesicular air  -Continue ceftriaxone  - cont to wean phenylephrine to maintain MAP >65    2. Acute kidney injury, secondary to septic shock.  Now with evidence of volume overload following aggressive IV fluid resuscitation  - received IV lasix yesterday; redose today  - still requiring low dose pressors  - renal following    3. Hyponatremia, with initial overcorrection requiring D5W  - cont low rate NSS   - Na 128 today    4. Lactic acidosis, in the setting of septic shock.  Resolved     5.  Atrial fibrillation, with rapid ventricular response.  Rates better controlled on amiodarone  - hematuria improved, hgb stable at 8  - restart IV heparin with no bolus    6.  Leukocytosis, trending down    7. Hematuria, in the setting of UTI and anticoagulation  - urology following    8. Anemia.  Multifactorial, with some component of hemodilution.  Also with  blood loss in the setting of hematuria  - hgb now stable at 8  - close monitoring with AC     9. Toxic metabolic encephalopathy in the setting of septic shock.  Resolving, more awake and alert     10.  COPD, with acute exacerbation.  Significant wheeze on exam  -Continue nebs, and budesonide  - increase solumedrl to 60 mg q6 hours    11.  Pulmonary infiltrates.  Suspect volume overload  - redose with lasix    12.  Severe mitral stenosis.  Cardiology following, with plan for outpatient evaluation    CODE STATUS  Full Code    The case was reviewed this morning at the patient's beside multidisciplinary rounds with the patient's nurse, dietician, pharmacist, respiratory therapist, physical therapist and critical care nurse coordinator. The patient's clinical status with regards to diagnosis, treatment plans including disposition of any IV, arterial lines, Samson and tubes were discussed, as well as dietary, respiratory therapy and mobilization needs.     This case was discussed with consultants.    Total critical time spent managing septic shock, DANY on this patient excluding any procedure time totals 35 minutes      Ingrid Acevedo DO  1/1/2021

## 2021-01-02 NOTE — PROGRESS NOTES
Daily Progress Note    Subjective     Interval History: Pt resting in bed, tyson draining red color urine, pt was restarted on Heparin,   Denies abdominal or flank pain.       Objective     Vital signs in last 24 hours:  Temp:  [36.5 °C (97.7 °F)-36.7 °C (98.1 °F)] 36.7 °C (98.1 °F)  Heart Rate:  [104-117] 108  Resp:  [15-63] 25  BP: (101-190)/(53-69) 106/61      Intake/Output Summary (Last 24 hours) at 1/2/2021 0744  Last data filed at 1/2/2021 0600  Gross per 24 hour   Intake 1589.88 ml   Output 1770 ml   Net -180.12 ml       Intake/Output this shift:  No intake/output data recorded.    Labs  CBC Results       01/02/21 01/01/21 01/01/21                    0434 2311 1614         WBC 17.40 -- --         RBC 2.91 -- --         HGB 7.5 7.4 7.5         HCT 24.3 23.6 24.0         MCV 83.5 -- --         MCH 25.8 -- --         MCHC 30.9 -- --          -- --                     BMP Results       01/02/21 01/01/21 12/31/20                    0434 0431 0400          128 127         K 3.8 4.1 3.9         Cl 97 98 97         CO2 16 15 17         Glucose 152 158 132          97 100         Creatinine 3.8 4.1 4.0         Calcium 7.7 7.7 6.6         Anion Gap 14 15 13         EGFR 11.0 10.1 10.4         Comment for BUN at 0400 on 12/31/20: Consistent with previous results          Imaging  IMPRESSION:  1.  Limited evaluation in the absence of intravenous or oral contrast and due to  patient motion.  No definite hydronephrosis or renal calculi as clinically  questioned, noting limited evaluation of the distal ureters due to the patient's  right hip prosthesis.  2.  Circumferential bladder wall thickening and intravesicular air within the  urinary bladder, concerning for urinary tract infection.  3.  Age-indeterminate mild compression deformities of T11, T12 and L3.           Physical Exam:  GEN: NAD  RESP: No  Labored respirations  ABD: Soft, NTTP  : + 16 fr tyson draining bloody urine      A/P: 89 y/o female  admit with severe urosepsis,  hematuria, DANY on CKD     -- Tyson draining bloody urine, pt was re started on Heparin, hand irrigate prn for blood clots, if not draining or not able to irrigate, change tyson to 3 way, and consider starting CBI  -- recommend holding off on anticoag if ok with primary team.   -- urine culture and blood culture showing klebsiella pneumoniae,  ID following pt currently on rocephin  -- ct scan showed circumferential bladder wall thickening and intravesical air withing the bladder concerning for UTI.  -- .Monitor creat, trending down.   -- Follow H&H, transfuse prn per primary team.  Will follow

## 2021-01-02 NOTE — PROGRESS NOTES
NEPHROLOGY PROGRESS NOTE    Subjective:   Pt seen re: DANY   UO 1780mL past 24hrs.  Urine previously pink tinged now red this am   FiO2 2L NC . She exhibits pursed lip breathing and is wheezing   Off pressors neosynephrine.   Ordered amiodarone gtt      Review of Systems   Respiratory: Positive for shortness of breath (improved).    Genitourinary: Positive for difficulty urinating and hematuria.   Hematological: Bruises/bleeds easily.       Vitals:    21 0700 21 0744 21 0800 21 0900   BP: (!) 113/55 (!) 107/57 (!) 107/57 (!) 100/51   Pulse: (!) 105 (!) 110 (!) 101 (!) 109   Resp: 16 (!) 30 (!) 26 16   Temp:  36.4 °C (97.5 °F)     TempSrc:  Oral     SpO2: 95% 96% 98% 97%   Weight:       Height:             Intake/Output Summary (Last 24 hours) at 2021 0924  Last data filed at 2021 0900  Gross per 24 hour   Intake 1656.88 ml   Output 1780 ml   Net -123.12 ml       Physical Exam  Eyes:      Pupils: Pupils are equal, round, and reactive to light.   Cardiovascular:      Rate and Rhythm: Tachycardia present.   Pulmonary:      Breath sounds: Wheezing (diffuse end expiratory wheeze) present.   Genitourinary:     Comments: Samson drainage red urine   Skin:     General: Skin is warm.      Capillary Refill: Capillary refill takes less than 2 seconds.   Neurological:      Mental Status: She is alert and oriented to person, place, and time.         LABS:  Results from last 7 days   Lab Units 21  0434   SODIUM mEQ/L 127*   POTASSIUM mEQ/L 3.8   CHLORIDE mEQ/L 97*   CO2 mEQ/L 16*   BUN mg/dL 101*   CREATININE mg/dL 3.8*   EGFR mL/min/1.73m*2 11.0*   GLUCOSE mg/dL 152*   CALCIUM mg/dL 7.7*   ALBUMIN g/dL 1.9*   WBC K/uL 17.40*   HEMOGLOBIN g/dL 7.5*   HEMATOCRIT % 24.3*   PLATELETS K/uL 176       Meds:    Current Facility-Administered Medications:   •  [] amiodarone (CORDARONE) 750 mg in dextrose 5 % 500 mL (1.5 mg/mL) infusion, 1 mg/min, intravenous, Continuous, Stopped at 20 0128  **FOLLOWED BY** amiodarone (CORDARONE) 750 mg in dextrose 5 % 500 mL (1.5 mg/mL) infusion, 0.5 mg/min, intravenous, Continuous, Chastity Wahl CRNP, Last Rate: 20 mL/hr at 01/02/21 0900, 0.5 mg/min at 01/02/21 0900  •  atorvastatin (LIPITOR) tablet 10 mg, 10 mg, oral, Daily (6p), Chastity Wahl CRNP, 10 mg at 01/01/21 1848  •  budesonide (PULMICORT) 0.5 mg/2 mL nebulizer solution 0.5 mg, 0.5 mg, nebulization, BID (6a, 6p), Chastity Wahl CRNP, 0.5 mg at 01/02/21 0828  •  calcium gluconate 1,000 mg in sodium chloride 0.9 % 50 mL IVPB, 1 g, intravenous, 2x daily PRN, Chastity Wahl CRNP  •  cefTRIAXone (ROCEPHIN) IVPB 1 g in 100 mL NSS vial in bag, 1 g, intravenous, q24h INT, Barrett Stern MD, Stopped at 01/01/21 1028  •  glucose chewable tablet 16-32 g of dextrose, 16-32 g of dextrose, oral, PRN **OR** dextrose 40 % oral gel 15-30 g of dextrose, 15-30 g of dextrose, oral, PRN **OR** glucagon (GLUCAGEN) injection 1 mg, 1 mg, intramuscular, PRN **OR** dextrose in water injection 12.5 g, 25 mL, intravenous, PRN, Chastity Wahl CRNP  •  furosemide (LASIX) injection 80 mg, 80 mg, intravenous, Once, Ingrid Acevedo, DO  •  heparin (porcine) 5,000 unit/mL injection 5,000 Units, 5,000 Units, subcutaneous, q8h JESSICA, Ankita Culver CRNP  •  insulin aspart U-100 (NovoLOG) pen 3-11 Units, 3-11 Units, subcutaneous, q6h, Dipika Crisostomo MD, 3 Units at 01/01/21 1157  •  ipratropium-albuteroL (DUO-NEB) 0.5-2.5 mg/3 mL nebulizer solution 3 mL, 3 mL, nebulization, q6H JESSICA, Chastity Wahl CRNP, 3 mL at 01/02/21 0828  •  levothyroxine (SYNTHROID) tablet 50 mcg, 50 mcg, oral, Daily (6:30a), Dipika Crisostomo MD, 50 mcg at 01/02/21 0535  •  magnesium sulfate IVPB 2g in 50 mL NSS/D5W/SWFI, 2 g, intravenous, PRN, Chastity Wahl CRNP, Stopped at 01/02/21 0837  •  methylPREDNISolone sod suc(PF) (Solu-MEDROL) injection 60 mg, 60 mg, intravenous, q6h INT, Stacey Miranda CRNP, 60 mg at 01/02/21 0910  •  nystatin (MYCOSTATIN) 100,000 unit/gram  topical powder, , Topical, BID, Dipika Crisostomo MD, Given at 01/02/21 0818  •  renal multivitamin tablet 1 tablet, 1 tablet, oral, Daily, Chastity Wahl CRNP, 1 tablet at 01/01/21 0855    ASSESSMENT:  Principal Problem:    Metabolic acidosis  Active Problems:    Severe sepsis (CMS/HCC)    Encephalopathy    Pneumobilia    Acute on chronic renal failure (CMS/HCC)    Hyperkalemia    Acute cystitis with hematuria    Hypoglycemia    COPD (chronic obstructive pulmonary disease) (CMS/[facility])    History of colon cancer    Type 2 MI (myocardial infarction) (CMS/HCC)    Hyponatremia    History of CVA (cerebrovascular accident)    Chronic atrial fibrillation (CMS/HCC)    Septic shock (CMS/[facility])    Leukocytosis    Anemia    Lactic acidosis    Hematuria      PLAN:  1. DANY on CKD: in the context of septic shock. Non-oliguric. Creatinine downtrending.   - pt has a narrow therapeutic window w/ comorbidities informing volume management. Noted hx chronic hypotension and has severe MS on echo w/ rapid afib requiring amiodarone gtt on the monitor.   - would continue gentle diuresis - ordered for lasix 80mg IVP daily. Goal UO 2-2.5L/day        2. Hyponatremia: Initial over-correction requiring D5W. Na 127 this am.   - discussed w/ Dr. Acevedo. Change NS 40cc to 10cc KVO.   - c/w lasix      3. Hyperkalemia: this is resolved. Renal function improving.      4. Uro-sepsis: On ceftriaxone IV   5. Hematuria: now gross red urine.  Heparin gtt discontinued. Samson appears to be draining. Urology following     Inderjit Sutherland MD

## 2021-01-02 NOTE — PROGRESS NOTES
SUBJECTIVE      Off pressors. Awake and alert    OBJECTIVE     VITAL SIGNS:  Temp:  [36.5 °C (97.7 °F)-36.7 °C (98.1 °F)] 36.7 °C (98.1 °F)  Heart Rate:  [104-117] 110  Resp:  [15-63] 30  BP: (101-190)/(53-69) 107/57  SPO2 96%    Intake/Output Summary (Last 24 hours) at 1/2/2021 0754  Last data filed at 1/2/2021 0700  Gross per 24 hour   Intake 1705.88 ml   Output 1770 ml   Net -64.12 ml       PHYSICAL EXAM:  General appearance: alert and cooperative  Head: without obvious abnormality  Eyes: PERRLA, extraocular movements intact  Neck: No JVD, carotid bruits, thyromegaly  Lungs: clear to auscultation bilaterally, no crackles or wheezing  Heart: Irreg irreg Rate controlled, S1-S2 normal, no murmurs, clicks, rubs or gallops  Abdomen: soft, non-tender, bowel sounds normal  Extremities: no edema, peripheral pulses present  Skin: Skin color, texture, turgor normal. No rashes or lesions  Neurologic: Alert and oriented X 3, no focal deficits    LABS / IMAGING / EKG / TELEMETRY     LABS:  Results from last 7 days   Lab Units 01/02/21  0434 01/01/21  1614 01/01/21  0431  12/31/20  0400 12/30/20  1012 12/30/20  0433 12/29/20  2244   SODIUM mEQ/L 127*  --  128*  --  127*  --  136  --    POTASSIUM mEQ/L 3.8  --  4.1  --  3.9  --  3.9  --    MAGNESIUM mg/dL 1.9 1.9 1.9   < > 2.0 2.1 2.4  --    CHLORIDE mEQ/L 97*  --  98  --  97*  --  100  --    CO2 mEQ/L 16*  --  15*  --  17*  --  21*  --    BUN mg/dL 101*  --  97*  --  100*  --  113*  --    CREATININE mg/dL 3.8*  --  4.1*  --  4.0*  --  4.3*  --    AST IU/L 24  --  29  --  27  --  23  --    ALT IU/L 26  --  25  --  19  --  18  --    TROPONIN I ng/mL  --   --   --   --   --  0.19* 0.23* 0.20*    < > = values in this interval not displayed.     Results from last 7 days   Lab Units 01/02/21  0434 01/01/21  2311 01/01/21  1614  01/01/21  0430  12/31/20  0400  12/30/20  1230   WBC K/uL 17.40*  --   --   --  12.53*  --  21.53*  --   --    HEMOGLOBIN g/dL 7.5* 7.4* 7.5*   < >  8.0*   < > 7.5*   < >  --    HEMATOCRIT % 24.3* 23.6* 24.0*   < > 25.1*   < > 23.9*   < >  --    PLATELETS K/uL 176  --   --   --  172  --  158  --   --    INR INR  --   --   --   --   --   --   --   --  1.2    < > = values in this interval not displayed.     No results found for: HGBA1C, TSH  No results found for: CHOL, LDLCALC, HDL, TRIG  No results found for: BNP    IMAGING:      ECG:       TELEMETRY: AF      MEDICATIONS        • atorvastatin  10 mg oral Daily (6p)   • budesonide  0.5 mg nebulization BID (6a, 6p)   • cefTRIAXone  1 g intravenous q24h INT   • insulin aspart U-100  3-11 Units subcutaneous q6h   • ipratropium-albuteroL  3 mL nebulization q6H JESSICA   • levothyroxine  50 mcg oral Daily (6:30a)   • methylPREDNISolone sodium succinate  60 mg intravenous q6h INT   • nystatin   Topical BID   • renal multivitamin  1 tablet oral Daily       ASSESSMENT AND PLAN     AF: Rate OK on Amio. BP still low. Hold off on restarting beta blocker for now,    Urosepsis: Improving    MS (severe). Plan as before    AS: S/P MARIE Medina MD  1/2/2021    Primary Care Doctor: Elizabeth Cabral MD

## 2021-01-02 NOTE — PLAN OF CARE
Problem: Adult Inpatient Plan of Care  Goal: Patient-Specific Goal (Individualization)  Flowsheets (Taken 1/2/2021 1224)  Patient-Specific Goals (Include Timeframe): wanted to rest  Individualized Care Needs:   strict aspirarion precautions   1:1 feeding assist   modified diet     Problem: Adult Inpatient Plan of Care  Goal: Plan of Care Review  Flowsheets (Taken 1/2/2021 1224)  Plan of Care Reviewed With: patient  Outcome Summary:   ST higgins completed   POC initiated     Problem: Swallowing Impairment  Goal: Improved Swallowing Without Aspiration  Outcome: Progressing

## 2021-01-02 NOTE — PROGRESS NOTES
Critical Care    01/02/21 / 9:56 AM    Visit Vitals  BP (!) 100/51   Pulse (!) 109   Temp 36.4 °C (97.5 °F) (Oral)   Resp 16   Ht 1.524 m (5')   Wt 59.5 kg (131 lb 1.6 oz)   SpO2 97%   BMI 25.60 kg/m²     Progress Note:    RN called to leave message for SIMRAN Cruz.     Will provide medical update when call is returned.    DREAD Arriola  Pager [phone]

## 2021-01-02 NOTE — PROGRESS NOTES
Patient: Nancy Minor  Location: [facility] 3211  MRN: [id number]  Today's date: 1/2/2021  SPEECH PATHOLOGY EVALUATION:     SLP Diagnosis: moderate oral and suspected pharyngeal dysphagia    Recommendations:  1. PUREE diet and NECTAR thick liquids   2. Liquids via SPOON presentation ONLY  3. Medications crushed in puree or nectar thick liquids   4. Strict aspiration precautions  5. 1:1 supervision at meals for feeding assist and safety  6. ONLY feed patient when fully awake/alert  7. Please stop feeding if any overt s/s of aspiration are observed  8. ST will closely monitor      Summary/Impressions:  Daily Outcome Statement (SLP): ST evaluation completed. Patient presents with moderate oral and suspected pharyngeal dysphagia. Suspect dysphagia to be influenced by overall weakness/debility as well as current reduced positioning due to the presence of a femoral line limiting HOB to only 30 degrees. With completed trials, there was delayed initiation of bolus formation/manipulation characterized by oral bolus holding, there was delayed transfer/swallow initiation characterized by repeated lingual pumping, there was suspected preswallow bolus leakage to the pharynx with all liquid trials. There was suspected reduced pharyngeal motility as seen by repeat transfer/swallows and delayed coughing with all thin liquid trials and nectar thick liquids via straw and cup sips. There appeared to be increased bolus control and clearance with puree and nectar thick liquids via tsp presentation. Patient was noted with purse lip breathing throughout the entire evaluation and intermittent audible wheezing although breath and voice sounds appeared clear. Per nurse, these behaviors have been observed at rest throughout the morning. Patient denied feeling SOB and SpO2 remained in the high 90s. Based on bedside performance, recommend a puree diet and nectar thick liquids via tsp presentations only. Recommend  strict aspiration precautions and 1:1 feeding assist. Patient is felt to be at increased risk for potential aspiration given bedside presensation and HOB position restrictions. Please stop feeding if any overt s/s of aspiration are observed. ST will closely monitor. All recommendations were reviewed with both nurse (Jayne) and attending (Dr. Acevedo) following this assessment.     Chief Complaint   Patient presents with   • Altered Mental Status   • Fatigue     Clinical Course: This 89 y.o. female was admitted 12/28/2020 with Hyperkalemia [E87.5]  Acute cystitis with hematuria [N30.01]  Severe sepsis (CMS/HCC) [A41.9, R65.20]  Acute renal failure, unspecified acute renal failure type (CMS/HCC) [N17.9].     89 y.o. female with a past medical history of CKD, history of stroke, remote history of colon cancer, COPD, lives at home with son, presented to the emergency room with several days of feeling confused, extremely weak, decreased oral intake.  Her urine started to become darker and saw blood today which prompted her son to get her to the emergency room.     ER work-up consistent with severe renal failure, hyperkalemia with no EKG changes with metabolic acidosis, UTI and severe sepsis.     Septic shock, in the setting of UTI with Klebsiella bacteremia.  UA with too numerous to count WBCs, CT demonstrating circumferential bladder wall thickening and intravesicular air     Reason for Consult: assess swallow function and safety as nurse reported coughing with thin liquids this am    Pertinent Radiology Results:   CXR 12/31/20:  IMPRESSION:  1.  Patchy interstitial opacity, with pulmonary vascular congestion, which may  represent multifocal airspace disease or pulmonary edema.  2.  Left basilar airspace disease, new/increased from prior.    CT Abdomen Pelvis 12/28/20:  IMPRESSION:  1.  Limited evaluation in the absence of intravenous or oral contrast and due to  patient motion.  No definite hydronephrosis or renal calculi  as clinically  questioned, noting limited evaluation of the distal ureters due to the patient's  right hip prosthesis.  2.  Circumferential bladder wall thickening and intravesicular air within the  urinary bladder, concerning for urinary tract infection.  3.  Age-indeterminate mild compression deformities of T11, T12 and L3.    CXR 12/28/20:  IMPRESSION: Linear atelectasis or scar in the left midlung zone.       Past Medical History:   Diagnosis Date   • Atrial fibrillation (CMS/HCC)    • Chronic hypotension    • Chronic kidney disease    • Chronic kidney failure    • Colon cancer (CMS/HCC)    • COPD (chronic obstructive pulmonary disease) (CMS/HCC)    • Low BP    • Stroke (cerebrum) (CMS/HCC)    • UTI (urinary tract infection)      Past Surgical History:   Procedure Laterality Date   • CARDIAC SURGERY     • CENTRAL LINE  12/29/2020        • COLON SURGERY     • HIP SURGERY     • JOINT REPLACEMENT     • KNEE SURGERY         No Known Allergies      Results from last 7 days   Lab Units 01/02/21  1105 01/02/21  0434 01/01/21  2311  01/01/21  0430  12/31/20  0400   WBC K/uL  --  17.40*  --   --  12.53*  --  21.53*   HEMOGLOBIN g/dL 7.5* 7.5* 7.4*   < > 8.0*   < > 7.5*   HEMATOCRIT % 24.0* 24.3* 23.6*   < > 25.1*   < > 23.9*   PLATELETS K/uL  --  176  --   --  172  --  158    < > = values in this interval not displayed.          Baseline Diet/Method of Nutritional Intake: regular solids, thin liquids     NOTE: diet order below reflects recommendations made following completion of this bedside swallow evaluation; at time of evaluation patient was on a full liquid diet of thin liquids.     Current Diet: (See below)       Dietary Orders   (From admission, onward)             Start     Ordered    01/02/21 1013  Adult Diet Pureed Texture; Nectar Thick Liquids; RD/LDN may adjust order  Diet effective now     Comments: Family tells us the patient likes soup, and yogurt  Nectars by [employer]   Question Answer Comment   Diet  Texture Pureed Texture    Fluid Consistency: Nectar Thick Liquids    Delegation of Authority. Diet orders written by PA/CRThang may not be adjusted by RD/LDNs. RD/LDN may adjust order        01/02/21 1012    12/29/20 2114  Dietary nutrition supplements  Once     Question Answer Comment   Select Supplement (PH): Boost Plus Vanilla    Meal period Breakfast Dinner        12/29/20 2113                Patient left with call bell in reach and alarms as found.     Alberta is a 89 y.o. female admitted on 12/28/2020 with Severe sepsis (CMS/HCC). Principal problem is Metabolic acidosis.    Past Medical History  Alberta has a past medical history of Atrial fibrillation (CMS/HCC), Chronic hypotension, Chronic kidney disease, Chronic kidney failure, Colon cancer (CMS/HCC), COPD (chronic obstructive pulmonary disease) (CMS/HCC), Low BP, Stroke (cerebrum) (CMS/HCC), and UTI (urinary tract infection).    History of Present Illness   Here with metabolic acidosis, no previous ST per chart review; from home    SLP Vitals    Date/Time Pulse Resp SpO2 BP MAP [facility]   01/02/21 1000 110 16 97 % 108/65 77 mmHg LK      SLP Pain    Date/Time Pain Type Pref Pain Scale Rating: Rest Rating: Activity [facility]   01/02/21 0950 Pain Assessment word (verbal rating pain scale) 0 - no pain 0 - no pain SHERRIE              Prior Level of Function      Most Recent Value   Communication  understands/communicates without difficulty   Swallowing  swallows foods/liquids without difficulty   Baseline Diet/Method of Nutritional Intake  regular solids, thin liquids   Past History of Dysphagia  none documented   Prior Level of Function Comment  from home, lives with her children   Assistive Device/Animal Currently Used at Home  walker, front-wheeled, raised toilet, shower chair          SLP Evaluation and Treatment - 01/02/21 0950        Time Calculation    Start Time  0950     Stop Time  1011     Time Calculation (min)  21 min        Session Details    Document Type   "initial evaluation     Mode of Treatment  speech language pathology        General Information    Patient Profile Reviewed?  yes     Onset of Illness/Injury or Date of Surgery  12/28/20     Referring Physician  Dr. Acevedo     General Observations of Patient  patient seen bedside for completion of a bedside swallow assessment     Existing Precautions/Restrictions  modified diet;oxygen therapy device and L/min;head of bed elevated 30 degrees;other (see comments)    femoral line in place    Limitations/Impairments  swallowing        Cognition/Psychosocial    Orientation Status (Cognition)  oriented to;person;disoriented to;place;time     Follows Commands (Cognition)  follows one step commands;75-90% accuracy     Cognitive Function (Cognitive)  memory deficit;executive function deficit     Executive Function Deficit (Cognition)  minimal deficit;initiation;insight/awareness of deficits;problem solving/reasoning     Memory Deficit (Cognitive)  minimal deficit;moderate deficit;short-term memory;recall, recent events     Comment, Cognition  verbal cues needed for location, patient believed she was in \"[place],\" verbal cues needed for time concepts        Oral Motor    Dentition (Oral Motor)  natural dentition;some missing teeth        Facial Symmetry (Oral Motor)    Facial Symmetry (Oral Motor)  WNL        Lip Function (Oral Motor)    Lip Range of Motion (Oral Motor)  WNL        Tongue Function (Oral Motor)    Tongue ROM (Oral Motor)  WNL        Vocal Quality/Secretion Management (Oral Motor)    Vocal Quality (Oral Motor)  breathy;other (see comments)    weak       Motor Speech    Speech Intelligibility (Motor Speech)  phrase/sentence level     Phrase/Sentence Level, Speech Intelligibility (Motor Speech)  minimal impairment     Articulation (Motor Speech)  other (see comments)    due to weak voice quality       Auditory Comprehension    Follows Commands (Auditory Comprehension)  1-step command     1 Step, Follows " Commands (Auditory Comprehension)  75-90% accuracy     Yes/No Questions (Auditory Comprehension)  simple/factual questions;WFL     Comment, Assessment (Auditory Comprehension)  delayed responses observed        Functional Communication Measures    FCM: Memory  4-->Level 4     FCM: Problem Solving  4-->Level 4     FCM: Swallowing  4-->Level 4        General Swallowing Observations    Current Diet/Method of Nutritional Intake (General Swallowing Observations, NIS)  other (see comments)    full liquids    Signs/Symptoms of Aspiration (Current Diet)  cough     Respiratory Support  nasal cannula;supplemental oxygen        Food and Liquid Trials (NIS)    Patient Positioning  head of bed elevated (specify degrees)    limited to 30 degress due to the presence of a femoral line    Oral Intake/Feeding Performance  oral trials administered by therapist     Food Consistencies Evaluated  pureed solids     Pureed Solids  use of teaspoon/fork     Liquid Consistencies Evaluated  nectar thick liquids;thin liquids     Thin Liquids  single cup sips;straw sips     Nectar Thick Liquids  single cup sips;straw sips;use of teaspoon     Oral Preparatory Phase of Swallow  oral holding of bolus     Oral Phase of Swallow  lingual pumping;delayed anterior-posterior transit;suspect posterior bolus leak     Pharyngeal Phase of Swallow  delayed swallow reflex initiation;coughing after swallow;multiple swallows per bolus;throat clearing noted after the swallow     Esophageal Phase of Swallow  no clinical symptoms     SpO2 Level  currently on 2 lpm        Swallowing Recommendations    Monitoring/Assistance Required  requires constant supervision during eating and drinking;requires assistance for eating and drinking     Strategies to Enhance Eating/Swallowing  upright sitting position for eating;allow adequate time for eating     Diet Consistency Recommendations  pureed solids;nectar thick liquids     Recommended Feeding/Eating Techniques  liquid via  spoon only     Mode of Delivery Recommendations  slow rate of intake;small bolus size     Postural Change Recommendations  none     Medication Administration Recommendations  crushed in puree and/or nectar thick liquids     Comment, Swallowing Recommendations  only feed patient when fully awake/alert        Swallowing Intervention    Dysphagia/Swallowing Interventions  monitor tolerance of;current diet without evidence of aspiration;advanced diet/liquid texture trials;compensatory swallowing strategies        AM-PAC (TM) - Cognition (Current Function)    Following/understanding a 10-15 minute speech or presentation?  3 - A little     Understanding familiar people during ordinary conversations?  4 - None     Remembering to take medications at the appropriate time?  2 - A lot     Remembering where things were placed or put away?  2 - A lot     Remembering a list of 3 or 4 errands without writing it down?  1 - Unable     Taking care of complicated tasks?  1 - Unable     AM-PAC (TM) Cognition Score  13        Therapy Assessment/Plan (SLP)    SLP Diagnosis  moderate oral and suspected pharyngeal dysphagia     Patient/Family Therapy Goal Statement (SLP)  wanted to rest     Rehab Potential (SLP Eval)  good, to achieve stated therapy goals     Therapy Frequency (SLP)  5 times/wk     Problem List (SLP)  swallowing; positioning        Daily Progress Summary (SLP)    Daily Outcome Statement (SLP)  ST evaluation completed. Patient presents with moderate oral and suspected pharyngeal dysphagia. Suspect dysphagia to be influenced by overall weakness/debility as well as current reduced positioning due to the presence of a femoral line limiting HOB to only 30 degrees. With completed trials, there was delayed initiation of bolus formation/manipulation characterized by oral bolus holding, there was delayed transfer/swallow initiation characterized by repeated lingual pumping, there was suspected preswallow bolus leakage to the  pharynx with all liquid trials. There was suspected reduced pharyngeal motility as seen by repeat transfer/swallows and delayed coughing with all thin liquid trials and nectar thick liquids via straw and cup sips. There appeared to be increased bolus control and clearance with puree and nectar thick liquids via tsp presentation. Patient was noted with purse lip breathing throughout the entire evaluation and intermittent audible wheezing although breath and voice sounds appeared clear. Per nurse, these behaviors have been observed at rest throughout the morning. Patient denied feeling SOB and SpO2 remained in the high 90s. Based on bedside performance, recommend a puree diet and nectar thick liquids via tsp presentations only. Recommend strict aspiration precautions and 1:1 feeding assist. Patient is felt to be at increased risk for potential aspiration given bedside presensation and HOB position restrictions. Please stop feeding if any overt s/s of aspiration are observed. ST will closely monitor. All recommendations were reviewed with both nurse (Jayne) and attending (Dr. Acevedo) following this assessment.         Therapy Plan Review/Discharge Plan (SLP)    Therapy Plan Review (SLP)  evaluation/treatment results reviewed;participants included;patient    nurse; attending                 Education provided this session. See the Patient Education summary report for full details.    SLP Goals      Most Recent Value   Pharyngeal Swallow Goal 1   Activity  safely tolerate, recommended diet/liquid level, with no clinical signs/symptoms of aspiration at 01/02/2021 0950   [place]  independently at 01/02/2021 0950   Time Frame  long-term goal (LTG), by discharge at 01/02/2021 0950   Progress/Outcome  other (see comments) [goal initiated] at 01/02/2021 0950

## 2021-01-02 NOTE — PROGRESS NOTES
Critical Care Progress Note     SUBJECTIVE  Interval History:   Patient seen and examined.  Off phenylephrine, remains on amiodarone  Less short of breath today  Having increased hematuria overnight    Allergies: Patient has no known allergies.    CURRENT MEDS  •  [] amiodarone, 1 mg/min, intravenous, Continuous **FOLLOWED BY** amiodarone, 0.5 mg/min, intravenous, Continuous  •  atorvastatin, 10 mg, oral, Daily (6p)  •  budesonide, 0.5 mg, nebulization, BID (6a, 6p)  •  calcium gluconate, 1 g, intravenous, 2x daily PRN  •  cefTRIAXone, 1 g, intravenous, q24h INT  •  glucose, 16-32 g of dextrose, oral, PRN **OR** dextrose, 15-30 g of dextrose, oral, PRN **OR** glucagon, 1 mg, intramuscular, PRN **OR** dextrose in water, 25 mL, intravenous, PRN  •  heparin (porcine), 5,000 Units, subcutaneous, q8h JESSICA  •  insulin aspart U-100, 3-11 Units, subcutaneous, q6h  •  ipratropium-albuteroL, 3 mL, nebulization, q6H JESSICA  •  levothyroxine, 50 mcg, oral, Daily (6:30a)  •  magnesium sulfate, 2 g, intravenous, PRN  •  methylPREDNISolone sodium succinate, 60 mg, intravenous, q6h INT  •  nystatin, , Topical, BID  •  renal multivitamin, 1 tablet, oral, Daily    VITAL SIGNS  Vitals:    21 0744 21 0800 21 0900 21 1000   BP: (!) 107/57 (!) 107/57 (!) 100/51 108/65   Pulse: (!) 110 (!) 101 (!) 109 (!) 110   Resp: (!) 30 (!) 26 16 16   Temp: 36.4 °C (97.5 °F)      TempSrc: Oral      SpO2: 96% 98% 97% 97%   Weight:       Height:           VENTILATOR SETTINGS       INTAKE/OUTPUT    Intake/Output Summary (Last 24 hours) at 2021 1103  Last data filed at 2021 1000  Gross per 24 hour   Intake 1452.38 ml   Output 1825 ml   Net -372.62 ml       Lines, Drains, Airways, Wounds:  CVC Triple Lumen 20 Non-tunneled Right Femoral (Active)   Number of days: 4       Peripheral IV 20 Left Antecubital (Active)   Number of days: 5       Peripheral IV 20 Anterior;Left;Upper Arm (Active)   Number of  days: 5       Urethral Catheter 16 Fr (Active)   Number of days: 5       Rash 12/29/20 0001 Right lateral hip patch (Active)   Number of days: 4       Wound Other (comment) Buttock (Active)   Number of days: 4       PHYSICAL EXAM  Physical Exam  Vitals signs reviewed.   Constitutional:       Comments: Frail   HENT:      Head: Normocephalic and atraumatic.      Mouth/Throat:      Mouth: Mucous membranes are moist.   Eyes:      General: No scleral icterus.     Conjunctiva/sclera: Conjunctivae normal.   Cardiovascular:      Rate and Rhythm: Tachycardia present. Rhythm irregular.      Heart sounds: Murmur present. No friction rub. No gallop.    Pulmonary:      Comments: Diffuse expiratory wheeze bilaterally, improved compared to yesterday.  Improved air entry  Abdominal:      General: Bowel sounds are normal. There is no distension.      Palpations: Abdomen is soft.      Tenderness: There is no abdominal tenderness.   Musculoskeletal:      Right lower leg: Edema present.      Left lower leg: Edema present.   Skin:     General: Skin is warm.   Neurological:      Mental Status: She is oriented to person, place, and time.         LAB RESULTS  ABG  Results from last 7 days   Lab Units 12/29/20  0612 12/29/20  0018 12/28/20  2051 12/28/20  1603   PH ART pH 7.52* 7.31* 7.33*  --    PCO2 ART mm Hg 24* 18* 14*  --    PO2 ART mm Hg 85 93 118*  --    HCO3 ART mEQ/L 24 14* 13*  --    O2 SAT ART % 100* 100* 100*  --    BASE EXC ART mEQ/L -1.6 -14.7 -15.9  --    SOURCE OF OXYGEN   --   --   --  RA     CBC  Results from last 7 days   Lab Units 01/02/21  0434 01/01/21  2311 01/01/21  1614  01/01/21  0430  12/31/20  0400   WBC K/uL 17.40*  --   --   --  12.53*  --  21.53*   RBC M/uL 2.91*  --   --   --  3.05*  --  2.87*   HEMOGLOBIN g/dL 7.5* 7.4* 7.5*   < > 8.0*   < > 7.5*   HEMATOCRIT % 24.3* 23.6* 24.0*   < > 25.1*   < > 23.9*   MCV fL 83.5  --   --   --  82.3*  --  83.3   MCH pg 25.8*  --   --   --  26.2*  --  26.1*   MCHC g/dL  30.9*  --   --   --  31.9*  --  31.4*   PLATELETS K/uL 176  --   --   --  172  --  158   RDW % 17.0*  --   --   --  17.2*  --  16.9*   MPV fL 10.0  --   --   --  9.5  --  9.5    < > = values in this interval not displayed.     BMP  Results from last 7 days   Lab Units 01/02/21  0434 01/01/21  0431 12/31/20  0400   SODIUM mEQ/L 127* 128* 127*   POTASSIUM mEQ/L 3.8 4.1 3.9   CHLORIDE mEQ/L 97* 98 97*   CO2 mEQ/L 16* 15* 17*   BUN mg/dL 101* 97* 100*   CREATININE mg/dL 3.8* 4.1* 4.0*   CALCIUM mg/dL 7.7* 7.7* 6.6*   ALBUMIN g/dL 1.9* 1.8* 1.5*   BILIRUBIN TOTAL mg/dL 0.2* 0.4 0.7   ALK PHOS IU/L 84 93 78   ALT IU/L 26 25 19   AST IU/L 24 29 27   GLUCOSE mg/dL 152* 158* 132*     Coag  Results from last 7 days   Lab Units 01/02/21  0434 01/01/21  2311 01/01/21  1614  12/30/20  1230   PROTIME sec  --   --   --   --  15.2*   INR INR  --   --   --   --  1.2   PTT sec 65* 76* 68*   < > 47*    < > = values in this interval not displayed.       IMAGING  Imaging personally reviewed  Chest x-ray (12/31/2020):  IMPRESSION:  1.  Patchy interstitial opacity, with pulmonary vascular congestion, which may  represent multifocal airspace disease or pulmonary edema.  2.  Left basilar airspace disease, new/increased from prior     CXR (12/29/2020):  IMPRESSION: Linear atelectasis or scar in the left midlung zone     CT Abd/pelvis (12/29/20):  IMPRESSION:  1.  Limited evaluation in the absence of intravenous or oral contrast and due to  patient motion.  No definite hydronephrosis or renal calculi as clinically  questioned, noting limited evaluation of the distal ureters due to the patient's  right hip prosthesis.  2.  Circumferential bladder wall thickening and intravesicular air within the  urinary bladder, concerning for urinary tract infection.  3.  Age-indeterminate mild compression deformities of T11, T12 and L3.  4. Additionally noted in the impression should be incidentally noted pneumobilia,  which is a nonspecific finding in a  patient status post cholecystectomy     Echo (12/29/2020):  Interpretation Summary        · Mitral valve thickening. Severe mitral annular calcification.  · Severe mitral valve stenosis.  · Mean gradient = 10.00 mmHg. HR = 110 bpm.  · Hyperdynamic LV systolic function. Estimated EF >75%.  · Mildly dilated LA.  · Tricuspid valve structure is grossly normal. Mild tricuspid valve regurgitation.  · Aortic valve structure is normal. Mild aortic valve regurgitation. No aortic valve stenosis. A bioprosthetic aortic valve is present.  · Moderately dilated RA.  · Moderately dilated RV. Mildly reduced RV systolic function.  · Mild mitral valve regurgitation.     Estimated PASP= 51mmHg by TR jet method.                 ASSESSMENT & PLAN  89-year-old female presenting with weakness, hematuria, found to have septic shock     1.  Septic shock, in the setting of UTI with Klebsiella bacteremia.  UA with too numerous to count WBCs, CT demonstrating circumferential bladder wall thickening and intravesicular air  -Continue ceftriaxone  -Weaned off phenylephrine, with low normal blood pressure  -Continue to hold beta-blocker    2.  Acute kidney injury, secondary to septic shock.  Creatinine trending down  -Remains volume overloaded.  Discussed with renal.  Will redose with Lasix 80 mg IV today    3.  Hyponatremia, with initial overcorrection requiring D5W.  Sodium now 127  -DC normal saline  -Continue Lasix as above    4.  Lactic acidosis, resolved    5.  Atrial fibrillation with rapid ventricular response.  Rate better controlled on amiodarone  -Amiodarone for now  -Stop IV heparin given worsening hematuria and drop in hemoglobin    6.  Anemia.  Multifactorial in the setting of hemodilution, and acute blood loss from hematuria  -Hemoglobin 7.5  -Stop heparin    7. Hematuria, in the setting of UTI and anticoagulation  - urology following    8. Toxic metabolic encephalopathy in the setting of septic shock.  Resolving, more awake and  alert    9.  COPD, with acute exacerbation.  Improving bronchospasm  -Taper Solu-Medrol to 60 mg every 8 hours    10. Pulmonary infiltrates.  Suspect volume overload  - redose with lasix     12.  Severe mitral stenosis.  Cardiology following, with plan for outpatient evaluation    Speech evaluation pending        CODE STATUS  Full Code          Ingrid Acevedo, DO  1/2/2021

## 2021-01-02 NOTE — HOSPITAL COURSE
Alberta is a 89 y.o. female admitted on 12/28/2020 with Severe sepsis (CMS/[facility]). Principal problem is Metabolic acidosis.    Past Medical History  Alberta has a past medical history of Atrial fibrillation (CMS/[facility]), Chronic hypotension, Chronic kidney disease, Chronic kidney failure, Colon cancer (CMS/HCC), COPD (chronic obstructive pulmonary disease) (CMS/HCC), Low BP, Stroke (cerebrum) (CMS/[facility]), and UTI (urinary tract infection).    History of Present Illness   Here with metabolic acidosis, no previous ST per chart review; from home

## 2021-01-03 PROBLEM — R62.7 FTT (FAILURE TO THRIVE) IN ADULT: Status: ACTIVE | Noted: 2021-01-01

## 2021-01-03 PROBLEM — R06.02 SOB (SHORTNESS OF BREATH): Status: ACTIVE | Noted: 2021-01-01

## 2021-01-03 PROBLEM — Z51.5 PALLIATIVE CARE BY SPECIALIST: Status: ACTIVE | Noted: 2021-01-01

## 2021-01-03 PROBLEM — R52 PAIN: Status: ACTIVE | Noted: 2021-01-01

## 2021-01-03 PROBLEM — R53.81 DEBILITY: Status: ACTIVE | Noted: 2021-01-01

## 2021-01-03 NOTE — ASSESSMENT & PLAN NOTE
"Patient with weakness, fatigue      1/6, reports things \"seem harder for me\" but remains hopeful that she can \"get better\"    PT/OT/ST  Likely to need rehab if in line with goals of care however pt wishes to go home and be with her family  "

## 2021-01-03 NOTE — PROGRESS NOTES
Pulmonary Progress Note     SUBJECTIVE  Interval History:   Patient transferred to PCU status  Continues to have mild shortness of breath, but feels better than she did a few days ago    Allergies: Patient has no known allergies.    CURRENT MEDS  •  atorvastatin, 10 mg, oral, Daily (6p)  •  budesonide, 0.5 mg, nebulization, BID (6a, 6p)  •  calcium gluconate, 1 g, intravenous, 2x daily PRN  •  cefTRIAXone, 1 g, intravenous, q24h INT  •  glucose, 16-32 g of dextrose, oral, PRN **OR** dextrose, 15-30 g of dextrose, oral, PRN **OR** glucagon, 1 mg, intramuscular, PRN **OR** dextrose in water, 25 mL, intravenous, PRN  •  heparin (porcine), 5,000 Units, subcutaneous, q8h JESSICA  •  insulin aspart U-100, 3-11 Units, subcutaneous, q6h  •  ipratropium-albuteroL, 3 mL, nebulization, q6H JESSICA  •  levothyroxine, 50 mcg, oral, Daily (6:30a)  •  lidocaine HCL, 5 mL, Mouth/Throat, 3x daily PRN  •  magnesium sulfate, 2 g, intravenous, PRN  •  methylPREDNISolone sodium succinate, 60 mg, intravenous, q8h INT  •  metoprolol, 2.5 mg, intravenous, q6h PRN  •  nystatin, , Topical, BID  •  renal multivitamin, 1 tablet, oral, Daily  •  sodium chloride, 10 mL, intravenous, q8h INT  •  sodium chloride, 10 mL, intravenous, PRN    VITAL SIGNS  Vitals:    01/03/21 0900 01/03/21 1000 01/03/21 1100 01/03/21 1200   BP: 120/66 (!) 115/56 114/66 (!) 126/56   Pulse: (!) 113 (!) 115 (!) 112 (!) 115   Resp: 19 15 16 13   Temp:   36.4 °C (97.5 °F)    TempSrc:   Axillary    SpO2: 92% 94% 96% 97%   Weight:       Height:           INTAKE/OUTPUT    Intake/Output Summary (Last 24 hours) at 1/3/2021 1245  Last data filed at 1/3/2021 1200  Gross per 24 hour   Intake 615 ml   Output 1925 ml   Net -1310 ml     PHYSICAL EXAM  Physical Exam  Vitals signs reviewed.   Constitutional:       Appearance: She is not toxic-appearing.      Comments: Frail   HENT:      Head: Normocephalic and atraumatic.   Cardiovascular:      Rate and Rhythm: Normal rate. Rhythm  irregular.      Heart sounds: No murmur. No friction rub. No gallop.    Pulmonary:      Breath sounds: No rhonchi or rales.      Comments: Bilateral expiratory wheeze  Abdominal:      General: Bowel sounds are normal. There is no distension.      Palpations: Abdomen is soft.      Tenderness: There is no abdominal tenderness.   Musculoskeletal:      Comments: Bilateral upper extremity lower extremity edema   Skin:     General: Skin is warm.   Neurological:      Mental Status: She is alert and oriented to person, place, and time.         LAB RESULTS  ABG  Results from last 7 days   Lab Units 12/29/20  0612 12/29/20  0018 12/28/20  2051 12/28/20  1603   PH ART pH 7.52* 7.31* 7.33*  --    PCO2 ART mm Hg 24* 18* 14*  --    PO2 ART mm Hg 85 93 118*  --    HCO3 ART mEQ/L 24 14* 13*  --    O2 SAT ART % 100* 100* 100*  --    BASE EXC ART mEQ/L -1.6 -14.7 -15.9  --    SOURCE OF OXYGEN   --   --   --  RA     CBC  Results from last 7 days   Lab Units 01/03/21  0336 01/02/21  2132 01/02/21  1651  01/02/21  0434  01/01/21  0430   WBC K/uL 12.33*  --   --   --  17.40*  --  12.53*   RBC M/uL 2.84*  --   --   --  2.91*  --  3.05*   HEMOGLOBIN g/dL 7.2* 7.3* 7.4*   < > 7.5*   < > 8.0*   HEMATOCRIT % 23.3* 23.2* 23.8*   < > 24.3*   < > 25.1*   MCV fL 82.0*  --   --   --  83.5  --  82.3*   MCH pg 25.4*  --   --   --  25.8*  --  26.2*   MCHC g/dL 30.9*  --   --   --  30.9*  --  31.9*   PLATELETS K/uL 172  --   --   --  176  --  172   RDW % 17.1*  --   --   --  17.0*  --  17.2*   MPV fL 9.6  --   --   --  10.0  --  9.5    < > = values in this interval not displayed.     BMP  Results from last 7 days   Lab Units 01/03/21  0336 01/02/21  0434 01/01/21  0431   SODIUM mEQ/L 129* 127* 128*   POTASSIUM mEQ/L 3.5* 3.8 4.1   CHLORIDE mEQ/L 98 97* 98   CO2 mEQ/L 17* 16* 15*   BUN mg/dL 97* 101* 97*   CREATININE mg/dL 3.9* 3.8* 4.1*   CALCIUM mg/dL 8.1* 7.7* 7.7*   ALBUMIN g/dL 2.1* 1.9* 1.8*   BILIRUBIN TOTAL mg/dL 0.6 0.2* 0.4   ALK PHOS IU/L 78  84 93   ALT IU/L 26 26 25   AST IU/L 19 24 29   GLUCOSE mg/dL 134* 152* 158*     Coag  Results from last 7 days   Lab Units 01/02/21  0434 01/01/21  2311 01/01/21  1614  12/30/20  1230   PROTIME sec  --   --   --   --  15.2*   INR INR  --   --   --   --  1.2   PTT sec 65* 76* 68*   < > 47*    < > = values in this interval not displayed.       IMAGING  Imaging independently reviewed.   Chest x-ray (12/31/2020):  IMPRESSION:  1.  Patchy interstitial opacity, with pulmonary vascular congestion, which may  represent multifocal airspace disease or pulmonary edema.  2.  Left basilar airspace disease, new/increased from prior     CXR (12/29/2020):  IMPRESSION: Linear atelectasis or scar in the left midlung zone     CT Abd/pelvis (12/29/20):  IMPRESSION:  1.  Limited evaluation in the absence of intravenous or oral contrast and due to  patient motion.  No definite hydronephrosis or renal calculi as clinically  questioned, noting limited evaluation of the distal ureters due to the patient's  right hip prosthesis.  2.  Circumferential bladder wall thickening and intravesicular air within the  urinary bladder, concerning for urinary tract infection.  3.  Age-indeterminate mild compression deformities of T11, T12 and L3.  4. Additionally noted in the impression should be incidentally noted pneumobilia,  which is a nonspecific finding in a patient status post cholecystectomy     Echo (12/29/2020):  Interpretation Summary        · Mitral valve thickening. Severe mitral annular calcification.  · Severe mitral valve stenosis.  · Mean gradient = 10.00 mmHg. HR = 110 bpm.  · Hyperdynamic LV systolic function. Estimated EF >75%.  · Mildly dilated LA.  · Tricuspid valve structure is grossly normal. Mild tricuspid valve regurgitation.  · Aortic valve structure is normal. Mild aortic valve regurgitation. No aortic valve stenosis. A bioprosthetic aortic valve is present.  · Moderately dilated RA.  · Moderately dilated RV. Mildly reduced  RV systolic function.  · Mild mitral valve regurgitation.     Estimated PASP= 51mmHg by TR jet method.           ASSESSMENT & PLAN    89-year-old female presenting with weakness, hematuria, found to have septic shock      1.  Septic shock, in the setting of UTI with Klebsiella bacteremia.  UA with too numerous to count WBCs, CT demonstrating circumferential bladder wall thickening and intravesicular air  -Continue ceftriaxone  - Off pressors    2.  COPD, with acute exacerbation and ongoing bronchospasm  - Continue Solu-Medrol 60 mg every 8 hours  -Continue albuterol nebs    3.  Acute kidney injury, in setting of septic shock.  -Renal following  -Redosed with IV Lasix    4.  Atrial fibrillation  -Amiodarone discontinued per cardiology  -Heparin off due to recurrent hematuria    5. Pulmonary infiltrates.  Suspect volume overload  - redose with lasix        Ingrid Acevedo, DO

## 2021-01-03 NOTE — PROGRESS NOTES
Patient: Nancy Minor  Location: [facility] Unit 3211  MRN: [id number]  Today's date: 1/3/2021     Speech Pathology: Therapy session    SLP Diagnosis: Mild oral and suspected pharyngeal dysphagia    Recommendations:  1. PUREE diet and NECTAR thick liquids   2. Liquids via SPOON presentation ONLY  3. Medications crushed in puree or nectar thick liquids   4. Strict aspiration precautions  5. 1:1 supervision at meals for feeding assist and safety  6. ONLY feed patient when fully awake/alert  7. Please stop feeding if any overt s/s of aspiration are observed  8. ST will closely monitor    Summary/Handoff:  Daily Outcome Statement (SLP): Pt seen for follow up. Nurse reported pt with poor po intake, but tolerated puree/nectar thick liquids well. Pt cont with HOB limitations (max 30*) due to femoral line; nurse reports hopeful line will soon be removed. Pt alert and cooperative for session. Tolerated puree and nectar thick liquids well, with adequate oral/pharyngeal control, mild swallow delay, and no s/s distress or aspiration; no oral holding observed. Rec cont on current diet for now; hopeful positioning restrictions will be removed and advanced textures will be attempted at that time. Will follow.       Session Notes: see flow sheet           Dietary Orders   (From admission, onward)             Start     Ordered    01/02/21 1013  Adult Diet Pureed Texture; Nectar Thick Liquids; RD/LDN may adjust order  Diet effective now     Comments: Family tells us the patient likes soup, and yogurt  Nectars by [employer]   Question Answer Comment   Diet Texture Pureed Texture    Fluid Consistency: Nectar Thick Liquids    Delegation of Authority. Diet orders written by PA/Clark may not be adjusted by RD/LDNs. RD/LDN may adjust order        01/02/21 1012    12/29/20 2114  Dietary nutrition supplements  Once     Question Answer Comment   Select Supplement (PH): Boost Plus Vanilla    Meal period Breakfast Dinner         12/29/20 2113                Results from last 7 days   Lab Units 01/03/21  0336 01/02/21  2132 01/02/21  1651  01/02/21  0434  01/01/21  0430   WBC K/uL 12.33*  --   --   --  17.40*  --  12.53*   HEMOGLOBIN g/dL 7.2* 7.3* 7.4*   < > 7.5*   < > 8.0*   HEMATOCRIT % 23.3* 23.2* 23.8*   < > 24.3*   < > 25.1*   PLATELETS K/uL 172  --   --   --  176  --  172    < > = values in this interval not displayed.          Patient left with call bell in reach and alarms as found.        Alberta is a 89 y.o. female admitted on 12/28/2020 with Severe sepsis (CMS/[facility]). Principal problem is Metabolic acidosis.    Past Medical History  Alberta has a past medical history of Atrial fibrillation (CMS/HCC), Chronic hypotension, Chronic kidney disease, Chronic kidney failure, Colon cancer (CMS/HCC), COPD (chronic obstructive pulmonary disease) (CMS/HCC), Low BP, Stroke (cerebrum) (CMS/HCC), and UTI (urinary tract infection).    History of Present Illness   Here with metabolic acidosis, no previous ST per chart review; from home    SLP Pain    Date/Time Pain Type Pref Pain Scale Rating: Rest Rating: Activity [facility]   01/03/21 1127 Pain Assessment word (verbal rating pain scale) 0 - no pain 0 - no pain ASC              Prior Level of Function      Most Recent Value   Communication  understands/communicates without difficulty   Swallowing  swallows foods/liquids without difficulty   Baseline Diet/Method of Nutritional Intake  regular solids, thin liquids   Past History of Dysphagia  none documented   Prior Level of Function Comment  from home, lives with her children   Assistive Device/Animal Currently Used at Home  walker, front-wheeled, raised toilet, shower chair          SLP Evaluation and Treatment - 01/03/21 1127        Time Calculation    Start Time  1127     Stop Time  1146     Time Calculation (min)  19 min        Session Details    Document Type  daily treatment/progress note     Mode of Treatment  speech language pathology         General Information    Patient Profile Reviewed?  yes     General Observations of Patient  Awake, alert; cont unable to have HOB raised beyond 30*     Existing Precautions/Restrictions  aspiration;fall;head of bed elevated 30 degrees;modified diet;oxygen therapy device and L/min;other (see comments)    femoral line    Limitations/Impairments  swallowing        Cognition/Psychosocial    Orientation Status (Cognition)  oriented to;person     Follows Commands (Cognition)  follows one step commands     Cognitive Function (Cognitive)  memory deficit     Executive Function Deficit (Cognition)  minimal deficit;insight/awareness of deficits     Memory Deficit (Cognitive)  minimal deficit;moderate deficit        Auditory Comprehension    Follows Commands (Auditory Comprehension)  1-step command     1 Step, Follows Commands (Auditory Comprehension)  50-74% accuracy     Yes/No Questions (Auditory Comprehension)  simple/factual questions        Functional Communication Measures    FCM: Swallowing  4-->Level 4        General Swallowing Observations    Current Diet/Method of Nutritional Intake (General Swallowing Observations, NIS)  nectar thick liquids;pureed solids     Signs/Symptoms of Aspiration (Current Diet)  none     Respiratory Support  nasal cannula    2L       Food and Liquid Trials (NIS)    Patient Positioning  head of bed elevated (specify degrees)    30* due to fem line    Oral Intake/Feeding Performance  oral trials administered by therapist     Food Consistencies Evaluated  pureed solids     Pureed Solids  WFL     Liquid Consistencies Evaluated  nectar thick liquids     [place] Thick Liquids  WFL;sips from cup;use of teaspoon     Oral Preparatory Phase of Swallow  WFL     Oral Phase of Swallow  WFL     Pharyngeal Phase of Swallow  delayed swallow reflex initiation     Esophageal Phase of Swallow  no clinical symptoms        Swallowing Intervention    Dysphagia/Swallowing Interventions  monitor tolerance  of;current diet without evidence of aspiration;advanced diet/liquid texture trials        Coping    Observed Emotional State  accepting     Verbalized Emotional State  acceptance        AM-PAC (TM) - Cognition (Current Function)    Following/understanding a 10-15 minute speech or presentation?  3 - A little     Understanding familiar people during ordinary conversations?  4 - None     Remembering to take medications at the appropriate time?  2 - A lot     Remembering where things were placed or put away?  2 - A lot     Remembering a list of 3 or 4 errands without writing it down?  1 - Unable     Taking care of complicated tasks?  1 - Unable     AM-PAC (TM) Cognition Score  13        Therapy Assessment/Plan (SLP)    SLP Diagnosis  Mild oral and suspected pharyngeal dysphagia     Rehab Potential (SLP Eval)  good, to achieve stated therapy goals     Therapy Frequency (SLP)  3-5 times/wk        Daily Progress Summary (SLP)    Daily Outcome Statement (SLP)  Pt seen for follow up. Nurse reported pt with poor po intake, but tolerated puree/nectar thick liquids well. Pt cont with HOB limitations (max 30*) due to femoral line; nurse reports hopeful line will soon be removed. Pt alert and cooperative for session. Tolerated puree and nectar thick liquids well, with adequate oral/pharyngeal control, mild swallow delay, and no s/s distress or aspiration; no oral holding observed. Rec cont on current diet for now; hopeful positioning restrictions will be removed and advanced textures will be attempted at that time. Will follow.      Symptoms Noted During/After Treatment  none     Progress Toward Functional Goals (SLP)  progressing toward functional goals as expected     Barriers to Overall Progress (SLP)  physical; HOB limitations        Therapy Plan Review/Discharge Plan (SLP)    SLP Recommended Discharge Disposition  skilled nursing facility;home with caregiver     Therapy Plan Review (SLP)  evaluation/treatment results  reviewed;care plan/treatment goals reviewed;risks/benefits reviewed;current/potential barriers reviewed;participants voiced agreement with care plan;participants included;patient     Plan for Continued Service After Discharge  yes                  Education provided this session. See the Patient Education summary report for full details.    SLP Goals      Most Recent Value   Pharyngeal Swallow Goal 1   Activity  safely tolerate, recommended diet/liquid level, with no clinical signs/symptoms of aspiration at 01/02/2021 0950   [place]  independently at 01/02/2021 0950   Time Frame  long-term goal (LTG), by discharge at 01/02/2021 0950   Progress/Outcome  goal ongoing at 01/03/2021 1127

## 2021-01-03 NOTE — ASSESSMENT & PLAN NOTE
Pt arrived with poor po for several days, albumin 2.1  Remote CVA, chronic dysphagia, only likes certain foods - does NOT like eggs    1/5 Appetite poor. Albumin 2.2.     Pureed diet with nectar thick  Aspiration precautions  Speech following for dysphagia and diet management  Asked Nutrition to reach out to ASHLY Bell to discuss pt food preferences to promote willingness to eat

## 2021-01-03 NOTE — PLAN OF CARE
Problem: Adult Inpatient Plan of Care  Goal: Plan of Care Review  Outcome: Progressing  Flowsheets (Taken 1/2/2021 2125)  Progress: improving  Plan of Care Reviewed With:   patient   daughter  Outcome Summary: awake, on 2l nasal cannula, tyson intact and draining, flexiseal intact and draining.  Understanding of change in level of care to PCU.     Problem: Adult Inpatient Plan of Care  Goal: Patient-Specific Goal (Individualization)  Outcome: Progressing  Flowsheets (Taken 1/2/2021 2125)  Individualized Care Needs: will feel comfortable expressing needs to staff.   Plan of Care Review  Plan of Care Reviewed With: patient, daughter  Progress: improving  Outcome Summary: awake, on 2l nasal cannula, tyson intact and draining, flexiseal intact and draining.  Understanding of change in level of care to PCU.

## 2021-01-03 NOTE — PROGRESS NOTES
SUBJECTIVE      Heparin stopped for recurrent urinary bleeding.     OBJECTIVE     VITAL SIGNS:  Temp:  [36.2 °C (97.2 °F)-36.5 °C (97.7 °F)] 36.5 °C (97.7 °F)  Heart Rate:  [101-113] 111  Resp:  [12-24] 13  BP: ()/(52-68) 102/59  SPO2 92%    Intake/Output Summary (Last 24 hours) at 1/3/2021 0907  Last data filed at 1/3/2021 0800  Gross per 24 hour   Intake 515 ml   Output 1875 ml   Net -1360 ml       PHYSICAL EXAM:  General appearance: alert and cooperative  Head: without obvious abnormality  Neck: No JVD, carotid bruits, thyromegaly  Lungs: diffuse wheezing  Heart: irreg irreg, S1-S2 normal, murmur changed  Abdomen: soft, non-tender, bowel sounds normal  Extremities:1+ edema, peripheral pulses present  Skin: Skin color, texture, turgor normal. No rashes or lesions  Neurologic: Alert and oriented X 3, no focal deficits    LABS / IMAGING / EKG / TELEMETRY     LABS:  Results from last 7 days   Lab Units 01/03/21  0336 01/02/21  1651 01/02/21  0434  01/01/21  0431  12/30/20  1012 12/30/20  0433 12/29/20  2244   SODIUM mEQ/L 129*  --  127*  --  128*   < >  --  136  --    POTASSIUM mEQ/L 3.5*  --  3.8  --  4.1   < >  --  3.9  --    MAGNESIUM mg/dL 2.3 2.5 1.9   < > 1.9   < > 2.1 2.4  --    CHLORIDE mEQ/L 98  --  97*  --  98   < >  --  100  --    CO2 mEQ/L 17*  --  16*  --  15*   < >  --  21*  --    BUN mg/dL 97*  --  101*  --  97*   < >  --  113*  --    CREATININE mg/dL 3.9*  --  3.8*  --  4.1*   < >  --  4.3*  --    AST IU/L 19  --  24  --  29   < >  --  23  --    ALT IU/L 26  --  26  --  25   < >  --  18  --    TROPONIN I ng/mL  --   --   --   --   --   --  0.19* 0.23* 0.20*    < > = values in this interval not displayed.     Results from last 7 days   Lab Units 01/03/21  0336 01/02/21  2132 01/02/21  1651  01/02/21  0434  01/01/21  0430  12/30/20  1230   WBC K/uL 12.33*  --   --   --  17.40*  --  12.53*   < >  --    HEMOGLOBIN g/dL 7.2* 7.3* 7.4*   < > 7.5*   < > 8.0*   < >  --    HEMATOCRIT % 23.3* 23.2*  23.8*   < > 24.3*   < > 25.1*   < >  --    PLATELETS K/uL 172  --   --   --  176  --  172   < >  --    INR INR  --   --   --   --   --   --   --   --  1.2    < > = values in this interval not displayed.     No results found for: HGBA1C, TSH  No results found for: CHOL, LDLCALC, HDL, TRIG  No results found for: BNP    IMAGING:      ECG:       TELEMETRY: AF      MEDICATIONS        • atorvastatin  10 mg oral Daily (6p)   • budesonide  0.5 mg nebulization BID (6a, 6p)   • cefTRIAXone  1 g intravenous q24h INT   • furosemide  80 mg intravenous Once   • heparin (porcine)  5,000 Units subcutaneous q8h JESSICA   • insulin aspart U-100  3-11 Units subcutaneous q6h   • ipratropium-albuteroL  3 mL nebulization q6H JESSICA   • levothyroxine  50 mcg oral Daily (6:30a)   • methylPREDNISolone sodium succinate  60 mg intravenous q8h INT   • nystatin   Topical BID   • renal multivitamin  1 tablet oral Daily   • sodium chloride  10 mL intravenous q8h INT       ASSESSMENT AND PLAN     AF: Not sure low dose amio controlling HR. Will D/C amio and keave prn order for metoprolol IV for HR. No AC    Urosepsis    DANY: Creat 3.9    COPD: Active wheezing        Abelino Medina MD  1/3/2021    Primary Care Doctor: Elizabeth Cabral MD

## 2021-01-03 NOTE — PLAN OF CARE
Problem: Adult Inpatient Plan of Care  Goal: Plan of Care Review  Outcome: Progressing  Flowsheets (Taken 1/3/2021 3759)  Plan of Care Reviewed With: patient  Outcome Summary:   tolerating current diet   remains unable to sit fully upright for intake due to femoral linte     Problem: Swallowing Impairment  Goal: Improved Swallowing Without Aspiration  Outcome: Progressing

## 2021-01-03 NOTE — ASSESSMENT & PLAN NOTE
Denies pain at this time but states that she overall does not feel good today.      Creatinine 4.3 now   Acetaminophen for pain prn (x1/24)          PDMP (outside record) with no findings  No meds for pain on home med list

## 2021-01-03 NOTE — DISCHARGE INSTR - ACTIVITY
Midline Catheter Information:    Insertion Date: 01/03/2020    4Fr single lumen CT injectable    Length: 15cm  Right arm circumference: 23cm    External Catheter Length: 0    Last Dressing Change: {DATE:23014}    Last Cap Change: {DATE:23014}    Keep this information in a safe place and share with your  Provider and/or Infusion Nurse as needed.

## 2021-01-03 NOTE — PROGRESS NOTES
Hospital Medicine Service -  Daily Progress Note       SUBJECTIVE     Interval History: No acute events overnight. Feels ok today. Denies dyspnea has pain.  Reports tongue soreness.     OBJECTIVE        Vital signs in last 24 hours:  Temp:  [36.2 °C (97.2 °F)-36.5 °C (97.7 °F)] 36.5 °C (97.7 °F)  Heart Rate:  [101-113] 113  Resp:  [12-24] 19  BP: ()/(52-68) 102/59  I/O last 3 completed shifts:  In: 1487.4 [P.O.:55; I.V.:1382.4; IV Piggyback:50]  Out: 2770 [Urine:2570; Stool:200]    PHYSICAL EXAMINATION        GEN: well-developed and well-nourished; not in acute distress  HEENT: normocephalic; atraumatic  NECK: no JVD; no bruits  CARDIO: regular rate and rhythm; no murmurs or rubs  RESP:  Bilateral diffuse wheezing  ABD: soft, non-distended, non-tender, normal bowel sounds  EXT: no cyanosis, clubbing, bilateral lower extremity swelling, 2+ right upper extremity swelling ecchymosis right forearm, mild swelling left upper extremity  SKIN: clean, dry, warm, and intact  MUSCULOSKELETAL: no injury or deformity  NEURO: alert and oriented x 3; nonfocal  BEHAVIOR/EMOTIONAL: appropriate; cooperative     LABS / IMAGING / TELE        Labs  Lab Results   Component Value Date    WBC 12.33 (H) 01/03/2021    HGB 7.2 (L) 01/03/2021    HCT 23.3 (L) 01/03/2021    MCV 82.0 (L) 01/03/2021     01/03/2021     Lab Results   Component Value Date    GLUCOSE 134 (H) 01/03/2021    CALCIUM 8.1 (L) 01/03/2021     (L) 01/03/2021    K 3.5 (L) 01/03/2021    CO2 17 (L) 01/03/2021    CL 98 01/03/2021    BUN 97 (HH) 01/03/2021    CREATININE 3.9 (H) 01/03/2021     Lab Results   Component Value Date    INR 1.2 12/30/2020       Imaging  Ct Abdomen Pelvis Without Iv Contrast    Addendum Date: 12/28/2020    Additionally noted in the impression should be incidentally noted pneumobilia, which is a nonspecific finding in a patient status post cholecystectomy. Correlate clinically.  Further evaluation with right upper quadrant ultrasound  could be considered.    Result Date: 12/28/2020  IMPRESSION: 1.  Limited evaluation in the absence of intravenous or oral contrast and due to patient motion.  No definite hydronephrosis or renal calculi as clinically questioned, noting limited evaluation of the distal ureters due to the patient's right hip prosthesis. 2.  Circumferential bladder wall thickening and intravesicular air within the urinary bladder, concerning for urinary tract infection. 3.  Age-indeterminate mild compression deformities of T11, T12 and L3.    X-ray Chest 1 View    Result Date: 12/31/2020  IMPRESSION: 1.  Patchy interstitial opacity, with pulmonary vascular congestion, which may represent multifocal airspace disease or pulmonary edema. 2.  Left basilar airspace disease, new/increased from prior.     X-ray Chest 1 View    Result Date: 12/28/2020  IMPRESSION: Linear atelectasis or scar in the left midlung zone. COMMENT: Erect AP portable view of the chest was performed.  We have no prior studies for comparison. The heart size is towards the upper limits of normal.  A TAVR prosthesis is noted.  The pulmonary vasculature is not engorged. Linear opacity in the left midlung zone is noted, likely an area of discoid atelectasis or scar.  The lung fields are otherwise clear, although EKG leads and wires could obscure small lesions.  No pleural effusions are seen. No hilar abnormality is seen.  There is calcification of the thoracic aorta. Bilateral rotator cuff tears are suspected.      ECG/Telemetry  I have independently reviewed the telemetry. No events for the last 24 hours.Atrial fib 100-120/'    ASSESSMENT AND PLAN      Septic shock (CMS/HCC)-Klebsiella   Assessment & Plan  Secondary to UTI with Klebsiella bacteremia  Improving  Currently on Rocephin.  Can switch to Ceftin per ID.  Total duration 14 days  History of UTIs in the past  CT abdomen also showing circumferential bladder wall thickening and intravesicular air incidental  pneumobilia which is a nonspecific finding in a patient who had cholecystectomy  Blood cultures and urine cultures grew Klebsiella   Status post pressors in ICU  DC right femoral PICC line  IV team consulted for midline  DC rectal tube if possible  PT/OT eval  Seen by speech and recommending puréed diet with nectar thick liquids  She would likely require SNF placement  Patient remains full code.  Would consult palliative care to address CODE STATUS  Heparin subcu for DVT prophylaxis      Chronic atrial fibrillation (CMS/formerly [facility])  Assessment & Plan  Heart rate borderline 100-120  History of atrial fibrillation in the past  She was taking Eliquis in the past which has been discontinued  Heparin drip discontinued here secondary to hematuria  Amiodarone discontinued by Dr. Medina today  Metoprolol IV as needed for atrial fibrillation    History of CVA (cerebrovascular accident)  Assessment & Plan  Not on aspirin at home  We will hold off to statin for now    Hyponatremia  Assessment & Plan  Due to severe hypovolemia and severe sepsis  Continuing IV fluid resuscitation  Checking BMP every 6 hours.    Type 2 MI (myocardial infarction) (CMS/formerly [facility])  Assessment & Plan  In the setting of DANY and severe sepsis  Continue to trend troponins.  EKG nonischemic    History of colon cancer  Assessment & Plan  Remote history of colon cancer  Appears to be under remission    COPD (chronic obstructive pulmonary disease) (CMS/formerly [facility])  Assessment & Plan  Currently in exacerbation  Continue IV Solu-Medrol 60 every 8 hours, albuterol nebs  Continue scheduled albuterol in the setting of hyperkalemia      Acute on chronic renal failure (CMS/formerly [facility])  Assessment & Plan  Secondary to septic shock  Now with volume overload from aggressive IV fluid  Pt is  +11 L this admission  Would order Lasix 80 IV today  Patient received Lasix 80 IV yesterday  Cr 5.3 on admission improved to 3.9   Nephrology following    Hematuria  Urology is following  Hemoglobin low  7.2  Heparin drip discontinued  H&H every 12 hours  Cont tyson    Metabolic encephalopathy   in the setting of septic shock  Resolved now    Severe mitral stenosis  Outpatient follow-up with cardiology     VTE Assessment: [place]    Code Status: Full Code  Estimated discharge date: 1/5/2021     Mila Ortega MD  1/3/2021  9:42 AM

## 2021-01-03 NOTE — PROGRESS NOTES
NEPHROLOGY PROGRESS NOTE    Subjective:   Pt seen re: DANY   Urine draining well and lighter red colored this am, no more clots.   Has diuresed well.   Transferred out of ICU.       Review of Systems   Respiratory: Positive for wheezing.    Genitourinary: Positive for difficulty urinating and hematuria.   All other systems reviewed and are negative.      Vitals:    01/03/21 0800 01/03/21 0900 01/03/21 1000 01/03/21 1100   BP: (!) 102/59 120/66 (!) 115/56 114/66   Pulse: (!) 111 (!) 113 (!) 115 (!) 112   Resp: 13 19 15 16   Temp:    36.4 °C (97.5 °F)   TempSrc:    Axillary   SpO2: 92% 92% 94% 96%   Weight:       Height:             Intake/Output Summary (Last 24 hours) at 1/3/2021 1211  Last data filed at 1/3/2021 1100  Gross per 24 hour   Intake 615 ml   Output 1825 ml   Net -1210 ml       Physical Exam  Constitutional:       Comments: Pursed lip breathing    HENT:      Head: Atraumatic.      Mouth/Throat:      Mouth: Mucous membranes are dry.   Neck:      Musculoskeletal: Neck supple.   Cardiovascular:      Rate and Rhythm: Regular rhythm.   Pulmonary:      Breath sounds: Wheezing (diffuse end expiratory wheeze) present.   Abdominal:      Palpations: Abdomen is soft.   Genitourinary:     Comments: Samson draining well. Urine light red  Skin:     General: Skin is dry.         LABS:  Results from last 7 days   Lab Units 01/03/21  0336   SODIUM mEQ/L 129*   POTASSIUM mEQ/L 3.5*   CHLORIDE mEQ/L 98   CO2 mEQ/L 17*   BUN mg/dL 97*   CREATININE mg/dL 3.9*   EGFR mL/min/1.73m*2 10.7*   GLUCOSE mg/dL 134*   CALCIUM mg/dL 8.1*   ALBUMIN g/dL 2.1*   WBC K/uL 12.33*   HEMOGLOBIN g/dL 7.2*   HEMATOCRIT % 23.3*   PLATELETS K/uL 172       Meds:    Current Facility-Administered Medications:   •  atorvastatin (LIPITOR) tablet 10 mg, 10 mg, oral, Daily (6p), Ankita Culver CRNP, 10 mg at 01/02/21 1706  •  budesonide (PULMICORT) 0.5 mg/2 mL nebulizer solution 0.5 mg, 0.5 mg, nebulization, BID (6a, 6p), Ankita Culver CRNP, 0.5 mg at  01/03/21 0834  •  calcium gluconate 1,000 mg in sodium chloride 0.9 % 50 mL IVPB, 1 g, intravenous, 2x daily PRN, Ankita Culver CRNP  •  cefTRIAXone (ROCEPHIN) IVPB 1 g in 100 mL NSS vial in bag, 1 g, intravenous, q24h INT, Ankita Culver CRNP, Stopped at 01/03/21 1019  •  glucose chewable tablet 16-32 g of dextrose, 16-32 g of dextrose, oral, PRN **OR** dextrose 40 % oral gel 15-30 g of dextrose, 15-30 g of dextrose, oral, PRN **OR** glucagon (GLUCAGEN) injection 1 mg, 1 mg, intramuscular, PRN **OR** dextrose in water injection 12.5 g, 25 mL, intravenous, PRN, Ankita Culver CRNP  •  heparin (porcine) 5,000 unit/mL injection 5,000 Units, 5,000 Units, subcutaneous, q8h JESSICA, Ankita Culver CRNP, 5,000 Units at 01/03/21 0622  •  insulin aspart U-100 (NovoLOG) pen 3-11 Units, 3-11 Units, subcutaneous, q6h, Ankita Culver CRNP, 3 Units at 01/01/21 1157  •  ipratropium-albuteroL (DUO-NEB) 0.5-2.5 mg/3 mL nebulizer solution 3 mL, 3 mL, nebulization, q6H JESSICA, Ankita Culver CRNP, 3 mL at 01/03/21 0836  •  levothyroxine (SYNTHROID) tablet 50 mcg, 50 mcg, oral, Daily (6:30a), Ankita Culver CRNP, 50 mcg at 01/03/21 0624  •  lidocaine HCL (XYLOCAINE) 2 % viscous mucosal solution 5 mL, 5 mL, Mouth/Throat, 3x daily PRN, Mila Ortega MD  •  magnesium sulfate IVPB 2g in 50 mL NSS/D5W/SWFI, 2 g, intravenous, PRN, Ankita Culver CRNP, Stopped at 01/02/21 0837  •  methylPREDNISolone sod suc(PF) (Solu-MEDROL) injection 60 mg, 60 mg, intravenous, q8h INT, Ankita Culver CRNP, 60 mg at 01/03/21 0846  •  metoprolol (LOPRESSOR) injection 2.5 mg, 2.5 mg, intravenous, q6h PRN, Abelino Medina MD  •  nystatin (MYCOSTATIN) 100,000 unit/gram topical powder, , Topical, BID, Ankita Culver CRNP, Given at 01/03/21 0855  •  renal multivitamin tablet 1 tablet, 1 tablet, oral, Daily, Ankita Culver CRNP, 1 tablet at 01/03/21 0802  •  sodium chloride flush 10 mL, 10 mL, intravenous, q8h INT, Mila Ortega MD  •  sodium chloride  flush 10 mL, 10 mL, intravenous, PRN, Mila Ortega MD    ASSESSMENT:  Principal Problem:    Metabolic acidosis  Active Problems:    Severe sepsis (CMS/HCC)    Encephalopathy    Pneumobilia    Acute on chronic renal failure (CMS/HCC)    Hyperkalemia    Acute cystitis with hematuria    Hypoglycemia    COPD (chronic obstructive pulmonary disease) (CMS/HCC)    History of colon cancer    Type 2 MI (myocardial infarction) (CMS/HCC)    Hyponatremia    History of CVA (cerebrovascular accident)    Chronic atrial fibrillation (CMS/HCC)    Septic shock (CMS/HCC)    Leukocytosis    Anemia    Lactic acidosis    Hematuria    Palliative care by specialist    SOB (shortness of breath)    FTT (failure to thrive) in adult    Pain    Debility      PLAN:  1. DANY on CKD: ATN in the context of septic shock. Converted to non-oliguric state.  Creatinine has been slowly downtrending.   - pt has a narrow therapeutic window w/ comorbidities informing volume management.   - would continue gentle diuresis w/  lasix 80mg IVP daily. Goal UO 2-2.5L/day         2. Hyponatremia: Initial over-correction requiring D5W. Improved and 129 today.   - c/w lasix order     3. Hyperkalemia: this is resolved. Renal function improving.      4. Uro-sepsis: improving on ceftriaxone   5. Hematuria:   Heparin gtt discontinued. Samson appears to be draining. Urology following.     Inderjit Sutherland MD

## 2021-01-03 NOTE — ASSESSMENT & PLAN NOTE
Sob in setting of sepsis and known COPD   IV Solu-Medrol albuterol nebs, albuterol per medical team  Fluid overload- + 22 L at one point      1/6- Unstable Continues to have significant dyspnea and is receiving diuretic therapy. Supplemental oxygen at 4 L    Overnight needed Hypothermia protocol with Trenton Mclaughlin      Continue diuresis per medical team though Creatinine is climbing and currently 4.3   Nephrology following

## 2021-01-03 NOTE — ASSESSMENT & PLAN NOTE
89yoF with urosepsis, renal failure, dysphagia, FTT (albumin 2.2), Afib difficult to control due to hypotension.  Pt is very frail, will not take po and is declining     Family meeting scheduled for today at 3pm with all 5 adult children to discuss goals of care  See ACP Note dated 20   Code status confirmed for LIMITED CODE:   No compressions, intubation or mechanical ventilation, okay for ACLS medications as well as Bag/Valve mask and BiPAp and Defib/cardioversion  Patient has a very extensive POA from [place] on EMR  Medical POA is Ashanti Vinson with alternate if Arabella were  to be Marilia Diaz  Likely to need rehab on discharge if in line with goals of care and she recovers from current illness however, pt and family inclined to prefer her return to home if able.

## 2021-01-03 NOTE — CONSULTS
PALLIATIVE CARE CONSULTATION NOTE    Conversation/Goals of Care:  In progress    ASSESSMENT AND PLAN  Debility  Assessment & Plan  Patient with weakness, fatigue    PT/OT/ST  Likely to need rehab if in line with goals of care    Pain  Assessment & Plan  Denies pain at this time.  Creatinine 3.9, liver enzymes wnl    PDMP (outside record) with no findings  No meds for pain on home med list      FTT (failure to thrive) in adult  Assessment & Plan  Pt arrived with poor po for several days, albumin 2.1  Remote CVA, ?chronic dysphagia    Pureed diet with nectar thick  Aspiration precautions  Speech following for dysphagia and diet management    SOB (shortness of breath)  Assessment & Plan  Sob in setting of sepsis and known COPD   IV Solu-Medrol 60 every 8 hours, albuterol nebs, albuterol per medical team  Fluid overload- + 22 L at one point           Continue diuresis per medical team    Palliative care by specialist  Assessment & Plan    -see debility        FULL CODE- Changed to limited code, No compressions, intubation or mechanical ventilation, okay for ACLS medications as well as Bag/Valve mask and BiPAp  Patient has a very extensive POA from [place] on EMR  Medical POA is Ashanti [place] with alternate if Arabella were  to be Marilia Minor [facility]  Palliative Care will clarify decision maker with patient as she appears to be neurologically intact at this time and further discuss code status and goals of care  Likely to need rehab on discharge if in line with goals of care      Requesting Physician: Mila Ortega MD []  Reason for Consultation: Assistance with clarification of goals of care      DINA  Nancy Minor is a 89yoF admitted 20 from home she shares with her son with altered mental status, poor po, weakness and fatigue for several days and son brought her ER when urine was darkened with hematuria.      PMH includes CKD, CVA, remote colon cancer s/p surgery, COPD,  chronic hypotension, UTIs, Afib (Eliquis). PSH includes Cardiac surgery, hip and knee replacement      Pt is being treated for renal failure, Urosepsis (Klebsiella bacteremia) and eval for HD, creatinine 3.9. ABX per ID  Afib with anticoags dcd for hematuria.     Type 2 MI in setting of In the setting of DANY and severe sepsis  CT abdomen also showing circumferential bladder wall thickening and intravesicular air incidental pneumobilia which is a nonspecific finding in a patient who had cholecystectomy     Of note, it was son who made code status change, however, POA notes different name as medical decision maker and patient appears to be fully oriented at this time   Palliative Care consulted by attending for goals of care      Patient in alert in bed and oriented. Recall reason that she came into hospital. States that her daughter Arabella is her POA. Does live with son and she hopes to get better to return home to him.     Reports significant dyspnea and she is being diuresed. Was 21 L positive at one time, now 11 L positive.    Denies pain, nausea, fever. + peripheral edema.     She has completed living will and states that she would not want compressions or to be placed on a ventilator.     She reports that she does not want these things as well.     Spoke with POA  Daughter Arabella by phone. Reports that if patient got covid they would want all resuscitative measures done but generally she agrees that the burden of compressions and intubation with mechanical ventilation would outweigh the benefit.    She is in agreement with limited code including defibrillation, ACLS medications and Bag/valve mask and BiPAP.           Personally reviewed case with RN and DREAD Arriola..    [facility]  Aurelia (Outside records) query reviewed with no concerns.    Code Status History:  Current Code Status: Changing to limited code  Prior to Consult: Full Code  Code status was changed during visit?  Yes    Palliative Assessment:  Current Palliative Performance Status: 40%   Estimated Palliative Prognosis: Depends on response to treatments     SUBJECTIVE:  Past Medical History:  Past Medical History:   Diagnosis Date   • Atrial fibrillation (CMS/HCC)    • Chronic hypotension    • Chronic kidney disease    • Chronic kidney failure    • Colon cancer (CMS/HCC)    • COPD (chronic obstructive pulmonary disease) (CMS/HCC)    • Low BP    • Stroke (cerebrum) (CMS/HCC)    • UTI (urinary tract infection)        Past Surgical History:  Past Surgical History:   Procedure Laterality Date   • CARDIAC SURGERY     • CENTRAL LINE  12/29/2020        • COLON SURGERY     • HIP SURGERY     • JOINT REPLACEMENT     • KNEE SURGERY         Allergies:  Patient has no known allergies.    Medications:    Current Facility-Administered Medications:   •  atorvastatin (LIPITOR) tablet 10 mg, 10 mg, oral, Daily (6p), Ankita Culver CRNP, 10 mg at 01/02/21 1706  •  budesonide (PULMICORT) 0.5 mg/2 mL nebulizer solution 0.5 mg, 0.5 mg, nebulization, BID (6a, 6p), Ankita Culver CRNP, 0.5 mg at 01/03/21 0834  •  calcium gluconate 1,000 mg in sodium chloride 0.9 % 50 mL IVPB, 1 g, intravenous, 2x daily PRN, Ankita Culver CRNP  •  cefTRIAXone (ROCEPHIN) IVPB 1 g in 100 mL NSS vial in bag, 1 g, intravenous, q24h INT, Ankita Culver CRNP, Stopped at 01/03/21 1019  •  glucose chewable tablet 16-32 g of dextrose, 16-32 g of dextrose, oral, PRN **OR** dextrose 40 % oral gel 15-30 g of dextrose, 15-30 g of dextrose, oral, PRN **OR** glucagon (GLUCAGEN) injection 1 mg, 1 mg, intramuscular, PRN **OR** dextrose in water injection 12.5 g, 25 mL, intravenous, PRN, Ankita Culver CRNP  •  heparin (porcine) 5,000 unit/mL injection 5,000 Units, 5,000 Units, subcutaneous, q8h Palomo LOPEZ Briana, CRNP, 5,000 Units at 01/03/21 0622  •  insulin aspart U-100 (NovoLOG) pen 3-11 Units, 3-11 Units, subcutaneous, q6h, Ankita Culver CRNP, 3 Units at 01/01/21  1157  •  ipratropium-albuteroL (DUO-NEB) 0.5-2.5 mg/3 mL nebulizer solution 3 mL, 3 mL, nebulization, q6H JESSICA, Ankita Culver CRNP, 3 mL at 01/03/21 0836  •  levothyroxine (SYNTHROID) tablet 50 mcg, 50 mcg, oral, Daily (6:30a), Ankita Culver CRNP, 50 mcg at 01/03/21 0624  •  lidocaine HCL (XYLOCAINE) 2 % viscous mucosal solution 5 mL, 5 mL, Mouth/Throat, 3x daily PRN, Mila Ortega MD  •  magnesium sulfate IVPB 2g in 50 mL NSS/D5W/SWFI, 2 g, intravenous, PRN, Ankita Culver CRNP, Stopped at 01/02/21 0837  •  methylPREDNISolone sod suc(PF) (Solu-MEDROL) injection 60 mg, 60 mg, intravenous, q8h INT, Ankita Culver CRNP, 60 mg at 01/03/21 0846  •  metoprolol (LOPRESSOR) injection 2.5 mg, 2.5 mg, intravenous, q6h PRN, Abelino Medina MD  •  nystatin (MYCOSTATIN) 100,000 unit/gram topical powder, , Topical, BID, Ankita Culver CRNP, Given at 01/03/21 0855  •  renal multivitamin tablet 1 tablet, 1 tablet, oral, Daily, Ankita Culver CRNP, 1 tablet at 01/03/21 0802  •  sodium chloride flush 10 mL, 10 mL, intravenous, q8h INT, Mila Ortega MD  •  sodium chloride flush 10 mL, 10 mL, intravenous, PRN, Mila Ortega MD    Social History:  Social History     Socioeconomic History   • Marital status:      Spouse name: None   • Number of children: None   • Years of education: None   • Highest education level: None   Occupational History   • None   Social Needs   • Financial resource strain: None   • Food insecurity     Worry: None     Inability: None   • Transportation needs     Medical: None     Non-medical: None   Tobacco Use   • Smoking status: Never Smoker   • Smokeless tobacco: Never Used   Substance and Sexual Activity   • Alcohol use: Yes   • Drug use: Not Currently   • Sexual activity: None   Lifestyle   • Physical activity     Days per week: None     Minutes per session: None   • Stress: None   Relationships   • Social connections     Talks on phone: None     Gets together: None     Attends [employer]  service: None     Active member of club or organization: None     Attends meetings of clubs or organizations: None     Relationship status: None   • Intimate partner violence     Fear of current or ex partner: None     Emotionally abused: None     Physically abused: None     Forced sexual activity: None   Other Topics Concern   • None   Social History Narrative    Lives at home, with son- care giver.       Family History:  Family History   Family history unknown: Yes       Review of Systems:  All other systems reviewed and negative except as noted in the HPI.    OBJECTIVE:  Vitals:   Visit Vitals  BP (!) 114/58   Pulse (!) 111   Temp 36.4 °C (97.5 °F) (Axillary)   Resp 13   Ht 1.524 m (5')   Wt 56.8 kg (125 lb 4.8 oz)   SpO2 98%   BMI 24.47 kg/m²     TMax (12h): Temp (12hrs), Av.4 °C (97.6 °F), Min:36.4 °C (97.5 °F), Max:36.5 °C (97.7 °F)    Weight from last three encounters:   Wt Readings from Last 3 Encounters:   21 56.8 kg (125 lb 4.8 oz)     I/Os:     Intake/Output Summary (Last 24 hours) at 1/3/2021 1349  Last data filed at 1/3/2021 1300  Gross per 24 hour   Intake 595 ml   Output 1920 ml   Net -1325 ml       Physical Exam  Vitals signs and nursing note reviewed.   Constitutional:       General: She is not in acute distress.  HENT:      Head: Normocephalic and atraumatic.      Mouth/Throat:      Mouth: Mucous membranes are moist.   Eyes:      General: No scleral icterus.  Cardiovascular:      Rate and Rhythm: Tachycardia present. Rhythm irregular.      Pulses: Normal pulses.   Pulmonary:      Breath sounds: Wheezing present.      Comments: Tachypneic. Inspiratory and expiratory wheezing,      Abdominal:      General: Bowel sounds are normal.      Palpations: Abdomen is soft.   Musculoskeletal:         General: Swelling present.      Comments: sarcopenia   Skin:     Findings: No rash.   Neurological:      Mental Status: She is alert and oriented to person, place, and time.   Psychiatric:         Mood  and Affect: Mood normal.         Behavior: Behavior normal.      Comments: pleasant         DATA  Imaging:  Ct Abdomen Pelvis Without Iv Contrast    Addendum Date: 12/28/2020    Additionally noted in the impression should be incidentally noted pneumobilia, which is a nonspecific finding in a patient status post cholecystectomy. Correlate clinically.  Further evaluation with right upper quadrant ultrasound could be considered.    Result Date: 12/28/2020  IMPRESSION: 1.  Limited evaluation in the absence of intravenous or oral contrast and due to patient motion.  No definite hydronephrosis or renal calculi as clinically questioned, noting limited evaluation of the distal ureters due to the patient's right hip prosthesis. 2.  Circumferential bladder wall thickening and intravesicular air within the urinary bladder, concerning for urinary tract infection. 3.  Age-indeterminate mild compression deformities of T11, T12 and L3.    X-ray Chest 1 View    Result Date: 12/31/2020  IMPRESSION: 1.  Patchy interstitial opacity, with pulmonary vascular congestion, which may represent multifocal airspace disease or pulmonary edema. 2.  Left basilar airspace disease, new/increased from prior.     X-ray Chest 1 View    Result Date: 12/28/2020  IMPRESSION: Linear atelectasis or scar in the left midlung zone. COMMENT: Erect AP portable view of the chest was performed.  We have no prior studies for comparison. The heart size is towards the upper limits of normal.  A TAVR prosthesis is noted.  The pulmonary vasculature is not engorged. Linear opacity in the left midlung zone is noted, likely an area of discoid atelectasis or scar.  The lung fields are otherwise clear, although EKG leads and wires could obscure small lesions.  No pleural effusions are seen. No hilar abnormality is seen.  There is calcification of the thoracic aorta. Bilateral rotator cuff tears are suspected.      I have independently reviewed the pertinent imaging to the  time of note and agree with reported results.    Telemetry/ECGs:     I have independently reviewed the pertinent cardiac studies to the time of note and agree with reported results.    Labs:  Results from last 7 days   Lab Units 01/03/21  0336   SODIUM mEQ/L 129*   POTASSIUM mEQ/L 3.5*   CHLORIDE mEQ/L 98   CO2 mEQ/L 17*   BUN mg/dL 97*   CREATININE mg/dL 3.9*   GLUCOSE mg/dL 134*   CALCIUM mg/dL 8.1*       Results from last 7 days   Lab Units 01/03/21  0336   MAGNESIUM mg/dL 2.3    Results from last 7 days   Lab Units 01/03/21  0336   WBC K/uL 12.33*   HEMOGLOBIN g/dL 7.2*   HEMATOCRIT % 23.3*   PLATELETS K/uL 172       Results from last 7 days   Lab Units 12/30/20  1230   INR INR 1.2              Labs reviewed. Discussed renal function with daughter Arabella. Creatinine elevated at 3.9  Hgb 7.2          Microbiology Data:  Microbiology Results     Procedure Component Value Units Date/Time    SARS-CoV-2 (COVID-19), PCR Nasopharynx [[id number]]  (Normal) Collected: 12/28/20 1840    Specimen: Nasopharyngeal Swab from Nasopharynx Updated: 12/28/20 2018    Narrative:      The following orders were created for panel order SARS-CoV-2 (COVID-19), PCR Nasopharynx.  Procedure                               Abnormality         Status                     ---------                               -----------         ------                     SARS-CoV-2 (COVID-19), P...[[id number]]  Normal              Final result                 Please view results for these tests on the individual orders.    SARS-CoV-2 (COVID-19), PCR Nasopharynx [[id number]]  (Normal) Collected: 12/28/20 1840    Specimen: Nasopharyngeal Swab from Nasopharynx Updated: 12/28/20 2018     SARS-CoV-2 (COVID-19) Negative    Narrative:      Nursing instructions: Obtain nasopharyngeal swab ONLY.  Send swab in viral transport media.    Blood Culture Blood, Venous [[id number]]  (Abnormal) Collected: 12/28/20 1648    Specimen: Blood, Venous Updated: 12/31/20 0829     Culture  **Positive Culture**      Klebsiella pneumoniae ssp pneumoniae     Gram Stain Result Gram negative bacilli    Urine culture Urine, Clean Catch [[id number]]  (Abnormal)  (Susceptibility) Collected: 12/28/20 1627    Specimen: Urine, Clean Catch Updated: 12/31/20 0830     Urine Culture **Positive Culture**      >1 x 10^5 CFU/mL Klebsiella pneumoniae ssp pneumoniae    Blood Culture Blood, Venous [[id number]]  (Abnormal)  (Susceptibility) Collected: 12/28/20 1603    Specimen: Blood, Venous Updated: 12/31/20 0825     Culture **Positive Culture**      Klebsiella pneumoniae ssp pneumoniae     Gram Stain Result Gram negative bacilli    Blood Culture PCR Panel Blood, Venous [[id number]]  (Abnormal) Collected: 12/28/20 1603    Specimen: Blood, Venous Updated: 12/29/20 1309     Acinetobacter calcoaceticus-baumannii Not Detected     Candida albicans Not Detected     Candida auris Not Detected     Candida glabrata Not Detected     Candida krusei Not Detected     Candida parapsilosis Not Detected     Candida tropicalis Not Detected     Cryptococcus neoformans/gattii Not Detected     Enterococcus faecalis Not Detected     Enterococcus faecium Not Detected     Enterobacter cloacae complex Not Detected     Escherichia coli Not Detected     Klebsiella oxytoca Not Detected     Klebsiella pneumoniae Detected     Klebsiella aerogenes Not Detected     Proteus Not Detected     Salmonella species Not Detected     Serratia marcescens Not Detected     Haemophilus influenzae Not Detected     Listeria monocytogenes Not Detected     Neiseria meningitidis Not Detected     Pseudomonas aeruginosa Not Detected     Stenotrophomonas maltophilia Not Detected     Bacteroides fragilis Not Detected     Staph (not aureus) Not Detected     Staphylococcus aureus Not Detected     Staphylococcus ludgnensis Not Detected     Staphylococcus epidermidis Not Detected     Streptococcus Not Detected     Streptococcus agalactiae (Group B) Not Detected     Streptococcus  pneumoniae Not Detected     Streptococcus pyogenes (Group A) Not Detected     KPC (Carbapenem Resistance Gene) Not Detected     Damon/B (Vancomycin Resistance Gene) Not Applicable     mecA/C Not Applicable     mecA/C and MREJ (MRSA) Not Applicable     CTX-M (ESBL) Not Detected     IMP Not Detected     mcr-1 Not Detected     NDM Not Detected     OXA-48-like Not Detected     VIM Not Detected     Comment: See below    Narrative:      This positive blood culture was tested with a rapid molecular panel that detects Enterococcus faecalis, Enterococcus faecium, Listeria monocytogenes, Staphylococcus species, Staphylococcus aureus, Staphylococcus lugdunensis, Staphylococcus epidermidis, Streptococcus agalactiae (Group B), Streptococcus pneumonia, Streptococcus pyogenes (Group A), Acinetobacter calcoaceticus-baumannii complex, Bacteroides fragilis, Haemophilus influenza, Neisseria meningitides, Pseudomonas aeruginosa, Stenotrophomonas maltophilia , Salmonella spp.,  Enterobacter cloacae complex, Escherichia coli, Klebsiella oxytoca, Klebsiella pneumoniae group, Klebsiella aerogenes , Proteus species, Serratia marcescens, Candida albicans, Candida auris, Candida glabrata, Candida krusei, Cryptococcus neoformans/gattii, Candida parapsilosis, and Candida tropicalis.         Thank you for the opportunity to participate in this patient's hospital plan of care.   DREAD Palafox  [phone]  1/3/2021 1:49 PM

## 2021-01-03 NOTE — PROGRESS NOTES
Daily Progress Note    Subjective     Interval History:Pt resting in bed, reports tongue discomfort, denies tyson complaints,  Heparin was held, urine light red, no clots.       Objective     Vital signs in last 24 hours:  Temp:  [36.2 °C (97.2 °F)-36.5 °C (97.7 °F)] 36.5 °C (97.7 °F)  Heart Rate:  [101-113] 109  Resp:  [12-26] 14  BP: ()/(51-68) 107/57      Intake/Output Summary (Last 24 hours) at 1/3/2021 0745  Last data filed at 1/3/2021 0700  Gross per 24 hour   Intake 575 ml   Output 1910 ml   Net -1335 ml       Intake/Output this shift:  I/O this shift:  In: 20 [I.V.:20]  Out: 60 [Urine:60]    Labs  CBC Results       01/03/21 01/02/21 01/02/21                    0336 2132 1651         WBC 12.33 -- --         RBC 2.84 -- --         HGB 7.2 7.3 7.4         HCT 23.3 23.2 23.8         MCV 82.0 -- --         MCH 25.4 -- --         MCHC 30.9 -- --          -- --                     BMP Results       01/03/21 01/02/21 01/01/21                    0336 0434 0431          127 128         K 3.5 3.8 4.1         Cl 98 97 98         CO2 17 16 15         Glucose 134 152 158         BUN 97 101 97         Creatinine 3.9 3.8 4.1         Calcium 8.1 7.7 7.7         Anion Gap 14 14 15         EGFR 10.7 11.0 10.1         Comment for BUN at 0336 on 01/03/21: Consistent with previous results          BMP Results       01/03/21 01/02/21 01/01/21                    0336 0434 0431          127 128         K 3.5 3.8 4.1         Cl 98 97 98         CO2 17 16 15         Glucose 134 152 158         BUN 97 101 97         Creatinine 3.9 3.8 4.1         Calcium 8.1 7.7 7.7         Anion Gap 14 14 15         EGFR 10.7 11.0 10.1         Comment for BUN at 0336 on 01/03/21: Consistent with previous results            Imaging    IMPRESSION:  1.  Limited evaluation in the absence of intravenous or oral contrast and due to  patient motion.  No definite hydronephrosis or renal calculi as clinically  questioned, noting  limited evaluation of the distal ureters due to the patient's  right hip prosthesis.  2.  Circumferential bladder wall thickening and intravesicular air within the  urinary bladder, concerning for urinary tract infection.  3.  Age-indeterminate mild compression deformities of T11, T12 and L3.         Physical Exam:  GEN:NAD  RESP: on NC  ABD: NTTP  : + 16 fr tyson draining light red color urine, no blood clots.     A/P: 89 y/o female admit with severe urosepsis,  hematuria, DANY on CKD     -- Tyson draining light red today, heparin was held, will monitor still might need cbi, vs OR.    -- urine culture and blood culture showing klebsiella pneumoniae,  ID following pt currently on rocephin  -- ct scan showed circumferential bladder wall thickening and intravesical air withing the bladder concerning for UTI.  -- .Monitor creat,.   -- Follow H&H, transfuse prn per primary team.  Will follow

## 2021-01-04 NOTE — PROGRESS NOTES
Nutrition Note           Clinical Course: Patient is a 89 y.o. female who was admitted on 12/28/2020 with a diagnosis of Hyperkalemia [E87.5]  Acute cystitis with hematuria [N30.01]  Severe sepsis (CMS/HCC) [A41.9, R65.20]  Acute renal failure, unspecified acute renal failure type (CMS/HCC) [N17.9].   Past Medical History:   Diagnosis Date   • Atrial fibrillation (CMS/HCC)    • Chronic hypotension    • Chronic kidney disease    • Chronic kidney failure    • Colon cancer (CMS/HCC)    • COPD (chronic obstructive pulmonary disease) (CMS/HCC)    • Low BP    • Stroke (cerebrum) (CMS/HCC)    • UTI (urinary tract infection)      Past Surgical History:   Procedure Laterality Date   • CARDIAC SURGERY     • CENTRAL LINE  12/29/2020        • COLON SURGERY     • HIP SURGERY     • JOINT REPLACEMENT     • KNEE SURGERY         Nutrition Interventions/ Recommendations:   1. Continue Puree, Nectar liquid diet  - adjust at SLP recommendations  - change Boost to berry magic cup and boost pudding for diet compliance  - monitor/encourage PO intakes  2. Treat/trend lab/lytes  - correct/replace prn  3. Daily weight; monitor I/O (if accurate, wt up ~ 25 lbs since admission)  4. Continue Renal MVI  5. Monitor BS while on IV steroids  - add SSI if BS >/= 180 mg/dl          Nutrition Status Classification: Moderate nutritional compromise       Dietary Orders   (From admission, onward)             Start     Ordered    01/02/21 1013  Adult Diet Pureed Texture; Nectar Thick Liquids; RD/LDN may adjust order  Diet effective now     Comments: Family tells us the patient likes soup, and yogurt  Nectars by [employer]   Question Answer Comment   Diet Texture Pureed Texture    Fluid Consistency: Nectar Thick Liquids    Delegation of Authority. Diet orders written by PA/CRThang may not be adjusted by RD/LDNs. RD/LDN may adjust order        01/02/21 1012    12/29/20 2114  Dietary nutrition supplements  Once     Question Answer Comment   Select Supplement  (PH): Boost Plus Vanilla    Meal period Breakfast Dinner        12/29/20 2113                    Reason for Assessment  Reason For Assessment: per organizational policy(follow up)  Diagnosis: renal disease    M[facility] Nutrition Screen Tool  Has patient lost weight without trying?: 0-->No  If yes,how much weight has been lost?: 0-->Patient has not lost weight  Has patient been eating poorly due to decreased appetite?: 0-->No  MST Nutrition Screen Score: 0    Nutrition/Diet History  Typical Food/Fluid Intake: good   Diet Prior to Admission: regular  Appetite Prior to Admission: Good-50-75%  Intake (%): 25%  Meal/Snack Patterns: 3 meals daily, but smaller portions  Supplemental Drinks/Foods/Additives: no  Functional Status: ambulatory(daughter cooks/shops +  home health aid)  Food Allergies: (NKFA)  Factors Affecting Nutritional Intake: difficulty/impaired swallowing(dislike of diet texture)    Physical Findings  Overall Physical Appearance: (frail-elderly appearing)  Gastrointestinal: (none reported)  Last Bowel Movement: 01/02/21  Skin: edema, pressure injury(all extremity +2; st ll buttock)    Last Bowel Movement: 01/02/21    Nutrition Order  Nutrition Order Review: meets nutritional requirements  Nutrition Order Comments: puree, nectar liquids    Anthropometrics  Height: 152.4 cm (5')    Weights (last 7 days)     Date/Time   Weight   Drug Calculation Weight (Dosing Weight)    01/03/21 0600   56.8 kg (125 lb 4.8 oz)   --    01/02/21 0434   59.5 kg (131 lb 1.6 oz)   --    01/01/21 0433   57.3 kg (126 lb 4.8 oz)   --    12/31/20 0548   55.4 kg (122 lb 1.6 oz)   --    12/30/20 0745   52.6 kg (116 lb)   --    12/30/20 0600   52.7 kg (116 lb 2.9 oz)   --    12/28/20 2130   45.5 kg (100 lb 3.2 oz)   45.5 kg (100 lb 3.2 oz)    12/28/20 1653   48.5 kg (107 lb)   --                   Current Weight  Weight Method: Bed scale  Weight: 56.8 kg (125 lb 4.8 oz)    Ideal Body Weight (IBW)  Ideal Body Weight (IBW) (kg): 45.86  % Ideal  Body Weight: 114.73         Body Mass Index (BMI)  BMI (Calculated): 24.5  BMI (kg/m2): 22.7  BMI Assessment: BMI 18.5-24.9: normal      Labs/Procedures/Meds  Lab Results Reviewed: reviewed      Results from last 7 days   Lab Units 01/04/21 0329 01/03/21 0336 01/02/21  0434   SODIUM mEQ/L 134* 129* 127*   POTASSIUM mEQ/L 3.5* 3.5* 3.8   CHLORIDE mEQ/L 101 98 97*   CO2 mEQ/L 19* 17* 16*   BUN mg/dL 102* 97* 101*   CREATININE mg/dL 4.1* 3.9* 3.8*   GLUCOSE mg/dL 123* 134* 152*   CALCIUM mg/dL 8.6* 8.1* 7.7*      Results from last 7 days   Lab Units 01/04/21 0329 01/03/21 0336 01/02/21  0434   ALK PHOS IU/L 73 78 84   BILIRUBIN TOTAL mg/dL 0.5 0.6 0.2*   ALBUMIN g/dL 2.0* 2.1* 1.9*   ALT IU/L 25 26 26   AST IU/L 15 19 24          No results found for: HGBA1C  Lab Results   Component Value Date    CDFPQDMA66 >1,500 (H) 01/03/2021     Lab Results   Component Value Date    CALCIUM 8.6 (L) 01/04/2021     Results from last 7 days   Lab Units 01/04/21  0330 01/03/21 0336 01/02/21 2132 01/02/21  0434   WBC K/uL 13.44* 12.33*  --   --  17.40*   HEMOGLOBIN g/dL 7.1* 7.2* 7.3*   < > 7.5*   HEMATOCRIT % 22.7* 23.3* 23.2*   < > 24.3*   PLATELETS K/uL 191 172  --   --  176    < > = values in this interval not displayed.      Results from last 7 days   Lab Units 01/03/21  0336   IRON ug/dL 17*   TIBC ug/dL 336          Results from last 7 days   Lab Units 01/04/21 0329 01/03/21  0336 01/02/21  1651   MAGNESIUM mg/dL 2.3 2.3 2.5               Medications  Pertinent Medications Reviewed: reviewed  • albuterol  2.5 mg nebulization Once   • atorvastatin  10 mg oral Daily (6p)   • budesonide  0.5 mg nebulization BID (6a, 6p)   • cefUROXime  250 mg oral Daily   • ferric gluconate (FERRLECIT) IVPB  125 mg intravenous Daily   • heparin (porcine)  5,000 Units subcutaneous q8h JESSICA   • insulin aspart U-100  3-11 Units subcutaneous q6h   • ipratropium-albuteroL  3 mL nebulization q6H JESSICA   • levothyroxine  50 mcg oral Daily (6:30a)   •  methylPREDNISolone sodium succinate  60 mg intravenous q8h INT   • nystatin   Topical BID   • renal multivitamin  1 tablet oral Daily   • sodium chloride  10 mL intravenous q8h INT         Estimated/Assessed Needs  Additional Documentation: Calorie Requirements (Group), Protein Requirements (Group), Fluid Requirements (Group)    Calorie Requirements  Estimated kCal Needs: Actual Body Weight(46kg)  Estimated Calorie Need Method: kcal/kg  Calorie/kg Recommended: 30-35  Calorie Recommendations: 1006-6665      Protein Requirements  Recommended Dosing Weight (Estimated Protein Needs): Actual Body Weight  Est Protein Requirement Amount (gms/kg): (.6-.8)  Protein Recommendations: 28-37    Fluid Requirements  Fluid Recommendation (mL): 25-30ml  Recommended Fluid Needs Dosing Weight: Actual Body Weight  Fluid Requirements (mL/day): (7252-7021)  Nevin-Oneal Method (over 20 kg): 2636.72      PES  Statement: PES Statement  Nutrition Diagnosis: Inadequate Oral Intake  Related To:: (age/illness/modified diet)  As Evidenced By:: 25% PO intakes  Nutritional Needs Met?: No, Stays the same                                     Clinical Comments:   Pt seen in f/u.  Admitted with AMS and fatigue, found to have septic shock in setting of UTI with bacteremia.  Pt now improved with abx treatment, but PO intakes remain poor, just as pta.  Wt up by 25 lbs since admission, c/w +2 edema and estimated 20 lbs of fluid accumulation.  Monitor for need of diuresis.    At visit the patient reports that appetite is poor because she does not like the pureed food.  Explained need for modified diet/liquids, which patient was accepting of, but still does not like as she was eating/drinking regular items at home pta.  RD also discussed importance of meeting nutrient needs, patient agreed to try the magic cup and pudding.  Will adjust supplements and continue to follow for needs.        Goals: Pt will consume 50% of meals x 7 days      Monitor and  Evaluation:   1. % PO intake  2. Trends in weights/labs/lytes  3. Medications  4. GI function  5. I/O  6. Medical course    Discussed with: patient                            Verbalizes understanding        Date: 01/04/21  Signature: Elena Robins, LDN

## 2021-01-04 NOTE — PROGRESS NOTES
Hospital Medicine Service -  Daily Progress Note       SUBJECTIVE     Interval History: No acute events overnight. Feels ok today. Denies dyspnea   OBJECTIVE        Vital signs in last 24 hours:  Temp:  [36.3 °C (97.3 °F)-36.5 °C (97.7 °F)] 36.3 °C (97.3 °F)  Heart Rate:  [] 109  Resp:  [13-24] 24  BP: ()/(55-77) 95/55  I/O last 3 completed shifts:  In: 465 [P.O.:85; I.V.:280; IV Piggyback:100]  Out: 2525 [Urine:2525]    PHYSICAL EXAMINATION        GEN: well-developed and well-nourished; not in acute distress  HEENT: normocephalic; atraumatic  NECK: no JVD; no bruits  CARDIO: regular rate and rhythm; no murmurs or rubs  RESP:  Bilateral diffuse wheezing  ABD: soft, non-distended, non-tender, normal bowel sounds  EXT: no cyanosis, clubbing, bilateral lower extremity swelling, 2+ right upper extremity swelling ecchymosis right forearm, mild swelling left upper extremity  SKIN: clean, dry, warm, and intact  MUSCULOSKELETAL: no injury or deformity  NEURO: alert and oriented x 3; nonfocal  BEHAVIOR/EMOTIONAL: appropriate; cooperative     LABS / IMAGING / TELE        Labs  Lab Results   Component Value Date    WBC 13.44 (H) 01/04/2021    HGB 7.1 (L) 01/04/2021    HCT 22.7 (L) 01/04/2021    MCV 82.2 (L) 01/04/2021     01/04/2021     Lab Results   Component Value Date    GLUCOSE 123 (H) 01/04/2021    CALCIUM 8.6 (L) 01/04/2021     (L) 01/04/2021    K 3.5 (L) 01/04/2021    CO2 19 (L) 01/04/2021     01/04/2021     (HH) 01/04/2021    CREATININE 4.1 (H) 01/04/2021     Lab Results   Component Value Date    INR 1.2 12/30/2020       Imaging  Ct Abdomen Pelvis Without Iv Contrast    Addendum Date: 12/28/2020    Additionally noted in the impression should be incidentally noted pneumobilia, which is a nonspecific finding in a patient status post cholecystectomy. Correlate clinically.  Further evaluation with right upper quadrant ultrasound could be considered.    Result Date:  12/28/2020  IMPRESSION: 1.  Limited evaluation in the absence of intravenous or oral contrast and due to patient motion.  No definite hydronephrosis or renal calculi as clinically questioned, noting limited evaluation of the distal ureters due to the patient's right hip prosthesis. 2.  Circumferential bladder wall thickening and intravesicular air within the urinary bladder, concerning for urinary tract infection. 3.  Age-indeterminate mild compression deformities of T11, T12 and L3.    X-ray Chest 1 View    Result Date: 12/31/2020  IMPRESSION: 1.  Patchy interstitial opacity, with pulmonary vascular congestion, which may represent multifocal airspace disease or pulmonary edema. 2.  Left basilar airspace disease, new/increased from prior.     X-ray Chest 1 View    Result Date: 12/28/2020  IMPRESSION: Linear atelectasis or scar in the left midlung zone. COMMENT: Erect AP portable view of the chest was performed.  We have no prior studies for comparison. The heart size is towards the upper limits of normal.  A TAVR prosthesis is noted.  The pulmonary vasculature is not engorged. Linear opacity in the left midlung zone is noted, likely an area of discoid atelectasis or scar.  The lung fields are otherwise clear, although EKG leads and wires could obscure small lesions.  No pleural effusions are seen. No hilar abnormality is seen.  There is calcification of the thoracic aorta. Bilateral rotator cuff tears are suspected.      ECG/Telemetry  I have independently reviewed the telemetry. No events for the last 24 hours.Atrial fib 100-120/'    ASSESSMENT AND PLAN      Septic shock (CMS/[facility])-Klebsiella   Assessment & Plan  Secondary to UTI with Klebsiella bacteremia  Improving  Currently on Rocephin.  Can switch to Ceftin per ID.  Total duration 14 days  History of UTIs in the past  CT abdomen also showing circumferential bladder wall thickening and intravesicular air incidental pneumobilia which is a nonspecific finding in a  patient who had cholecystectomy  Blood cultures and urine cultures grew Klebsiella   Status post pressors in ICU  PT/OT stephonal  Seen by speech and recommending puréed diet with nectar thick liquids  She would likely require SNF placement  Palliative care consult appreciated.  Patient is limited code- she does not want chest compressions or mechanical ventilation.  Ok for medications and shock  Heparin subcu for DVT prophylaxis      Chronic atrial fibrillation (CMS/[facility])  Assessment & Plan  Heart rate borderline 100-120   History of atrial fibrillation in the past  She was taking Eliquis in the past which has been discontinued  Heparin drip discontinued here secondary to hematuria  Amiodarone discontinued by Dr. Medina today  Metoprolol IV as needed for atrial fibrillation   Patient cannot take medication secondary to low blood pressure    History of CVA (cerebrovascular accident)  Assessment & Plan  Not on aspirin at home  We will hold off to statin for now    Hyponatremia   Assessment & Plan  Resolved  Na 134    Type 2 MI (myocardial infarction) (CMS/[facility])  Assessment & Plan  Second to demand ischemia  In the setting of DANY and severe sepsis  EKG nonischemic    History of colon cancer  Assessment & Plan  Remote history of colon cancer  Appears to be under remission    COPD (chronic obstructive pulmonary disease) (CMS/[facility])  Assessment & Plan  Currently in exacerbation  Continue IV Solu-Medrol 60 every 8 hours, albuterol nebs  Continue scheduled albuterol in the setting of hyperkalemia      Acute on chronic renal failure (CMS/[facility])  Assessment & Plan  Secondary to septic shock  Now with volume overload from aggressive IV fluid  Pt is  +9 L this admission  Received Lasix 80 IV prn  Cr 5.3 on admission improved to 4.1  Nephrology following    Hematuria  Improving  Urology is following  Hemoglobin low 7.2  Heparin drip discontinued  H&H every 12 hours  Cont tyson    Metabolic encephalopathy   in the setting of septic  shock  Resolved now    Severe mitral stenosis  Outpatient follow-up with cardiology    Anemia  B12 level elevated  Iron deficiency panel consistent with anemia of chronic disease and iron deficiency  Would give IV iron  Follow hemoglobin and hemoglobin 7.1 today  Baseline hemoglobin 11     VTE Assessment: Padua    Code Status: Limited Code  Estimated discharge date: 1/5/2021     Mila Ortega MD  1/4/2021  9:45 AM

## 2021-01-04 NOTE — PROGRESS NOTES
NEPHROLOGY PROGRESS NOTE    Subjective: Seen in nephrology follow-up for acute kidney injury.  She has blood tinged urine output with no clots.  Urine output in the last 24 hours 1675 mL.  Looks comfortable today.          Vitals:    01/04/21 1000 01/04/21 1013 01/04/21 1113 01/04/21 1202   BP:  (!) 104/58 (!) 99/54    BP Location:       Patient Position:       Pulse: (!) 118 (!) 107 (!) 116 (!) 111   Resp: 20 20 20    Temp:    36.4 °C (97.5 °F)   TempSrc:    Oral   SpO2: 96% 96% 95% 96%   Weight:       Height:             Intake/Output Summary (Last 24 hours) at 1/4/2021 1219  Last data filed at 1/4/2021 1200  Gross per 24 hour   Intake 125 ml   Output 1300 ml   Net -1175 ml       Physical Exam  Constitutional:       Comments: Looks comfortable today.   HENT:      Head: Atraumatic.      Mouth/Throat:      Mouth: Mucous membranes are dry.   Neck:      Musculoskeletal: Neck supple.   Cardiovascular:      Rate and Rhythm: Regular rhythm.   Pulmonary:      Breath sounds: Wheezing (diffuse end expiratory wheeze) present.   Abdominal:      Palpations: Abdomen is soft.   Genitourinary:     Comments: Samson draining well. Urine light red  Skin:     General: Skin is dry.         LABS:  Results from last 7 days   Lab Units 01/04/21  0330 01/04/21  0329   SODIUM mEQ/L  --  134*   POTASSIUM mEQ/L  --  3.5*   CHLORIDE mEQ/L  --  101   CO2 mEQ/L  --  19*   BUN mg/dL  --  102*   CREATININE mg/dL  --  4.1*   EGFR mL/min/1.73m*2  --  10.1*   GLUCOSE mg/dL  --  123*   CALCIUM mg/dL  --  8.6*   ALBUMIN g/dL  --  2.0*   WBC K/uL 13.44*  --    HEMOGLOBIN g/dL 7.1*  --    HEMATOCRIT % 22.7*  --    PLATELETS K/uL 191  --        Meds:    Current Facility-Administered Medications:   •  acetaminophen (TYLENOL) tablet 650 mg, 650 mg, oral, q4h PRN, Guerita Bose CRNP, 650 mg at 01/04/21 0336  •  albuterol nebulizer solution 2.5 mg, 2.5 mg, nebulization, Once, Guerita Bose CRNP  •  atorvastatin (LIPITOR) tablet 10 mg, 10 mg,  oral, Daily (6p), Ankita Culver CRNP, 10 mg at 01/03/21 1713  •  budesonide (PULMICORT) 0.5 mg/2 mL nebulizer solution 0.5 mg, 0.5 mg, nebulization, BID (6a, 6p), Ankita Culver CRNP, 0.5 mg at 01/04/21 0831  •  calcium gluconate 1,000 mg in sodium chloride 0.9 % 50 mL IVPB, 1 g, intravenous, 2x daily PRN, Ankita Culver CRNP  •  cefUROXime (CEFTIN) tablet 250 mg, 250 mg, oral, Daily, Barrett Stern MD, 250 mg at 01/04/21 1039  •  glucose chewable tablet 16-32 g of dextrose, 16-32 g of dextrose, oral, PRN **OR** dextrose 40 % oral gel 15-30 g of dextrose, 15-30 g of dextrose, oral, PRN **OR** glucagon (GLUCAGEN) injection 1 mg, 1 mg, intramuscular, PRN **OR** dextrose in water injection 12.5 g, 25 mL, intravenous, PRN, Ankita Culver CRNP  •  ferric gluconate (FERRLECIT) 125 mg in sodium chloride 0.9 % 100 mL IVPB, 125 mg, intravenous, Daily, Mila Ortega MD, Stopped at 01/04/21 1131  •  guaiFENesin (ROBITUSSIN) 100 mg/5 mL liquid 400 mg, 400 mg, oral, q4h PRN, Guerita Bose CRNP, 400 mg at 01/04/21 0336  •  heparin (porcine) 5,000 unit/mL injection 5,000 Units, 5,000 Units, subcutaneous, q8h [facility], CRNP, 5,000 Units at 01/04/21 0630  •  insulin aspart U-100 (NovoLOG) pen 3-11 Units, 3-11 Units, subcutaneous, q6h, Ankita Culver CRNP, 3 Units at 01/01/21 1157  •  ipratropium-albuteroL (DUO-NEB) 0.5-2.5 mg/3 mL nebulizer solution 3 mL, 3 mL, nebulization, q6H JESSICA, Ankita Culver CRNP, 3 mL at 01/04/21 0831  •  levothyroxine (SYNTHROID) tablet 50 mcg, 50 mcg, oral, Daily (6:30a), Ankita Culver CRNP, 50 mcg at 01/04/21 0631  •  lidocaine HCL (XYLOCAINE) 2 % viscous mucosal solution 5 mL, 5 mL, Mouth/Throat, 3x daily PRN, Mila Ortega MD  •  magnesium sulfate IVPB 2g in 50 mL NSS/D5W/SWFI, 2 g, intravenous, PRN, Ankita Culver CRNP, Stopped at 01/02/21 0837  •  methylPREDNISolone sod suc(PF) (Solu-MEDROL) injection 60 mg, 60 mg, intravenous, q8h INT, Ingrid Acevedo, DO  •   metoprolol (LOPRESSOR) injection 2.5 mg, 2.5 mg, intravenous, q6h PRN, Abelino Medina MD, 2.5 mg at 01/03/21 1508  •  nystatin (MYCOSTATIN) 100,000 unit/gram topical powder, , Topical, BID, Ankita Culver CRNP, Given at 01/04/21 0937  •  renal multivitamin tablet 1 tablet, 1 tablet, oral, Daily, Ankita Culver CRNP, 1 tablet at 01/04/21 0930  •  sodium chloride flush 10 mL, 10 mL, intravenous, q8h INT, Mila Ortega MD, 10 mL at 01/04/21 0930  •  sodium chloride flush 10 mL, 10 mL, intravenous, PRN, Mila Ortega MD    ASSESSMENT:  Principal Problem:    Metabolic acidosis  Active Problems:    Severe sepsis (CMS/[facility])    Encephalopathy    Pneumobilia    Acute on chronic renal failure (CMS/[facility])    Hyperkalemia    Acute cystitis with hematuria    Hypoglycemia    COPD (chronic obstructive pulmonary disease) (CMS/[facility])    History of colon cancer    Type 2 MI (myocardial infarction) (CMS/[facility])    Hyponatremia    History of CVA (cerebrovascular accident)    Chronic atrial fibrillation (CMS/[facility])    Septic shock (CMS/[facility])    Leukocytosis    Anemia    Lactic acidosis    Hematuria    Palliative care by specialist    SOB (shortness of breath)    FTT (failure to thrive) in adult    Pain    Debility      PLAN:  DANY on CKD: ATN in the context of septic shock.  No longer oliguric-approximately 1600 mL of urine output in the last 24 hours.  She did receive loop diuretics.  Creatinine creatinine peaked at 6.4 on 12/28/2020 and improved to 3.8 on 1/2/2021.  It went up to 4.1 today.  We will give a lower dose diuretic today-40 mg times once IV.        2. Hyponatremia: Initial over-correction requiring D5W.  Sodium is 134 today-acceptable change from 129 over the last 24 hours.     3. Hyperkalemia: this is resolved.  As a matter of fact she will need potassium replacement as the potassium is 3.5 today     4. Uro-sepsis: improving on cefuroxime  5. Hematuria:   Heparin gtt discontinued.  No more clots in the Samson bag.  Just  blood-tinged urine.  Urology following.       Kurt Bolton MD

## 2021-01-04 NOTE — PROGRESS NOTES
Patient: Nancy Minor  Location: [facility]  MRN: [id number]  Today's date: 1/4/2021    Attempted to see patient for therapy. Unable due to medical hold.   currently recieving breathing tx. BP on monitor 87/59. Will continue to follow

## 2021-01-04 NOTE — PROGRESS NOTES
Patient: Nancy Minor  Location: [facility]30  MRN: [id number]  Today's date: 1/4/2021     Session ended with pt supine in bed, HOB raised, alarmed with all immediate needs in reach. VSS/NAD. RN notified.      Alberta is a 89 y.o. female admitted on 12/28/2020 with Severe sepsis (CMS/HCC). Principal problem is Metabolic acidosis.    Past Medical History  Alberta has a past medical history of Atrial fibrillation (CMS/HCC), Chronic hypotension, Chronic kidney disease, Chronic kidney failure, Colon cancer (CMS/HCC), COPD (chronic obstructive pulmonary disease) (CMS/HCC), Low BP, Stroke (cerebrum) (CMS/HCC), and UTI (urinary tract infection).    History of Present Illness   Here with metabolic acidosis, no previous ST per chart review; from home    OT Vitals    Date/Time Pulse SpO2 Pt Activity O2 Therapy O2 Del Method O2 Flow Rate BP BP Location BP Method Pt Position [facility]   01/04/21 1133 120 94 % At rest Supplemental oxygen Nasal cannula 2 L/min 89/57 Left forearm Automatic Lying DM   01/04/21 1140 120 -- -- -- -- -- 106/72 Left forearm Automatic Sitting DM   01/04/21 1145 121 98 % At rest Supplemental oxygen Nasal cannula -- 119/69 Left forearm Automatic Lying DMM      OT Pain    Date/Time Pain Type Pref Pain Scale Rating: Rest Rating: Activity [facility]   01/04/21 1133 Pain Assessment word (verbal rating pain scale) 0 - no pain 0 - no pain DMM          Prior Living Environment      Most Recent Value   Living Arrangements  house   Living Environment Comment  Pt unreliable historian,  per EMR lives in [facility] w/[facility] and [place] [facility] [place] [address]. They have purchased a home with 0 [address] and are preparing to move in late January.          Prior Level of Function      Most Recent Value   Dominant Hand  right   Ambulation  assistive equipment   Transferring  assistive equipment   Toileting  assistive person   Bathing  assistive person   Dressing  assistive person   Eating  assistive person    Communication  understands/communicates without difficulty   Swallowing  swallows foods/liquids without difficulty   Baseline Diet/Method of Nutritional Intake  regular solids, thin liquids   Past History of Dysphagia  none documented   Prior Level of Function Comment  pt unreliable historian,  reports that she had assist from A for ADLs PTA ,  Per EMR pt was Ax1 for transfers   Assistive Device/Animal Currently Used at Home  walker, front-wheeled          Occupational Profile      Most Recent Value   Reason for Services/Referral  ADL dysfunction   Occupational History/Life Experiences  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  yes     Onset of Illness/Injury or Date of Surgery  12/28/21     Referring Physician  Curtis     General Observations of Patient  Pt rec'd supine in bed; agreeable to session     Existing Precautions/Restrictions  aspiration;fall;oxygen therapy device and L/min        Cognition/Psychosocial    Affect/Mental Status (Cognitive)  confused     Orientation Status (Cognition)  oriented to;person;time     Follows Commands (Cognition)  follows one step commands;25-49% accuracy     Cognitive Function (Cognitive)  attention deficit;executive function deficit;memory deficit;safety deficit     Attention Deficit (Cognitive)  minimal deficit;concentration;focused/sustained attention;selective attention     Executive Function Deficit (Cognition)  minimal deficit;insight/awareness of deficits;judgment;information processing;planning/decision making     Memory Deficit (Cognitive)  moderate deficit;short-term  memory;long-term memory;immediate recall;recall, recent events     Safety Deficit (Cognitive)  moderate deficit;insight into deficits/self awareness;judgment;problem solving     Comment, Cognition  Pt reported she was in [place]; reorientation provided        Hearing Assessment    Hearing Status  WFL        Vision Assessment/Intervention    Visual Impairment/Limitations  corrective lenses full time        Sensory Assessment (Somatosensory)    Sensory Assessment (Somatosensory)  UE sensation intact        Range of Motion (ROM)    Range of Motion  left upper extremity ROM deficit;right upper extremity ROM deficit     Left Upper Extremity (ROM)  shoulder;elbow;wrist;hand     Shoulder, Left (ROM)  ~90* FF     Elbow, Left (ROM)  WFL     Wrist, Left (ROM)  WFL     Hand, Left (ROM)  WFL     Right Upper Extremity (ROM)  shoulder;elbow;wrist;hand     Shoulder, Right (ROM)  0* FF AROM; ~90* PROM     Elbow, Right (ROM)  WFL     Wrist, Right (ROM)  WFL     Hand, Right (ROM)  WFL        Strength (Manual Muscle Testing)    Strength (Manual Muscle Testing)  left upper extremity strength deficit;right upper extremity strength deficit     Left Upper Extremity Strength  shoulder;elbow;wrist;hand     Shoulder, Left (Strength)  3/5     Elbow, Left (Strength)  3/5     Wrist, Left (Strength)  3/5     Hand, Left (Strength)  3/5     Right Upper Extremity Strength  shoulder;elbow;wrist;hand     Shoulder, Right (Strength)  2/5     Elbow, Right (Strength)  2/5     Wrist, Right (Strength)  2/5     Hand, Right (Strength)  2/5        Bed Mobility    [place], Supine to Sit  maximum assist (25-49% patient effort);2 person assist;increased time to complete;verbal cues     Verbal Cues (Supine to Sit)  hand placement;preparatory posture;safety     [place], Sit to Supine  dependent (less than 25% patient effort);2 person assist;verbal cues     Verbal Cues (Sit to Supine)  safety;technique     Assistive Device (Bed Mobility)  bed  rails;draw sheet;head of bed elevated     Comment (Bed Mobility)  OOB to the L        Transfers    Transfers  other (see comments)    Supine <> SIt    Comment  OOB not safe to attempt at this time; Pt Dep - Max Ax2 for bed mobility and Mod - Max A to maintain static sitting balance EOB        Bed to Chair Transfer    [place], Bed to Chair  unable to assess;not tested        Chair to Bed Transfer    [place], Chair to Bed  unable to assess;not tested        Safety Issues, Functional Mobility    Safety Issues Affecting Function (Mobility)  insight into deficits/self awareness;judgment;problem solving     Impairments Affecting Function (Mobility)  balance;cognition;coordination;endurance/activity tolerance;motor planning;postural/trunk control;strength     Cognitive Impairments, Mobility Safety/Performance  attention;insight into deficits/self awareness;judgment;problem solving/reasoning        Balance    Balance Assessment  sitting static balance;sitting dynamic balance     Static Sitting Balance  moderate impairment;sitting, edge of bed     Dynamic Sitting Balance  moderate impairment;sitting, edge of bed        Lower Body Dressing    [place] Assistance  dons/doffs left sock;dons/doffs right sock     [place]  dependent (less than 25% patient effort)     Position  supine     Adaptive Equipment  none        Grooming    Self-Performance  washes, rinses and dries face     [place] Assistance  washes, rinses and dries face     [place]  moderate assist (50-74% patient effort);verbal cues;increased time to complete;safety considerations     Position  edge of bed sitting     Setup Assistance  obtain supplies;open containers     Adaptive Equipment  none     Comment  v/c and tactile cues to attend to task        BADL Safety/Performance    Impairments, BADL Safety/Performance  balance;endurance/activity tolerance;cognition;grasp/prehension;coordination;motor planning;strength;trunk/postural control     Cognitive  Impairments, BADL Safety/Performance  attention;insight into deficits/self awareness;judgment;problem solving/reasoning     Skilled BADL Treatment/Intervention  adaptive equipment training;BADL process/adaptation training;compensatory training;energy conservation;environmental modifications        AM-PAC (TM) - ADL (Current Function)    Putting on and taking off regular lower body clothing?  1 - Total     Bathing?  1 - Total     Toileting?  1 - Total     Putting on/taking off regular upper body clothing?  2 - A Lot     How much help for taking care of personal grooming?  2 - A Lot     Eating meals?  3 - A Little     AM-PAC (TM) ADL Score  10        Therapy Assessment/Plan (OT)    Rehab Potential (OT)  good, to achieve stated therapy goals     Therapy Frequency (OT)  2-3 times/wk        Progress Summary (OT)    Daily Outcome Statement (OT)  OT IE completed ([facility] 10). Pt limited by decreased functional cog, decreased endurane, balance, and strength, decreased trunk control, and decreased ROM and strength in RUE. Pt currently Max A - DEP for supine to sit tx, Dep for LB dressing, and Mod A for grooming tasks seated EOB. Per EMR pt was Ax1 for transfers and ADLs PTA, so this represents a significant departure from PLOF. Benefits from continued OT to Max functional IND and ensure safety.     Symptoms Noted During/After Treatment  none        Therapy Plan Review/Discharge Plan (OT)    OT Recommended Discharge Disposition  skilled nursing facility     Anticipated Equipment Needs At Discharge (OT)  other (see comments)    TBD pending progress                  Education provided this session. See the Patient Education summary report for full details.         OT Goals      Most Recent Value   Bed Mobility Goal 1   Activity/Assistive Device  bed mobility activities, all at 01/04/2021 1133   [place]  minimum assist (75% or more patient effort) at 01/04/2021 1133   Time Frame  by discharge at 01/04/2021 1133    Progress/Outcome  goal ongoing at 01/04/2021 1133   Transfer Goal 1   Activity/Assistive Device  all transfers at 01/04/2021 1133   [place]  moderate assist (50-74% patient effort) at 01/04/2021 1133   Time Frame  by discharge at 01/04/2021 1133   Progress/Outcome  goal ongoing at 01/04/2021 1133   Bathing Goal 1   Activity/Assistive Device  bathing skills, all at 01/04/2021 1133   [place]  moderate assist (50-74% patient effort) at 01/04/2021 1133   Time Frame  by discharge at 01/04/2021 1133   Progress/Outcome  goal ongoing at 01/04/2021 1133   Dressing Goal 1   Activity/Adaptive Equipment  dressing skills, all at 01/04/2021 1133   [place]  minimum assist (75% or more patient effort) at 01/04/2021 1133   Time Frame  by discharge at 01/04/2021 1133   Progress/Outcome  goal ongoing at 01/04/2021 1133   Toileting Goal 1   Activity/Assistive Device  toileting skills, all at 01/04/2021 1133   [place]  minimum assist (75% or more patient effort) at 01/04/2021 1133   Time Frame  by discharge at 01/04/2021 1133   Progress/Outcome  goal ongoing at 01/04/2021 1133

## 2021-01-04 NOTE — PLAN OF CARE
Problem: Adult Inpatient Plan of Care  Goal: Plan of Care Review  Outcome: Progressing  Flowsheets (Taken 1/4/2021 1022)  Progress: no change     Problem: Skin Injury Risk Increased  Goal: Skin Health and Integrity  Outcome: Progressing   Nutrition Interventions/ Recommendations:   1. Continue Puree, Nectar liquid diet  - adjust at SLP recommendations  - change Boost to berry magic cup and boost pudding for diet compliance  - monitor/encourage PO intakes  2. Treat/trend lab/lytes  - correct/replace prn  3. Daily weight; monitor I/O (if accurate, wt up ~ 25 lbs since admission)  4. Continue Renal MVI  5. Monitor BS while on IV steroids  - add SSI if BS >/= 180 mg/dl

## 2021-01-04 NOTE — PLAN OF CARE
Problem: Adult Inpatient Plan of Care  Goal: Plan of Care Review  Outcome: Progressing  Flowsheets (Taken 1/4/2021 3799)  Progress: improving  Plan of Care Reviewed With: patient  Outcome Summary: OT IE completed ([facility] 10). Pt limited by decreased functional cog, decreased endurane, balance, and strength, decreased trunk control, and decreased ROM and strength in RUE. Pt currently Max A - DEP for supine to sit tx, Dep for LB dressing, and Mod A for grooming tasks seated EOB. Per EMR pt was Ax1 for transfers and ADLs PTA, so this represents a significant departure from PLOF. Benefits from continued OT to Max functional IND and ensure safety.

## 2021-01-04 NOTE — PROGRESS NOTES
Infectious Disease Progress Note    Patient Name: Nancy Minor  MR#: [id number]  : 10/6/1923  Admission Date: 2020  Date: 21   Time: 9:29 AM   Author: Barrett Stern MD    Major Events: none    Antibiotics:    Anti-infectives (From admission, onward)    Start     Dose/Rate Route Frequency Ordered Stop    20 1015  cefTRIAXone (ROCEPHIN) IVPB 1 g in 100 mL NSS vial in bag      1 g  200 mL/hr over 30 Minutes intravenous Every 24 hours interval 20 0925      20  nystatin (MYCOSTATIN) 100,000 unit/gram topical powder       Topical 2 times daily 20            Subjective     Review of Systems  Feels well and denies any worsening pain    Objective     Vital Signs:    Patient Vitals for the past 72 hrs:   BP Temp Temp src Pulse Resp SpO2 Weight   21 0320 (!) 95/55 36.3 °C (97.3 °F) Axillary -- (!) 24 94 % --   21 0013 96/60 36.5 °C (97.7 °F) Oral (!) 109 (!) 22 95 % --   21 2213 (!) 96/56 36.3 °C (97.4 °F) Oral (!) 107 16 97 % --   21 -- -- -- (!) 105 -- -- --   21 (!) 97/57 -- -- (!) 101 -- -- --   21 -- -- -- 98 -- -- --   21 (!) 92/55 36.3 °C (97.4 °F) Oral (!) 105 13 95 % --   21 1900 117/67 -- -- (!) 103 20 92 % --   21 1830 -- -- -- (!) 101 -- -- --   21 1700 109/74 -- -- (!) 102 13 97 % --   21 1600 117/67 -- -- 100 13 98 % --   21 1500 128/76 -- -- (!) 118 (!) 22 97 % --   21 1400 120/77 -- -- (!) 109 14 96 % --   21 1300 (!) 114/58 -- -- (!) 111 13 98 % --   21 1200 (!) 126/56 -- -- (!) 115 13 97 % --   21 1100 114/66 36.4 °C (97.5 °F) Axillary (!) 112 16 96 % --   21 1000 (!) 115/56 -- -- (!) 115 15 94 % --   21 0900 120/66 -- -- (!) 113 19 92 % --   21 0800 (!) 102/59 -- -- (!) 111 13 92 % --   21 0700 (!) 107/57 36.5 °C (97.7 °F) Axillary (!) 109 14 92 % --   21 0600 (!) 99/54 -- -- (!) 109 12 95 % 56.8 kg (125 lb  4.8 oz)   01/03/21 0500 (!) 96/52 -- -- (!) 108 15 94 % --   01/03/21 0400 (!) 101/52 36.4 °C (97.6 °F) Axillary (!) 112 17 (!) 91 % --   01/03/21 0300 114/66 -- -- (!) 110 16 100 % --   01/03/21 0200 (!) 99/54 -- -- (!) 105 15 97 % --   01/03/21 0100 (!) 105/57 -- -- (!) 105 18 97 % --   01/03/21 0000 (!) 105/55 -- -- (!) 108 20 97 % --   01/02/21 2300 (!) 99/58 -- -- (!) 106 15 95 % --   01/02/21 2200 (!) 102/53 -- -- (!) 106 14 95 % --   01/02/21 2100 (!) 100/54 -- -- (!) 110 (!) 24 95 % --   01/02/21 2000 (!) 115/57 -- -- (!) 103 18 98 % --   01/02/21 1900 (!) 112/57 36.4 °C (97.6 °F) Axillary (!) 107 17 99 % --   01/02/21 1800 113/68 -- -- (!) 105 (!) 23 -- --   01/02/21 1700 (!) 111/56 -- -- (!) 108 17 98 % --   01/02/21 1600 112/63 36.2 °C (97.2 °F) Axillary (!) 102 16 97 % --   01/02/21 1500 (!) 117/55 -- -- (!) 110 17 100 % --   01/02/21 1400 (!) 113/57 -- -- (!) 102 (!) 22 97 % --   01/02/21 1300 (!) 105/59 -- -- (!) 101 15 97 % --   01/02/21 1200 (!) 112/59 -- -- (!) 111 18 96 % --   01/02/21 1100 (!) 116/57 36.4 °C (97.6 °F) Oral (!) 113 17 95 % --   01/02/21 1000 108/65 -- -- (!) 110 16 97 % --   01/02/21 0900 (!) 100/51 -- -- (!) 109 16 97 % --   01/02/21 0800 (!) 107/57 -- -- (!) 101 (!) 26 98 % --   01/02/21 0744 (!) 107/57 36.4 °C (97.5 °F) Oral (!) 110 (!) 30 96 % --   01/02/21 0700 (!) 113/55 -- -- (!) 105 16 95 % --   01/02/21 0600 -- -- -- (!) 106 19 96 % --   01/02/21 0544 106/61 -- -- (!) 108 (!) 25 97 % --   01/02/21 0444 111/69 36.7 °C (98.1 °F) Oral (!) 109 (!) 34 96 % --   01/02/21 0434 -- -- -- -- -- -- 59.5 kg (131 lb 1.6 oz)   01/02/21 0400 -- -- -- (!) 104 -- -- --   01/02/21 0344 115/63 -- -- (!) 105 (!) 34 97 % --   01/02/21 0244 115/66 -- -- (!) 112 (!) 31 98 % --   01/02/21 0144 118/68 -- -- (!) 107 (!) 63 97 % --   01/02/21 0044 (!) 124/58 -- -- (!) 110 17 98 % --   01/01/21 2344 126/60 -- -- (!) 116 (!) 61 97 % --   01/01/21 2244 (!) 120/56 -- -- (!) 112 (!) 29 98 % --   01/01/21  2144 (!) 116/55 -- -- (!) 117 (!) 30 98 % --   21 2044 (!) 131/58 36.5 °C (97.7 °F) Oral (!) 108 (!) 25 100 % --   21 -- -- -- (!) 111 -- -- --   21 1944 102/61 -- -- (!) 110 19 98 % --   21 1844 (!) 110/55 -- -- (!) 106 (!) 22 97 % --   21 1800 111/60 -- -- (!) 108 16 99 % --   21 1700 (!) 190/60 -- -- (!) 110 15 98 % --   21 1600 (!) 124/56 36.7 °C (98.1 °F) Oral (!) 115 16 98 % --   21 1500 115/63 -- -- (!) 107 (!) 24 97 % --   21 1400 (!) 112/59 -- -- (!) 105 (!) 26 99 % --   21 1300 (!) 101/57 -- -- (!) 110 (!) 33 97 % --   21 1200 (!) 119/55 -- -- (!) 106 20 99 % --   21 1100 (!) 110/53 -- -- (!) 108 (!) 32 99 % --   21 1000 (!) 110/56 -- -- (!) 109 (!) 24 100 % --       Temp (72hrs), Av.4 °C (97.6 °F), Min:36.2 °C (97.2 °F), Max:36.7 °C (98.1 °F)      Physical Exam:  General appearance: NAD  Head: NCAT  Lungs: wheezing bilateral  Heart: regular rate and rhythm  Abdomen: soft, non-tender  Extremities: edema none  Skin: no rashes  Neurologic: following commands    Lines, Drains, Airways, Wounds:  Midline Catheter 21 (Active)   Number of days: 1       Peripheral IV 20 Anterior;Left;Upper Arm (Active)   Number of days: 7       Urethral Catheter 16 Fr (Active)   Number of days: 7       Rash 20 0001 Right lateral hip patch (Active)   Number of days: 6       Wound Other (comment) Buttock (Active)   Number of days: 6       Labs:    CBC Results       21                    0330 0336 2132         WBC 13.44 12.33 --         RBC 2.76 2.84 --         HGB 7.1 7.2 7.3         HCT 22.7 23.3 23.2         MCV 82.2 82.0 --         MCH 25.7 25.4 --         MCHC 31.3 30.9 --          172 --         Comment for HGB at 0330 on 21: ALL RESULTS HAVE BEEN CHECKED    Comment for PLT at 0330 on 21: CONSISTENT WITH PREVIOUS RESULTS      BMP Results       21                     0329 0336 0434          129 127         K 3.5 3.5 3.8         Cl 101 98 97         CO2 19 17 16         Glucose 123 134 152          97 101         Creatinine 4.1 3.9 3.8         Calcium 8.6 8.1 7.7         Anion Gap 14 14 14         EGFR 10.1 10.7 11.0         Comment for BUN at 0329 on 01/04/21: Checked by dilution      Comment for BUN at 0336 on 01/03/21: Consistent with previous results        PT/PTT Results       01/02/21 01/01/21 01/01/21                    0434 2311 1614         PTT 65 76 68         Comment for PTT at 0434 on 01/02/21: The Standard Therapeutic Range for Heparin is 68 to 101 seconds.    Comment for PTT at 2311 on 01/01/21: The Standard Therapeutic Range for Heparin is 68 to 101 seconds.    Comment for PTT at 1614 on 01/01/21: Result rechecked    The Standard Therapeutic Range for Heparin is 68 to 101 seconds.      UA Results       12/28/20                          1627           Color Red           Clarity Turbid           Glucose Proper identification not possible due to color interference.           Bilirubin Proper identification not possible due to color interference.           Ketones Proper identification not possible due to color interference.           Sp Grav 1.017           Blood +3           Ph Proper identification not possible due to color interference.           Protein Proper identification not possible due to color interference.           Urobilinogen Proper identification not possible due to color interference.           Nitrite Proper identification not possible due to color interference.           Leuk Est +3           WBC Too Numerous To Count           RBC Too Numerous To Count           Bacteria +3           Comment for Blood at 1627 on 12/28/20: The sensitivity of the occult blood test is equivalent to approximately 4 intact RBC/HPF.      Lactate Results       12/30/20 12/29/20 12/29/20                    1205 0924 0524         Lactate 1.3 4.1 6.5                        Microbiology Results     Procedure Component Value Units Date/Time    SARS-CoV-2 (COVID-19), PCR Nasopharynx [[id number]]  (Normal) Collected: 12/28/20 1840    Specimen: Nasopharyngeal Swab from Nasopharynx Updated: 12/28/20 2018    Narrative:      The following orders were created for panel order SARS-CoV-2 (COVID-19), PCR Nasopharynx.  Procedure                               Abnormality         Status                     ---------                               -----------         ------                     SARS-CoV-2 (COVID-19), P...[[id number]]  Normal              Final result                 Please view results for these tests on the individual orders.    SARS-CoV-2 (COVID-19), PCR Nasopharynx [[id number]]  (Normal) Collected: 12/28/20 1840    Specimen: Nasopharyngeal Swab from Nasopharynx Updated: 12/28/20 2018     SARS-CoV-2 (COVID-19) Negative    Narrative:      Nursing instructions: Obtain nasopharyngeal swab ONLY.  Send swab in viral transport media.    Blood Culture Blood, Venous [[id number]]  (Abnormal) Collected: 12/28/20 1648    Specimen: Blood, Venous Updated: 01/03/21 1409     Culture **Positive Culture**      Klebsiella pneumoniae ssp pneumoniae     Gram Stain Result Gram negative bacilli    Urine culture Urine, Clean Catch [[id number]]  (Abnormal)  (Susceptibility) Collected: 12/28/20 1627    Specimen: Urine, Clean Catch Updated: 12/31/20 0830     Urine Culture **Positive Culture**      >1 x 10^5 CFU/mL Klebsiella pneumoniae ssp pneumoniae    Blood Culture Blood, Venous [[id number]]  (Abnormal)  (Susceptibility) Collected: 12/28/20 1603    Specimen: Blood, Venous Updated: 01/03/21 1408     Culture **Positive Culture**      Klebsiella pneumoniae ssp pneumoniae     Gram Stain Result Gram negative bacilli    Blood Culture PCR Panel Blood, Venous [[id number]]  (Abnormal) Collected: 12/28/20 1603    Specimen: Blood, Venous Updated: 12/29/20 1309     Acinetobacter calcoaceticus-baumannii Not  Detected     Candida albicans Not Detected     Candida auris Not Detected     Candida glabrata Not Detected     Candida krusei Not Detected     Candida parapsilosis Not Detected     Candida tropicalis Not Detected     Cryptococcus neoformans/gattii Not Detected     Enterococcus faecalis Not Detected     Enterococcus faecium Not Detected     Enterobacter cloacae complex Not Detected     Escherichia coli Not Detected     Klebsiella oxytoca Not Detected     Klebsiella pneumoniae Detected     Klebsiella aerogenes Not Detected     Proteus Not Detected     Salmonella species Not Detected     Serratia marcescens Not Detected     Haemophilus influenzae Not Detected     Listeria monocytogenes Not Detected     Neiseria meningitidis Not Detected     Pseudomonas aeruginosa Not Detected     Stenotrophomonas maltophilia Not Detected     Bacteroides fragilis Not Detected     Staph (not aureus) Not Detected     Staphylococcus aureus Not Detected     Staphylococcus ludgnensis Not Detected     Staphylococcus epidermidis Not Detected     Streptococcus Not Detected     Streptococcus agalactiae (Group B) Not Detected     Streptococcus pneumoniae Not Detected     Streptococcus pyogenes (Group A) Not Detected     KPC (Carbapenem Resistance Gene) Not Detected     Damon/B (Vancomycin Resistance Gene) Not Applicable     mecA/C Not Applicable     mecA/C and MREJ (MRSA) Not Applicable     CTX-M (ESBL) Not Detected     IMP Not Detected     mcr-1 Not Detected     NDM Not Detected     OXA-48-like Not Detected     VIM Not Detected     Comment: See below    Narrative:      This positive blood culture was tested with a rapid molecular panel that detects Enterococcus faecalis, Enterococcus faecium, Listeria monocytogenes, Staphylococcus species, Staphylococcus aureus, Staphylococcus lugdunensis, Staphylococcus epidermidis, Streptococcus agalactiae (Group B), Streptococcus pneumonia, Streptococcus pyogenes (Group A), Acinetobacter  calcoaceticus-baumannii complex, Bacteroides fragilis, Haemophilus influenza, Neisseria meningitides, Pseudomonas aeruginosa, Stenotrophomonas maltophilia , Salmonella spp.,  Enterobacter cloacae complex, Escherichia coli, Klebsiella oxytoca, Klebsiella pneumoniae group, Klebsiella aerogenes , Proteus species, Serratia marcescens, Candida albicans, Candida auris, Candida glabrata, Candida krusei, Cryptococcus neoformans/gattii, Candida parapsilosis, and Candida tropicalis.          Pathology Results     ** No results found for the last 720 hours. **          Echo:     Cardiac Imaging   TRANSTHORACIC ECHO (TTE) COMPLETE 12/30/2020    Narrative · Mitral valve thickening. Severe mitral annular calcification.  · Severe mitral valve stenosis.  · Mean gradient = 10.00 mmHg. HR = 110 bpm.  · Hyperdynamic LV systolic function. Estimated EF >75%.  · Mildly dilated LA.  · Tricuspid valve structure is grossly normal. Mild tricuspid valve   regurgitation.  · Aortic valve structure is normal. Mild aortic valve regurgitation. No   aortic valve stenosis. A bioprosthetic aortic valve is present.  · Moderately dilated RA.  · Moderately dilated RV. Mildly reduced RV systolic function.  · Mild mitral valve regurgitation.     Estimated PASP= 51mmHg by TR jet method.         Imaging:    Radiology Imaging   XR CHEST 1 VW    Narrative CLINICAL HISTORY:  89-year-old female with wheezing    COMMENT:  A portable erect AP view of the chest was obtained at 10:00 AM on  12/31/2020    COMPARISON:  Chest radiograph from 12/28/2020    Cardiac leads overlie the chest.  The left costophrenic angle is excluded from  the field-of-view.  Postprocedural changes of aortic valvuloplasty are again  noted.  There is patchy interstitial opacity is noted, with pulmonary vascular  congestion, suggesting edema.  There is vague left lower lobe opacity, which may  represent airspace disease or atelectasis.    Cardiomegaly is noted.      Impression IMPRESSION:  1.   Patchy interstitial opacity, with pulmonary vascular congestion, which may  represent multifocal airspace disease or pulmonary edema.  2.  Left basilar airspace disease, new/increased from prior.             Assessment     1. Klebsiella bacteremia due to UTI      Plan      1. D/C ceftriaxone and start cefuroxime for 7 more days with pt remaining clinically stable.

## 2021-01-04 NOTE — PROGRESS NOTES
Pulmonary Progress Note     SUBJECTIVE  Interval History:   Patient sitting up in bed  Less short of breath    Allergies: Patient has no known allergies.    CURRENT MEDS  •  acetaminophen, 650 mg, oral, q4h PRN  •  albuterol, 2.5 mg, nebulization, Once  •  atorvastatin, 10 mg, oral, Daily (6p)  •  budesonide, 0.5 mg, nebulization, BID (6a, 6p)  •  calcium gluconate, 1 g, intravenous, 2x daily PRN  •  cefTRIAXone, 1 g, intravenous, q24h INT  •  glucose, 16-32 g of dextrose, oral, PRN **OR** dextrose, 15-30 g of dextrose, oral, PRN **OR** glucagon, 1 mg, intramuscular, PRN **OR** dextrose in water, 25 mL, intravenous, PRN  •  guaiFENesin, 400 mg, oral, q4h PRN  •  heparin (porcine), 5,000 Units, subcutaneous, q8h JESSICA  •  insulin aspart U-100, 3-11 Units, subcutaneous, q6h  •  ipratropium-albuteroL, 3 mL, nebulization, q6H JESSICA  •  levothyroxine, 50 mcg, oral, Daily (6:30a)  •  lidocaine HCL, 5 mL, Mouth/Throat, 3x daily PRN  •  magnesium sulfate, 2 g, intravenous, PRN  •  methylPREDNISolone sodium succinate, 60 mg, intravenous, q6h INT  •  metoprolol, 2.5 mg, intravenous, q6h PRN  •  nystatin, , Topical, BID  •  renal multivitamin, 1 tablet, oral, Daily  •  sodium chloride, 10 mL, intravenous, q8h INT  •  sodium chloride, 10 mL, intravenous, PRN    VITAL SIGNS  Vitals:    01/03/21 2130 01/03/21 2213 01/04/21 0013 01/04/21 0320   BP:  (!) 96/56 96/60 (!) 95/55   BP Location:  Left forearm Left forearm Left forearm   Patient Position:  Lying Lying Lying   Pulse: (!) 105 (!) 107 (!) 109    Resp:  16 (!) 22 (!) 24   Temp:  36.3 °C (97.4 °F) 36.5 °C (97.7 °F) 36.3 °C (97.3 °F)   TempSrc:  Oral Oral Axillary   SpO2:  97% 95% 94%   Weight:       Height:           INTAKE/OUTPUT    Intake/Output Summary (Last 24 hours) at 1/4/2021 0841  Last data filed at 1/4/2021 0645  Gross per 24 hour   Intake 185 ml   Output 1570 ml   Net -1385 ml     PHYSICAL EXAM  Physical Exam  Vitals signs reviewed.   Constitutional:       Comments:  Frail  Less tachypneic   HENT:      Head: Normocephalic and atraumatic.      Mouth/Throat:      Mouth: Mucous membranes are dry.   Cardiovascular:      Rate and Rhythm: Normal rate. Rhythm irregular.      Heart sounds: Murmur present. No friction rub. No gallop.    Pulmonary:      Comments: Diffuse exp wheeze b/l  Abdominal:      General: There is no distension.      Palpations: Abdomen is soft.      Tenderness: There is no abdominal tenderness.   Musculoskeletal:      Right lower leg: Edema present.      Left lower leg: Edema present.   Skin:     General: Skin is warm.   Neurological:      Mental Status: She is alert and oriented to person, place, and time.         LAB RESULTS  ABG  Results from last 7 days   Lab Units 12/29/20  0612 12/29/20  0018 12/28/20 2051 12/28/20  1603   PH ART pH 7.52* 7.31* 7.33*  --    PCO2 ART mm Hg 24* 18* 14*  --    PO2 ART mm Hg 85 93 118*  --    HCO3 ART mEQ/L 24 14* 13*  --    O2 SAT ART % 100* 100* 100*  --    BASE EXC ART mEQ/L -1.6 -14.7 -15.9  --    SOURCE OF OXYGEN   --   --   --  RA     CBC  Results from last 7 days   Lab Units 01/04/21  0330 01/03/21  0336 01/02/21  2132  01/02/21  0434   WBC K/uL 13.44* 12.33*  --   --  17.40*   RBC M/uL 2.76* 2.84*  --   --  2.91*   HEMOGLOBIN g/dL 7.1* 7.2* 7.3*   < > 7.5*   HEMATOCRIT % 22.7* 23.3* 23.2*   < > 24.3*   MCV fL 82.2* 82.0*  --   --  83.5   MCH pg 25.7* 25.4*  --   --  25.8*   MCHC g/dL 31.3* 30.9*  --   --  30.9*   PLATELETS K/uL 191 172  --   --  176   RDW % 17.6* 17.1*  --   --  17.0*   MPV fL 9.7 9.6  --   --  10.0    < > = values in this interval not displayed.     BMP  Results from last 7 days   Lab Units 01/04/21  0329 01/03/21  0336 01/02/21  0434   SODIUM mEQ/L 134* 129* 127*   POTASSIUM mEQ/L 3.5* 3.5* 3.8   CHLORIDE mEQ/L 101 98 97*   CO2 mEQ/L 19* 17* 16*   BUN mg/dL 102* 97* 101*   CREATININE mg/dL 4.1* 3.9* 3.8*   CALCIUM mg/dL 8.6* 8.1* 7.7*   ALBUMIN g/dL 2.0* 2.1* 1.9*   BILIRUBIN TOTAL mg/dL 0.5 0.6 0.2*    ALK PHOS IU/L 73 78 84   ALT IU/L 25 26 26   AST IU/L 15 19 24   GLUCOSE mg/dL 123* 134* 152*     Coag  Results from last 7 days   Lab Units 01/02/21  0434 01/01/21  2311 01/01/21  1614  12/30/20  1230   PROTIME sec  --   --   --   --  15.2*   INR INR  --   --   --   --  1.2   PTT sec 65* 76* 68*   < > 47*    < > = values in this interval not displayed.       IMAGING  Imaging independently reviewed.   No new imaging    ASSESSMENT & PLAN  89-year-old female presenting with weakness, hematuria, found to have septic shock     1. COPD, with acute exacerbation and improving bronchospasm  - taper to solumedrol 40 mg q8  - cont nebs     2. Resolved septic shock 2/2 UTI with Klebsiella bacteremia  - Cont abx     3. Acute kidney injury, in setting of septic shock.  -Renal following    4. A  Fib, rate controlled    5. Pulmonary infiltrates.  Suspect 2/2 volume overload; doubt pneumonia        Ingrid Acevedo DO

## 2021-01-04 NOTE — PROGRESS NOTES
Patient: Nancy Minor  Location: [facility] 3230  MRN: [id number]  Today's date: 1/4/2021  Pt left in chair position of bed, call bell in reach,incont pad beneath, alarm on. RN notified  Alberta is a 89 y.o. female admitted on 12/28/2020 with Severe sepsis (CMS/HCC). Principal problem is Metabolic acidosis.    Past Medical History  Alberta has a past medical history of Atrial fibrillation (CMS/HCC), Chronic hypotension, Chronic kidney disease, Chronic kidney failure, Colon cancer (CMS/HCC), COPD (chronic obstructive pulmonary disease) (CMS/HCC), Low BP, Stroke (cerebrum) (CMS/HCC), and UTI (urinary tract infection).    History of Present Illness   Here with metabolic acidosis, no previous ST per chart review; from home  Therapy Pain/Vitals     Row Name 01/04/21 1133 01/04/21 1140 01/04/21 1145       Pain/Comfort/Sleep    Pain Charting Type  Pain Assessment  --  --    Preferred Pain Scale  word (verbal rating pain scale)  --  --    Pain Rating (word): Rest  0 - no pain  --  --    Pain Rating (word): Activity  0 - no pain  --  --       Vital Signs    Pulse  (!) 120  (!) 120  (!) 121    SpO2  94 %  --  98 %    Patient Activity  At rest  --  At rest    Oxygen Therapy  Supplemental oxygen  --  Supplemental oxygen    O2 Delivery Method  Nasal cannula  --  Nasal cannula    O2 Flow Rate (L/min)  2 L/min  --  --    BP  (!) 89/57  106/72  119/69    BP Location  Left forearm  Left forearm  Left forearm    BP Method  Automatic  Automatic  Automatic    Patient Position  Lying  Sitting  Lying          PT Vitals    Date/Time Pulse SpO2 Pt Activity O2 Therapy O2 Del Method BP BP Location BP Method Pt Position Who   01/04/21 1140 120 -- -- -- -- 106/72 Left forearm Automatic Sitting DMM   01/04/21 1145 121 98 % At rest Supplemental oxygen Nasal cannula 119/69 Left forearm Automatic Lying DMM          Prior Living Environment      Most Recent Value   Living Arrangements  house   Living Environment  Comment  Pt unreliable historian,  per EMR lives in [facility] w/dtr Arabella and MICHELLE in [facility] w/5 [address]ER[address]. They have purchased a home with 0 GUILLERMO and are preparing to move in late January.          Prior Level of Function      Most Recent Value   Dominant Hand  right   Ambulation  assistive equipment   Transferring  assistive equipment   Toileting  assistive person   Bathing  assistive person   Dressing  assistive person   Eating  assistive person   Communication  understands/communicates without difficulty   Swallowing  swallows foods/liquids without difficulty   Baseline Diet/Method of Nutritional Intake  regular solids, thin liquids   Past History of Dysphagia  none documented   Prior Level of Function Comment  -- [per chart review,pt amb 10-15' 1x/day w/RW at baseline]   Assistive Device/Animal Currently Used at Home  walker, front-wheeled          PT Evaluation and Treatment - 01/04/21 1134        Time Calculation    Start Time  1134     Stop Time  1154     Time Calculation (min)  20 min        Session Details    Document Type  initial evaluation     Mode of Treatment  physical therapy        General Information    Patient Profile Reviewed?  yes     Onset of Illness/Injury or Date of Surgery  12/28/21     Referring Physician  [facility]     General Observations of Patient  resting in bed, willing to participate     Existing Precautions/Restrictions  fall;aspiration;oxygen therapy device and L/min        Cognition/Psychosocial    Affect/Mental Status (Cognitive)  confused     Orientation Status (Cognition)  oriented to;person;time     Follows Commands (Cognition)  follows one step commands;25-49% accuracy;increased processing time needed;repetition of directions required;verbal cues/prompting required     Comment, Cognition  see OT assessment for additional details        Sensory    Hearing Status  WFL        Vision Assessment/Intervention    Visual Impairment/Limitations  corrective lenses full time        Sensory Assessment  (Somatosensory)    Sensory Assessment (Somatosensory)  LE sensation intact        Range of Motion Comprehensive    Comment: Range of Motion  AAROM BLE WFL, AROM limited by weakness        Strength (Manual Muscle Testing)    Strength (Manual Muscle Testing)  left lower extremity strength deficit;right lower extremity strength deficit     Left Lower Extremity Strength  left LE strength is WFL except;hip;knee;ankle     Hip, Left (Strength)  1+/5 flex supine     Knee, Left (Strength)  flex supine 2/5, ext seated 3-/5     Ankle, Left (Strength)  DF 2/5, PF 2+/5 supine     Right Lower Extremity Strength  right LE strength is WFL except;hip;knee;ankle     Hip, Right (Strength)  1+/5 flex supine     Knee, Right (Strength)  2/5 flex supine, 2/5 ext seated     Ankle, Right (Strength)  2/5 DF, 2+/5 PF supine        Bed Mobility    [place], Supine to Sit  maximum assist (25-49% patient effort);2 person assist     Verbal Cues (Supine to Sit)  hand placement;preparatory posture;technique     [place], Sit to Supine  dependent (less than 25% patient effort);2 person assist     Assistive Device (Bed Mobility)  bed rails;draw sheet;head of bed elevated     Comment (Bed Mobility)  max inflated mattress, exit to L        Transfers    Comment  pt required mod to Max A x 1 to maintain sitting EOB, + R post lean requirng asssit to correct. sitting tolerance approx 3 min prior to return to bed w/fatigue        Bed to Chair Transfer    Comment  unsafe to attempt at time of session due to impaired trunk control and LE weakness        Sit to Stand Transfer    Comment  unsafe to attempt at time of session due to impaired trunk control and LE weakness        Gait Training    Comment  unsafe to attempt at time of session due to impaired trunk control and LE weakness        Safety Issues, Functional Mobility    Safety Issues Affecting Function (Mobility)  awareness of need for assistance;insight into deficits/self awareness;judgment      Impairments Affecting Function (Mobility)  balance;cognition;endurance/activity tolerance;postural/trunk control;strength     Cognitive Impairments, Mobility Safety/Performance  awareness, need for assistance;insight into deficits/self awareness;judgment;problem solving/reasoning        Balance    Balance Assessment  sitting static balance;sitting dynamic balance;sit to stand dynamic balance;standing static balance;standing dynamic balance     Static Sitting Balance  moderate impairment;supported;sitting, edge of bed     Dynamic Sitting Balance  moderate impairment;supported;sitting, edge of bed     Sit to Stand Dynamic Balance  unable to perform activity     Static Standing Balance  unable to perform activity     Dynamic Standing Balance  unable to perform activity        AM-PAC (TM) - Mobility (Current Function)    Turning from your back to your side while in a flat bed without using bedrails?  2 - A Lot     Moving from lying on your back to sitting on the side of a flat bed without using bedrails?  2 - A Lot     Moving to and from a bed to a chair?  1 - Total     Standing up from a chair using your arms?  1 - Total     To walk in a hospital room?  1 - Total     Climbing 3-5 steps with a railing?  1 - Total     AM-PAC (TM) Mobility Score  8        Therapy Assessment/Plan (PT)    Rehab Potential (PT)  good, to achieve stated therapy goals     Therapy Frequency (PT)  3-5 times/wk        Progress Summary (PT)    Daily Outcome Statement (PT)  Ax2 for bed mobility w/impaired strength/balance/activity tolerance. will benefit from cont skilled therapy to maximize independence w/functional mobility. [facility] 8     Symptoms Noted During/After Treatment  none        Therapy Plan Review/Discharge Plan (PT)    PT Recommended Discharge Disposition  skilled nursing facility;home with home health;home with assist;home with caregiver    home w/inc assist vs snf pending progress    Anticipated Equipment Needs at Discharge (PT Eval)   none                       Education provided this session. See the Patient Education summary report for full details.    PT Goals      Most Recent Value   Bed Mobility Goal 1   Activity/Assistive Device  rolling to left, rolling to right, scooting, sit to supine, supine to sit, bridging at 01/04/2021 1134   [place]  supervision required at 01/04/2021 1134   Time Frame  by discharge at 01/04/2021 1134   Progress/Outcome  goal ongoing at 01/04/2021 1134   Transfer Goal 1   Activity/Assistive Device  sit-to-stand/stand-to-sit, bed-to-chair/chair-to-bed, stand pivot, wheelchair transfer, walker, front-wheeled at 01/04/2021 1134   [place]  moderate assist (50-74% patient effort) at 01/04/2021 1134   Time Frame  by discharge at 01/04/2021 1134   Progress/Outcome  goal ongoing at 01/04/2021 1134   Gait Training Goal 1   Activity/Assistive Device  gait (walking locomotion), walker, front-wheeled at 01/04/2021 1134   [place]  moderate assist (50-74% patient effort) at 01/04/2021 1134   Distance  10 at 01/04/2021 1134   Time Frame  by discharge at 01/04/2021 1134   Progress/Outcome  goal ongoing at 01/04/2021 1134

## 2021-01-04 NOTE — PLAN OF CARE
Problem: Adult Inpatient Plan of Care  Goal: Plan of Care Review  Outcome: Progressing  Flowsheets (Taken 1/4/2021 0911)  Progress: no change  Plan of Care Reviewed With: patient  Outcome Summary: Ax2 for bed mobility, impaired sitting balance

## 2021-01-04 NOTE — PLAN OF CARE
Problem: Adult Inpatient Plan of Care  Goal: Plan of Care Review  Outcome: Progressing  Flowsheets (Taken 1/4/2021 1824)  Progress: improving  Plan of Care Reviewed With: patient  Outcome Summary: swallow tx follow up completed     Problem: Swallowing Impairment  Goal: Improved Swallowing Without Aspiration  Intervention: Optimize Eating and Swallowing  Flowsheets  Taken 1/4/2021 1700 by Verna Carver, RN  Aspiration Precautions:   stimuli minimized while eating   awake/alert before oral intake   respiratory status monitored   small bites/sips encouraged   upright posture maintained   liquids thickened   food texture adjusted  Taken 1/3/2021 2130 by Christa Caldwell, RN  Swallowing Techniques: Dysphagia: appropriate positioning encouraged

## 2021-01-04 NOTE — PROGRESS NOTES
Pt needs a PCP in order for a Homecare referral to be completed and name of PCP needed to sign the Homecare orders Initially and Ongoing orders.    No Homecare referral at this time.  Liason did leave pt's son a VM x 2 last week for the name of a PCP in order to have Homecare.        Will f/u with CM today to eval for a PCP to sign  HC orders.

## 2021-01-04 NOTE — PROGRESS NOTES
Patient: Nancy Minor  Location: [facility]30  MRN: [id number]  Today's date: 1/4/2021    Attempted to see patient for therapy. Unable due to medical hold(currently recieving breathing tx. BP on monitor 87/59, . Will attempt again as able).

## 2021-01-04 NOTE — PROGRESS NOTES
Daily Progress Note    Subjective     Interval History:Pt resting in bed, reports tongue discomfort, denies tyson complaints,  Heparin was held, urine pink, no clots.       Objective     Vital signs in last 24 hours:  Temp:  [36.3 °C (97.3 °F)-36.5 °C (97.7 °F)] 36.3 °C (97.3 °F)  Heart Rate:  [] 109  Resp:  [13-24] 24  BP: ()/(55-77) 95/55      Intake/Output Summary (Last 24 hours) at 1/4/2021 0824  Last data filed at 1/4/2021 0645  Gross per 24 hour   Intake 185 ml   Output 1570 ml   Net -1385 ml       Intake/Output this shift:  No intake/output data recorded.    Labs  CBC Results       01/03/21 01/02/21 01/02/21                    0336 2132 1651         WBC 12.33 -- --         RBC 2.84 -- --         HGB 7.2 7.3 7.4         HCT 23.3 23.2 23.8         MCV 82.0 -- --         MCH 25.4 -- --         MCHC 30.9 -- --          -- --                     BMP Results       01/03/21 01/02/21 01/01/21                    0336 0434 0431          127 128         K 3.5 3.8 4.1         Cl 98 97 98         CO2 17 16 15         Glucose 134 152 158         BUN 97 101 97         Creatinine 3.9 3.8 4.1         Calcium 8.1 7.7 7.7         Anion Gap 14 14 15         EGFR 10.7 11.0 10.1         Comment for BUN at 0336 on 01/03/21: Consistent with previous results          BMP Results       01/03/21 01/02/21 01/01/21                    0336 0434 0431          127 128         K 3.5 3.8 4.1         Cl 98 97 98         CO2 17 16 15         Glucose 134 152 158         BUN 97 101 97         Creatinine 3.9 3.8 4.1         Calcium 8.1 7.7 7.7         Anion Gap 14 14 15         EGFR 10.7 11.0 10.1         Comment for BUN at 0336 on 01/03/21: Consistent with previous results            Imaging    IMPRESSION:  1.  Limited evaluation in the absence of intravenous or oral contrast and due to  patient motion.  No definite hydronephrosis or renal calculi as clinically  questioned, noting limited evaluation of the distal  ureters due to the patient's  right hip prosthesis.  2.  Circumferential bladder wall thickening and intravesicular air within the  urinary bladder, concerning for urinary tract infection.  3.  Age-indeterminate mild compression deformities of T11, T12 and L3.         Physical Exam:  GEN:NAD  RESP: on NC  ABD: NTTP  : + 16 fr tyson draining light pink color urine, no blood clots.     A/P: 89 y/o female admit with severe urosepsis,  hematuria, DANY on CKD     -- Tyson draining light pink today and appears improved can restart anticoagulation tomorrow  -- On [facility] as an outpt, not sure who she is following with. Should f/u with outpt urologist  -- urine culture and blood culture showing klebsiella pneumoniae,  ID following pt currently on rocephin  -- ct scan showed circumferential bladder wall thickening and intravesical air withing the bladder concerning for UTI.  -- .Monitor creat,.   -- Follow H&H, transfuse prn per primary team.  Will follow

## 2021-01-04 NOTE — PROGRESS NOTES
SUBJECTIVE      Moved to PCU. Less SOB    OBJECTIVE     VITAL SIGNS:  Temp:  [36.3 °C (97.3 °F)-36.5 °C (97.7 °F)] 36.3 °C (97.3 °F)  Heart Rate:  [] 109  Resp:  [13-24] 24  BP: ()/(55-77) 95/55  SPO2 94%    Intake/Output Summary (Last 24 hours) at 1/4/2021 0651  Last data filed at 1/4/2021 0645  Gross per 24 hour   Intake 225 ml   Output 1675 ml   Net -1450 ml       PHYSICAL EXAM:  General appearance: alert and cooperative  Head: without obvious abnormality  Eyes: PERRLA, extraocular movements intact  Neck: No JVD, carotid bruits, thyromegaly  Lungs: clear to auscultation bilaterally, no crackles or wheezing  Heart: Irreg Irreg S1-S2 normal, no clicks, rubs or gallops. Murmur unchanged  Abdomen: soft, non-tender, bowel sounds normal  Extremities: 1+ edema, peripheral pulses present  Skin: Skin color, texture, turgor normal. No rashes or lesions  Neurologic: Alert and oriented X 3, no focal deficits    LABS / IMAGING / EKG / TELEMETRY     LABS:  Results from last 7 days   Lab Units 01/04/21  0329 01/03/21  0336 01/02/21  1651 01/02/21  0434  12/30/20  1012 12/30/20  0433 12/29/20  2244   SODIUM mEQ/L 134* 129*  --  127*   < >  --  136  --    POTASSIUM mEQ/L 3.5* 3.5*  --  3.8   < >  --  3.9  --    MAGNESIUM mg/dL 2.3 2.3 2.5 1.9   < > 2.1 2.4  --    CHLORIDE mEQ/L 101 98  --  97*   < >  --  100  --    CO2 mEQ/L 19* 17*  --  16*   < >  --  21*  --    BUN mg/dL 102* 97*  --  101*   < >  --  113*  --    CREATININE mg/dL 4.1* 3.9*  --  3.8*   < >  --  4.3*  --    AST IU/L 15 19  --  24   < >  --  23  --    ALT IU/L 25 26  --  26   < >  --  18  --    TROPONIN I ng/mL  --   --   --   --   --  0.19* 0.23* 0.20*    < > = values in this interval not displayed.     Results from last 7 days   Lab Units 01/04/21  0330 01/03/21  0336 01/02/21  2132  01/02/21  0434  12/30/20  1230   WBC K/uL 13.44* 12.33*  --   --  17.40*   < >  --    HEMOGLOBIN g/dL 7.1* 7.2* 7.3*   < > 7.5*   < >  --    HEMATOCRIT % 22.7*  23.3* 23.2*   < > 24.3*   < >  --    PLATELETS K/uL 191 172  --   --  176   < >  --    INR INR  --   --   --   --   --   --  1.2    < > = values in this interval not displayed.     No results found for: HGBA1C, TSH  No results found for: CHOL, LDLCALC, HDL, TRIG  No results found for: BNP    IMAGING:      ECG:       TELEMETRY: AF      MEDICATIONS        • albuterol  2.5 mg nebulization Once   • atorvastatin  10 mg oral Daily (6p)   • budesonide  0.5 mg nebulization BID (6a, 6p)   • cefTRIAXone  1 g intravenous q24h INT   • heparin (porcine)  5,000 Units subcutaneous q8h JESSICA   • insulin aspart U-100  3-11 Units subcutaneous q6h   • ipratropium-albuteroL  3 mL nebulization q6H JESSICA   • levothyroxine  50 mcg oral Daily (6:30a)   • methylPREDNISolone sodium succinate  60 mg intravenous q6h INT   • nystatin   Topical BID   • renal multivitamin  1 tablet oral Daily   • sodium chloride  10 mL intravenous q8h INT       ASSESSMENT AND PLAN     AF: Rate OK off amio. Not much room for rate control as BP marginal and Cr 4.1. No AC due to recurrent urinary bleeding    Urosepsis: On Abs    DANY: Cr 4.1. Nephrology following    COPD: Improved from yesterday    Abelino Medina MD  1/4/2021    Primary Care Doctor: Elizabeth Cabral MD

## 2021-01-04 NOTE — PROGRESS NOTES
Patient: Nancy Minor  Location: [facility] 3230  MRN: [id number]  Today's date: 1/4/2021  Speech Pathology: Therapy session    SLP Diagnosis: Mild oral and suspected pharyngeal dysphagia    Recommendations:  1. Cont w/ puree and nectar liquids; allow liquids by small sip, no straws. If pt w/ s/s of aspiration w/ thins, return to liquids by tsp  2. meds in puree, crush as able  3. SLP to follow and advance as tolerated      Summary/Handoff:  Daily Outcome Statement (SLP): Pt seen b/s for swallow tx follow up. Per staff, did well w/ am tray. Pt disinterested in   po currently beyond a small amount of fluids but is willing to pick items for lunch which were ordered for her. Pt tolerated nectars by tsp and by small sips. Would cont w/ puree and nectars w/ aspiration precuations but allow liquids by small cup sips, no straws      Session Notes:            Dietary Orders   (From admission, onward)             Start     Ordered    01/04/21 1042  Dietary nutrition supplements  Once     Question Answer Comment   Select Supplement (PH): Magic Cup Wildberry    Meal period Lunch Dinner        01/04/21 1041    01/04/21 1041  Dietary nutrition supplements  Once     Question Answer Comment   Select Supplement (PH): Boost Glucose Control Chocolate    Meal period Breakfast        01/04/21 1041    01/04/21 1040  Adult Diet Pureed Texture; Nectar Thick Liquids; RD/LDN may adjust order  Diet effective now     Comments: Family tells us the patient likes soup, and yogurt  Nectars by Teaspoon   Question Answer Comment   Diet Texture Pureed Texture    Fluid Consistency: Nectar Thick Liquids    Delegation of Authority. Diet orders written by PA/Clark may not be adjusted by RD/LDNs. RD/LDN may adjust order        01/04/21 1041                Results from last 7 days   Lab Units 01/04/21  0330 01/03/21  0336 01/02/21  2132  01/02/21  0434   WBC K/uL 13.44* 12.33*  --   --  17.40*   HEMOGLOBIN g/dL 7.1* 7.2*  7.3*   < > 7.5*   HEMATOCRIT % 22.7* 23.3* 23.2*   < > 24.3*   PLATELETS K/uL 191 172  --   --  176    < > = values in this interval not displayed.          Patient left with call bell in reach and alarms as found.      Alberta is a 89 y.o. female admitted on 12/28/2020 with Severe sepsis (CMS/HCC). Principal problem is Metabolic acidosis.    Past Medical History  Alberta has a past medical history of Atrial fibrillation (CMS/HCC), Chronic hypotension, Chronic kidney disease, Chronic kidney failure, Colon cancer (CMS/HCC), COPD (chronic obstructive pulmonary disease) (CMS/HCC), Low BP, Stroke (cerebrum) (CMS/HCC), and UTI (urinary tract infection).    History of Present Illness   Here with metabolic acidosis, no previous ST per chart review; from home    SLP Pain    Date/Time Pain Type Pref Pain Scale Rating: [facility]   01/04/21 1121 Pain Assessment word (verbal rating pain scale) 0 - no pain EVM          Prior Living Environment      Most Recent Value   Living Arrangements  house   Living Environment Comment  Pt unreliable historian,  per EMR lives in [facility] w/dtr [facility] and [place] in [facility] w/[address]. They have purchased a home with 0 [address]ER[address] and are preparing to move in late January.          Prior Level of Function      Most Recent Value   Dominant Hand  right   Ambulation  assistive equipment   Transferring  assistive equipment   Toileting  assistive person   Bathing  assistive person   Dressing  assistive person   Eating  assistive person   Communication  understands/communicates without difficulty   Swallowing  swallows foods/liquids without difficulty   Baseline Diet/Method of Nutritional Intake  regular solids, thin liquids   Past History of Dysphagia  none documented   Prior Level of Function Comment  -- [per chart review,pt amb 10-15' 1x/day w/RW at baseline]   Assistive Device/Animal Currently Used at Home  walker, front-wheeled          SLP Evaluation and Treatment - 01/04/21 1121        Time Calculation    Start  Time  1121     Stop Time  1132     Time Calculation (min)  11 min        Session Details    Document Type  daily treatment/progress note     Mode of Treatment  individual therapy;speech language pathology        General Information    General Observations of Patient  in bed, tired, not interested in po        Functional Communication Measures    FCM: Swallowing  4-->Level 4        General Swallowing Observations    Current Diet/Method of Nutritional Intake (General Swallowing Observations, NIS)  nectar thick liquids;pureed solids     Signs/Symptoms of Aspiration (Current Diet)  none        Food and Liquid Trials (NIS)    Patient Positioning  head of bed elevated (specify degrees)     Comment, Pureed Solids  declines all puree offerings     Comment, [place] Thick Liquids  takes 1-2 sips from cup w/o s/s of aspiration; accepts 2 tsp of nectar juice w/o s/s         Swallowing Recommendations    Monitoring/Assistance Required  requires constant supervision during eating and drinking     Strategies to Enhance Eating/Swallowing  no straw use for liquid intake;observe closely for symptoms of aspiration;upright sitting position for eating     Diet Consistency Recommendations  nectar thick liquids;pureed solids     Recommended Feeding/Eating Techniques  small sips/bites     Mode of Delivery Recommendations  small bolus size;slow rate of intake     Medication Administration Recommendations  in puree, crush as able        Swallowing Intervention    Dysphagia/Swallowing Interventions  monitor tolerance of;current diet without evidence of aspiration;advanced diet/liquid texture trials        AM-PAC (TM) - Cognition (Current Function)    Following/understanding a 10-15 minute speech or presentation?  3 - A little     Understanding familiar people during ordinary conversations?  4 - None     Remembering to take medications at the appropriate time?  2 - A lot     Remembering where things were placed or put away?  3 - A little      Remembering a list of 3 or 4 errands without writing it down?  1 - Unable     Taking care of complicated tasks?  1 - Unable     AM-PAC (TM) Cognition Score  14        Therapy Assessment/Plan (SLP)    SLP Diagnosis  Mild oral and suspected pharyngeal dysphagia     Rehab Potential (SLP Eval)  good, to achieve stated therapy goals     Therapy Frequency (SLP)  3-5 times/wk     Problem List (SLP)  swallowing     Functional Level at Time of Evaluation (SLP)  alert, tired     Planned Therapy Interventions (SLP)  po trials, education        Daily Progress Summary (SLP)    Daily Outcome Statement (SLP)  Pt seen b/s for swallow tx follow up. Per staff, did well w/ am tray. Pt disinterested in   po currently beyond a small amount of fluids but is willing to pick items for lunch which were ordered for her. Pt tolerated nectars by tsp and by small sips. Would cont w/ puree and nectars w/ aspiration precuations but allow liquids by small cup sips, no straws     Symptoms Noted During/After Treatment  fatigue        Therapy Plan Review/Discharge Plan (SLP)    Therapy Plan Review (SLP)  care plan/treatment goals reviewed;participants included;patient                  Education provided this session. See the Patient Education summary report for full details.    SLP Goals      Most Recent Value   Pharyngeal Swallow Goal 1   Activity  safely tolerate, recommended diet/liquid level, with no clinical signs/symptoms of aspiration at 01/02/2021 0950   [place]  independently at 01/02/2021 0950   Time Frame  long-term goal (LTG), by discharge at 01/02/2021 0950   Progress/Outcome  goal ongoing at 01/04/2021 1121

## 2021-01-05 NOTE — PLAN OF CARE
Problem: Adult Inpatient Plan of Care  Goal: Plan of Care Review  Outcome: Progressing  Flowsheets (Taken 1/5/2021 1651)  Progress: improving  Plan of Care Reviewed With: patient  Outcome Summary: swallow tx completed     Problem: Swallowing Impairment  Goal: Improved Swallowing Without Aspiration  Intervention: Optimize Eating and Swallowing  Flowsheets  Taken 1/4/2021 1700 by Verna Carver RN  Aspiration Precautions:   stimuli minimized while eating   awake/alert before oral intake   respiratory status monitored   small bites/sips encouraged   upright posture maintained   liquids thickened   food texture adjusted  Taken 1/3/2021 2130 by Christa Caldwell, RN  Swallowing Techniques: Dysphagia: appropriate positioning encouraged

## 2021-01-05 NOTE — PROGRESS NOTES
Hospital Medicine Service -  Daily Progress Note       SUBJECTIVE     Interval History: No acute events overnight. Pt reports she is feeling better today than yesterday and wants to know when she will be better. Pt offers no complaints of new discomfort.  Pt denies SOB different from her baseline, chest pain, abdominal pain, N/V/D.     OBJECTIVE        Vital signs in last 24 hours:  Temp:  [35.9 °C (96.6 °F)-36.4 °C (97.6 °F)] 35.9 °C (96.6 °F)  Heart Rate:  [] 117  Resp:  [18-20] 18  BP: ()/(52-77) 83/62  I/O last 3 completed shifts:  In: 160 [P.O.:60; IV Piggyback:100]  Out: 1662 [Urine:1662]    PHYSICAL EXAMINATION        GEN: well-developed and well-nourished; not in acute distress  HEENT: normocephalic; atraumatic  NECK: no JVD; no bruits  CARDIO: tachycardic irreg-irreg, no murmurs or rubs  RESP: Diffuse exp wheezing bilaterally; no rales, or rhonchi  ABD: soft, non-distended, non-tender, normal bowel sounds, Samson catheter in place draining clear yellow urine  EXT: no cyanosis, clubbing, or edema  SKIN: clean, dry, warm, and intact.  R forearm erythematous and swollen distal to midline catheter insertion site   MUSCULOSKELETAL: no injury or deformity  NEURO: alert and oriented x 3; nonfocal  BEHAVIOR/EMOTIONAL: appropriate; cooperative     LABS / IMAGING / TELE        Labs  Lab Results   Component Value Date    WBC 17.81 (H) 01/05/2021    HGB 7.7 (L) 01/05/2021    HCT 24.7 (L) 01/05/2021    MCV 83.4 01/05/2021     01/05/2021     Lab Results   Component Value Date    GLUCOSE 118 (H) 01/05/2021    CALCIUM 8.4 (L) 01/05/2021     (L) 01/05/2021    K 3.4 (L) 01/05/2021    CO2 15 (L) 01/05/2021     01/05/2021     (HH) 01/05/2021    CREATININE 4.3 (H) 01/05/2021     Lab Results   Component Value Date    INR 1.2 12/30/2020       Imaging  Ct Abdomen Pelvis Without Iv Contrast    Addendum Date: 12/28/2020    Additionally noted in the impression should be incidentally noted  pneumobilia, which is a nonspecific finding in a patient status post cholecystectomy. Correlate clinically.  Further evaluation with right upper quadrant ultrasound could be considered.    Result Date: 12/28/2020  IMPRESSION: 1.  Limited evaluation in the absence of intravenous or oral contrast and due to patient motion.  No definite hydronephrosis or renal calculi as clinically questioned, noting limited evaluation of the distal ureters due to the patient's right hip prosthesis. 2.  Circumferential bladder wall thickening and intravesicular air within the urinary bladder, concerning for urinary tract infection. 3.  Age-indeterminate mild compression deformities of T11, T12 and L3.    X-ray Chest 1 View    Result Date: 12/31/2020  IMPRESSION: 1.  Patchy interstitial opacity, with pulmonary vascular congestion, which may represent multifocal airspace disease or pulmonary edema. 2.  Left basilar airspace disease, new/increased from prior.     X-ray Chest 1 View    Result Date: 12/28/2020  IMPRESSION: Linear atelectasis or scar in the left midlung zone. COMMENT: Erect AP portable view of the chest was performed.  We have no prior studies for comparison. The heart size is towards the upper limits of normal.  A TAVR prosthesis is noted.  The pulmonary vasculature is not engorged. Linear opacity in the left midlung zone is noted, likely an area of discoid atelectasis or scar.  The lung fields are otherwise clear, although EKG leads and wires could obscure small lesions.  No pleural effusions are seen. No hilar abnormality is seen.  There is calcification of the thoracic aorta. Bilateral rotator cuff tears are suspected.      ECG/Telemetry  I have independently reviewed the telemetry. No events for the last 24 hours.    ASSESSMENT AND PLAN      Septic shock (CMS/[facility])  Assessment & Plan  History of UTIs in the past  Found to be severe septic in the emergency room with UTI  CT abdomen also showing incidental pneumobilia  however no abdominal symptoms-consulted surgery  Blood cultures/Urine culture + Klebsiella  Initially on ceftriaxone, switched to PO ceftin by ID  Pt required Levophed in ICU, however has since been discontinued  As per the son her baseline blood pressures always run low with systolic in 80s to 90s.  Keeping the map goal of 60 and above.  WBC initially 30 -> 12 -> 17 currently  Initial Lactate 2.4 - 7.1 - 1.3  Anion gap 19 - 22 - 15  ID Following  PT/OT for evaluation    Metabolic acidosis  Assessment & Plan  Improved  Patient presenting with generalized weakness, confusion, decreased oral intake to the emergency room  Lives at home with the son  ER work-up significant for profound metabolic acidosis, hyperkalemia and severe renal failure  Nephrology contacted and started on bicarb drip - discontinued  Monitoring EKG  Initial Lactate 2.4 - 7.1 - 1.3  Anion gap 19 - 22 - 15  Checking CMP daily    Chronic atrial fibrillation (CMS/[facility])  Assessment & Plan  History of atrial fibrillation   Was taking Eliquis in the past which has been discontinued  Was supposed to take baby aspirin which she is not on on home medication  Currently in rapid atrial fibrillation  Given her renal functions not ideal to start on amiodarone drip or give digoxin  Hematuria resolved, Heparin SC restarted  Will consider IV beta-blocker with hold parameters d/t hypotension PRN for tachycardia  Cardiology following    Acute on chronic renal failure (CMS/HCC)  Assessment & Plan  Likely due to UTI/Septic shock  IV rehydration  Currently +9L since admission  Some evidence of volume overload on CXR, gently diuresed  BUN//64 on admission 106/43 currently  Lasix given  Nephro following    History of CVA (cerebrovascular accident)  Assessment & Plan  Not on aspirin at home  We will hold off to statin for now    Hyponatremia  Assessment & Plan  Due to severe hypovolemia and severe sepsis  IV fluid resuscitated - improved   - 133  Checking CMP  daily  Nephro following    Type 2 MI (myocardial infarction) (CMS/[facility])  Assessment & Plan  In the setting of DANY and severe sepsis  Troponins trended  EKG nonischemic  Stable    COPD (chronic obstructive pulmonary disease) (CMS/[facility])  Assessment & Plan  Stable  Initially on scheduled albuterol in the setting of hyperkalemia  Switched to budesonide  Solu-medrol dosing per pulm rec  Pulm following    Hypoglycemia  Assessment & Plan  Secondary to severe sepsis  Continue Accu-Cheks every 2 hours  Hyperglycemia protocol  Stable    Acute cystitis with hematuria  Assessment & Plan  Improving  Samson catheter in place currently draining clear yellow urine  Initially on ceftriaxone, switched to Ceftin by ID and will continue post-discharge for total of 7 days  Hgb currently 7.7 - trend daily - Stable  Urology following    Hyperkalemia  Assessment & Plan  Received hyperkalemia protocol in the emergency room with calcium gluconate and was on bicarb drip  IV Insulin and dextrose given  Improved  Pt now mildly HYPOkalemic - will replace  Nephro following    Pneumobilia  Assessment & Plan  - Found on CT  - Non-specific in setting of prior cholecystectomy  - Surgery consulted - likely 2/2 prior cholecystectomy    Encephalopathy  Assessment & Plan  - Likely 2/2 septic shock  - Resolved    History of colon cancer  Assessment & Plan  Stable  Remote history of colon cancer  Appears to be under remission         VTE Assessment: [place]     VTE Prophylaxis: Heparin SC  Code Status: Limited Code  Estimated discharge date: 1/8/2021     Saúl Balderas  1/5/2021  8:44 AM

## 2021-01-05 NOTE — PROGRESS NOTES
Daily Progress Note    Subjective     Interval History: Pt out of ICU, + for cough, denies abdominal pain, urine clear yellow      Objective     Vital signs in last 24 hours:  Temp:  [35.9 °C (96.6 °F)-36.4 °C (97.6 °F)] 35.9 °C (96.6 °F)  Heart Rate:  [] 118  Resp:  [18-20] 20  BP: ()/(52-77) 96/58      Intake/Output Summary (Last 24 hours) at 1/5/2021 0729  Last data filed at 1/5/2021 0600  Gross per 24 hour   Intake 160 ml   Output 937 ml   Net -777 ml       Intake/Output this shift:  No intake/output data recorded.    Labs  CBC Results       01/05/21 01/04/21 01/03/21                    0501 0330 0336         WBC 17.81 13.44 12.33         RBC 2.96 2.76 2.84         HGB 7.7 7.1 7.2         HCT 24.7 22.7 23.3         MCV 83.4 82.2 82.0         MCH 26.0 25.7 25.4         MCHC 31.2 31.3 30.9          191 172         Comment for HGB at 0330 on 01/04/21: ALL RESULTS HAVE BEEN CHECKED    Comment for PLT at 0330 on 01/04/21: CONSISTENT WITH PREVIOUS RESULTS        BMP Results       01/05/21 01/04/21 01/03/21                    0501 0329 0336          134 129         K 3.4 3.5 3.5         Cl 103 101 98         CO2 15 19 17         Glucose 118 123 134          102 97         Creatinine 4.3 4.1 3.9         Calcium 8.4 8.6 8.1         Anion Gap 15 14 14         EGFR 9.6 10.1 10.7         Comment for BUN at 0501 on 01/05/21: Consistent with previous results  Quantity not sufficient for repeat      Comment for BUN at 0329 on 01/04/21: Checked by dilution      Comment for BUN at 0336 on 01/03/21: Consistent with previous results            Imaging    IMPRESSION:  1.  Limited evaluation in the absence of intravenous or oral contrast and due to  patient motion.  No definite hydronephrosis or renal calculi as clinically  questioned, noting limited evaluation of the distal ureters due to the patient's  right hip prosthesis.  2.  Circumferential bladder wall thickening and intravesicular air within  the  urinary bladder, concerning for urinary tract infection.  3.  Age-indeterminate mild compression deformities of T11, T12 and L3.      Physical Exam:  GEN: NAD  RESP: on NC  ABD: NTTP  : + 16 fr tyson draining yellow color urine      A/P: 89 y/o female admit with severe urosepsis,  hematuria, DANY on CKD     -- Tyson draining clear yellow color, can restart anticoagulation   -- On [facility] as an outpt, not sure who she is following with. Should f/u with outpt urologist  -- urine culture and blood culture showing klebsiella pneumoniae,  ID following pt currently on ceftin.   -- ct scan showed circumferential bladder wall thickening and intravesical air withing the bladder concerning for UTI.  -- .Monitor creat,.   -- Follow H&H, transfuse prn per primary team.  Will follow

## 2021-01-05 NOTE — PROGRESS NOTES
NEPHROLOGY PROGRESS NOTE    Subjective: Seen in nephrology follow-up for acute kidney injury.  She has blood tinged urine output with no clots.  Urine output in the last 24 hours 937 mL.  Blood pressure is quite low.          Vitals:    01/05/21 0836 01/05/21 0845 01/05/21 1000 01/05/21 1200   BP: (!) 88/61 128/66 101/65 114/61   BP Location: Left forearm Left forearm     Patient Position: Lying Lying     Pulse: (!) 120 (!) 130 (!) 124 (!) 122   Resp:   20 18   Temp:    36.1 °C (97 °F)   TempSrc:    Oral   SpO2: 96% 93% 93% 95%   Weight:       Height:             Intake/Output Summary (Last 24 hours) at 1/5/2021 1227  Last data filed at 1/5/2021 1200  Gross per 24 hour   Intake 60 ml   Output 1032 ml   Net -972 ml       Physical Exam  Constitutional:       Comments: Looks comfortable today.   HENT:      Head: Atraumatic.      Mouth/Throat:      Mouth: Mucous membranes are dry.   Neck:      Musculoskeletal: Neck supple.   Cardiovascular:      Rate and Rhythm: Regular rhythm.   Pulmonary:      Breath sounds: Wheezing (diffuse end expiratory wheeze) present.   Abdominal:      Palpations: Abdomen is soft.   Genitourinary:     Comments: Samson draining well. Urine light red  Skin:     General: Skin is dry.         LABS:  Results from last 7 days   Lab Units 01/05/21  0501   SODIUM mEQ/L 133*   POTASSIUM mEQ/L 3.4*   CHLORIDE mEQ/L 103   CO2 mEQ/L 15*   BUN mg/dL 106*   CREATININE mg/dL 4.3*   EGFR mL/min/1.73m*2 9.6*   GLUCOSE mg/dL 118*   CALCIUM mg/dL 8.4*   ALBUMIN g/dL 2.2*   WBC K/uL 17.81*   HEMOGLOBIN g/dL 7.7*   HEMATOCRIT % 24.7*   PLATELETS K/uL 200       Meds:    Current Facility-Administered Medications:   •  acetaminophen (TYLENOL) tablet 650 mg, 650 mg, oral, q4h PRN, Guerita Bose CRNP, 650 mg at 01/04/21 0336  •  atorvastatin (LIPITOR) tablet 10 mg, 10 mg, oral, Daily (6p), Ankita Culver CRNP, 10 mg at 01/04/21 1750  •  budesonide (PULMICORT) 0.5 mg/2 mL nebulizer solution 0.5 mg, 0.5 mg,  nebulization, BID (6a, 6p), Ankita Culver CRNP, 0.5 mg at 01/05/21 0751  •  calcium gluconate 1,000 mg in sodium chloride 0.9 % 50 mL IVPB, 1 g, intravenous, 2x daily PRN, Ankita Culver CRNP  •  cefUROXime (CEFTIN) tablet 250 mg, 250 mg, oral, Daily, Barrett Stern MD, 250 mg at 01/05/21 0843  •  glucose chewable tablet 16-32 g of dextrose, 16-32 g of dextrose, oral, PRN **OR** dextrose 40 % oral gel 15-30 g of dextrose, 15-30 g of dextrose, oral, PRN **OR** glucagon (GLUCAGEN) injection 1 mg, 1 mg, intramuscular, PRN **OR** dextrose in water injection 12.5 g, 25 mL, intravenous, PRN, Ankita Culver CRNP  •  ferric gluconate (FERRLECIT) 125 mg in sodium chloride 0.9 % 100 mL IVPB, 125 mg, intravenous, Daily, Mila Ortega MD, Stopped at 01/05/21 0943  •  furosemide (LASIX) injection 40 mg, 40 mg, intravenous, q12h [facility], Mila Ortega MD, 40 mg at 01/05/21 1207  •  guaiFENesin (ROBITUSSIN) 100 mg/5 mL liquid 400 mg, 400 mg, oral, q4h PRN, Guerita Bose CRNP, 400 mg at 01/04/21 0336  •  heparin (porcine) 5,000 unit/mL injection 5,000 Units, 5,000 Units, subcutaneous, q8h [facility], 5,000 Units at 01/05/21 0557  •  insulin aspart U-100 (NovoLOG) pen 3-11 Units, 3-11 Units, subcutaneous, With meals & nightly, Mila Ortega MD, 3 Units at 01/05/21 1212  •  ipratropium-albuteroL (DUO-NEB) 0.5-2.5 mg/3 mL nebulizer solution 3 mL, 3 mL, nebulization, q6H [facility], 3 mL at 01/05/21 0751  •  levothyroxine (SYNTHROID) tablet 50 mcg, 50 mcg, oral, Daily (6:30a), Ankita Culver CRNP, 50 mcg at 01/05/21 0557  •  lidocaine HCL (XYLOCAINE) 2 % viscous mucosal solution 5 mL, 5 mL, Mouth/Throat, 3x daily PRN, Mila Ortega MD  •  magnesium sulfate IVPB 2g in 50 mL NSS/D5W/SWFI, 2 g, intravenous, PRN, Ankita Culver CRNP, Stopped at 01/02/21 0837  •  methylPREDNISolone sod suc(PF) (Solu-MEDROL) injection 60 mg, 60 mg, intravenous, q8h INT, Ingrid Acevedo, , 60 mg at 01/05/21  0557  •  metoprolol (LOPRESSOR) injection 2.5 mg, 2.5 mg, intravenous, q6h PRN, Abelino Medina MD, 2.5 mg at 01/03/21 1508  •  nystatin (MYCOSTATIN) 100,000 unit/gram topical powder, , Topical, BID, Ankita Culver CRNP, Given at 01/05/21 0844  •  nystatin (MYCOSTATIN) 100,000 unit/mL suspension 500,000 Units, 500,000 Units, Swish & Swallow, QID, Mila Ortega MD  •  potassium chloride (KLOR-CON) tablet extended release 20 mEq, 20 mEq, oral, PRN **OR** potassium chloride (KLOR-CON) tablet extended release 40 mEq, 40 mEq, oral, PRN **OR** potassium chloride 20 mEq in 100 mL IVPB  (premix), 20 mEq, intravenous, PRN **OR** potassium chloride (KCL) 40 mEq/250 mL IVPB in NSS 40 mEq, 40 mEq, intravenous, PRN, Mila Ortega MD, Last Rate: 62.5 mL/hr at 01/05/21 1207, 40 mEq at 01/05/21 1207  •  renal multivitamin tablet 1 tablet, 1 tablet, oral, Daily, Ankita Culver CRNP, 1 tablet at 01/05/21 0843  •  sodium chloride flush 10 mL, 10 mL, intravenous, q8h INT, Mila Ortega MD, 10 mL at 01/05/21 0901  •  sodium chloride flush 10 mL, 10 mL, intravenous, PRN, Mila Ortega MD    ASSESSMENT:  Principal Problem:    Metabolic acidosis  Active Problems:    Severe sepsis (CMS/HCC)    Encephalopathy    Pneumobilia    Acute on chronic renal failure (CMS/HCC)    Hyperkalemia    Acute cystitis with hematuria    Hypoglycemia    COPD (chronic obstructive pulmonary disease) (CMS/HCC)    History of colon cancer    Type 2 MI (myocardial infarction) (CMS/HCC)    Hyponatremia    History of CVA (cerebrovascular accident)    Chronic atrial fibrillation (CMS/HCC)    Septic shock (CMS/HCC)    Leukocytosis    Anemia    Lactic acidosis    Hematuria    Palliative care by specialist    SOB (shortness of breath)    FTT (failure to thrive) in adult    Pain    Debility      PLAN:  1. DANY on CKD: ATN in the context of septic shock.  No longer oliguric-approximately 800 mL of urine output over the last 24 hours. Creatinine creatinine peaked at  6.4 on 12/28/2020, improved to 3.8 on 1/2/2021.  It went up to 4.3 today.  We will give a lower dose diuretic today-40 mg IV for 2 doses.        2. Hyponatremia: Initial over-correction requiring D5W.  Sodium is 133 today.     3. Hyperkalemia: this is resolved.  As a matter of fact she will need potassium replacement as the potassium is 3.4 today     4. Uro-sepsis: improving on cefuroxime  5. Hematuria:   Heparin gtt discontinued.  No more clots in the Samson bag.  Just blood-tinged urine.  Urology following.   6.  Volume overload by x-ray.  Diuretic as above.      Kurt Bolton MD

## 2021-01-05 NOTE — PROGRESS NOTES
Infectious Disease Progress Note    Patient Name: Nancy Minor  MR#: [id number]  : 10/6/1923  Admission Date: 2020  Date: 21   Time: 9:47 AM   Author: Barrett Stern MD    Major Events:  none    Antibiotics:    Anti-infectives (From admission, onward)    Start     Dose/Rate Route Frequency Ordered Stop    21 1030  cefUROXime (CEFTIN) tablet 250 mg      250 mg oral Daily 21 0931      20  nystatin (MYCOSTATIN) 100,000 unit/gram topical powder       Topical 2 times daily 20            Subjective     Review of Systems    Remains afebrile without acute events overnight    Objective     Vital Signs:    Patient Vitals for the past 72 hrs:   BP Temp Temp src Pulse Resp SpO2 Weight   21 0845 128/66 -- -- (!) 130 -- 93 % --   21 0836 (!) 88/61 -- -- (!) 120 -- 96 % --   21 0800 (!) 83/62 -- -- (!) 117 18 98 % --   21 0600 (!) 96/58 -- -- (!) 118 20 (!) 84 % --   21 0400 101/62 (!) 35.9 °C (96.6 °F) Oral (!) 114 18 98 % --   21 0200 (!) 94/52 -- -- (!) 114 20 96 % --   21 0000 (!) 109/58 36.2 °C (97.1 °F) Oral (!) 114 20 (!) 84 % --   21 2200 120/66 -- -- (!) 119 18 97 % --   21 2000 108/69 -- -- 99 20 93 % --   21 1800 102/63 -- -- (!) 105 18 93 % --   21 1600 (!) 95/54 36.4 °C (97.6 °F) Oral 100 20 95 % --   21 1500 -- -- -- 97 20 95 % --   21 1313 109/61 -- -- 97 20 93 % --   21 1213 122/62 -- -- (!) 111 20 97 % --   21 1202 -- 36.4 °C (97.5 °F) Oral (!) 111 20 96 % --   21 1145 119/69 -- -- (!) 121 -- 98 % --   21 1140 106/72 -- -- (!) 120 -- -- --   21 1133 (!) 89/57 -- -- (!) 120 -- 94 % --   21 1113 (!) 99/54 -- -- (!) 116 20 95 % --   21 1013 (!) 104/58 -- -- (!) 107 20 96 % --   21 1000 -- -- -- (!) 118 20 96 % --   21 0913 111/77 -- -- (!) 114 18 97 % --   21 0900 -- -- -- (!) 102 20 100 % --   21 0813 (!) 87/59 -- -- 97  20 93 % --   01/04/21 0800 -- -- -- (!) 103 18 95 % --   01/04/21 0713 104/65 -- -- 99 20 96 % --   01/04/21 0700 -- -- -- 96 18 94 % --   01/04/21 0320 (!) 95/55 36.3 °C (97.3 °F) Axillary -- (!) 24 94 % --   01/04/21 0013 96/60 36.5 °C (97.7 °F) Oral (!) 109 (!) 22 95 % --   01/03/21 2213 (!) 96/56 36.3 °C (97.4 °F) Oral (!) 107 16 97 % --   01/03/21 2130 -- -- -- (!) 105 -- -- --   01/03/21 2021 (!) 97/57 -- -- (!) 101 -- -- --   01/03/21 1944 -- -- -- 98 -- -- --   01/03/21 1925 (!) 92/55 36.3 °C (97.4 °F) Oral (!) 105 13 95 % --   01/03/21 1900 117/67 -- -- (!) 103 20 92 % --   01/03/21 1830 -- -- -- (!) 101 -- -- --   01/03/21 1700 109/74 -- -- (!) 102 13 97 % --   01/03/21 1600 117/67 -- -- 100 13 98 % --   01/03/21 1500 128/76 -- -- (!) 118 (!) 22 97 % --   01/03/21 1400 120/77 -- -- (!) 109 14 96 % --   01/03/21 1300 (!) 114/58 -- -- (!) 111 13 98 % --   01/03/21 1200 (!) 126/56 -- -- (!) 115 13 97 % --   01/03/21 1100 114/66 36.4 °C (97.5 °F) Axillary (!) 112 16 96 % --   01/03/21 1000 (!) 115/56 -- -- (!) 115 15 94 % --   01/03/21 0900 120/66 -- -- (!) 113 19 92 % --   01/03/21 0800 (!) 102/59 -- -- (!) 111 13 92 % --   01/03/21 0700 (!) 107/57 36.5 °C (97.7 °F) Axillary (!) 109 14 92 % --   01/03/21 0600 (!) 99/54 -- -- (!) 109 12 95 % 56.8 kg (125 lb 4.8 oz)   01/03/21 0500 (!) 96/52 -- -- (!) 108 15 94 % --   01/03/21 0400 (!) 101/52 36.4 °C (97.6 °F) Axillary (!) 112 17 (!) 91 % --   01/03/21 0300 114/66 -- -- (!) 110 16 100 % --   01/03/21 0200 (!) 99/54 -- -- (!) 105 15 97 % --   01/03/21 0100 (!) 105/57 -- -- (!) 105 18 97 % --   01/03/21 0000 (!) 105/55 -- -- (!) 108 20 97 % --   01/02/21 2300 (!) 99/58 -- -- (!) 106 15 95 % --   01/02/21 2200 (!) 102/53 -- -- (!) 106 14 95 % --   01/02/21 2100 (!) 100/54 -- -- (!) 110 (!) 24 95 % --   01/02/21 2000 (!) 115/57 -- -- (!) 103 18 98 % --   01/02/21 1900 (!) 112/57 36.4 °C (97.6 °F) Axillary (!) 107 17 99 % --   01/02/21 1800 113/68 -- -- (!) 105 (!)  23 -- --   21 1700 (!) 111/56 -- -- (!) 108 17 98 % --   21 1600 112/63 36.2 °C (97.2 °F) Axillary (!) 102 16 97 % --   21 1500 (!) 117/55 -- -- (!) 110 17 100 % --   21 1400 (!) 113/57 -- -- (!) 102 (!) 22 97 % --   21 1300 (!) 105/59 -- -- (!) 101 15 97 % --   21 1200 (!) 112/59 -- -- (!) 111 18 96 % --   21 1100 (!) 116/57 36.4 °C (97.6 °F) Oral (!) 113 17 95 % --   21 1000 108/65 -- -- (!) 110 16 97 % --       Temp (72hrs), Av.3 °C (97.4 °F), Min:35.9 °C (96.6 °F), Max:36.5 °C (97.7 °F)      Physical Exam:  General appearance: alert and cooperative  Head: NCAT  Lungs: wheezing bilateral  Heart: regular rate and rhythm  Abdomen: soft, non-tender  Extremities: edema none  Skin: no rashes  Neurologic: grossly normal    Lines, Drains, Airways, Wounds:  Midline Catheter 21 (Active)   Number of days: 2       Peripheral IV 20 Anterior;Left;Upper Arm (Active)   Number of days: 8       Urethral Catheter 16 Fr (Active)   Number of days: 8       Rash 20 0001 Right lateral hip patch (Active)   Number of days: 7       Wound Other (comment) Buttock (Active)   Number of days: 7       Labs:    CBC Results       21                    0501 0330 0336         WBC 17.81 13.44 12.33         RBC 2.96 2.76 2.84         HGB 7.7 7.1 7.2         HCT 24.7 22.7 23.3         MCV 83.4 82.2 82.0         MCH 26.0 25.7 25.4         MCHC 31.2 31.3 30.9          191 172         Comment for HGB at 0330 on 21: ALL RESULTS HAVE BEEN CHECKED    Comment for PLT at 0330 on 21: CONSISTENT WITH PREVIOUS RESULTS      BMP Results       21                    0501 0329 0336          134 129         K 3.4 3.5 3.5         Cl 103 101 98         CO2 15 19 17         Glucose 118 123 134          102 97         Creatinine 4.3 4.1 3.9         Calcium 8.4 8.6 8.1         Anion Gap 15 14 14         EGFR 9.6 10.1 10.7          Comment for BUN at 0501 on 01/05/21: Consistent with previous results  Quantity not sufficient for repeat      Comment for BUN at 0329 on 01/04/21: Checked by dilution      Comment for BUN at 0336 on 01/03/21: Consistent with previous results        PT/PTT Results       01/02/21 01/01/21 01/01/21                    0434 2311 1614         PTT 65 76 68         Comment for PTT at 0434 on 01/02/21: The Standard Therapeutic Range for Heparin is 68 to 101 seconds.    Comment for PTT at 2311 on 01/01/21: The Standard Therapeutic Range for Heparin is 68 to 101 seconds.    Comment for PTT at 1614 on 01/01/21: Result rechecked    The Standard Therapeutic Range for Heparin is 68 to 101 seconds.      UA Results       12/28/20                          1627           Color Red           Clarity Turbid           Glucose Proper identification not possible due to color interference.           Bilirubin Proper identification not possible due to color interference.           Ketones Proper identification not possible due to color interference.           Sp Grav 1.017           Blood +3           Ph Proper identification not possible due to color interference.           Protein Proper identification not possible due to color interference.           Urobilinogen Proper identification not possible due to color interference.           Nitrite Proper identification not possible due to color interference.           Leuk Est +3           WBC Too Numerous To Count           RBC Too Numerous To Count           Bacteria +3           Comment for Blood at 1627 on 12/28/20: The sensitivity of the occult blood test is equivalent to approximately 4 intact RBC/HPF.      Lactate Results       12/30/20 12/29/20 12/29/20                    1205 0924 0524         Lactate 1.3 4.1 6.5                       Microbiology Results     Procedure Component Value Units Date/Time    SARS-CoV-2 (COVID-19), PCR Nasopharynx [[id number]]  (Normal) Collected:  12/28/20 1840    Specimen: Nasopharyngeal Swab from Nasopharynx Updated: 12/28/20 2018    Narrative:      The following orders were created for panel order SARS-CoV-2 (COVID-19), PCR Nasopharynx.  Procedure                               Abnormality         Status                     ---------                               -----------         ------                     SARS-CoV-2 (COVID-19), P...[[id number]]  Normal              Final result                 Please view results for these tests on the individual orders.    SARS-CoV-2 (COVID-19), PCR Nasopharynx [[id number]]  (Normal) Collected: 12/28/20 1840    Specimen: Nasopharyngeal Swab from Nasopharynx Updated: 12/28/20 2018     SARS-CoV-2 (COVID-19) Negative    Narrative:      Nursing instructions: Obtain nasopharyngeal swab ONLY.  Send swab in viral transport media.    Blood Culture Blood, Venous [[id number]]  (Abnormal) Collected: 12/28/20 1648    Specimen: Blood, Venous Updated: 01/03/21 1409     Culture **Positive Culture**      Klebsiella pneumoniae ssp pneumoniae     Gram Stain Result Gram negative bacilli    Urine culture Urine, Clean Catch [[id number]]  (Abnormal)  (Susceptibility) Collected: 12/28/20 1627    Specimen: Urine, Clean Catch Updated: 12/31/20 0830     Urine Culture **Positive Culture**      >1 x 10^5 CFU/mL Klebsiella pneumoniae ssp pneumoniae    Blood Culture Blood, Venous [[id number]]  (Abnormal)  (Susceptibility) Collected: 12/28/20 1603    Specimen: Blood, Venous Updated: 01/03/21 1408     Culture **Positive Culture**      Klebsiella pneumoniae ssp pneumoniae     Gram Stain Result Gram negative bacilli    Blood Culture PCR Panel Blood, Venous [[id number]]  (Abnormal) Collected: 12/28/20 1603    Specimen: Blood, Venous Updated: 12/29/20 1309     Acinetobacter calcoaceticus-baumannii Not Detected     Candida albicans Not Detected     Candida auris Not Detected     Candida glabrata Not Detected     Candida krusei Not Detected     Candida  parapsilosis Not Detected     Candida tropicalis Not Detected     Cryptococcus neoformans/gattii Not Detected     Enterococcus faecalis Not Detected     Enterococcus faecium Not Detected     Enterobacter cloacae complex Not Detected     Escherichia coli Not Detected     Klebsiella oxytoca Not Detected     Klebsiella pneumoniae Detected     Klebsiella aerogenes Not Detected     Proteus Not Detected     Salmonella species Not Detected     Serratia marcescens Not Detected     Haemophilus influenzae Not Detected     Listeria monocytogenes Not Detected     Neiseria meningitidis Not Detected     Pseudomonas aeruginosa Not Detected     Stenotrophomonas maltophilia Not Detected     Bacteroides fragilis Not Detected     Staph (not aureus) Not Detected     Staphylococcus aureus Not Detected     Staphylococcus ludgnensis Not Detected     Staphylococcus epidermidis Not Detected     Streptococcus Not Detected     Streptococcus agalactiae (Group B) Not Detected     Streptococcus pneumoniae Not Detected     Streptococcus pyogenes (Group A) Not Detected     KPC (Carbapenem Resistance Gene) Not Detected     Damon/B (Vancomycin Resistance Gene) Not Applicable     mecA/C Not Applicable     mecA/C and MREJ (MRSA) Not Applicable     CTX-M (ESBL) Not Detected     IMP Not Detected     mcr-1 Not Detected     NDM Not Detected     OXA-48-like Not Detected     VIM Not Detected     Comment: See below    Narrative:      This positive blood culture was tested with a rapid molecular panel that detects Enterococcus faecalis, Enterococcus faecium, Listeria monocytogenes, Staphylococcus species, Staphylococcus aureus, Staphylococcus lugdunensis, Staphylococcus epidermidis, Streptococcus agalactiae (Group B), Streptococcus pneumonia, Streptococcus pyogenes (Group A), Acinetobacter calcoaceticus-baumannii complex, Bacteroides fragilis, Haemophilus influenza, Neisseria meningitides, Pseudomonas aeruginosa, Stenotrophomonas maltophilia , Salmonella spp.,   Enterobacter cloacae complex, Escherichia coli, Klebsiella oxytoca, Klebsiella pneumoniae group, Klebsiella aerogenes , Proteus species, Serratia marcescens, Candida albicans, Candida auris, Candida glabrata, Candida krusei, Cryptococcus neoformans/gattii, Candida parapsilosis, and Candida tropicalis.          Pathology Results     ** No results found for the last 720 hours. **          Echo:     Cardiac Imaging   TRANSTHORACIC ECHO (TTE) COMPLETE 12/30/2020    Narrative · Mitral valve thickening. Severe mitral annular calcification.  · Severe mitral valve stenosis.  · Mean gradient = 10.00 mmHg. HR = 110 bpm.  · Hyperdynamic LV systolic function. Estimated EF >75%.  · Mildly dilated LA.  · Tricuspid valve structure is grossly normal. Mild tricuspid valve   regurgitation.  · Aortic valve structure is normal. Mild aortic valve regurgitation. No   aortic valve stenosis. A bioprosthetic aortic valve is present.  · Moderately dilated RA.  · Moderately dilated RV. Mildly reduced RV systolic function.  · Mild mitral valve regurgitation.     Estimated PASP= 51mmHg by TR jet method.         Imaging:    Radiology Imaging   XR CHEST 1 VW    Narrative CLINICAL HISTORY:  89-year-old female with wheezing    COMMENT:  A portable erect AP view of the chest was obtained at 10:00 AM on  12/31/2020    COMPARISON:  Chest radiograph from 12/28/2020    Cardiac leads overlie the chest.  The left costophrenic angle is excluded from  the field-of-view.  Postprocedural changes of aortic valvuloplasty are again  noted.  There is patchy interstitial opacity is noted, with pulmonary vascular  congestion, suggesting edema.  There is vague left lower lobe opacity, which may  represent airspace disease or atelectasis.    Cardiomegaly is noted.      Impression IMPRESSION:  1.  Patchy interstitial opacity, with pulmonary vascular congestion, which may  represent multifocal airspace disease or pulmonary edema.  2.  Left basilar airspace disease,  new/increased from prior.             Assessment     1. Klebsiella bacteremia due to UTI - leukocytosis is worsening in the setting of steroid administration but pt remains afebrile. Low concern for worsening infection with pt remaining clinically stable.     Plan      1. Continue cefuroxime for 6 more days to complete 14 day course.

## 2021-01-05 NOTE — PATIENT CARE CONFERENCE
Care Progression Rounds Note  Date: 1/5/2021  Time: 12:43 PM     Patient Name: Nancy Minor     Medical Record Number: [id number]   YOB: 1923  Sex: Female      Room/Bed: 3230    Admitting Diagnosis: Hyperkalemia [E87.5]  Acute cystitis with hematuria [N30.01]  Severe sepsis (CMS/HCC) [A41.9, R65.20]  Acute renal failure, unspecified acute renal failure type (CMS/HCC) [N17.9]   Admit Date/Time: 12/28/2020  3:35 PM    Primary Diagnosis: Metabolic acidosis  Principal Problem: Metabolic acidosis    GMLOS: 4.8  Anticipated Discharge Date: 1/8/2021    AM-PAC  Mobility Score: 8    Discharge Planning:  Living Arrangements: house  Anticipated Discharge Disposition: home with home health services, skilled nursing facility    Barriers to Discharge:  Barriers to Discharge: Medical issues not resolved  Comment: hypotensive, cr 4.3, afib to 140's, CXR=fluid overload    Participants:  , nursing

## 2021-01-05 NOTE — PROGRESS NOTES
Pulmonary Progress Note     SUBJECTIVE  Interval History:   Patient still  Short of breath.  Intermittent cough  Cr increased    Allergies: Patient has no known allergies.    CURRENT MEDS  •  acetaminophen, 650 mg, oral, q4h PRN  •  atorvastatin, 10 mg, oral, Daily (6p)  •  budesonide, 0.5 mg, nebulization, BID (6a, 6p)  •  calcium gluconate, 1 g, intravenous, 2x daily PRN  •  cefUROXime, 250 mg, oral, Daily  •  glucose, 16-32 g of dextrose, oral, PRN **OR** dextrose, 15-30 g of dextrose, oral, PRN **OR** glucagon, 1 mg, intramuscular, PRN **OR** dextrose in water, 25 mL, intravenous, PRN  •  ferric gluconate (FERRLECIT) IVPB, 125 mg, intravenous, Daily  •  guaiFENesin, 400 mg, oral, q4h PRN  •  heparin (porcine), 5,000 Units, subcutaneous, q8h JESSICA  •  insulin aspart U-100, 3-11 Units, subcutaneous, With meals & nightly  •  ipratropium-albuteroL, 3 mL, nebulization, q6H JESSICA  •  levothyroxine, 50 mcg, oral, Daily (6:30a)  •  lidocaine HCL, 5 mL, Mouth/Throat, 3x daily PRN  •  magnesium sulfate, 2 g, intravenous, PRN  •  methylPREDNISolone sodium succinate, 60 mg, intravenous, q8h INT  •  metoprolol, 2.5 mg, intravenous, q6h PRN  •  nystatin, , Topical, BID  •  renal multivitamin, 1 tablet, oral, Daily  •  sodium chloride, 10 mL, intravenous, q8h INT  •  sodium chloride, 10 mL, intravenous, PRN    VITAL SIGNS  Vitals:    01/05/21 0200 01/05/21 0400 01/05/21 0600 01/05/21 0800   BP: (!) 94/52 101/62 (!) 96/58 (!) 83/62   BP Location: Left forearm Left forearm Left forearm    Patient Position: Lying Lying Lying    Pulse: (!) 114 (!) 114 (!) 118 (!) 117   Resp: 20 18 20 18   Temp:  (!) 35.9 °C (96.6 °F)     TempSrc:  Oral     SpO2: 96% 98% (!) 84% 98%   Weight:       Height:           INTAKE/OUTPUT    Intake/Output Summary (Last 24 hours) at 1/5/2021 0810  Last data filed at 1/5/2021 0600  Gross per 24 hour   Intake 160 ml   Output 877 ml   Net -717 ml     PHYSICAL EXAM  Physical Exam  Vitals signs reviewed.    Constitutional:       Comments: frail   HENT:      Head: Normocephalic and atraumatic.   Eyes:      General: No scleral icterus.     Conjunctiva/sclera: Conjunctivae normal.   Cardiovascular:      Rate and Rhythm: Tachycardia present. Rhythm irregular.      Heart sounds: Murmur present. No friction rub. No gallop.    Pulmonary:      Comments: Diffuse exp wheeze B/L  Abdominal:      General: There is no distension.      Palpations: Abdomen is soft.      Tenderness: There is no abdominal tenderness.   Musculoskeletal:      Comments: Mild peripheral edema   Neurological:      Mental Status: She is alert and oriented to person, place, and time.         LAB RESULTS  ABG    CBC  Results from last 7 days   Lab Units 01/05/21  0501 01/04/21  0330 01/03/21  0336   WBC K/uL 17.81* 13.44* 12.33*   RBC M/uL 2.96* 2.76* 2.84*   HEMOGLOBIN g/dL 7.7* 7.1* 7.2*   HEMATOCRIT % 24.7* 22.7* 23.3*   MCV fL 83.4 82.2* 82.0*   MCH pg 26.0* 25.7* 25.4*   MCHC g/dL 31.2* 31.3* 30.9*   PLATELETS K/uL 200 191 172   RDW % 18.0* 17.6* 17.1*   MPV fL 9.5 9.7 9.6     BMP  Results from last 7 days   Lab Units 01/05/21  0501 01/04/21  0329 01/03/21  0336   SODIUM mEQ/L 133* 134* 129*   POTASSIUM mEQ/L 3.4* 3.5* 3.5*   CHLORIDE mEQ/L 103 101 98   CO2 mEQ/L 15* 19* 17*   BUN mg/dL 106* 102* 97*   CREATININE mg/dL 4.3* 4.1* 3.9*   CALCIUM mg/dL 8.4* 8.6* 8.1*   ALBUMIN g/dL 2.2* 2.0* 2.1*   BILIRUBIN TOTAL mg/dL 0.6 0.5 0.6   ALK PHOS IU/L 71 73 78   ALT IU/L 24 25 26   AST IU/L 17 15 19   GLUCOSE mg/dL 118* 123* 134*     Coag  Results from last 7 days   Lab Units 01/02/21  0434 01/01/21  2311 01/01/21  1614  12/30/20  1230   PROTIME sec  --   --   --   --  15.2*   INR INR  --   --   --   --  1.2   PTT sec 65* 76* 68*   < > 47*    < > = values in this interval not displayed.       IMAGING  Imaging independently reviewed.   No new imaging    ASSESSMENT & PLAN  89-year-old female presenting with weakness, hematuria, found to have septic shock      1.  COPD, with acute exacerbation.  Ongoing significnat bronchospasm  - cont same dose of steroids today, solumedrol 60 mg q8  - cont nebs     2. Resolved septic shock 2/2 UTI with Klebsiella bacteremia  - Cont abx      3. Acute kidney injury, in setting of septic shock.  - Cr increased to 4.3  -Renal following    4. A fib, with rapid rates this AM, although also receiving neb  -cardiology following    5. Pulmonary infiltrates.  Suspect 2/2 volume overload; doubt pneumonia  - will repeat CXR      Ingrid Acevedo DO

## 2021-01-05 NOTE — PROGRESS NOTES
Patient: Nancy Minor  Location: [facility]30  MRN: [id number]  Today's date: 1/5/2021     Patient supine in bed on fitted sheet,HOB elevated,  incontinence pad, bed alarm on, call bell and all needs in reach, RN aware.      Alberta is a 89 y.o. female admitted on 12/28/2020 with Severe sepsis (CMS/HCC). Principal problem is Metabolic acidosis.    Past Medical History  Alberta has a past medical history of Atrial fibrillation (CMS/HCC), Chronic hypotension, Chronic kidney disease, Chronic kidney failure, Colon cancer (CMS/HCC), COPD (chronic obstructive pulmonary disease) (CMS/HCC), Low BP, Stroke (cerebrum) (CMS/HCC), and UTI (urinary tract infection).    History of Present Illness   Here with metabolic acidosis, no previous ST per chart review; from home    OT Vitals    Date/Time Pulse SpO2 O2 Therapy O2 Del Method O2 Flow Rate BP BP Location BP Method Pt Position [facility]   01/05/21 0836 120 96 % Supplemental oxygen Nasal cannula 4 L/min 88/61 Left forearm Automatic Lying DA   01/05/21 0845 130 93 % Supplemental oxygen Nasal cannula 4 L/min 128/66 Left forearm Automatic Lying DA      OT Pain    Date/Time Pain Type Pref Pain Scale Rating: Rest Rating: Activity [facility]   01/05/21 0836 Pain Assessment word (verbal rating pain scale) 0 - no pain 0 - no pain DA   01/05/21 0845 Pain Assessment word (verbal rating pain scale) 0 - no pain 0 - no pain KK          Prior Living Environment      Most Recent Value   Living Arrangements  house   Living Environment Comment  Pt unreliable historian,  per EMR lives in [facility] w/dtr [facility] and MICHELLE in [facility] w/5 GUILLERMO. They have purchased a home with 0 GUILLERMO and are preparing to move in late January.          Prior Level of Function      Most Recent Value   Dominant Hand  right   Ambulation  assistive equipment   Transferring  assistive equipment   Toileting  assistive person   Bathing  assistive person   Dressing  assistive person   Eating  assistive person    Communication  understands/communicates without difficulty   Swallowing  swallows foods/liquids without difficulty   Baseline Diet/Method of Nutritional Intake  regular solids, thin liquids   Past History of Dysphagia  none documented   Prior Level of Function Comment  -- [per chart review,pt amb 10-15' 1x/day w/RW at baseline]   Assistive Device/Animal Currently Used at Home  walker, front-wheeled          Occupational Profile      Most Recent Value   Reason for Services/Referral  ADL dysfunction   Occupational History/Life Experiences  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  yes     General Observations of Patient  in bed     Existing Precautions/Restrictions  fall;oxygen therapy device and L/min;other (see comments)    activity as tolerated       Cognition/Psychosocial    Affect/Mental Status (Cognitive)  confused     Orientation Status (Cognition)  oriented x 4;person;place    place=hospital, time- January 20 100    Follows Commands (Cognition)  follows one step commands;50-74% accuracy;increased processing time needed;delayed response/completion;initiation impaired     Attention Deficit (Cognitive)  moderate deficit;concentration;focused/sustained attention     Executive Function Deficit (Cognition)  insight/awareness of deficits;information processing;moderate deficit     Safety Deficit (Cognitive)  moderate deficit;awareness of need for assistance;insight into deficits/self awareness        Bed Mobility    Bed Mobility  scooting/bridging     [place], Scoot/Bridge  maximum assist (25-49% patient  effort);2 person assist;verbal cues     Verbal Cues (Scoot/Bridge)  technique     Comment (Bed Mobility)  high heart rate with minimal activity, deferred further mobility, able to come into long sit briefly with MOD A, able to assist with scooting with use of B/L LE        Balance    Static Sitting Balance  severe impairment;supported    long sit       Motor Skills    Motor Skills  functional endurance     Functional Endurance  poor        Upper Body Dressing    [place]  moderate assist (50-74% patient effort)     Position  supine     Comment  donning gown around front, step by step cues        Grooming    Self-Performance  washes, rinses and dries face;washes, rinses and dries hands     [place]  set up;close supervision     Position  supine, use of damp cloth        Coping    Observed Emotional State  cooperative        AM-PAC (TM) - ADL (Current Function)    Putting on and taking off regular lower body clothing?  1 - Total     Bathing?  1 - Total     Toileting?  1 - Total     Putting on/taking off regular upper body clothing?  2 - A Lot     How much help for taking care of personal grooming?  2 - A Lot     Eating meals?  3 - A Little     AM-PAC (TM) ADL Score  10        Therapy Assessment/Plan (OT)    Rehab Potential (OT)  good, to achieve stated therapy goals     Therapy Frequency (OT)  2-3 times/wk        Progress Summary (OT)    Daily Outcome Statement (OT)  [facility] 10, MAX A of 2 scoting in bed, MOD A to come into long sit, close SUP grooming, MOD A UB dressing, functional transfer limited by high heart rate with minimal activity, Rec continued OT to promote safety and IND with ADLs         Therapy Plan Review/Discharge Plan (OT)    OT Recommended Discharge Disposition  skilled nursing facility     Anticipated Equipment Needs At Discharge (OT)  commode, 3-in-1                   Education provided this session. See the Patient Education summary report for full details.         OT Goals      Most Recent  Value   Bed Mobility Goal 1   Activity/Assistive Device  bed mobility activities, all at 01/04/2021 1133   [place]  minimum assist (75% or more patient effort) at 01/04/2021 1133   Time Frame  by discharge at 01/04/2021 1133   Progress/Outcome  goal ongoing at 01/04/2021 1133   Transfer Goal 1   Activity/Assistive Device  all transfers at 01/04/2021 1133   [place]  moderate assist (50-74% patient effort) at 01/04/2021 1133   Time Frame  by discharge at 01/04/2021 1133   Progress/Outcome  goal ongoing at 01/04/2021 1133   Bathing Goal 1   Activity/Assistive Device  bathing skills, all at 01/04/2021 1133   [place]  moderate assist (50-74% patient effort) at 01/04/2021 1133   Time Frame  by discharge at 01/04/2021 1133   Progress/Outcome  goal ongoing at 01/04/2021 1133   Dressing Goal 1   Activity/Adaptive Equipment  dressing skills, all at 01/04/2021 1133   [place]  minimum assist (75% or more patient effort) at 01/04/2021 1133   Time Frame  by discharge at 01/04/2021 1133   Progress/Outcome  goal ongoing at 01/04/2021 1133   Toileting Goal 1   Activity/Assistive Device  toileting skills, all at 01/04/2021 1133   [place]  minimum assist (75% or more patient effort) at 01/04/2021 1133   Time Frame  by discharge at 01/04/2021 1133   Progress/Outcome  goal ongoing at 01/04/2021 1133

## 2021-01-05 NOTE — PLAN OF CARE
Problem: Adult Inpatient Plan of Care  Goal: Plan of Care Review  Flowsheets (Taken 1/5/2021 1313)  Plan of Care Reviewed With: other (see comments)  RNCC reviewed chart and discussed status in care progression rounds. Creat 4.3, hypotensive, afib to 140's, CXR, O2 @ 4L. Palliative following. Therapy recommends SNF, however, family prefers return to son's home in [place] with HC and family assistance. Family has been notified pt will need a local PCP to receive HC in [place]. [facility] is following. Plan: SNF vs HC pending family's ability to secure local PCP. Will follow for ongoing d/c needs.

## 2021-01-05 NOTE — PROGRESS NOTES
Patient: Nancy Minor  Location: [facility]30  MRN: [id number]  Today's date: 1/5/2021  Speech Pathology: Therapy session    SLP Diagnosis: Mild oral and suspected pharyngeal dysphagia; Thrush appearance, c/o mouth pain; now hoarse    Recommendations:  1. Cont w/ puree and nectar liquids; allow liquids by small sip, no straws. If pt w/ s/s of aspiration w/ thins, return to liquids by tsp  2. meds in puree, crush as able  3. SLP to follow and advance as tolerated  4. Eval and tx for oral thrush    Summary/Handoff:  Daily Outcome Statement (SLP): Pt seen b/s for swallow tx followup. Pt w/ new hoarseness, and c/o mouth pain and some sore throat with swallowing. She has limited interest in intake but accepts and tolerates nectars, thinned nectars, and ice chips. Messaged MD to eval and tx for thrush; SLP to follow and advance as tolerated      Session Notes:            Dietary Orders   (From admission, onward)             Start     Ordered    01/04/21 1042  Dietary nutrition supplements  Once     Question Answer Comment   Select Supplement (PH): Magic Cup Wildberry    Meal period Lunch Dinner        01/04/21 1041    01/04/21 1041  Dietary nutrition supplements  Once     Question Answer Comment   Select Supplement (PH): Boost Glucose Control Chocolate    Meal period Breakfast        01/04/21 1041    01/04/21 1040  Adult Diet Pureed Texture; Nectar Thick Liquids; RD/LDN may adjust order  Diet effective now     Comments: Family tells us the patient likes soup, and yogurt  Nectars by Teaspoon   Question Answer Comment   Diet Texture Pureed Texture    Fluid Consistency: Nectar Thick Liquids    Delegation of Authority. Diet orders written by PA/[place] may not be adjusted by RD/LDNs. RD/LDN may adjust order        01/04/21 1041                Results from last 7 days   Lab Units 01/05/21  0501 01/04/21  0330 01/03/21  0336   WBC K/uL 17.81* 13.44* 12.33*   HEMOGLOBIN g/dL 7.7* 7.1* 7.2*    HEMATOCRIT % 24.7* 22.7* 23.3*   PLATELETS K/uL 200 191 172          Patient left with call bell in reach and alarms as found.      Alberta is a 89 y.o. female admitted on 12/28/2020 with Severe sepsis (CMS/HCC). Principal problem is Metabolic acidosis.    Past Medical History  Alberta has a past medical history of Atrial fibrillation (CMS/HCC), Chronic hypotension, Chronic kidney disease, Chronic kidney failure, Colon cancer (CMS/HCC), COPD (chronic obstructive pulmonary disease) (CMS/HCC), Low BP, Stroke (cerebrum) (CMS/[facility]), and UTI (urinary tract infection).    History of Present Illness   Here with metabolic acidosis, no previous ST per chart review; from home    SLP Vitals    Date/Time Pulse Resp SpO2 O2 Therapy O2 Del Method O2 Flow Rate BP MAP [facility]   01/05/21 1400 107 18 93 % -- -- -- 116/69 87 mmHg [facility]   01/05/21 1514 -- -- -- Supplemental oxygen Nasal cannula 4 L/min -- -- [facility]   01/05/21 1600 128 20 96 % Supplemental oxygen Nasal cannula 4 L/min 112/74 88 mmHg [facility]      SLP Pain    Date/Time Pain Type Pref Pain Scale Location Rating: Rest [facility]   01/05/21 1413 Pain Assessment number (Numeric Rating Pain Scale) back -- [facility]   01/05/21 1458 Pain Reassessment word (verbal rating pain scale) -- 2 - mild pain [facility]   01/05/21 1600 Pain Assessment word (verbal rating pain scale) back 2 - mild pain [facility]          Prior Living Environment      Most Recent Value   Living Arrangements  house   Living Environment Comment  Pt unreliable historian,  per EMR lives in [facility] w/dtr [facility] and MICHELLE in [facility] w/5 GUILLERMO. They have purchased a home with 0 GUILLERMO and are preparing to move in late January.          Prior Level of Function      Most Recent Value   Dominant Hand  right   Ambulation  assistive equipment   Transferring  assistive equipment   Toileting  assistive person   Bathing  assistive person   Dressing  assistive person   Eating  assistive person   Communication  understands/communicates without difficulty   Swallowing   swallows foods/liquids without difficulty   Baseline Diet/Method of Nutritional Intake  regular solids, thin liquids   Past History of Dysphagia  none documented   Prior Level of Function Comment  -- [per chart review,pt amb 10-15' 1x/day w/RW at baseline]   Assistive Device/Animal Currently Used at Home  walker, front-wheeled          SLP Evaluation and Treatment - 01/05/21 1225        Time Calculation    Start Time  --                  Education provided this session. See the Patient Education summary report for full details.    SLP Goals      Most Recent Value   Pharyngeal Swallow Goal 1   Activity  safely tolerate, recommended diet/liquid level, with no clinical signs/symptoms of aspiration at 01/02/2021 0950   [place]  independently at 01/02/2021 0950   Time Frame  long-term goal (LTG), by discharge at 01/02/2021 0950   Progress/Outcome  goal ongoing at 01/05/2021 1025

## 2021-01-05 NOTE — PLAN OF CARE
Problem: Adult Inpatient Plan of Care  Goal: Plan of Care Review  Outcome: Progressing  Flowsheets (Taken 1/5/2021 7708)  Progress: no change  Outcome Summary:   Pt. seen for PT treat   functionally limited by dec. strength, balance, activity tolerance, and cogitive status. Pt. cont. to req. Ax2 for bed mob. Will benefit from cont. PT, dispo for SNF. Will req. 24/7 [facility] if pt. and family opts for home d/c.

## 2021-01-05 NOTE — PROGRESS NOTES
"Palliative Care Progress Note    Patient Name: Nancy Minor  Patient MRN: [id number]  Date / Time: 1/5/2021 2:38 PM   Attending Physician: Mila Ortega MD    This is Hospital Day: 9    Interval History (Subjective)    Source: chart review, the patient and Dr. Ortega and CHANDLER Rider    No significant clinical improvement. Worsening renal function. Does not recall having rapid atrial fibrillation last night. Denies dizziness, nausea.    Reports dyspnea. Denies pain. States in general that  She is having a bad day.     Wishes to continue disease focused therapies unless she was not going to \"get better.\" In that case she would wish to go home and be comfortable.        Attempted to reach daughter (MERCEDES Bell by phone to discuss case. She is one of 5 children so they may benefit from conference call with palliative care and medical team.             Current Medications:  •  acetaminophen, 650 mg, oral, q4h PRN  •  atorvastatin, 10 mg, oral, Daily (6p)  •  budesonide, 0.5 mg, nebulization, BID (6a, 6p)  •  calcium gluconate, 1 g, intravenous, 2x daily PRN  •  cefUROXime, 250 mg, oral, Daily  •  glucose, 16-32 g of dextrose, oral, PRN **OR** dextrose, 15-30 g of dextrose, oral, PRN **OR** glucagon, 1 mg, intramuscular, PRN **OR** dextrose in water, 25 mL, intravenous, PRN  •  ferric gluconate (FERRLECIT) IVPB, 125 mg, intravenous, Daily  •  furosemide, 40 mg, intravenous, q12h JESSICA  •  guaiFENesin, 400 mg, oral, q4h PRN  •  heparin (porcine), 5,000 Units, subcutaneous, q8h JESSICA  •  insulin aspart U-100, 3-11 Units, subcutaneous, With meals & nightly  •  ipratropium-albuteroL, 3 mL, nebulization, q6H JESSICA  •  levothyroxine, 50 mcg, oral, Daily (6:30a)  •  lidocaine HCL, 5 mL, Mouth/Throat, 3x daily PRN  •  magnesium sulfate, 2 g, intravenous, PRN  •  methylPREDNISolone sodium succinate, 60 mg, intravenous, q8h INT  •  metoprolol, 2.5 mg, intravenous, q6h PRN  •  nystatin, , Topical, BID  •  nystatin, 500,000 Units, " Swish & Swallow, QID  •  potassium chloride, 20 mEq, oral, PRN **OR** potassium chloride, 40 mEq, oral, PRN **OR** potassium chloride, 20 mEq, intravenous, PRN **OR** potassium chloride, 40 mEq, intravenous, PRN  •  renal multivitamin, 1 tablet, oral, Daily  •  sodium chloride, 10 mL, intravenous, q8h INT  •  sodium chloride, 10 mL, intravenous, PRN    Conversation/Goals of Care:  Goals currently are restoration of health     Palliative Assessment:    Current Palliative Performance Status:  30%  Preferred Setting for Care: inpt  Patient Status: Disease state: Deteriorating despite treatments.  Mental status: oriented.  Family support: The patient receives support from her children..    Objective    Physical Exam:  General:   No Acute Distress,frail, fatigued  Eyes:  Sclera Anicteric  ENMT:  Mucus Membranes Moist  CV:  Rate  100s  Lungs:  Respiratory Rate  Normal and Slightly labored. Insp and exp wheezes   Abdomen:  Bowel Sounds present  Psych:  Appropriate  Neuro:  Awake, lethargic and Oriented  person, place and time/date  Skin:  Rash absent  MS severe sarcopenia, LE edema    Vitals:  Vitals:    01/05/21 1400   BP: 116/69   Pulse: (!) 107   Resp: 18   Temp:    SpO2: 93%       Intake & Output:  I/O last 3 completed shifts:  In: 160 [P.O.:60; IV Piggyback:100]  Out: 1662 [Urine:1662]    Laboratory Studies:    CBC Results       01/05/21 01/04/21 01/03/21                    0501 0330 0336         WBC 17.81 13.44 12.33         RBC 2.96 2.76 2.84         HGB 7.7 7.1 7.2         HCT 24.7 22.7 23.3         MCV 83.4 82.2 82.0         MCH 26.0 25.7 25.4         MCHC 31.2 31.3 30.9          191 172         Comment for HGB at 0330 on 01/04/21: ALL RESULTS HAVE BEEN CHECKED    Comment for PLT at 0330 on 01/04/21: CONSISTENT WITH PREVIOUS RESULTS        CMP Results       01/05/21 01/04/21 01/03/21                    0501 0329 0336          134 129         K 3.4 3.5 3.5         Cl 103 101 98         CO2 15 19 17          Glucose 118 123 134          102 97         Creatinine 4.3 4.1 3.9         Calcium 8.4 8.6 8.1         Anion Gap 15 14 14         AST 17 15 19         ALT 24 25 26         Albumin 2.2 2.0 2.1         EGFR 9.6 10.1 10.7         Comment for BUN at 0501 on 01/05/21: Consistent with previous results  Quantity not sufficient for repeat      Comment for BUN at 0329 on 01/04/21: Checked by dilution      Comment for BUN at 0336 on 01/03/21: Consistent with previous results          Troponin I Results       12/30/20 12/30/20 12/29/20                    1012 0433 2244         Troponin I 0.19 0.23 0.20         Comment for Troponin I at 1012 on 12/30/20: Result requires test to be repeated on new specimen 4-6 hours after the original.    Comment for Troponin I at 0433 on 12/30/20: Result requires test to be repeated on new specimen 4-6 hours after the original.    Comment for Troponin I at 2244 on 12/29/20: Result requires test to be repeated on new specimen 4-6 hours after the original.        ABG Results       12/29/20 12/29/20 12/28/20                    0612 0018 2051         pH 7.52 7.31 7.33         pCO2 24 18 14         pO2 85 93 118         HCO3 24 14 13         Base Excess -1.6 -14.7 -15.9         Comment for pH at 0018 on 12/29/20: Temperature Corrected pH(T)    Comment for pH at 2051 on 12/28/20: Temperature Corrected pH(T)    Comment for pCO2 at 0018 on 12/29/20: Temperature Corrected pCO2(T)    Comment for pCO2 at 2051 on 12/28/20: Temperature Corrected pCO2(T)    Comment for pO2 at 0018 on 12/29/20: Temperature Corrected pO2(T)    Comment for pO2 at 2051 on 12/28/20: Temperature Corrected pO2(T)          All labs reveiwed and abnormal values noted.       WBCs trending up  Creatinine 4.3 trending up      Imaging and Other Studies:     Radiology Imaging   XR CHEST 1 VW    Narrative CLINICAL HISTORY: Hypoxia.  Wheezing.    COMMENT:  A portable upright AP view of the chest was performed.    Comparison:  "12/31/2020    Cardiac monitoring leads obscure portions of the underlying lungs.  There is  pleural-parenchymal disease at the left much greater than right lung bases which  appears progressed compared to the prior.  In addition, there is interstitial  prominence and perihilar airspace disease, left greater than right, which is  progressed compared to the prior.  There is also increased airspace disease in  the left greater than right lung bases.  A more focal area of airspace disease  is also present in the right upper lobe.  The heart is enlarged.  Aortic  atherosclerosis is present.  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  Degenerative changes are present in the regional osseous structures and the  bones are diffusely demineralized.      Impression IMPRESSION:  Progression of pulmonary edema and pleural effusions, left greater than right,  with new/increasing perihilar, lower lobe and right upper lobe airspace disease.                                                                           Imaging studies reviewed and impressions noted.          Assessment and Plan    Debility  Assessment & Plan  Patient with weakness, fatigue      1/5- Having \"bad day\" today. Still reports that she hopes to improve clinically and is willing to continue current treatments in hopes of restoration of health      PT/OT/ST  Likely to need rehab if in line with goals of care    Pain  Assessment & Plan  Denies pain at this time but states that she overall does not feel good today.      Creatinine 4.3 now   Acetaminophen for pain prn (x1/24)          PDMP (outside record) with no findings  No meds for pain on home med list      FTT (failure to thrive) in adult  Assessment & Plan  Pt arrived with poor po for several days, albumin 2.1  Remote CVA, ?chronic dysphagia    1/5 Appetite poor. Albumin 2.2.         Pureed diet with nectar thick  Aspiration precautions  Speech following for dysphagia and diet management    SOB " (shortness of breath)  Assessment & Plan  Sob in setting of sepsis and known COPD   IV Solu-Medrol albuterol nebs, albuterol per medical team  Fluid overload- + 22 L at one point      - Unstable Continues to have significant dyspnea and is receiving diuretic therapy. Supplemental oxygen at 4 L           Continue diuresis per medical team    Palliative care by specialist  Assessment & Plan    -see debility        Now  limited code, No compressions, intubation or mechanical ventilation, okay for ACLS medications as well as Bag/Valve mask and BiPAp and Defib/cardioversion  Patient has a very extensive POA from [place] on EMR  Medical POA is Ashanti Vinson with alternate if Arabella were  to be Marilia Diaz  Palliative Care will clarify decision maker with patient as she appears to be neurologically intact at this time and further discuss code status and goals of care  Likely to need rehab on discharge if in line with goals of care and she recovers from current illness             Discussed with Dr. Ortega, CHANDLER [facility], and patient ..       DREAD Palafox  Office [phone]

## 2021-01-05 NOTE — PROGRESS NOTES
SUBJECTIVE      No change. Cr worse  OBJECTIVE     VITAL SIGNS:  Temp:  [35.9 °C (96.6 °F)-36.4 °C (97.6 °F)] 35.9 °C (96.6 °F)  Heart Rate:  [] 114  Resp:  [18-20] 18  BP: ()/(52-77) 101/62  SPO2 98%    Intake/Output Summary (Last 24 hours) at 1/5/2021 0647  Last data filed at 1/5/2021 0000  Gross per 24 hour   Intake 160 ml   Output 702 ml   Net -542 ml       PHYSICAL EXAM:  General appearance: alert and cooperative  Head: without obvious abnormality  Eyes: PERRLA, extraocular movements intact  Neck: No JVD, carotid bruits, thyromegaly  Lungs: diffuse exp wheezing  Heart: irreg irreg, S1-S2 normal, no murmurs, clicks, rubs or gallops  Abdomen: soft, non-tender, bowel sounds normal  Extremities: no edema, peripheral pulses present  Skin: Skin color, texture, turgor normal. No rashes or lesions  Neurologic: Alert and oriented X 3, no focal deficits    LABS / IMAGING / EKG / TELEMETRY     LABS:  Results from last 7 days   Lab Units 01/05/21  0501 01/04/21  0329 01/03/21  0336  12/30/20  1012 12/30/20  0433 12/29/20  2244   SODIUM mEQ/L 133* 134* 129*   < >  --  136  --    POTASSIUM mEQ/L 3.4* 3.5* 3.5*   < >  --  3.9  --    MAGNESIUM mg/dL 2.3 2.3 2.3   < > 2.1 2.4  --    CHLORIDE mEQ/L 103 101 98   < >  --  100  --    CO2 mEQ/L 15* 19* 17*   < >  --  21*  --    BUN mg/dL 106* 102* 97*   < >  --  113*  --    CREATININE mg/dL 4.3* 4.1* 3.9*   < >  --  4.3*  --    AST IU/L 17 15 19   < >  --  23  --    ALT IU/L 24 25 26   < >  --  18  --    TROPONIN I ng/mL  --   --   --   --  0.19* 0.23* 0.20*    < > = values in this interval not displayed.     Results from last 7 days   Lab Units 01/05/21  0501 01/04/21  0330 01/03/21  0336  12/30/20  1230   WBC K/uL 17.81* 13.44* 12.33*   < >  --    HEMOGLOBIN g/dL 7.7* 7.1* 7.2*   < >  --    HEMATOCRIT % 24.7* 22.7* 23.3*   < >  --    PLATELETS K/uL 200 191 172   < >  --    INR INR  --   --   --   --  1.2    < > = values in this interval not displayed.     No  results found for: HGBA1C, TSH  No results found for: CHOL, LDLCALC, HDL, TRIG  No results found for: BNP    IMAGING:      ECG:       TELEMETRY: AF      MEDICATIONS        • atorvastatin  10 mg oral Daily (6p)   • budesonide  0.5 mg nebulization BID (6a, 6p)   • cefUROXime  250 mg oral Daily   • ferric gluconate (FERRLECIT) IVPB  125 mg intravenous Daily   • heparin (porcine)  5,000 Units subcutaneous q8h JESSICA   • insulin aspart U-100  3-11 Units subcutaneous With meals & nightly   • ipratropium-albuteroL  3 mL nebulization q6H JESSICA   • levothyroxine  50 mcg oral Daily (6:30a)   • methylPREDNISolone sodium succinate  60 mg intravenous q8h INT   • nystatin   Topical BID   • renal multivitamin  1 tablet oral Daily   • sodium chloride  10 mL intravenous q8h INT       ASSESSMENT AND PLAN   AF: Rate OK off amio. Not much room for rate control as BP marginal and Cr 4.1. No AC due to recurrent urinary bleeding     DANY : Cr 4.3 Nephrology following    COPD: still wheezing      Abelino Medina MD  1/5/2021    Primary Care Doctor: Elizabeth Cabral MD

## 2021-01-05 NOTE — PROGRESS NOTES
Patient: Nancy Minor  Location: [facility] 3230  MRN: [id number]  Today's date: 1/5/2021     Patient left reclined in bed w/ x-ray present, vitals stable, and comfortable. Nursing aware.    Alberta is a 89 y.o. female admitted on 12/28/2020 with Severe sepsis (CMS/HCC). Principal problem is Metabolic acidosis.    Past Medical History  Alberta has a past medical history of Atrial fibrillation (CMS/HCC), Chronic hypotension, Chronic kidney disease, Chronic kidney failure, Colon cancer (CMS/HCC), COPD (chronic obstructive pulmonary disease) (CMS/HCC), Low BP, Stroke (cerebrum) (CMS/[facility]), and UTI (urinary tract infection).    History of Present Illness   Here with metabolic acidosis, no previous ST per chart review; from home    PT Vitals    Date/Time Pulse SpO2 O2 Therapy O2 Del Method O2 Flow Rate BP BP Location BP Method Pt Position [facility]   01/05/21 0836 120 96 % Supplemental oxygen Nasal cannula 4 L/min 88/61 Left forearm Automatic Lying DA   01/05/21 0845 130 93 % Supplemental oxygen Nasal cannula 4 L/min 128/66 Left forearm Automatic Lying DA      PT Pain    Date/Time Pain Type Pref Pain Scale Rating: Rest Rating: Activity [facility]   01/05/21 0836 Pain Assessment word (verbal rating pain scale) 0 - no pain 0 - no pain DA          Prior Living Environment      Most Recent Value   Living Arrangements  house   Living Environment Comment  Pt unreliable historian,  per EMR lives in [facility] w/[facility] and [facility] in [place] [facility] [place] w/[address]. They have purchased a home with 0 [address]LLER[address] and are preparing to move in late January.          Prior Level of Function      Most Recent Value   Dominant Hand  right   Ambulation  assistive equipment   Transferring  assistive equipment   Toileting  assistive person   Bathing  assistive person   Dressing  assistive person   Eating  assistive person   Communication  understands/communicates without difficulty   Swallowing  swallows foods/liquids without difficulty   Baseline  Diet/Method of Nutritional Intake  regular solids, thin liquids   Past History of Dysphagia  none documented   Prior Level of Function Comment  -- [per chart review,pt amb 10-15' 1x/day w/RW at baseline]   Assistive Device/Animal Currently Used at Home  walker, front-wheeled          PT Evaluation and Treatment - 01/05/21 0833        Time Calculation    Start Time  0833     Stop Time  0850     Time Calculation (min)  17 min        Session Details    Document Type  daily treatment/progress note     Mode of Treatment  physical therapy        General Information    Patient Profile Reviewed?  yes     Onset of Illness/Injury or Date of Surgery  12/28/21     Referring Physician  Curtis     General Observations of Patient  Pt. reclined in bed and agreeable to PT     Existing Precautions/Restrictions  fall;oxygen therapy device and L/min     Limitations/Impairments  safety/cognitive        Cognition/Psychosocial    Affect/Mental Status (Cognitive)  confused     Orientation Status (Cognition)  oriented to;person     Follows Commands (Cognition)  follows one step commands;50-74% accuracy;increased processing time needed;delayed response/completion;initiation impaired;physical/tactile prompts required;repetition of directions required;verbal cues/prompting required     Cognitive Function (Cognitive)  attention deficit;executive function deficit;memory deficit;safety deficit        Bed Mobility    Bed Mobility  scooting/bridging     Amelia, Scoot/Bridge  maximum assist (25-49% patient effort);2 person assist;verbal cues     Verbal Cues (Scoot/Bridge)  technique;preparatory posture     Amelia, Supine to Sit  unable to assess     [place], Sit to Supine  unable to assess     Assistive Device (Bed Mobility)  draw sheet    HOB flat     Comment (Bed Mobility)  Pt. limited to bed mobility TA 2/2 unstable VS. Req. max Ax2 to boost up in bed, able to contribute minimally by pushing feet into bed, req. max A to position  LEs prior to scoot.         Bed to Chair Transfer    [place], Bed to Chair  unable to assess        Sit to Stand Transfer    [place], Sit to Stand Transfer  unable to assess        Stand to Sit Transfer    [place], Stand to Sit Transfer  unable to assess        Safety Issues, Functional Mobility    Safety Issues Affecting Function (Mobility)  ability to follow commands;insight into deficits/self awareness;judgment;problem solving;sequencing abilities     Impairments Affecting Function (Mobility)  balance;cognition;endurance/activity tolerance;strength;range of motion        Balance    Balance Assessment  sitting static balance     Static Sitting Balance  unable to perform activity     Comment, Balance  Unstable VS, pt. performing TA and Nia in bed        Therapeutic Exercise    Therapeutic Exercise  lower extremity        Lower Extremity (Therapeutic Exercise)    Exercise Position/Type (LE Therapeutic Exercise)  supine;AAROM (active assistive range of motion)     Range of Motion Exercises (LE Therapeutic Exercise)  bilateral;knee flexion/extension;hip flexion/extension     Reps and Sets (LE Therapeutic Exercise)  2x5     Comment (LE Therapeutic Exercise)  Pt. performs supine gross LE flex/ext. Req. assist to perform flexion, but able to push into slight resistance w/ extension. Pt. demos and expresses fatigue following sets        Coping    Observed Emotional State  calm;cooperative     Verbalized Emotional State  acceptance        AM-PAC (TM) - Mobility (Current Function)    Turning from your back to your side while in a flat bed without using bedrails?  2 - A Lot     Moving from lying on your back to sitting on the side of a flat bed without using bedrails?  2 - A Lot     Moving to and from a bed to a chair?  1 - Total     Standing up from a chair using your arms?  1 - Total     To walk in a hospital room?  1 - Total     Climbing 3-5 steps with a railing?  1 - Total     AM-PAC (TM) Mobility Score  8         Therapy Assessment/Plan (PT)    Rehab Potential (PT)  good, to achieve stated therapy goals     Therapy Frequency (PT)  3-5 times/wk        Progress Summary (PT)    Daily Outcome Statement (PT)  Pt. seen for PT treat; functionally limited by dec. strength, balance, activity tolerance, and cogitive status. Pt. cont. to req. Ax2 for bed mob. Will benefit from cont. PT, dispo for SNF. Will req. 24/7 HHS if pt. and family opts for home d/c.      Symptoms Noted During/After Treatment  fatigue        Therapy Plan Review/Discharge Plan (PT)    PT Recommended Discharge Disposition  skilled nursing facility     Anticipated Equipment Needs at Discharge (PT Eval)  none        Plan of Care Review    Plan of Care Reviewed With  patient                       Education provided this session. See the Patient Education summary report for full details.    PT Goals      Most Recent Value   Bed Mobility Goal 1   Activity/Assistive Device  rolling to left, rolling to right, scooting, sit to supine, supine to sit, bridging at 01/04/2021 1134   [place]  supervision required at 01/04/2021 1134   Time Frame  by discharge at 01/04/2021 1134   Progress/Outcome  goal ongoing at 01/05/2021 0833   Transfer Goal 1   Activity/Assistive Device  sit-to-stand/stand-to-sit, bed-to-chair/chair-to-bed, stand pivot, wheelchair transfer, walker, front-wheeled at 01/04/2021 1134   [place]  moderate assist (50-74% patient effort) at 01/04/2021 1134   Time Frame  by discharge at 01/04/2021 1134   Progress/Outcome  goal ongoing at 01/05/2021 0833   Gait Training Goal 1   Activity/Assistive Device  gait (walking locomotion), walker, front-wheeled at 01/04/2021 1134   [place]  moderate assist (50-74% patient effort) at 01/04/2021 1134   Distance  10 at 01/04/2021 1134   Time Frame  by discharge at 01/04/2021 1134   Progress/Outcome  goal ongoing at 01/05/2021 0833

## 2021-01-05 NOTE — PROGRESS NOTES
Hospital Medicine Service -  Daily Progress Note       SUBJECTIVE     Interval History: Appears dyspneic. Rapid atrial fibrillation 130-140/' last night .    OBJECTIVE        Vital signs in last 24 hours:  Temp:  [35.9 °C (96.6 °F)-36.4 °C (97.6 °F)] 35.9 °C (96.6 °F)  Heart Rate:  [] 124  Resp:  [18-20] 20  BP: ()/(52-72) 101/65  I/O last 3 completed shifts:  In: 160 [P.O.:60; IV Piggyback:100]  Out: 1662 [Urine:1662]    PHYSICAL EXAMINATION        GEN: well-developed and well-nourished; not in acute distress  HEENT: normocephalic; atraumatic  NECK: no JVD; no bruits  CARDIO: regular rate and rhythm; no murmurs or rubs  RESP:  Bilateral diffuse wheezing  ABD: soft, non-distended, non-tender, normal bowel sounds  EXT: no cyanosis, clubbing, bilateral lower extremity swelling, 2+ right upper extremity swelling ecchymosis right forearm, mild swelling left upper extremity  SKIN: clean, dry, warm, and intact  MUSCULOSKELETAL: no injury or deformity  NEURO: alert and oriented x 3; nonfocal  BEHAVIOR/EMOTIONAL: appropriate; cooperative     LABS / IMAGING / TELE        Labs  Lab Results   Component Value Date    WBC 17.81 (H) 01/05/2021    HGB 7.7 (L) 01/05/2021    HCT 24.7 (L) 01/05/2021    MCV 83.4 01/05/2021     01/05/2021     Lab Results   Component Value Date    GLUCOSE 118 (H) 01/05/2021    CALCIUM 8.4 (L) 01/05/2021     (L) 01/05/2021    K 3.4 (L) 01/05/2021    CO2 15 (L) 01/05/2021     01/05/2021     (HH) 01/05/2021    CREATININE 4.3 (H) 01/05/2021     Lab Results   Component Value Date    INR 1.2 12/30/2020       Imaging  Ct Abdomen Pelvis Without Iv Contrast    Addendum Date: 12/28/2020    Additionally noted in the impression should be incidentally noted pneumobilia, which is a nonspecific finding in a patient status post cholecystectomy. Correlate clinically.  Further evaluation with right upper quadrant ultrasound could be considered.    Result Date:  12/28/2020  IMPRESSION: 1.  Limited evaluation in the absence of intravenous or oral contrast and due to patient motion.  No definite hydronephrosis or renal calculi as clinically questioned, noting limited evaluation of the distal ureters due to the patient's right hip prosthesis. 2.  Circumferential bladder wall thickening and intravesicular air within the urinary bladder, concerning for urinary tract infection. 3.  Age-indeterminate mild compression deformities of T11, T12 and L3.    X-ray Chest 1 View    Result Date: 1/5/2021  IMPRESSION: Progression of pulmonary edema and pleural effusions, left greater than right, with new/increasing perihilar, lower lobe and right upper lobe airspace disease.    X-ray Chest 1 View    Result Date: 12/31/2020  IMPRESSION: 1.  Patchy interstitial opacity, with pulmonary vascular congestion, which may represent multifocal airspace disease or pulmonary edema. 2.  Left basilar airspace disease, new/increased from prior.     X-ray Chest 1 View    Result Date: 12/28/2020  IMPRESSION: Linear atelectasis or scar in the left midlung zone. COMMENT: Erect AP portable view of the chest was performed.  We have no prior studies for comparison. The heart size is towards the upper limits of normal.  A TAVR prosthesis is noted.  The pulmonary vasculature is not engorged. Linear opacity in the left midlung zone is noted, likely an area of discoid atelectasis or scar.  The lung fields are otherwise clear, although EKG leads and wires could obscure small lesions.  No pleural effusions are seen. No hilar abnormality is seen.  There is calcification of the thoracic aorta. Bilateral rotator cuff tears are suspected.      ECG/Telemetry  I have independently reviewed the telemetry. No events for the last 24 hours.Atrial fib 100-120/'    ASSESSMENT AND PLAN      Septic shock (CMS/[facility])-Klebsiella   Assessment & Plan  Secondary to UTI with Klebsiella bacteremia  Improving  Currently on Rocephin.  Can  switch to Ceftin per ID.  Total duration 14 days  History of UTIs in the past  CT abdomen also showing circumferential bladder wall thickening and intravesicular air incidental pneumobilia which is a nonspecific finding in a patient who had cholecystectomy  Blood cultures and urine cultures grew Klebsiella   Status post pressors in ICU  PT/OT stephonal  Seen by speech and recommending puréed diet with nectar thick liquids  She would likely require SNF placement  Palliative care consult appreciated.  Patient is limited code- she does not want chest compressions or mechanical ventilation.  Ok for medications and shock  Heparin subcu for DVT prophylaxis  Updated daughter (POA) discussed poor prognosis. She wants to continue diuresis and see whether she will improve. She may need hospice placement, if cr continues to deteriorate and remains hypotensive.  Poor prognosis  Discussed with  and       Chronic atrial fibrillation (CMS/[facility])  Assessment & Plan  Heart rate 130-140/'  History of atrial fibrillation in the past  Amiodarone discontinued   Metoprolol IV as needed for atrial fibrillation , Would restart Lovenox as hematuria resolved  Cannot initiate medications secondary to low blood pressure  Unable to give meds sec to Renal failure, Hypotension    History of CVA (cerebrovascular accident)  Assessment & Plan  Not on aspirin at home  We will hold off to statin for now    Hyponatremia   Assessment & Plan  Resolved  Na 134    Type 2 MI (myocardial infarction) (CMS/[facility])  Assessment & Plan  Second to demand ischemia  In the setting of DANY and severe sepsis  EKG nonischemic    History of colon cancer  Assessment & Plan  Remote history of colon cancer  Appears to be under remission    COPD (chronic obstructive pulmonary disease) (CMS/[facility])  Assessment & Plan  Currently in exacerbation  Continue IV Solu-Medrol 60 every 8 hours, albuterol nebs  Continue scheduled albuterol in the setting of hyperkalemia      Acute  on chronic renal failure (CMS/HCC)  Assessment & Plan  Secondary to septic shock  Now with volume overload from aggressive IV fluid  Pt is  +9 L this admission  Received Lasix 80 IV prn  Cr 5.3 on admission improved to 4.3  Baseline cr 1.4 6/19   Nephrology following  Chest Xray Shows worsening fluid overload  Would try Lasix 40 twice daily today    Hematuria  Resolved  Samson draining yellow urine  Urology is following  Hemoglobin low 7.7  Ok to initiate anticoagulation per urology    Metabolic encephalopathy   in the setting of septic shock  Resolved now    Severe mitral stenosis  Outpatient follow-up with cardiology    Anemia  B12 level elevated  Iron deficiency panel consistent with anemia of chronic disease and iron deficiency  Would give IV iron  Follow hemoglobin and hemoglobin 7.7 today  Baseline hemoglobin 11     VTE Assessment: Pad[place]    Code Status: Limited Code  Estimated discharge date: 1/8/2021     Mila Ortega MD  1/5/2021  11:12 AM

## 2021-01-05 NOTE — PLAN OF CARE
Problem: Adult Inpatient Plan of Care  Goal: Plan of Care Review  Outcome: Progressing  Flowsheets (Taken 1/5/2021 1252)  Progress: no change  Plan of Care Reviewed With: patient  Outcome Summary: OT treatment complete: MAX A of  scooting in bed, close SUP grooming, MOD A UB dressing

## 2021-01-05 NOTE — PLAN OF CARE
Plan of Care Review  Plan of Care Reviewed With: patient, daughter  Progress: no change  Outcome Summary: A&Ox1-2 this shift, disoriented to time and situation, intermittently aware she is in the hospital. Tolerating sips of nectar thick liquids. Continues to have loose sounding cough. Turned [id number]. Samson in place with pink tinged urine, no clots noted. Continues in a.fib with HR going up to 130s-140s at times, but does not sustain. Typically in 100s-110s. BP in 90s most of night. PRN lopressor not given because of HR. Nocturnist NP notified of elevated HR and no new orders at this time.

## 2021-01-06 NOTE — PROGRESS NOTES
SUBJECTIVE      HR overall better. BP improved    OBJECTIVE     VITAL SIGNS:  Temp:  [35.4 °C (95.8 °F)-36.3 °C (97.3 °F)] 36.3 °C (97.3 °F)  Heart Rate:  [103-130] 104  Resp:  [18-22] 20  BP: ()/(58-78) 123/60  SPO2 97%    Intake/Output Summary (Last 24 hours) at 1/6/2021 0629  Last data filed at 1/6/2021 0600  Gross per 24 hour   Intake --   Output 1600 ml   Net -1600 ml       PHYSICAL EXAM:  General appearance:sleeping  Head: without obvious abnormality  Eyes: PERRLA, extraocular movements intact  Neck: No JVD, carotid bruits, thyromegaly  Lungs: less wheezing  Heart: Irreg irreg, S1-S2 normal, no murmurs, clicks, rubs or gallops  Abdomen: soft, non-tender, bowel sounds normal  Extremities: no edema, peripheral pulses present  Skin: Skin color, texture, turgor normal. No rashes or lesions  Neurologic: Alert and oriented X 3, no focal deficits    LABS / IMAGING / EKG / TELEMETRY     LABS:  Results from last 7 days   Lab Units 01/06/21  0411 01/05/21  0501 01/04/21  0329  12/30/20  1012   SODIUM mEQ/L 139 133* 134*   < >  --    POTASSIUM mEQ/L 3.8 3.4* 3.5*   < >  --    MAGNESIUM mg/dL 2.2 2.3 2.3   < > 2.1   CHLORIDE mEQ/L 104 103 101   < >  --    CO2 mEQ/L 19* 15* 19*   < >  --    BUN mg/dL 112* 106* 102*   < >  --    CREATININE mg/dL 4.3* 4.3* 4.1*   < >  --    AST IU/L 14* 17 15   < >  --    ALT IU/L 25 24 25   < >  --    TROPONIN I ng/mL  --   --   --   --  0.19*    < > = values in this interval not displayed.     Results from last 7 days   Lab Units 01/06/21  0411 01/05/21  0501 01/04/21  0330  12/30/20  1230   WBC K/uL 17.71* 17.81* 13.44*   < >  --    HEMOGLOBIN g/dL 7.8* 7.7* 7.1*   < >  --    HEMATOCRIT % 25.0* 24.7* 22.7*   < >  --    PLATELETS K/uL 234 200 191   < >  --    INR INR  --   --   --   --  1.2    < > = values in this interval not displayed.     No results found for: HGBA1C, TSH  No results found for: CHOL, LDLCALC, HDL, TRIG  No results found for: BNP    IMAGING:      ECG:        TELEMETRY: AF/flutter      MEDICATIONS        • atorvastatin  10 mg oral Daily (6p)   • budesonide  0.5 mg nebulization BID (6a, 6p)   • cefUROXime  250 mg oral Daily   • ferric gluconate (FERRLECIT) IVPB  125 mg intravenous Daily   • heparin (porcine)  5,000 Units subcutaneous q8h JESSICA   • insulin aspart U-100  3-11 Units subcutaneous With meals & nightly   • ipratropium-albuteroL  3 mL nebulization q6H JESSICA   • levothyroxine  50 mcg oral Daily (6:30a)   • methylPREDNISolone sodium succinate  60 mg intravenous q8h INT   • nystatin   Topical BID   • nystatin  500,000 Units Swish & Swallow QID   • renal multivitamin  1 tablet oral Daily   • sodium chloride  10 mL intravenous q8h INT       ASSESSMENT AND PLAN     AF: Rates and BP better. Follow. If BP improves and HR increases, May need/tolerate  rate control. Would avoid beta blockers    DANY: Cr remians 4.3    COPD: Improved    Abelino Medina MD  1/6/2021    Primary Care Doctor: Elizabeth Cabral MD

## 2021-01-06 NOTE — PROGRESS NOTES
Patient: Nancy Minor  Location: [facility] 3230  MRN: [id number]  Today's date: 1/6/2021  Speech Pathology: Therapy session    SLP Diagnosis: Pt w/ ongoing confusion and oral pharyngeal dysphagia    Recommendations:  1. Puree and nectar liquids with strict aspiration precautions  2. SLP to follow and advance as tolerated  3.      Summary/Handoff:  Daily Outcome Statement (SLP): Pt seen b/s for swallow tx. Pt not wanting pureed items and requesting a doughnut. Pt seen w/ nectars, puree and chopped solids and while she conts to tolerate nectars and puree, over time she had difficulty w/ chopped solids. Would cont w/ current diet w/ strict aspiration precautions including oral care t/o the day.       Session Notes:     D/w RN       Dietary Orders   (From admission, onward)             Start     Ordered    01/04/21 1042  Dietary nutrition supplements  Once     Question Answer Comment   Select Supplement (PH): Magic Cup Wildberry    Meal period Lunch Dinner        01/04/21 1041    01/04/21 1041  Dietary nutrition supplements  Once     Question Answer Comment   Select Supplement (PH): Boost Glucose Control Chocolate    Meal period Breakfast        01/04/21 1041    01/04/21 1040  Adult Diet Pureed Texture; Nectar Thick Liquids; RD/LDN may adjust order  Diet effective now     Comments: Family tells us the patient likes soup, and yogurt  Nectars by [employer]oon   Question Answer Comment   Diet Texture Pureed Texture    Fluid Consistency: Nectar Thick Liquids    Delegation of Authority. Diet orders written by PA/Clark may not be adjusted by RD/LDNs. RD/LDN may adjust order        01/04/21 1041                Results from last 7 days   Lab Units 01/06/21  0411 01/05/21  0501 01/04/21  0330   WBC K/uL 17.71* 17.81* 13.44*   HEMOGLOBIN g/dL 7.8* 7.7* 7.1*   HEMATOCRIT % 25.0* 24.7* 22.7*   PLATELETS K/uL 234 200 191          Patient left with call bell in reach and alarms as found.      Alberta is a  89 y.o. female admitted on 12/28/2020 with Severe sepsis (CMS/HCC). Principal problem is Metabolic acidosis.    Past Medical History  Alberta has a past medical history of Atrial fibrillation (CMS/HCC), Chronic hypotension, Chronic kidney disease, Chronic kidney failure, Colon cancer (CMS/HCC), COPD (chronic obstructive pulmonary disease) (CMS/HCC), Low BP, Stroke (cerebrum) (CMS/HCC), and UTI (urinary tract infection).    History of Present Illness   Here with metabolic acidosis, no previous ST per chart review; from home    SLP Pain    Date/Time Pain Type Rating: [facility]   01/06/21 1242 Pain Assessment 0 - no pain EVM          Prior Living Environment      Most Recent Value   Living Arrangements  house   Living Environment Comment  Pt unreliable historian,  per EMR lives in [facility] w/dtr [facility] and [place] in [facility] [place]. They have purchased a home with 0 [address] and are preparing to move in late January.          Prior Level of Function      Most Recent Value   Dominant Hand  right   Ambulation  assistive equipment   Transferring  assistive equipment   Toileting  assistive person   Bathing  assistive person   Dressing  assistive person   Eating  assistive person   Communication  understands/communicates without difficulty   Swallowing  swallows foods/liquids without difficulty   Baseline Diet/Method of Nutritional Intake  regular solids, thin liquids   Past History of Dysphagia  none documented   Prior Level of Function Comment  -- [per chart review,pt amb 10-15' 1x/day w/RW at baseline]   Assistive Device/Animal Currently Used at Home  walker, front-wheeled          SLP Evaluation and Treatment - 01/06/21 1242        Time Calculation    Start Time  1242     Stop Time  1305     Time Calculation (min)  23 min        Session Details    Document Type  daily treatment/progress note     Mode of Treatment  individual therapy;speech language pathology        General Information    Patient Profile Reviewed?  yes     General  Observations of Patient  in bed, fidgeting w/ blankets; seen briefly earlier and again now     Existing Precautions/Restrictions  fall;modified diet;aspiration     Limitations/Impairments  safety/cognitive;swallowing        Cognition/Psychosocial    Affect/Mental Status (Cognitive)  confused     Orientation Status (Cognition)  oriented to;person;place     Comment, Cognition  at end, wants to call her dtr who she was trying to call earlier., assisted and pt asking dtr when she comes in tomorrow to bring the tomatoes        Tongue Function (Oral Motor)    Comment, Tongue Function (Oral Motor)  tongue clear today, pt states no pain        Motor Speech    Comment, Motor Speech Intervention  not hoarse today        Functional Communication Measures    FCM: Swallowing  4-->Level 4        General Swallowing Observations    Current Diet/Method of Nutritional Intake (General Swallowing Observations, NIS)  nectar thick liquids;pureed solids     Comment, General Swallowing Observations  pt does not want to eat puree, wants a donut, willing to try solids today        Food and Liquid Trials (NIS)    Patient Positioning  head of bed elevated (specify degrees)     Oral Intake/Feeding Performance  other (see comments)    pt able to pick small pieces from my hand and self feed    Pureed Solids  WFL     Chopped Solids  impaired     Comment, Chopped Solids  pt self feeds small pieces of plain donut )1/2-1 in pieces. Slow mastication but complete with no oral residuals of solids after the swallow. Over time, pt w/ occasional drip of saliva while chewing and some collections of saliva near teeth. Pt happily self feeding and would like me not to break up the item into pieces but accepts my explanation of why. On last trial, pt w/ difficulty swallowing piece and unclear if she masticated it less, or it if was dryer, but pt w/ cough/gag noise and then swallows spontaneously a few times. Pt states she is fine but that it was hard to  swallow. Oral cavity suctioned and no residuals. Pt takes small sips of coffee again and no s/s of aspiration.      Nectar Thick Liquids  sips from cup;intact     Comment, Nectar Thick Liquids  needs assist w/ cup     Esophageal Phase of Swallow  no clinical symptoms        Swallowing Intervention    Dysphagia/Swallowing Interventions  monitor tolerance of;current diet without evidence of aspiration;advanced diet/liquid texture trials        AM-PAC (TM) - Cognition (Current Function)    Following/understanding a 10-15 minute speech or presentation?  2 - A lot     Understanding familiar people during ordinary conversations?  3 - A little     Remembering to take medications at the appropriate time?  1 - Unable     Remembering where things were placed or put away?  2 - A lot     Remembering a list of 3 or 4 errands without writing it down?  1 - Unable     Taking care of complicated tasks?  1 - Unable     AM-PAC (TM) Cognition Score  10        Therapy Assessment/Plan (SLP)    SLP Diagnosis  Pt w/ ongoing confusion and oral pharyngeal dysphagia     Rehab Potential (SLP Eval)  good, to achieve stated therapy goals     Therapy Frequency (SLP)  3-5 times/wk     Problem List (SLP)  swallowing, cognition     Functional Level at Time of Evaluation (SLP)  alert, confused     Planned Therapy Interventions (SLP)  po trials, education        Daily Progress Summary (SLP)    Daily Outcome Statement (SLP)  Pt seen b/s for swallow tx. Pt not wanting pureed items and requesting a doughnut. Pt seen w/ nectars, puree and chopped solids and while she conts to tolerate nectars and puree, over time she had difficulty w/ chopped solids. Would cont w/ current diet w/ strict aspiration precautions including oral care t/o the day.      Symptoms Noted During/After Treatment  none        Therapy Plan Review/Discharge Plan (SLP)    Therapy Plan Review (SLP)  care plan/treatment goals reviewed;participants included;patient                   Education provided this session. See the Patient Education summary report for full details.    SLP Goals      Most Recent Value   Pharyngeal Swallow Goal 1   Activity  safely tolerate, recommended diet/liquid level, with no clinical signs/symptoms of aspiration at 01/02/2021 0950   [place]  independently at 01/02/2021 0950   Time Frame  long-term goal (LTG), by discharge at 01/02/2021 0950   Progress/Outcome  goal ongoing at 01/06/2021 1242

## 2021-01-06 NOTE — PROGRESS NOTES
Non Billable    Family phone meeting set for 3pm today, 1/6  With POA daughter Arabella, siblings Marilia, Dana, Thomas and Andrew  Palliative Care will introduce hospice and review goals of care.  DREAD Georges  Palliative Care  Pager [phone]

## 2021-01-06 NOTE — PLAN OF CARE
Problem: Adult Inpatient Plan of Care  Goal: Plan of Care Review  Outcome: Progressing  Flowsheets (Taken 1/6/2021 1340)  Progress: improving  Plan of Care Reviewed With: patient  Outcome Summary: OT tx complete     Problem: Self-Care Deficit  Goal: Improved Ability to Complete Activities of Daily Living  Outcome: Progressing

## 2021-01-06 NOTE — PATIENT CARE CONFERENCE
Care Progression Rounds Note  Date: 1/6/2021  Time: 2:31 PM     Patient Name: Nancy Minor     Medical Record Number: [id number]   YOB: 1923  Sex: Female      Room/Bed: 3230    Admitting Diagnosis: Hyperkalemia [E87.5]  Acute cystitis with hematuria [N30.01]  Severe sepsis (CMS/HCC) [A41.9, R65.20]  Acute renal failure, unspecified acute renal failure type (CMS/HCC) [N17.9]   Admit Date/Time: 12/28/2020  3:35 PM    Primary Diagnosis: Metabolic acidosis  Principal Problem: Metabolic acidosis    GMLOS: 4.8  Anticipated Discharge Date: 1/8/2021    AM-PAC  Mobility Score: 6    Discharge Planning:  Living Arrangements: house  Anticipated Discharge Disposition: home with home health services, skilled nursing facility    Barriers to Discharge:  Barriers to Discharge: Medical issues not resolved  Comment: O2 @ 4L, lasix, IV abx, IV steroids, pall f/u    Participants:  , nursing

## 2021-01-06 NOTE — PROGRESS NOTES
"   Hospital Medicine Service -  Daily Progress Note       SUBJECTIVE     Interval History: Developed hypothermia overnight and placed onto warming blanket. Pt reports feeling \"ok\".  Pt notes being uncomfortable in bed.  Pt denies chest pain, SOB, headache, abdominal pain, N/V/D.     OBJECTIVE        Vital signs in last 24 hours:  Temp:  [35.4 °C (95.8 °F)-36.3 °C (97.3 °F)] 36.3 °C (97.3 °F)  Heart Rate:  [103-130] 110  Resp:  [18-22] 20  BP: (101-124)/(58-78) 123/60  I/O last 3 completed shifts:  In: 60 [P.O.:60]  Out: 1985 [Urine:1985]    PHYSICAL EXAMINATION        GEN: well-developed and well-nourished; ill-appearing  HEENT: normocephalic; atraumatic  NECK: no JVD; no bruits  CARDIO: tachycardic irreg-irreg, no murmurs or rubs  RESP: Diffuse exp wheezing bilaterally; no rales, or rhonchi  ABD: soft, non-distended, non-tender, normal bowel sounds, Samson catheter in place draining yellow urine  EXT: no cyanosis, clubbing, or edema, SCDs in place  SKIN: clean, dry, warm, and intact.  R forearm erythema and swelling improving MUSCULOSKELETAL: no injury or deformity  NEURO: alert and oriented ; nonfocal  BEHAVIOR/EMOTIONAL: appropriate; cooperative     LABS / IMAGING / TELE        Labs  Lab Results   Component Value Date    WBC 17.71 (H) 01/06/2021    HGB 7.8 (L) 01/06/2021    HCT 25.0 (L) 01/06/2021    MCV 82.8 (L) 01/06/2021     01/06/2021     Lab Results   Component Value Date    GLUCOSE 116 (H) 01/06/2021    CALCIUM 8.9 01/06/2021     01/06/2021    K 3.8 01/06/2021    CO2 19 (L) 01/06/2021     01/06/2021     (HH) 01/06/2021    CREATININE 4.3 (H) 01/06/2021     Lab Results   Component Value Date    INR 1.2 12/30/2020       Imaging  Ct Abdomen Pelvis Without Iv Contrast    Addendum Date: 12/28/2020    Additionally noted in the impression should be incidentally noted pneumobilia, which is a nonspecific finding in a patient status post cholecystectomy. Correlate clinically.  Further " evaluation with right upper quadrant ultrasound could be considered.    Result Date: 12/28/2020  IMPRESSION: 1.  Limited evaluation in the absence of intravenous or oral contrast and due to patient motion.  No definite hydronephrosis or renal calculi as clinically questioned, noting limited evaluation of the distal ureters due to the patient's right hip prosthesis. 2.  Circumferential bladder wall thickening and intravesicular air within the urinary bladder, concerning for urinary tract infection. 3.  Age-indeterminate mild compression deformities of T11, T12 and L3.    X-ray Chest 1 View    Result Date: 1/5/2021  IMPRESSION: Progression of pulmonary edema and pleural effusions, left greater than right, with new/increasing perihilar, lower lobe and right upper lobe airspace disease.    X-ray Chest 1 View    Result Date: 12/31/2020  IMPRESSION: 1.  Patchy interstitial opacity, with pulmonary vascular congestion, which may represent multifocal airspace disease or pulmonary edema. 2.  Left basilar airspace disease, new/increased from prior.     X-ray Chest 1 View    Result Date: 12/28/2020  IMPRESSION: Linear atelectasis or scar in the left midlung zone. COMMENT: Erect AP portable view of the chest was performed.  We have no prior studies for comparison. The heart size is towards the upper limits of normal.  A TAVR prosthesis is noted.  The pulmonary vasculature is not engorged. Linear opacity in the left midlung zone is noted, likely an area of discoid atelectasis or scar.  The lung fields are otherwise clear, although EKG leads and wires could obscure small lesions.  No pleural effusions are seen. No hilar abnormality is seen.  There is calcification of the thoracic aorta. Bilateral rotator cuff tears are suspected.      ECG/Telemetry  I have independently reviewed the telemetry. No events for the last 24 hours.    ASSESSMENT AND PLAN      Septic shock (CMS/[facility])  Assessment & Plan  History of UTIs in the past  Found  to be severe sepsis in the emergency room with UTI  CT abdomen also showing incidental pneumobilia however no abdominal symptoms-consulted surgery  Blood cultures/Urine culture + Klebsiella  Initially on ceftriaxone, switched to PO ceftin by ID  Pt required Levophed with central line in ICU, however both have been discontinued  As per the son her baseline blood pressures always run low with systolic in 80s to 90s.  Keeping the map goal of 60 and above.  WBC initially 30 -> 12 -> 17 currently  Initial Lactate 2.4 - 7.1 - 1.3  Anion gap 19 - 22 - 15  Hypothermic overnight, now on warming blanket  ID Following  PT/OT for evaluation  Palliative care consulted and family meeting plan for goals of care     Metabolic acidosis  Assessment & Plan  Improved  Likely 2/2 renal failure  Patient presenting with generalized weakness, confusion, decreased oral intake to the emergency room  Lives at home with the son  ER work-up significant for profound metabolic acidosis, hyperkalemia and severe renal failure  Nephrology contacted and started on bicarb drip - discontinued  Monitoring EKG  Initial Lactate 2.4 - 7.1 - 1.3  Anion gap 19 - 22 - 15  Checking CMP daily     Chronic atrial fibrillation (CMS/HCC)  Assessment & Plan  History of atrial fibrillation   Was taking Eliquis in the past which has been discontinued  Was supposed to take baby aspirin which she is not on on home medication  Currently in rapid atrial fibrillation  Given her renal functions not ideal to start on amiodarone drip or give digoxin  Hematuria resolved, Heparin SC restarted  IV beta-blocker with hold parameters d/t hypotension PRN for tachycardia  Cardiology following     Acute on chronic renal failure (CMS/HCC)  Assessment & Plan  Likely due to UTI/Septic shock  Aggressive IV rehydration at time of admission  Currently +7L since admission total  Currently volume overload on CXR, attempting diuresis  BUN//64 on admission -> 106/43 ->  Currently  112/4.3  Lasix given -  Negative 2.7L over the past 3 days  Nephro following    Pulmonary edema  Assessment & Plan  Pt with tachypnea and volume overload on CXR  Attempting diuresis with Lasix  Negative 2.7L of volume total over the past 3 days  Maintaining O2 sat 92% on 4lpm nasal cannula  Would consider BiPAP  Pulm/cards following     History of CVA (cerebrovascular accident)  Assessment & Plan  Not on aspirin at home  We will hold off to statin for now     Hyponatremia  Assessment & Plan  Due to severe hypovolemia and severe sepsis  IV fluid resuscitated - improved   - 133  Checking CMP daily  Nephro following     Type 2 MI (myocardial infarction) (CMS/[facility])  Assessment & Plan  In the setting of DANY and severe sepsis  Troponins trended  EKG nonischemic  Stable     COPD (chronic obstructive pulmonary disease) (CMS/[facility])  Assessment & Plan  Stable  Initially on scheduled albuterol in the setting of hyperkalemia  Switched to budesonide  Solu-medrol dosing per pulm rec  Pulm following     Hypoglycemia  Assessment & Plan  Secondary to severe sepsis  Continue Accu-Cheks every 2 hours  Hyperglycemia protocol  Stable     Acute cystitis with hematuria  Assessment & Plan  Improving  Samson catheter in place currently draining clear yellow urine  Initially on ceftriaxone, switched to Ceftin by ID and will continue post-discharge for total of 7 days  Hgb currently 7.7 - trend daily - Stable  Urology following     Hyperkalemia  Assessment & Plan  Received hyperkalemia protocol in the emergency room with calcium gluconate and was on bicarb drip  IV Insulin and dextrose given  Improved  Pt now mildly HYPOkalemic - will replace  Nephro following     Pneumobilia  Assessment & Plan  - Found on CT  - Non-specific in setting of prior cholecystectomy  - Surgery consulted - likely 2/2 prior cholecystectomy     Encephalopathy  Assessment & Plan  - Likely 2/2 septic shock  - Resolved     History of colon cancer  Assessment &  Plan  Stable  Remote history of colon cancer  Appears to be under remission         VTE Assessment: Pad[place]     VTE Prophylaxis: SCD/Heparin SC  Code Status: Limited Code  Estimated discharge date: 1/8/2021     Saúl Balderas  1/6/2021  9:24 AM

## 2021-01-06 NOTE — PLAN OF CARE
Problem: Adult Inpatient Plan of Care  Goal: Plan of Care Review  Flowsheets (Taken 1/6/2021 8653)  Plan of Care Reviewed With: other (see comments)   RNCC reviewed chart and discussed status in care progression rounds. Pt on O2 @ 4L, IV abx, steroids, lasix. S/w Anabela V/hospice - plan is for hospice call with family on Friday. Pt for downgrade to tele. Will follow for ongoing CC needs.

## 2021-01-06 NOTE — PROGRESS NOTES
"Palliative Care Prolonged Non-Face-to-face Management    Please note that this is a separately billed procedure and all time spent here was not spent evaluating the patient and/or is in excess of standard review of the records.   Please see today's progress note for symptom management.   Time in: 151  Time out: 1602  I spoke to patient's 5 adult children, Arabella, Marilia, Dana, Thomas, Andrew who could not visit due to COVID-19 restrictions.     We reviewed the complex medical condition of this 89yoF with urosepsis, renal failure, dysphagia, FTT (albumin 2.2), Afib difficult to control due to hypotension and now hypothermia.    Pt is not eating, family believes if she was home with them and \"had the food she likes\" she would do much better.  They are struggling to understand why pt cannot eat whatever she wants \"or she'll starve\".  Reviewed aspiration risks and agreed to have Nutrition f/u to ascertain preferred foods.    When asked what she hopes for patient is very very clear she wishes to \"go home, I have private aides, I want to go home\".  When asked if she wishes to continue life prolonging care and hospitalization she is reluctant stating again she wishes to go home.  When asked if she realized going home now could shorten her life span she asks to get better and then go home but she does not want rehab.        Introduced hospice and family is considering transition to hospice IF she could come home however they would like to give her another 48 hours to see if she can stabilize, improve prior to return home.          Confirmed Code status is LIMITED CODE:   No compressions, intubation or mechanical ventilation, okay for ACLS medications as well as Bag/Valve mask and BiPAp and Defib/cardioversion  Patient has a very extensive POA from [place] on EMR  Medical POA is Ashanti Vinson with alternate if Arabella were  to be Marilia Diaz  Likely to need rehab on discharge if in line with " "goals of care and she recovers from current illness however, pt and family inclined to prefer her return to home if able.    Plan is to give patient another 48 hours to see if she can rally, stabilize and have another family meeting by phone on Friday 1/8 at 2pm to discuss disposition plan   If patient is not considered \"stable\" to dc home they would consider hospice.  Pt and family were agreeable to hear about hospice, all questions answered and emotional support provided    We spoke for 47 minutes about prognosis, signs and symptoms, and goals of care. I offered much support in this uncertain time.   I also reviewed his records from previous hospitalizations and spoke to Dr. Ortega about prognosis, symptom control, and general plan.     Radha Ramirez, DREAD  Office [phone]    "

## 2021-01-06 NOTE — CONSULTS
[facility]/Hospice will be meeting with the family today at 3 pm to discuss the hospice philosophy and plan of care.

## 2021-01-06 NOTE — PROGRESS NOTES
Patient: Nancy Minor  Location: [facility]  MRN: [id number]  Today's date: 1/6/2021    Pt left in bed with alarm set, call bell and personal items accessible, and all needs addressed.  RN present and notified of position and performance    Alberta is a 89 y.o. female admitted on 12/28/2020 with Severe sepsis (CMS/HCC). Principal problem is Metabolic acidosis.    Past Medical History  Alberta has a past medical history of Atrial fibrillation (CMS/HCC), Chronic hypotension, Chronic kidney disease, Chronic kidney failure, Colon cancer (CMS/HCC), COPD (chronic obstructive pulmonary disease) (CMS/HCC), Low BP, Stroke (cerebrum) (CMS/HCC), and UTI (urinary tract infection).    History of Present Illness   Here with metabolic acidosis, no previous ST per chart review; from home    OT Vitals    Date/Time Pulse SpO2 Pt Activity O2 Therapy O2 Del Method O2 Flow Rate BP BP Location BP Method Pt Position [facility]   01/06/21 1115 135 -- -- -- -- -- 116/77 Left upper arm Automatic Sitting    01/06/21 1129 114 99 % At rest Supplemental oxygen Nasal cannula 4 L/min 123/71 Left upper arm Automatic -- chair position of bed LG      OT Pain    Date/Time Pain Type Pref Pain Scale Rating: Rest Rating: Activity [facility]   01/06/21 1129 Pain Assessment;Post Activity word (verbal rating pain scale) 0 - no pain 0 - no pain LG          Prior Living Environment      Most Recent Value   Living Arrangements  house   Living Environment Comment  Pt unreliable historian,  per EMR lives in [facility] w[facility] [facility] in [place] [facility] w/5 [address]. They have purchased a home with 0 GUILLERMO and are preparing to move in late January.          Prior Level of Function      Most Recent Value   Dominant Hand  right   Ambulation  assistive equipment   Transferring  assistive equipment   Toileting  assistive person   Bathing  assistive person   Dressing  assistive person   Eating  assistive person   Communication  understands/communicates  without difficulty   Swallowing  swallows foods/liquids without difficulty   Baseline Diet/Method of Nutritional Intake  regular solids, thin liquids   Past History of Dysphagia  none documented   Prior Level of Function Comment  -- [per chart review,pt amb 10-15' 1x/day w/RW at baseline]   Assistive Device/Animal Currently Used at Home  walker, front-wheeled          Occupational Profile      Most Recent Value   Reason for Services/Referral  ADL dysfunction   Occupational History/Life Experiences  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  yes     Onset of Illness/Injury or Date of Surgery  12/28/20     Referring Physician  Jordan     General Observations of Patient  pt rec'd resting in bed on 4L O2 via NC, gilberto hugger on. agreeable to therapy and cleared by RN     Existing Precautions/Restrictions  fall;aspiration;oxygen therapy device and L/min;modified diet     Limitations/Impairments  safety/cognitive        Cognition/Psychosocial    Affect/Mental Status (Cognitive)  confused     Orientation Status (Cognition)  oriented to;person;situation     Follows Commands (Cognition)  follows one step commands;50-74% accuracy;increased processing time needed;repetition of directions required;verbal cues/prompting required     Executive Function Deficit (Cognition)  moderate deficit;information processing;abstract thinking;insight/awareness of deficits;judgment;organization/sequencing        Hearing Assessment    Hearing Status  WFL        Vision Assessment/Intervention    Visual Impairment/Limitations  corrective lenses full  time        Bed Mobility    [place], Supine to Sit  dependent (less than 25% patient effort);2 person assist     [place], Sit to Supine  dependent (less than 25% patient effort);2 person assist     Assistive Device (Bed Mobility)  draw sheet;head of bed elevated     Comment (Bed Mobility)  OOB to L        Transfers    Transfers  other (see comments)     Comment  unable to assess this session due to safety concerns        Safety Issues, Functional Mobility    Safety Issues Affecting Function (Mobility)  insight into deficits/self awareness;awareness of need for assistance;problem solving     Impairments Affecting Function (Mobility)  balance;cognition;endurance/activity tolerance;motor control;postural/trunk control;strength     Cognitive Impairments, Mobility Safety/Performance  awareness, need for assistance;insight into deficits/self awareness        Balance    Static Sitting Balance  moderate impairment;sitting, edge of bed     Dynamic Sitting Balance  moderate impairment;sitting, edge of bed     Sit to Stand Dynamic Balance  not tested     Static Standing Balance  not tested     Dynamic Standing Balance  not tested     Comment, Balance  mod A required for EOB sitting balance, tolerated approx 5 minutes before fatigue        Motor Skills    Functional Endurance  poor; desat with minimal activity        Grooming    Self-Performance  washes, rinses and dries hands     [place]  moderate assist (50-74% patient effort)     Position  edge of bed sitting     Comment  assist for sitting balance EOB, good attention and use of BUE        AM-PAC (TM) - ADL (Current Function)    Putting on and taking off regular lower body clothing?  1 - Total     Bathing?  1 - Total     Toileting?  1 - Total     Putting on/taking off regular upper body clothing?  2 - A Lot     How much help for taking care of personal grooming?  2 - A Lot     Eating meals?  3 - A Little     AM-PAC (TM) ADL Score  10        Therapy  Assessment/Plan (OT)    Rehab Potential (OT)  good, to achieve stated therapy goals     Therapy Frequency (OT)  2-3 times/wk     Problem List (OT)  balance;cognition;mobility;strength;postural control     Activity Limitations Related to Problem List  BADLs not performed adequately or safely;unable to transfer safely        Progress Summary (OT)    Daily Outcome Statement (OT)  OT follow-up tx complete. Pt continues to require dep A x2 for bed mobility, mod A for static sitting balance/grooming task at EOB. Cont OT POC to maximize independence, rec SNF     Symptoms Noted During/After Treatment  fatigue        Therapy Plan Review/Discharge Plan (OT)    OT Recommended Discharge Disposition  skilled nursing facility     Anticipated Equipment Needs At Discharge (OT)  brain, 3-in-1                   Education provided this session. See the Patient Education summary report for full details.         OT Goals      Most Recent Value   Bed Mobility Goal 1   Activity/Assistive Device  bed mobility activities, all at 01/04/2021 1133   [place]  minimum assist (75% or more patient effort) at 01/04/2021 1133   Time Frame  by discharge at 01/04/2021 1133   Progress/Outcome  goal ongoing at 01/04/2021 1133   Transfer Goal 1   Activity/Assistive Device  all transfers at 01/04/2021 1133   [place]  moderate assist (50-74% patient effort) at 01/04/2021 1133   Time Frame  by discharge at 01/04/2021 1133   Progress/Outcome  goal ongoing at 01/04/2021 1133   Bathing Goal 1   Activity/Assistive Device  bathing skills, all at 01/04/2021 1133   [place]  moderate assist (50-74% patient effort) at 01/04/2021 1133   Time Frame  by discharge at 01/04/2021 1133   Progress/Outcome  goal ongoing at 01/04/2021 1133   Dressing Goal 1   Activity/Adaptive Equipment  dressing skills, all at 01/04/2021 1133   [place]  minimum assist (75% or more patient effort) at 01/04/2021 1133   Time Frame  by discharge at 01/04/2021 1133    Progress/Outcome  goal ongoing at 01/04/2021 1133   Toileting Goal 1   Activity/Assistive Device  toileting skills, all at 01/04/2021 1133   [place]  minimum assist (75% or more patient effort) at 01/04/2021 1133   Time Frame  by discharge at 01/04/2021 1133   Progress/Outcome  goal ongoing at 01/04/2021 1133

## 2021-01-06 NOTE — PROGRESS NOTES
Daily Progress Note    Subjective     Interval History: pt resting in bed, tyson draining yellow urine. Reports leg pain.       Objective     Vital signs in last 24 hours:  Temp:  [35.4 °C (95.8 °F)-36.3 °C (97.3 °F)] 36.3 °C (97.3 °F)  Heart Rate:  [103-130] 104  Resp:  [18-22] 20  BP: ()/(58-78) 123/60      Intake/Output Summary (Last 24 hours) at 1/6/2021 0747  Last data filed at 1/6/2021 0600  Gross per 24 hour   Intake --   Output 1600 ml   Net -1600 ml       Intake/Output this shift:  No intake/output data recorded.    Labs  CBC Results       01/06/21 01/05/21 01/04/21                    0411 0501 0330         WBC 17.71 17.81 13.44         RBC 3.02 2.96 2.76         HGB 7.8 7.7 7.1         HCT 25.0 24.7 22.7         MCV 82.8 83.4 82.2         MCH 25.8 26.0 25.7         MCHC 31.2 31.2 31.3          200 191         Comment for HGB at 0330 on 01/04/21: ALL RESULTS HAVE BEEN CHECKED    Comment for PLT at 0330 on 01/04/21: CONSISTENT WITH PREVIOUS RESULTS        BMP Results       01/06/21 01/05/21 01/04/21                    0411 0501 0329          133 134         K 3.8 3.4 3.5         Cl 104 103 101         CO2 19 15 19         Glucose 116 118 123          106 102         Creatinine 4.3 4.3 4.1         Calcium 8.9 8.4 8.6         Anion Gap 16 15 14         EGFR 9.6 9.6 10.1         Comment for BUN at 0411 on 01/06/21: Checked by dilution    Comment for BUN at 0501 on 01/05/21: Consistent with previous results  Quantity not sufficient for repeat      Comment for BUN at 0329 on 01/04/21: Checked by dilution            Imaging  IMPRESSION:  1.  Limited evaluation in the absence of intravenous or oral contrast and due to  patient motion.  No definite hydronephrosis or renal calculi as clinically  questioned, noting limited evaluation of the distal ureters due to the patient's  right hip prosthesis.  2.  Circumferential bladder wall thickening and intravesicular air within the  urinary  bladder, concerning for urinary tract infection.  3.  Age-indeterminate mild compression deformities of T11, T12 and L3.        Physical Exam:      GEN: NAD, frail  RESP: on NC  ABD: NTTP  : + 16 fr tyson draining yellow color urine      A/P: 89 y/o female admit with severe urosepsis,  hematuria, DANY on CKD     -- Tyson draining clear yellow color, on heparin.   -- On [facility] as an outpt, not sure who she is following with. Should f/u with outpt urologist, continue with tyson as inpatient.   -- urine culture and blood culture showing klebsiella pneumoniae,  ID following pt currently on ceftin.   -- ct scan showed circumferential bladder wall thickening and intravesical air withing the bladder concerning for UTI.  -- .Monitor creat,.   -- Follow H&H, transfuse prn per primary team.  Will follow

## 2021-01-06 NOTE — PROGRESS NOTES
Patient: Nancy Minor  Location: [facility] 3230  MRN: [id number]  Today's date: 1/6/2021  Pt left in modified chair position of bed, call bell in reach, alarm on, incont pad beneath, warming blanket on. RN present  Alberta is a 89 y.o. female admitted on 12/28/2020 with Severe sepsis (CMS/HCC). Principal problem is Metabolic acidosis.    Past Medical History  Alberta has a past medical history of Atrial fibrillation (CMS/HCC), Chronic hypotension, Chronic kidney disease, Chronic kidney failure, Colon cancer (CMS/HCC), COPD (chronic obstructive pulmonary disease) (CMS/HCC), Low BP, Stroke (cerebrum) (CMS/HCC), and UTI (urinary tract infection).    History of Present Illness   Here with metabolic acidosis, no previous ST per chart review; from home    PT Vitals    Date/Time Pulse SpO2 Pt Activity O2 Therapy O2 Del Method O2 Flow Rate BP BP Location BP Method Pt Position [facility], [place]   01/06/21 1110 127 100 % At rest Supplemental oxygen Nasal cannula 4 L/min 116/59 Left upper arm Automatic Lying    01/06/21 1115 135 -- -- -- -- -- 116/77 Left upper arm Automatic Sitting    01/06/21 1129 114 99 % At rest Supplemental oxygen Nasal cannula 4 L/min 123/71 Left upper arm Automatic -- chair position of bed LG      PT Pain    Date/Time Pain Type Pref Pain Scale Rating: Rest Rating: Activity [facility] [place]   01/06/21 1110 Pain Assessment word (verbal rating pain scale) 0 - no pain 0 - no pain    01/06/21 1129 Pain Assessment;Post Activity word (verbal rating pain scale) 0 - no pain 0 - no pain LG          Prior Living Environment      Most Recent Value   Living Arrangements  house   Living Environment Comment  Pt unreliable historian,  per EMR lives in [facility] w/dtr Arabella and MICHELLE in [facility] w/5 [address]. They have purchased a home with 0 GUILLERMO and are preparing to move in late January.          Prior Level of Function      Most Recent Value   Dominant Hand  right   Ambulation  assistive equipment   Transferring   assistive equipment   Toileting  assistive person   Bathing  assistive person   Dressing  assistive person   Eating  assistive person   Communication  understands/communicates without difficulty   Swallowing  swallows foods/liquids without difficulty   Baseline Diet/Method of Nutritional Intake  regular solids, thin liquids   Past History of Dysphagia  none documented   Prior Level of Function Comment  -- [per chart review,pt amb 10-15' 1x/day w/RW at baseline]   Assistive Device/Animal Currently Used at Home  walker, front-wheeled          PT Evaluation and Treatment - 01/06/21 1110        Time Calculation    Start Time  1110     Stop Time  1131     Time Calculation (min)  21 min        Session Details    Document Type  daily treatment/progress note     Mode of Treatment  physical therapy        General Information    Patient Profile Reviewed?  yes     Onset of Illness/Injury or Date of Surgery  12/28/21     Referring Physician  Jordan Cedillo Observations of Patient  resting in bed, HOB elevated, 4L O2 via NC. [employer] warming blanket on. pt willing to participate. Cleared by RN for session today     Existing Precautions/Restrictions  fall;aspiration;oxygen therapy device and L/min;modified diet        Cognition/Psychosocial    Affect/Mental Status (Cognitive)  confused     Orientation Status (Cognition)  oriented to;person;situation     Follows Commands (Cognition)  follows one step commands;50-74% accuracy;increased processing time needed;verbal cues/prompting required        Sensory    Hearing Status  WFL        Vision Assessment/Intervention    Visual Impairment/Limitations  corrective lenses full time        Sensory Assessment (Somatosensory)    Sensory Assessment (Somatosensory)  LE sensation intact        Range of Motion Comprehensive    Comment: Range of Motion  AAROM/PROM BLE remains WFL. AROM limited by weakness        Strength (Manual Muscle Testing)    Strength (Manual Muscle Testing)  left lower  extremity strength deficit;right lower extremity strength deficit     Hip, Left (Strength)  1+/5 flex supine     Knee, Left (Strength)  flex supine 2/5, ext seated 3/5     Ankle, Left (Strength)  DF 2/5, PF 2+/5     Hip, Right (Strength)  1+/5 flex supine     Knee, Right (Strength)  2-/5 flex supine, 2+/5 ext seated     Ankle, Right (Strength)  2/5 DF, 2+/5 PF supine        Bed Mobility    Walker, Supine to Sit  dependent (less than 25% patient effort);2 person assist     Walker, Sit to Supine  dependent (less than 25% patient effort);2 person assist     Assistive Device (Bed Mobility)  draw sheet;head of bed elevated     Comment (Bed Mobility)  max inflated mattress, exit to L        Transfers    Comment  pt required Mod A to assist w/sittting balance EOB x 5 min. able to assist w/LUE on bedrail w/cont assist required to correct R and post lean. While seated EOB, able to perform LLE LAQ x 4 reps w/verbal/tactile cues, RLE AAROM LAQ x 4 reps, AP x 5 reps BLE        Bed to Chair Transfer    Comment  unsafe to attempt due to impaired sitting balance/weakness.  pt left in modified chair position of bed at end of session        Sit to Stand Transfer    Comment  unsafe to attempt due to impaired sitting balance/weakness.         Gait Training    Comment  unsafe to attempt        Safety Issues, Functional Mobility    Safety Issues Affecting Function (Mobility)  insight into deficits/self awareness;awareness of need for assistance     Impairments Affecting Function (Mobility)  balance;cognition;endurance/activity tolerance;motor control;postural/trunk control;strength     Cognitive Impairments, Mobility Safety/Performance  awareness, need for assistance;insight into deficits/self awareness        Balance    Balance Assessment  sitting static balance;sitting dynamic balance;sit to stand dynamic balance;standing static balance;standing dynamic balance     Static Sitting Balance  moderate  impairment;unsupported;sitting, edge of bed     Dynamic Sitting Balance  moderate impairment;unsupported;sitting, edge of bed     Sit to Stand Dynamic Balance  unable to perform activity     Static Standing Balance  unable to perform activity     Dynamic Standing Balance  unable to perform activity        Lower Extremity (Therapeutic Exercise)    Exercise Position/Type (LE Therapeutic Exercise)  seated;AROM (active range of motion);AAROM (active assistive range of motion)     General Exercise (LE Therapeutic Exercise)  bilateral;ankle pumps;LAQ (long arc quad)     Comment (LE Therapeutic Exercise)  able to perform LLE LAQ x 4 reps w/verbal/tactile cues, RLE AAROM LAQ x 4 reps, AP x 5 reps BLE        AM-PAC (TM) - Mobility (Current Function)    Turning from your back to your side while in a flat bed without using bedrails?  1 - Total     Moving from lying on your back to sitting on the side of a flat bed without using bedrails?  1 - Total     Moving to and from a bed to a chair?  1 - Total     Standing up from a chair using your arms?  1 - Total     To walk in a hospital room?  1 - Total     Climbing 3-5 steps with a railing?  1 - Total     AM-PAC (TM) Mobility Score  6        Therapy Assessment/Plan (PT)    Rehab Potential (PT)  fair, will monitor progress closely     Therapy Frequency (PT)  3-5 times/wk        Progress Summary (PT)    Daily Outcome Statement (PT)  pt cont to require assist for bed mobility,sitting balance w/limited activity tolerance. will beenfit from cont skilled therapy to maximze safety/independence w/functional mobility. [facility] 6     Symptoms Noted During/After Treatment  fatigue        Therapy Plan Review/Discharge Plan (PT)    PT Recommended Discharge Disposition  skilled nursing facility     Anticipated Equipment Needs at Discharge (PT Eval)  none                       Education provided this session. See the Patient Education summary report for full details.    PT Goals      Most Recent  Value   Bed Mobility Goal 1   Activity/Assistive Device  rolling to left, rolling to right, scooting, sit to supine, supine to sit, bridging at 01/04/2021 1134   Divide  supervision required at 01/04/2021 1134   Time Frame  by discharge at 01/04/2021 1134   Progress/Outcome  progress slower than expected, goal ongoing at 01/06/2021 1110   Transfer Goal 1   Activity/Assistive Device  sit-to-stand/stand-to-sit, bed-to-chair/chair-to-bed, stand pivot, wheelchair transfer, walker, front-wheeled at 01/04/2021 1134   Divide  moderate assist (50-74% patient effort) at 01/04/2021 1134   Time Frame  by discharge at 01/04/2021 1134   Progress/Outcome  progress slower than expected, goal ongoing at 01/06/2021 1110   Gait Training Goal 1   Activity/Assistive Device  gait (walking locomotion), walker, front-wheeled at 01/04/2021 1134   Divide  moderate assist (50-74% patient effort) at 01/04/2021 1134   Distance  10 at 01/04/2021 1134   Time Frame  by discharge at 01/04/2021 1134   Progress/Outcome  unable to make needed progress, goal ongoing at 01/06/2021 1110

## 2021-01-06 NOTE — PLAN OF CARE
Problem: Adult Inpatient Plan of Care  Goal: Plan of Care Review  Outcome: Progressing  Flowsheets (Taken 1/6/2021 1450)  Progress: improving  Plan of Care Reviewed With: patient  Outcome Summary: swallow tx completed     Problem: Swallowing Impairment  Goal: Improved Swallowing Without Aspiration  Intervention: Optimize Eating and Swallowing  Flowsheets  Taken 1/4/2021 1700 by Verna Carver RN  Aspiration Precautions:   stimuli minimized while eating   awake/alert before oral intake   respiratory status monitored   small bites/sips encouraged   upright posture maintained   liquids thickened   food texture adjusted  Taken 1/3/2021 2130 by Christa Caldwell, RN  Swallowing Techniques: Dysphagia: appropriate positioning encouraged

## 2021-01-06 NOTE — PROGRESS NOTES
Infectious Disease Progress Note    Patient Name: Nancy Minor  MR#: [id number]  : 10/6/1923  Admission Date: 2020  Date: 21   Time: 10:21 AM   Author: Barrett Stern MD    Major Events: none    Antibiotics:    Anti-infectives (From admission, onward)    Start     Dose/Rate Route Frequency Ordered Stop    21 1400  nystatin (MYCOSTATIN) 100,000 unit/mL suspension 500,000 Units      500,000 Units Swish & Swallow 4 times daily 21 1049      21 1030  cefUROXime (CEFTIN) tablet 250 mg      250 mg oral Daily 21 0931      20  nystatin (MYCOSTATIN) 100,000 unit/gram topical powder       Topical 2 times daily 20            Subjective     Review of Systems    Denies any pain and feels a bit better today    Objective     Vital Signs:    Patient Vitals for the past 72 hrs:   BP Temp Temp src Pulse Resp SpO2 Weight   21 1000 (!) 112/57 -- -- (!) 116 18 92 % --   21 0800 -- -- -- (!) 110 -- 97 % --   21 0600 123/60 36.3 °C (97.3 °F) Rectal (!) 104 20 97 % 52.3 kg (115 lb 3.2 oz)   21 0528 -- -- -- (!) 130 -- -- --   21 0410 116/74 36.1 °C (97 °F) Axillary (!) 116 20 93 % --   21 0215 -- (!) 35.9 °C (96.6 °F) Axillary -- -- -- --   21 0200 120/70 (!) 35.7 °C (96.2 °F) Rectal (!) 109 20 99 % --   21 0021 -- -- -- (!) 120 -- -- --   21 0020 -- (!) 35.7 °C (96.3 °F) Rectal (!) 105 -- 96 % --   21 0000 (!) 118/58 36.3 °C (97.3 °F) Axillary (!) 103 20 95 % --   21 -- -- -- (!) 130 -- -- --   21 122/67 -- -- (!) 111 (!) 22 96 % --   21 -- (!) 35.8 °C (96.4 °F) Rectal -- -- -- --   21 -- (!) 35.4 °C (95.8 °F) Rectal -- -- -- --   21 124/78 (!) 35.7 °C (96.3 °F) Axillary (!) 124 20 97 % --   21 1920 -- -- -- (!) 110 -- -- --   21 1800 123/66 -- -- (!) 112 20 93 % --   21 1600 112/74 (!) 35.8 °C (96.4 °F) Axillary (!) 128 20 96 % --   21  1400 116/69 -- -- (!) 107 18 93 % --   01/05/21 1200 114/61 36.1 °C (97 °F) Oral (!) 122 18 95 % --   01/05/21 1000 101/65 -- -- (!) 124 20 93 % --   01/05/21 0845 128/66 -- -- (!) 130 -- 93 % --   01/05/21 0836 (!) 88/61 -- -- (!) 120 -- 96 % --   01/05/21 0800 (!) 83/62 -- -- (!) 117 18 98 % --   01/05/21 0600 (!) 96/58 -- -- (!) 118 20 (!) 84 % --   01/05/21 0400 101/62 (!) 35.9 °C (96.6 °F) Oral (!) 114 18 98 % --   01/05/21 0200 (!) 94/52 -- -- (!) 114 20 96 % --   01/05/21 0000 (!) 109/58 36.2 °C (97.1 °F) Oral (!) 114 20 (!) 84 % --   01/04/21 2200 120/66 -- -- (!) 119 18 97 % --   01/04/21 2000 108/69 -- -- 99 20 93 % --   01/04/21 1800 102/63 -- -- (!) 105 18 93 % --   01/04/21 1600 (!) 95/54 36.4 °C (97.6 °F) Oral 100 20 95 % --   01/04/21 1500 -- -- -- 97 20 95 % --   01/04/21 1313 109/61 -- -- 97 20 93 % --   01/04/21 1213 122/62 -- -- (!) 111 20 97 % --   01/04/21 1202 -- 36.4 °C (97.5 °F) Oral (!) 111 20 96 % --   01/04/21 1145 119/69 -- -- (!) 121 -- 98 % --   01/04/21 1140 106/72 -- -- (!) 120 -- -- --   01/04/21 1133 (!) 89/57 -- -- (!) 120 -- 94 % --   01/04/21 1113 (!) 99/54 -- -- (!) 116 20 95 % --   01/04/21 1013 (!) 104/58 -- -- (!) 107 20 96 % --   01/04/21 1000 -- -- -- (!) 118 20 96 % --   01/04/21 0913 111/77 -- -- (!) 114 18 97 % --   01/04/21 0900 -- -- -- (!) 102 20 100 % --   01/04/21 0813 (!) 87/59 -- -- 97 20 93 % --   01/04/21 0800 -- -- -- (!) 103 18 95 % --   01/04/21 0713 104/65 -- -- 99 20 96 % --   01/04/21 0700 -- -- -- 96 18 94 % --   01/04/21 0320 (!) 95/55 36.3 °C (97.3 °F) Axillary -- (!) 24 94 % --   01/04/21 0013 96/60 36.5 °C (97.7 °F) Oral (!) 109 (!) 22 95 % --   01/03/21 2213 (!) 96/56 36.3 °C (97.4 °F) Oral (!) 107 16 97 % --   01/03/21 2130 -- -- -- (!) 105 -- -- --   01/03/21 2021 (!) 97/57 -- -- (!) 101 -- -- --   01/03/21 1944 -- -- -- 98 -- -- --   01/03/21 1925 (!) 92/55 36.3 °C (97.4 °F) Oral (!) 105 13 95 % --   01/03/21 1900 117/67 -- -- (!) 103 20 92 % --    21 1830 -- -- -- (!) 101 -- -- --   21 1700 109/74 -- -- (!) 102 13 97 % --   21 1600 117/67 -- -- 100 13 98 % --   21 1500 128/76 -- -- (!) 118 (!) 22 97 % --   21 1400 120/77 -- -- (!) 109 14 96 % --   21 1300 (!) 114/58 -- -- (!) 111 13 98 % --   21 1200 (!) 126/56 -- -- (!) 115 13 97 % --   21 1100 114/66 36.4 °C (97.5 °F) Axillary (!) 112 16 96 % --       Temp (72hrs), Av.1 °C (96.9 °F), Min:35.4 °C (95.8 °F), Max:36.5 °C (97.7 °F)      Physical Exam:  General appearance: lethargic  Head: NCAT  Lungs: wheezing bilateral  Heart: regular rate and rhythm  Abdomen: soft, non-tender  Extremities: edema none  Skin: no rashes  Neurologic: following commands    Lines, Drains, Airways, Wounds:  Midline Catheter 21 (Active)   Number of days: 3       Peripheral IV 20 Anterior;Left;Upper Arm (Active)   Number of days: 9       Urethral Catheter 16 Fr (Active)   Number of days: 9       Rash 20 0001 Right lateral hip patch (Active)   Number of days: 8       Wound Other (comment) Buttock (Active)   Number of days: 8       Labs:    CBC Results       21                    0411 0501 0330         WBC 17.71 17.81 13.44         RBC 3.02 2.96 2.76         HGB 7.8 7.7 7.1         HCT 25.0 24.7 22.7         MCV 82.8 83.4 82.2         MCH 25.8 26.0 25.7         MCHC 31.2 31.2 31.3          200 191         Comment for HGB at 0330 on 21: ALL RESULTS HAVE BEEN CHECKED    Comment for PLT at 0330 on 21: CONSISTENT WITH PREVIOUS RESULTS      BMP Results       21                    0411 0501 0329          133 134         K 3.8 3.4 3.5         Cl 104 103 101         CO2 19 15 19         Glucose 116 118 123          106 102         Creatinine 4.3 4.3 4.1         Calcium 8.9 8.4 8.6         Anion Gap 16 15 14         EGFR 9.6 9.6 10.1         Comment for BUN at 0411 on 21: Checked by  dilution    Comment for BUN at 0501 on 01/05/21: Consistent with previous results  Quantity not sufficient for repeat      Comment for BUN at 0329 on 01/04/21: Checked by dilution        PT/PTT Results       01/02/21 01/01/21 01/01/21                    0434 2311 1614         PTT 65 76 68         Comment for PTT at 0434 on 01/02/21: The Standard Therapeutic Range for Heparin is 68 to 101 seconds.    Comment for PTT at 2311 on 01/01/21: The Standard Therapeutic Range for Heparin is 68 to 101 seconds.    Comment for PTT at 1614 on 01/01/21: Result rechecked    The Standard Therapeutic Range for Heparin is 68 to 101 seconds.      UA Results       12/28/20                          1627           Color Red           Clarity Turbid           Glucose Proper identification not possible due to color interference.           Bilirubin Proper identification not possible due to color interference.           Ketones Proper identification not possible due to color interference.           Sp Grav 1.017           Blood +3           Ph Proper identification not possible due to color interference.           Protein Proper identification not possible due to color interference.           Urobilinogen Proper identification not possible due to color interference.           Nitrite Proper identification not possible due to color interference.           Leuk Est +3           WBC Too Numerous To Count           RBC Too Numerous To Count           Bacteria +3           Comment for Blood at 1627 on 12/28/20: The sensitivity of the occult blood test is equivalent to approximately 4 intact RBC/HPF.      Lactate Results       12/30/20 12/29/20 12/29/20                    1205 0924 0524         Lactate 1.3 4.1 6.5                       Microbiology Results     Procedure Component Value Units Date/Time    SARS-CoV-2 (COVID-19), PCR Nasopharynx [[id number]]  (Normal) Collected: 01/04/21 1625    Specimen: Nasopharyngeal Swab from Nasopharynx Updated:  01/05/21 1043    Narrative:      The following orders were created for panel order SARS-CoV-2 (COVID-19), PCR Nasopharynx.  Procedure                               Abnormality         Status                     ---------                               -----------         ------                     SARS-CoV-2 (COVID-19), P...[[id number]]  Normal              Final result                 Please view results for these tests on the individual orders.    SARS-CoV-2 (COVID-19), PCR Nasopharynx [[id number]]  (Normal) Collected: 01/04/21 1625    Specimen: Nasopharyngeal Swab from Nasopharynx Updated: 01/05/21 1043     SARS-CoV-2 (COVID-19) Negative    SARS-CoV-2 (COVID-19), PCR Nasopharynx [[id number]]  (Normal) Collected: 12/28/20 1840    Specimen: Nasopharyngeal Swab from Nasopharynx Updated: 12/28/20 2018    Narrative:      The following orders were created for panel order SARS-CoV-2 (COVID-19), PCR Nasopharynx.  Procedure                               Abnormality         Status                     ---------                               -----------         ------                     SARS-CoV-2 (COVID-19), P...[[id number]]  Normal              Final result                 Please view results for these tests on the individual orders.    SARS-CoV-2 (COVID-19), PCR Nasopharynx [[id number]]  (Normal) Collected: 12/28/20 1840    Specimen: Nasopharyngeal Swab from Nasopharynx Updated: 12/28/20 2018     SARS-CoV-2 (COVID-19) Negative    Narrative:      Nursing instructions: Obtain nasopharyngeal swab ONLY.  Send swab in viral transport media.    Blood Culture Blood, Venous [[id number]]  (Abnormal) Collected: 12/28/20 1648    Specimen: Blood, Venous Updated: 01/03/21 1409     Culture **Positive Culture**      Klebsiella pneumoniae ssp pneumoniae     Gram Stain Result Gram negative bacilli    Urine culture Urine, Clean Catch [[id number]]  (Abnormal)  (Susceptibility) Collected: 12/28/20 1627    Specimen: Urine, Clean Catch Updated:  12/31/20 0830     Urine Culture **Positive Culture**      >1 x 10^5 CFU/mL Klebsiella pneumoniae ssp pneumoniae    Blood Culture Blood, Venous [[id number]]  (Abnormal)  (Susceptibility) Collected: 12/28/20 1603    Specimen: Blood, Venous Updated: 01/03/21 1408     Culture **Positive Culture**      Klebsiella pneumoniae ssp pneumoniae     Gram Stain Result Gram negative bacilli    Blood Culture PCR Panel Blood, Venous [[id number]]  (Abnormal) Collected: 12/28/20 1603    Specimen: Blood, Venous Updated: 12/29/20 1309     Acinetobacter calcoaceticus-baumannii Not Detected     Candida albicans Not Detected     Candida auris Not Detected     Candida glabrata Not Detected     Candida krusei Not Detected     Candida parapsilosis Not Detected     Candida tropicalis Not Detected     Cryptococcus neoformans/gattii Not Detected     Enterococcus faecalis Not Detected     Enterococcus faecium Not Detected     Enterobacter cloacae complex Not Detected     Escherichia coli Not Detected     Klebsiella oxytoca Not Detected     Klebsiella pneumoniae Detected     Klebsiella aerogenes Not Detected     Proteus Not Detected     Salmonella species Not Detected     Serratia marcescens Not Detected     Haemophilus influenzae Not Detected     Listeria monocytogenes Not Detected     Neiseria meningitidis Not Detected     Pseudomonas aeruginosa Not Detected     Stenotrophomonas maltophilia Not Detected     Bacteroides fragilis Not Detected     Staph (not aureus) Not Detected     Staphylococcus aureus Not Detected     Staphylococcus ludgnensis Not Detected     Staphylococcus epidermidis Not Detected     Streptococcus Not Detected     Streptococcus agalactiae (Group B) Not Detected     Streptococcus pneumoniae Not Detected     Streptococcus pyogenes (Group A) Not Detected     KPC (Carbapenem Resistance Gene) Not Detected     Damon/B (Vancomycin Resistance Gene) Not Applicable     mecA/C Not Applicable     mecA/C and MREJ (MRSA) Not Applicable      CTX-M (ESBL) Not Detected     IMP Not Detected     mcr-1 Not Detected     NDM Not Detected     OXA-48-like Not Detected     VIM Not Detected     Comment: See below    Narrative:      This positive blood culture was tested with a rapid molecular panel that detects Enterococcus faecalis, Enterococcus faecium, Listeria monocytogenes, Staphylococcus species, Staphylococcus aureus, Staphylococcus lugdunensis, Staphylococcus epidermidis, Streptococcus agalactiae (Group B), Streptococcus pneumonia, Streptococcus pyogenes (Group A), Acinetobacter calcoaceticus-baumannii complex, Bacteroides fragilis, Haemophilus influenza, Neisseria meningitides, Pseudomonas aeruginosa, Stenotrophomonas maltophilia , Salmonella spp.,  Enterobacter cloacae complex, Escherichia coli, Klebsiella oxytoca, Klebsiella pneumoniae group, Klebsiella aerogenes , Proteus species, Serratia marcescens, Candida albicans, Candida auris, Candida glabrata, Candida krusei, Cryptococcus neoformans/gattii, Candida parapsilosis, and Candida tropicalis.          Pathology Results     ** No results found for the last 720 hours. **          Echo:     Cardiac Imaging   TRANSTHORACIC ECHO (TTE) COMPLETE 12/30/2020    Narrative · Mitral valve thickening. Severe mitral annular calcification.  · Severe mitral valve stenosis.  · Mean gradient = 10.00 mmHg. HR = 110 bpm.  · Hyperdynamic LV systolic function. Estimated EF >75%.  · Mildly dilated LA.  · Tricuspid valve structure is grossly normal. Mild tricuspid valve   regurgitation.  · Aortic valve structure is normal. Mild aortic valve regurgitation. No   aortic valve stenosis. A bioprosthetic aortic valve is present.  · Moderately dilated RA.  · Moderately dilated RV. Mildly reduced RV systolic function.  · Mild mitral valve regurgitation.     Estimated PASP= 51mmHg by TR jet method.         Imaging:    Radiology Imaging   XR CHEST 1 VW    Narrative CLINICAL HISTORY: Hypoxia.  Wheezing.    COMMENT:  A portable upright  AP view of the chest was performed.    Comparison: 12/31/2020    Cardiac monitoring leads obscure portions of the underlying lungs.  There is  pleural-parenchymal disease at the left much greater than right lung bases which  appears progressed compared to the prior.  In addition, there is interstitial  prominence and perihilar airspace disease, left greater than right, which is  progressed compared to the prior.  There is also increased airspace disease in  the left greater than right lung bases.  A more focal area of airspace disease  is also present in the right upper lobe.  The heart is enlarged.  Aortic  atherosclerosis is present.  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  Degenerative changes are present in the regional osseous structures and the  bones are diffusely demineralized.      Impression IMPRESSION:  Progression of pulmonary edema and pleural effusions, left greater than right,  with new/increasing perihilar, lower lobe and right upper lobe airspace disease.       Assessment     1. Klebsiella bacteremia due to UTI - leukocytosis is stable in the setting of steroid administration and pt remains afebrile.     Plan      1. Continue cefuroxime for 5 more days with pt remaining clinically stable.

## 2021-01-06 NOTE — PROGRESS NOTES
"[facility] Palliative Care Progress Note    Patient Name: Nancy Marinelli  Patient MRN: [id number]  Date / Time: 1/6/2021 8:38 PM   Attending Physician: Marcelino Sutherland MD    This is Hospital Day: 10    Capacity for Medical Decision Making: questionable due to intermittent forgetfulness    Advance Directives:  Existence of Advance Directives:    Document Type(s):   Durable power of : Arabella Cruz.  Living Will: requested for file  Healthcare Proxy: Arabella Cruz, Relation: Relationship: daughter   Emergency Contact: Arabella Cruz; Relation: Daughter; deysi marinelli; Relation: Son; figueroa marinelli; Relation: Relative; apple marinelli; Relation: Son    Conversation/Goals of Care:  Goals of care, introduce hospice    Interval History (Subjective)  Source: pt, RN, Attending, daughter/POA Arabella Cruz    Pt is resting in bed, denies pain, reports sob, fatigue, poor appetite, lethargy.  Denies n/v/d/c.    Palliative Assessment and Plan  Current Palliative Performance Status: 20%   Baseline Palliative Performance Status: Unknown   Preferred Setting for Care: pt states multiple times she wishes to go home  Estimated Palliative Prognosis: poor, life expectancy <6months per attending  Patient Prognostic Awareness: appears to be in denial   Patient Status: Disease state: Deteriorating despite treatments.  Functional status: Bedridden.  Mental status: alert, oriented and anxious.  Nutritional status: albumin below 2.5 g/dL.  Psychosocial issues: anxiety about hospitalization and health issues.  Family support: The patient receives support from her children..    Debility  Assessment & Plan  Patient with weakness, fatigue      1/6, reports things \"seem harder for me\" but remains hopeful that she can \"get better\"    PT/OT/ST  Likely to need rehab if in line with goals of care however pt wishes to go home and be with her family    Pain  Assessment & Plan  Denies pain at this time but states that she overall does not feel " good today.      Creatinine 4.3 now   Acetaminophen for pain prn (x1/24)          PDMP (outside record) with no findings  No meds for pain on home med list      FTT (failure to thrive) in adult  Assessment & Plan  Pt arrived with poor po for several days, albumin 2.1  Remote CVA, chronic dysphagia, only likes certain foods - does NOT like eggs    1/5 Appetite poor. Albumin 2.2.     Pureed diet with nectar thick  Aspiration precautions  Speech following for dysphagia and diet management  Asked Nutrition to reach out to POA Arabella to discuss pt food preferences to promote willingness to eat    SOB (shortness of breath)  Assessment & Plan  Sob in setting of sepsis and known COPD   IV Solu-Medrol albuterol nebs, albuterol per medical team  Fluid overload- + 22 L at one point      - Unstable Continues to have significant dyspnea and is receiving diuretic therapy. Supplemental oxygen at 4 L    Overnight needed Hypothermia protocol with Bare Hugger      Continue diuresis per medical team though Creatinine is climbing and currently 4.3   Nephrology following    Palliative care by specialist  Assessment & Plan  89yoF with urosepsis, renal failure, dysphagia, FTT (albumin 2.2), Afib difficult to control due to hypotension.  Pt is very frail, will not take po and is declining     Family meeting scheduled for today at 3pm with all 5 adult children to discuss goals of care  See ACP Note dated 20   Code status confirmed for LIMITED CODE:   No compressions, intubation or mechanical ventilation, okay for ACLS medications as well as Bag/Valve mask and BiPAp and Defib/cardioversion  Patient has a very extensive POA from [place] on EMR  Medical POA is Ashanti [place] with alternate if Arabella were  to be Marilia Diaz  Likely to need rehab on discharge if in line with goals of care and she recovers from current illness however, pt and family inclined to prefer her return to home if able.         Current Medications:  •  acetaminophen, 650 mg, oral, q4h PRN  •  atorvastatin, 10 mg, oral, Daily (6p)  •  budesonide, 0.5 mg, nebulization, BID (6a, 6p)  •  calcium gluconate, 1 g, intravenous, 2x daily PRN  •  cefUROXime, 250 mg, oral, Daily  •  glucose, 16-32 g of dextrose, oral, PRN **OR** dextrose, 15-30 g of dextrose, oral, PRN **OR** glucagon, 1 mg, intramuscular, PRN **OR** dextrose in water, 25 mL, intravenous, PRN  •  [START ON 1/7/2021] ferric gluconate (FERRLECIT) IVPB, 125 mg, intravenous, Daily  •  furosemide, 80 mg, intravenous, q12h JESSICA  •  guaiFENesin, 400 mg, oral, q4h PRN  •  heparin (porcine), 5,000 Units, subcutaneous, q8h JESSICA  •  insulin aspart U-100, 3-11 Units, subcutaneous, With meals & nightly  •  ipratropium-albuteroL, 3 mL, nebulization, q6H JESSICA  •  levothyroxine, 50 mcg, oral, Daily (6:30a)  •  lidocaine HCL, 5 mL, Mouth/Throat, 3x daily PRN  •  magnesium sulfate, 2 g, intravenous, PRN  •  methylPREDNISolone sodium succinate, 60 mg, intravenous, q8h INT  •  metoprolol, 2.5 mg, intravenous, q6h PRN  •  nystatin, , Topical, BID  •  nystatin, 500,000 Units, Swish & Swallow, QID  •  potassium chloride, 20 mEq, oral, PRN **OR** potassium chloride, 40 mEq, oral, PRN **OR** potassium chloride, 20 mEq, intravenous, PRN **OR** potassium chloride, 40 mEq, intravenous, PRN  •  renal multivitamin, 1 tablet, oral, Daily  •  sodium chloride, 10 mL, intravenous, q8h INT  •  sodium chloride, 10 mL, intravenous, PRN    Objective  Physical Exam:  General:   No Acute Distress, frail  Eyes:  Sclera Anicteric, EOM intact   ENMT:  Mmm  CV:  HRR, pallor  Extremities: no edema  Lungs:  CTA, no rales/rhonchi/wheeze  Abdomen:  Soft, non tender, + Bowel Sounds  Psych:  Anxious and Cooperative  Neuro:  Awake and Alert, deconditioned  Skin:  No rash, no jaundice      Vitals:  Vitals:    01/06/21 1931   BP: 138/68   Pulse: (!) 104   Resp: 18   Temp: 36.2 °C (97.1 °F)   SpO2: 95%       Intake & Output:  I/O  last 3 completed shifts:  In: 200 [IV Piggyback:200]  Out: 2475 [Urine:2475]    Laboratory Studies:    CBC Results       01/06/21 01/05/21 01/04/21                    0411 0501 0330         WBC 17.71 17.81 13.44         RBC 3.02 2.96 2.76         HGB 7.8 7.7 7.1         HCT 25.0 24.7 22.7         MCV 82.8 83.4 82.2         MCH 25.8 26.0 25.7         MCHC 31.2 31.2 31.3          200 191         Comment for HGB at 0330 on 01/04/21: ALL RESULTS HAVE BEEN CHECKED    Comment for PLT at 0330 on 01/04/21: CONSISTENT WITH PREVIOUS RESULTS        CMP Results       01/06/21 01/05/21 01/04/21                    0411 0501 0329          133 134         K 3.8 3.4 3.5         Cl 104 103 101         CO2 19 15 19         Glucose 116 118 123          106 102         Creatinine 4.3 4.3 4.1         Calcium 8.9 8.4 8.6         Anion Gap 16 15 14         AST 14 17 15         ALT 25 24 25         Albumin 2.3 2.2 2.0         EGFR 9.6 9.6 10.1         Comment for BUN at 0411 on 01/06/21: Checked by dilution    Comment for BUN at 0501 on 01/05/21: Consistent with previous results  Quantity not sufficient for repeat      Comment for BUN at 0329 on 01/04/21: Checked by dilution              Imaging and Other Studies:     Radiology Imaging   XR CHEST 1 VW    Narrative CLINICAL HISTORY: Hypoxia.  Wheezing.    COMMENT:  A portable upright AP view of the chest was performed.    Comparison: 12/31/2020    Cardiac monitoring leads obscure portions of the underlying lungs.  There is  pleural-parenchymal disease at the left much greater than right lung bases which  appears progressed compared to the prior.  In addition, there is interstitial  prominence and perihilar airspace disease, left greater than right, which is  progressed compared to the prior.  There is also increased airspace disease in  the left greater than right lung bases.  A more focal area of airspace disease  is also present in the right upper lobe.  The heart is  enlarged.  Aortic  atherosclerosis is present.  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  Degenerative changes are present in the regional osseous structures and the  bones are diffusely demineralized.      Impression IMPRESSION:  Progression of pulmonary edema and pleural effusions, left greater than right,  with new/increasing perihilar, lower lobe and right upper lobe airspace disease.                                                                       Cardiac Imaging   TRANSTHORACIC ECHO (TTE) COMPLETE 12/30/2020    Narrative · Mitral valve thickening. Severe mitral annular calcification.  · Severe mitral valve stenosis.  · Mean gradient = 10.00 mmHg. HR = 110 bpm.  · Hyperdynamic LV systolic function. Estimated EF >75%.  · Mildly dilated LA.  · Tricuspid valve structure is grossly normal. Mild tricuspid valve   regurgitation.  · Aortic valve structure is normal. Mild aortic valve regurgitation. No   aortic valve stenosis. A bioprosthetic aortic valve is present.  · Moderately dilated RA.  · Moderately dilated RV. Mildly reduced RV systolic function.  · Mild mitral valve regurgitation.     Estimated PASP= 51mmHg by TR jet method.          I have independently reviewed the pertinent imaging to the time of note and agree with reported results.    Discussed with pt, RN, Attending, A, [facility] hospice liaison..     DREAD Briggs  Palliative Care Nurse Practitioner  [facility]  [phone] [facility] office  [address] [place] [phone]  Pager [phone]

## 2021-01-06 NOTE — PLAN OF CARE
Problem: Adult Inpatient Plan of Care  Goal: Plan of Care Review  Outcome: Progressing  Flowsheets (Taken 1/6/2021 1218)  Progress: improving  Plan of Care Reviewed With: patient  Outcome Summary: Ax2 supine to sit, Mod A sitting EOB x 5 min w/assist to correct post/R lean.

## 2021-01-06 NOTE — PROGRESS NOTES
NEPHROLOGY PROGRESS NOTE    Subjective:   Patient seen and evaluated in follow up for DANY. She has worsening SOB and orthopnea. Feels better sitting up in bed. Developed hypothermia and is on warming blanket. Has some confusion at this time.      Review of Systems   Constitutional: Positive for activity change, appetite change and chills.   HENT: Negative.    Eyes: Negative.    Respiratory: Positive for shortness of breath.    Cardiovascular: Positive for leg swelling.   Gastrointestinal: Negative.    Endocrine: Negative.    Genitourinary: Negative.    Musculoskeletal: Negative.    Skin: Negative.    Neurological: Positive for weakness.   Hematological: Negative.    Psychiatric/Behavioral: Positive for confusion.   All other systems reviewed and are negative.      Vitals:    01/06/21 1000 01/06/21 1110 01/06/21 1115 01/06/21 1129   BP: (!) 112/57 (!) 116/59 116/77 123/71   BP Location:  Left upper arm Left upper arm Left upper arm   Patient Position:  Lying Sitting    Pulse: (!) 116 (!) 127 (!) 135 (!) 114   Resp: 18      Temp:       TempSrc:       SpO2: 92% 100%  99%   Weight:       Height:             Intake/Output Summary (Last 24 hours) at 1/6/2021 1223  Last data filed at 1/6/2021 1100  Gross per 24 hour   Intake 200 ml   Output 1725 ml   Net -1525 ml       Physical Exam  Constitutional:       General: She is not in acute distress.     Appearance: She is well-developed. She is ill-appearing.   HENT:      Head: Normocephalic and atraumatic.   Eyes:      General: No scleral icterus.     Conjunctiva/sclera: Conjunctivae normal.   Neck:      Musculoskeletal: Neck supple.      Vascular: No carotid bruit or JVD.   Cardiovascular:      Rate and Rhythm: Normal rate and regular rhythm.      Heart sounds: Normal heart sounds. Heart sounds not distant. No murmur. No friction rub. No gallop.    Pulmonary:      Effort: Pulmonary effort is normal.      Breath sounds: Wheezing and rales present. No decreased breath sounds or  rhonchi.   Abdominal:      General: Bowel sounds are normal. There is no distension.      Palpations: Abdomen is soft.   Musculoskeletal: Normal range of motion.      Right lower leg: Edema present.      Left lower leg: Edema present.   Skin:     General: Skin is warm and dry.      Findings: No rash.   Neurological:      Comments: + mild confusion         LABS:  Results from last 7 days   Lab Units 01/06/21  0411   SODIUM mEQ/L 139   POTASSIUM mEQ/L 3.8   CHLORIDE mEQ/L 104   CO2 mEQ/L 19*   BUN mg/dL 112*   CREATININE mg/dL 4.3*   EGFR mL/min/1.73m*2 9.6*   GLUCOSE mg/dL 116*   CALCIUM mg/dL 8.9   ALBUMIN g/dL 2.3*   WBC K/uL 17.71*   HEMOGLOBIN g/dL 7.8*   HEMATOCRIT % 25.0*   PLATELETS K/uL 234       Meds:    Current Facility-Administered Medications:   •  acetaminophen (TYLENOL) tablet 650 mg, 650 mg, oral, q4h PRN, Guerita Bose CRNP, 650 mg at 01/05/21 1413  •  atorvastatin (LIPITOR) tablet 10 mg, 10 mg, oral, Daily (6p), Ankita Culver CRNP, 10 mg at 01/05/21 1825  •  budesonide (PULMICORT) 0.5 mg/2 mL nebulizer solution 0.5 mg, 0.5 mg, nebulization, BID (6a, 6p), Ankita Culver CRNP, 0.5 mg at 01/06/21 0756  •  calcium gluconate 1,000 mg in sodium chloride 0.9 % 50 mL IVPB, 1 g, intravenous, 2x daily PRN, Ankita Culver CRNP  •  cefUROXime (CEFTIN) tablet 250 mg, 250 mg, oral, Daily, Barrett Stern MD, 250 mg at 01/06/21 0959  •  glucose chewable tablet 16-32 g of dextrose, 16-32 g of dextrose, oral, PRN **OR** dextrose 40 % oral gel 15-30 g of dextrose, 15-30 g of dextrose, oral, PRN **OR** glucagon (GLUCAGEN) injection 1 mg, 1 mg, intramuscular, PRN **OR** dextrose in water injection 12.5 g, 25 mL, intravenous, PRN, Ankita Culver CRNP  •  [START ON 1/7/2021] ferric gluconate (FERRLECIT) 125 mg in sodium chloride 0.9 % 100 mL IVPB, 125 mg, intravenous, Daily, Mila Ortega MD  •  guaiFENesin (ROBITUSSIN) 100 mg/5 mL liquid 400 mg, 400 mg, oral, q4h PRN, Guerita Bose CRNP, 400 mg at  01/04/21 0336  •  heparin (porcine) 5,000 unit/mL injection 5,000 Units, 5,000 Units, subcutaneous, q8h [facility], Ankita Culver CRNP, 5,000 Units at 01/06/21 0532  •  insulin aspart U-100 (NovoLOG) pen 3-11 Units, 3-11 Units, subcutaneous, With meals & nightly, Mila Ortega MD, 3 Units at 01/05/21 1212  •  ipratropium-albuteroL (DUO-NEB) 0.5-2.5 mg/3 mL nebulizer solution 3 mL, 3 mL, nebulization, q6H JESSICA, Ankita Culver CRNP, 3 mL at 01/06/21 0756  •  levothyroxine (SYNTHROID) tablet 50 mcg, 50 mcg, oral, Daily (6:30a), Ankita Culver CRNP, 50 mcg at 01/06/21 0537  •  lidocaine HCL (XYLOCAINE) 2 % viscous mucosal solution 5 mL, 5 mL, Mouth/Throat, 3x daily PRN, Mila Ortega MD  •  magnesium sulfate IVPB 2g in 50 mL NSS/D5W/SWFI, 2 g, intravenous, PRN, Ankita Culver CRNP, Stopped at 01/02/21 0837  •  methylPREDNISolone sod suc(PF) (Solu-MEDROL) injection 60 mg, 60 mg, intravenous, q8h INT, Ingrid Acevedo DO, 60 mg at 01/06/21 0532  •  metoprolol (LOPRESSOR) injection 2.5 mg, 2.5 mg, intravenous, q6h PRN, Abelino Medina MD, 2.5 mg at 01/06/21 1211  •  nystatin (MYCOSTATIN) 100,000 unit/gram topical powder, , Topical, BID, Ankita Culver CRNP, Given at 01/06/21 0900  •  nystatin (MYCOSTATIN) 100,000 unit/mL suspension 500,000 Units, 500,000 Units, Swish & Swallow, QID, Mila Ortega MD, 500,000 Units at 01/06/21 0959  •  potassium chloride (KLOR-CON) tablet extended release 20 mEq, 20 mEq, oral, PRN **OR** potassium chloride (KLOR-CON) tablet extended release 40 mEq, 40 mEq, oral, PRN **OR** potassium chloride 20 mEq in 100 mL IVPB  (premix), 20 mEq, intravenous, PRN **OR** potassium chloride (KCL) 40 mEq/250 mL IVPB in NSS 40 mEq, 40 mEq, intravenous, PRN, Mila Ortega MD, Stopped at 01/05/21 1713  •  renal multivitamin tablet 1 tablet, 1 tablet, oral, Daily, Ankita Culver CRNP, 1 tablet at 01/06/21 0959  •  sodium chloride flush 10 mL, 10 mL, intravenous, q8h INT, Mila Ortega MD, 10 mL at  01/06/21 1000  •  sodium chloride flush 10 mL, 10 mL, intravenous, PRN, Mila Ortega MD    ASSESSMENT/PLAN:  1. DANY superimposed on CKD - secondary to ATN in setting of septic shock - creatinine peaked at 6.4 - improved to 3.8 but now in the low 4's.   1. Continues to have worsening volume overload - will increase diuretics to 80 mg IV BID.  2. Avoid NSAIDs and other nephrotoxins -   3. Poor prognosis  2. Hyponatremia - corrected - monitor with diuresis  3. Hyperkalemia - Resolved - will continue to monitor with diuresis as she may become hypokalemic  4. Uro-sepsis/Septic Shock - continues on Abx per ID; hypothermic and on bear hugger/warming blanket today  5. Hematuria - urology is following - urine looks clear today  6. Pulmonary Edema: Worsening volume overload on imagine and worsening symptomatically - increase lasix to 80 mg IV BID.      ELANA Coley C

## 2021-01-06 NOTE — PROGRESS NOTES
Hospital Medicine Service -  Daily Progress Note       SUBJECTIVE     Interval History: Not much improved. Appears very weak. Poor po intake per RN.  No specific complaints. Denies Chest pain or dyspnea.  Hypothermic 96 yesterday and initiated on bear hugger's.   OBJECTIVE        Vital signs in last 24 hours:  Temp:  [35.4 °C (95.8 °F)-36.3 °C (97.3 °F)] 36.3 °C (97.3 °F)  Heart Rate:  [103-130] 116  Resp:  [18-22] 18  BP: (112-124)/(57-78) 112/57  I/O last 3 completed shifts:  In: 60 [P.O.:60]  Out: 1985 [Urine:1985]    PHYSICAL EXAMINATION        GEN: Frail; not in acute distress  HEENT: normocephalic; atraumatic  NECK: no JVD; no bruits  CARDIO: regular rate and rhythm; no murmurs or rubs  RESP: Scattered b/l wheezing  ABD: soft, non-distended, non-tender, normal bowel sounds  EXT: no cyanosis, clubbing, bilateral lower extremity swelling, 1+ right upper extremity swelling ecchymosis right forearm, mild swelling left upper extremity  SKIN: clean, dry, warm, and intact  MUSCULOSKELETAL: no injury or deformity  NEURO: alert and oriented x 3; nonfocal  BEHAVIOR/EMOTIONAL: appropriate; cooperative     LABS / IMAGING / TELE        Labs  Lab Results   Component Value Date    WBC 17.71 (H) 01/06/2021    HGB 7.8 (L) 01/06/2021    HCT 25.0 (L) 01/06/2021    MCV 82.8 (L) 01/06/2021     01/06/2021     Lab Results   Component Value Date    GLUCOSE 116 (H) 01/06/2021    CALCIUM 8.9 01/06/2021     01/06/2021    K 3.8 01/06/2021    CO2 19 (L) 01/06/2021     01/06/2021     (HH) 01/06/2021    CREATININE 4.3 (H) 01/06/2021     Lab Results   Component Value Date    INR 1.2 12/30/2020       Imaging  Ct Abdomen Pelvis Without Iv Contrast    Addendum Date: 12/28/2020    Additionally noted in the impression should be incidentally noted pneumobilia, which is a nonspecific finding in a patient status post cholecystectomy. Correlate clinically.  Further evaluation with right upper quadrant ultrasound could be  considered.    Result Date: 12/28/2020  IMPRESSION: 1.  Limited evaluation in the absence of intravenous or oral contrast and due to patient motion.  No definite hydronephrosis or renal calculi as clinically questioned, noting limited evaluation of the distal ureters due to the patient's right hip prosthesis. 2.  Circumferential bladder wall thickening and intravesicular air within the urinary bladder, concerning for urinary tract infection. 3.  Age-indeterminate mild compression deformities of T11, T12 and L3.    X-ray Chest 1 View    Result Date: 1/5/2021  IMPRESSION: Progression of pulmonary edema and pleural effusions, left greater than right, with new/increasing perihilar, lower lobe and right upper lobe airspace disease.    X-ray Chest 1 View    Result Date: 12/31/2020  IMPRESSION: 1.  Patchy interstitial opacity, with pulmonary vascular congestion, which may represent multifocal airspace disease or pulmonary edema. 2.  Left basilar airspace disease, new/increased from prior.     X-ray Chest 1 View    Result Date: 12/28/2020  IMPRESSION: Linear atelectasis or scar in the left midlung zone. COMMENT: Erect AP portable view of the chest was performed.  We have no prior studies for comparison. The heart size is towards the upper limits of normal.  A TAVR prosthesis is noted.  The pulmonary vasculature is not engorged. Linear opacity in the left midlung zone is noted, likely an area of discoid atelectasis or scar.  The lung fields are otherwise clear, although EKG leads and wires could obscure small lesions.  No pleural effusions are seen. No hilar abnormality is seen.  There is calcification of the thoracic aorta. Bilateral rotator cuff tears are suspected.      ECG/Telemetry  I have independently reviewed the telemetry. No events for the last 24 hours.Atrial fib 100-120/'    ASSESSMENT AND PLAN      Septic shock (CMS/HCC)-Klebsiella   Assessment & Plan  Secondary to UTI with Klebsiella bacteremia  Improving  Cont  Ceftin for total 14 days  History of UTIs in the past  CT abdomen also showing circumferential bladder wall thickening and intravesicular air incidental pneumobilia which is a nonspecific finding in a patient who had cholecystectomy  Blood cultures and urine cultures grew Klebsiella   Status post Levophed in ICU  PT/OT eval  Seen by speech and recommending puréed diet with nectar thick liquids  Palliative care consult appreciated.  Patient is limited code- she does not want chest compressions or mechanical ventilation.  Ok for medications and shock  Heparin subcu for DVT prophylaxis  Updated daughter (POA) discussed poor prognosis. She wants to continue diuresis and see whether she will improve. She may need hospice placement, if she does not improve  Poor prognosis sec to multiple medical problems, poor po inake, failure to thrive  Discussed with  and       Chronic atrial fibrillation (CMS/[facility])  Assessment & Plan  Heart rate 120-140/'  History of atrial fibrillation in the past  Amiodarone drip discontinued   Metoprolol IV as needed for atrial fibrillation   anticoagulation held sec to hematuria    History of CVA (cerebrovascular accident)  Assessment & Plan  Not on aspirin at home  We will hold off to statin for now    Hyponatremia   Assessment & Plan  Resolved  Na 134    Type 2 MI (myocardial infarction) (CMS/[facility])  Assessment & Plan  Second to demand ischemia  In the setting of DANY and severe sepsis  EKG nonischemic    History of colon cancer  Assessment & Plan  Remote history of colon cancer  Appears to be under remission    COPD (chronic obstructive pulmonary disease) (CMS/[facility])  Assessment & Plan  Currently in exacerbation  Continue IV Solu-Medrol 60 every 8 hours, albuterol nebs  Continue scheduled albuterol      Acute on chronic renal failure (CMS/[facility])  Assessment & Plan  Secondary to  ATN /CKD  Now with volume overload from aggressive IV fluid  Pt is  +9 L this admission  Received Lasix 80  IV prn  Cr 5.3 on admission improved to 4.3  Baseline cr 1.4 6/19   Nephrology following  Chest Xray Shows worsening fluid overload  Receiving Lasix as needed here  Would give Lasix 40 IV today  Urine output 1600 yesterday    Hematuria  Resolved  Samson draining yellow urine  Urology is following  Hemoglobin low 7.7  Ok to initiate anticoagulation per urology    Metabolic encephalopathy   in the setting of septic shock  Resolved now    Severe mitral stenosis  Outpatient follow-up with cardiology    Anemia  B12 level elevated  Iron deficiency panel consistent with anemia of chronic disease and iron deficiency  Would give IV iron  Follow hemoglobin and hemoglobin 7.8 today  Baseline hemoglobin 11     VTE Assessment: Padua    Code Status: Limited Code  Estimated discharge date: 1/8/2021     Mila Ortega MD  1/6/2021  10:16 AM

## 2021-01-07 NOTE — PROGRESS NOTES
Non billable      Palliative Care and [facility]:  Family phone meeting for further goals of care discussion confirmed for  Friday 1/8 at 2pm with daughter/POEULOGIO Cruz and 4 siblings  [facility] Line is [phone]  DREAD Georges  Palliative Care

## 2021-01-07 NOTE — PROGRESS NOTES
Pulmonary Progress Note     SUBJECTIVE  Interval History:   Patient appears uncomfortable    Allergies: Patient has no known allergies.    CURRENT MEDS  •  acetaminophen, 650 mg, oral, q4h PRN  •  atorvastatin, 10 mg, oral, Daily (6p)  •  budesonide, 0.5 mg, nebulization, BID (6a, 6p)  •  calcium gluconate, 1 g, intravenous, 2x daily PRN  •  cefUROXime, 250 mg, oral, Daily  •  glucose, 16-32 g of dextrose, oral, PRN **OR** dextrose, 15-30 g of dextrose, oral, PRN **OR** glucagon, 1 mg, intramuscular, PRN **OR** dextrose in water, 25 mL, intravenous, PRN  •  ferric gluconate (FERRLECIT) IVPB, 125 mg, intravenous, Daily  •  furosemide, 80 mg, intravenous, q12h JESSICA  •  guaiFENesin, 400 mg, oral, q4h PRN  •  heparin (porcine), 5,000 Units, subcutaneous, q8h JESSICA  •  insulin aspart U-100, 3-11 Units, subcutaneous, With meals & nightly  •  ipratropium-albuteroL, 3 mL, nebulization, q6H JESSICA  •  levothyroxine, 50 mcg, oral, Daily (6:30a)  •  lidocaine HCL, 5 mL, Mouth/Throat, 3x daily PRN  •  magnesium sulfate, 2 g, intravenous, PRN  •  methylPREDNISolone sodium succinate, 60 mg, intravenous, q8h INT  •  metoprolol, 2.5 mg, intravenous, q6h PRN  •  nystatin, , Topical, BID  •  nystatin, 500,000 Units, Swish & Swallow, QID  •  potassium chloride, 20 mEq, oral, PRN **OR** potassium chloride, 40 mEq, oral, PRN **OR** potassium chloride, 20 mEq, intravenous, PRN **OR** potassium chloride, 40 mEq, intravenous, PRN  •  renal multivitamin, 1 tablet, oral, Daily  •  sodium chloride, 10 mL, intravenous, q8h INT  •  sodium chloride, 10 mL, intravenous, PRN    VITAL SIGNS  Vitals:    01/07/21 0553 01/07/21 0714 01/07/21 0747 01/07/21 1137   BP:  112/64  (!) 130/59   BP Location:  Right upper arm  Left upper arm   Patient Position:  Lying  Lying   Pulse:  (!) 102 88 (!) 104   Resp:  18  16   Temp:  36.4 °C (97.6 °F)  36.7 °C (98.1 °F)   TempSrc:       SpO2:  93%  100%   Weight: 52.2 kg (115 lb)      Height:            INTAKE/OUTPUT    Intake/Output Summary (Last 24 hours) at 1/7/2021 1428  Last data filed at 1/7/2021 0700  Gross per 24 hour   Intake --   Output 1475 ml   Net -1475 ml     PHYSICAL EXAM  Physical Exam  Vitals signs reviewed.   Constitutional:       Appearance: She is ill-appearing.   HENT:      Head: Normocephalic and atraumatic.      Mouth/Throat:      Comments: Hoarse voice  Cardiovascular:      Rate and Rhythm: Normal rate and regular rhythm.   Pulmonary:      Comments: Crackles B/L   Decreased wheeze  Abdominal:      General: Bowel sounds are normal.      Palpations: Abdomen is soft.   Musculoskeletal:      Comments: B/L peripheral edema   Neurological:      Mental Status: She is alert.         LAB RESULTS  ABG    CBC  Results from last 7 days   Lab Units 01/07/21  0344 01/06/21  0411 01/05/21  0501   WBC K/uL 20.74* 17.71* 17.81*   RBC M/uL 3.05* 3.02* 2.96*   HEMOGLOBIN g/dL 8.1* 7.8* 7.7*   HEMATOCRIT % 25.4* 25.0* 24.7*   MCV fL 83.3 82.8* 83.4   MCH pg 26.6* 25.8* 26.0*   MCHC g/dL 31.9* 31.2* 31.2*   PLATELETS K/uL 257 234 200   RDW % 18.6* 18.2* 18.0*   MPV fL 10.5 10.2 9.5     BMP  Results from last 7 days   Lab Units 01/07/21  0344 01/06/21  0411 01/05/21  0501   SODIUM mEQ/L 140 139 133*   POTASSIUM mEQ/L 3.5* 3.8 3.4*   CHLORIDE mEQ/L 104 104 103   CO2 mEQ/L 19* 19* 15*   BUN mg/dL 115* 112* 106*   CREATININE mg/dL 4.4* 4.3* 4.3*   CALCIUM mg/dL 8.7* 8.9 8.4*   ALBUMIN g/dL 2.3* 2.3* 2.2*   BILIRUBIN TOTAL mg/dL 0.7 0.8 0.6   ALK PHOS IU/L 68 74 71   ALT IU/L 26 25 24   AST IU/L 17 14* 17   GLUCOSE mg/dL 134* 116* 118*     Coag  Results from last 7 days   Lab Units 01/02/21  0434 01/01/21  2311 01/01/21  1614   PTT sec 65* 76* 68*       IMAGING  Imaging independently reviewed.   CXR (1/5/21):  IMPRESSION:  Progression of pulmonary edema and pleural effusions, left greater than right,  with new/increasing perihilar, lower lobe and right upper lobe airspace disease.    ASSESSMENT &  PLAN  89-year-old female presenting with weakness, hematuria, found to have septic shock, renal failure, COPD excacerbation    1. COPD, with acute exacerbation  - Decreased Wheeze today  - Decrease solumedrol to 40 mg q8 hours    2. Hypoxia, multifactorial in setting of COPD exacerbation, pulmonary edema and volume overload  -Increased O2 requirements  -Chest x-ray 1/5 with worsening pulmonary edema  -Remains on Lasix 80 mg every 12 hours    3.  Resolved septic shock secondary to UTI with Klebsiella bacteremia    4.  Acute kidney injury  -Creatinine 4.4  -Receiving diuretics, with renal following    5.  Pulmonary infiltrates, in the setting of volume overload      Ingrid Acevedo DO

## 2021-01-07 NOTE — PROGRESS NOTES
Patient: Nancy Minor  Location: [facility] 3A 3009  MRN: [id number]  Today's date: 1/7/2021     Pt OOB to chair; alarm set; personal items and cll cord in reach; RN notified.  OT present.    Alberta is a 89 y.o. female admitted on 12/28/2020 with Severe sepsis (CMS/HCC). Principal problem is Metabolic acidosis.    Past Medical History  Alberta has a past medical history of Atrial fibrillation (CMS/HCC), Chronic hypotension, Chronic kidney disease, Chronic kidney failure, Colon cancer (CMS/HCC), COPD (chronic obstructive pulmonary disease) (CMS/HCC), Low BP, Stroke (cerebrum) (CMS/HCC), and UTI (urinary tract infection).    History of Present Illness   Here with metabolic acidosis, no previous ST per chart review; from home    PT Vitals    Date/Time Pulse [facility]   01/07/21 1148 -- vitals obtained by OT this session MM      PT Pain    Date/Time Pain Type Pref Pain Scale Rating: Rest Rating: Activity [facility]   01/07/21 1148 Pain Assessment;Post Activity word (verbal rating pain scale) 0 - no pain 0 - no pain MM          Prior Living Environment      Most Recent Value   Living Arrangements  house   Living Environment Comment  Pt unreliable historian,  per EMR lives in [facility] w/[facility] and [place] [facility] [place] [address]. They have purchased a home with 0 [address] and are preparing to move in late January.          Prior Level of Function      Most Recent Value   Dominant Hand  right   Ambulation  assistive equipment   Transferring  assistive equipment   Toileting  assistive person   Bathing  assistive person   Dressing  assistive person   Eating  assistive person   Communication  understands/communicates without difficulty   Swallowing  swallows foods/liquids without difficulty   Baseline Diet/Method of Nutritional Intake  regular solids, thin liquids   Past History of Dysphagia  none documented   Prior Level of Function Comment  -- [per chart review,pt amb 10-15' 1x/day w/RW at baseline]   Assistive Device/Animal Currently Used  at Home  walker, front-wheeled          PT Evaluation and Treatment - 01/07/21 1148        Time Calculation    Start Time  1148     Stop Time  1204     Time Calculation (min)  16 min        Session Details    Document Type  daily treatment/progress note     Mode of Treatment  physical therapy        General Information    Referring Physician  Jordan     General Observations of Patient  RN cleared, rec;d in bed; OT present     Existing Precautions/Restrictions  fall     Limitations/Impairments  safety/cognitive        Cognition/Psychosocial    Affect/Mental Status (Cognitive)  flat/blunted affect     Orientation Status (Cognition)  oriented to;person     Follows Commands (Cognition)  follows one step commands;verbal cues/prompting required     Comment, Cognition  impaired but receptive        Bed Mobility    [place]astian, Roll Left  maximum assist (25-49% patient effort)     Sebastian, Supine to Sit  maximum assist (25-49% patient effort);verbal cues;nonverbal cues (demo/gesture)     Verbal Cues (Supine to Sit)  hand placement;proper use of assistive device;preparatory posture    bed rail    Assistive Device (Bed Mobility)  bed rails;draw sheet;head of bed elevated;other (see comments)    mattress firmed    Comment (Bed Mobility)  OOB to (L), HOB 28deg; draw sheet to complete mvmt to EOB once sitting        Transfers    Transfers  stand pivot transfer        Sit to Stand Transfer    Sebastian, Sit to Stand Transfer  dependent (less than 25% patient effort);verbal cues;nonverbal cues (demo/gesture)     Verbal Cues  preparatory posture;technique;hand placement     Assistive Device  --    HHA    Comment  from EOB        Stand to Sit Transfer    [place]astian, Stand to Sit Transfer  maximum assist (25-49% patient effort);verbal cues;nonverbal cues (demo/gesture);1 person assist;1 person to manage equipment     Verbal Cues  hand placement;preparatory posture;maintaining center of gravity over base of support      Assistive Device  --    HHA    Comment  to chair, arm rests        Stand Pivot Transfer    [place], Stand Pivot/Stand Step Transfer  maximum assist (25-49% patient effort);2 person assist    one person to guide hips, one person to sppt    Assistive Device  --    HHA    Comment  R tyo L; bed to chair        Gait Training    Comment  decr'd balance, strength, and safety    unsafe to attempt       Safety Issues, Functional Mobility    Safety Issues Affecting Function (Mobility)  insight into deficits/self awareness;awareness of need for assistance     Impairments Affecting Function (Mobility)  endurance/activity tolerance;postural/trunk control;strength;balance        Balance    Static Sitting Balance  mild impairment;supported;sitting, edge of bed     Dynamic Sitting Balance  moderate impairment;supported;sitting, edge of bed    w/i KATHY; Sever impairment unsppt'd and outside KATHY    Comment, Balance  incr''d falls risk        AM-PAC (TM) - Mobility (Current Function)    Turning from your back to your side while in a flat bed without using bedrails?  2 - A Lot     Moving from lying on your back to sitting on the side of a flat bed without using bedrails?  2 - A Lot     Moving to and from a bed to a chair?  1 - Total     Standing up from a chair using your arms?  1 - Total     To walk in a hospital room?  1 - Total     Climbing 3-5 steps with a railing?  1 - Total     AM-PAC (TM) Mobility Score  8        Therapy Assessment/Plan (PT)    Rehab Potential (PT)  fair, will monitor progress closely     Therapy Frequency (PT)  3-5 times/wk     Problem List  balance;cognition;mobility;strength;postural control;inability to direct their own care;other (see comments)    endurance    Activity Limitations Related to Problem List  unable to ambulate safely;unable to transfer safely;community activities not performed adequately or safely;home management activity not performed adequately or safely        Progress Summary (PT)     Daily Outcome Statement (PT)  PT session completed.  Incr'd asst to EOB and trxf to chair.  Max to Dep level of asst for essential mobility.  Pt non-ambulatory as of this session.  Cont per PT POC.     Symptoms Noted During/After Treatment  fatigue        Therapy Plan Review/Discharge Plan (PT)    PT Recommended Discharge Disposition  skilled nursing facility;home with home health;home with assist;home with caregiver        Plan of Care Review    Plan of Care Reviewed With  patient                       Education provided this session. See the Patient Education summary report for full details.    PT Goals      Most Recent Value   Bed Mobility Goal 1   Activity/Assistive Device  rolling to left, rolling to right, scooting, sit to supine, supine to sit, bridging at 01/04/2021 1134   [place]  supervision required at 01/04/2021 1134   Time Frame  by discharge at 01/04/2021 1134   Progress/Outcome  progress slower than expected, goal ongoing at 01/06/2021 1110   Transfer Goal 1   Activity/Assistive Device  sit-to-stand/stand-to-sit, bed-to-chair/chair-to-bed, stand pivot, wheelchair transfer, walker, front-wheeled at 01/04/2021 1134   [place]  moderate assist (50-74% patient effort) at 01/04/2021 1134   Time Frame  by discharge at 01/04/2021 1134   Progress/Outcome  progress slower than expected, goal ongoing at 01/06/2021 1110   Gait Training Goal 1   Activity/Assistive Device  gait (walking locomotion), walker, front-wheeled at 01/04/2021 1134   [place]  moderate assist (50-74% patient effort) at 01/04/2021 1134   Distance  10 at 01/04/2021 1134   Time Frame  by discharge at 01/04/2021 1134   Progress/Outcome  unable to make needed progress, goal ongoing at 01/06/2021 1110

## 2021-01-07 NOTE — PROGRESS NOTES
NEPHROLOGY PROGRESS NOTE    Subjective:   Pt seen re: ATN, volume overload  Urine draining pink tinged urine this am.   Pt feels weak and oral intake is diminished.       Review of Systems   Constitutional: Positive for activity change and appetite change. Negative for fever.   Respiratory: Positive for shortness of breath.    Cardiovascular: Negative for chest pain, palpitations and leg swelling.   Neurological: Positive for weakness.       Vitals:    01/07/21 0322 01/07/21 0553 01/07/21 0714 01/07/21 0747   BP: (!) 110/55  112/64    BP Location: Left forearm  Right upper arm    Patient Position: Lying  Lying    Pulse: 97  (!) 102 88   Resp: 18  18    Temp: 36.7 °C (98 °F)  36.4 °C (97.6 °F)    TempSrc: Axillary      SpO2: 92%  93%    Weight:  52.2 kg (115 lb)     Height:             Intake/Output Summary (Last 24 hours) at 1/7/2021 1113  Last data filed at 1/7/2021 0700  Gross per 24 hour   Intake --   Output 1750 ml   Net -1750 ml       Physical Exam  Constitutional:       Appearance: She is ill-appearing.      Comments: Cachetic    HENT:      Head: Atraumatic.   Eyes:      Pupils: Pupils are equal, round, and reactive to light.   Neck:      Musculoskeletal: Neck supple.   Cardiovascular:      Rate and Rhythm: Tachycardia present. Rhythm irregular.   Pulmonary:      Breath sounds: Wheezing present.      Comments: Diminished breath sounds at the bases.   End expiratory wheeze diffuse   Abdominal:      Palpations: Abdomen is soft.      Tenderness: There is no abdominal tenderness.   Musculoskeletal:         General: No swelling.   Skin:     General: Skin is warm.         LABS:  Results from last 7 days   Lab Units 01/07/21  0344   SODIUM mEQ/L 140   POTASSIUM mEQ/L 3.5*   CHLORIDE mEQ/L 104   CO2 mEQ/L 19*   BUN mg/dL 115*   CREATININE mg/dL 4.4*   EGFR mL/min/1.73m*2 9.3*   GLUCOSE mg/dL 134*   CALCIUM mg/dL 8.7*   ALBUMIN g/dL 2.3*   WBC K/uL 20.74*   HEMOGLOBIN g/dL 8.1*   HEMATOCRIT % 25.4*   PLATELETS K/uL 257        Meds:    Current Facility-Administered Medications:   •  acetaminophen (TYLENOL) tablet 650 mg, 650 mg, oral, q4h PRN, Guerita Bose CRNP, 650 mg at 01/07/21 0543  •  atorvastatin (LIPITOR) tablet 10 mg, 10 mg, oral, Daily (6p), Ankita Culver CRNP, 10 mg at 01/06/21 1736  •  budesonide (PULMICORT) 0.5 mg/2 mL nebulizer solution 0.5 mg, 0.5 mg, nebulization, BID (6a, 6p), Ankita Culver CRNP, 0.5 mg at 01/07/21 0840  •  calcium gluconate 1,000 mg in sodium chloride 0.9 % 50 mL IVPB, 1 g, intravenous, 2x daily PRN, Ankita Culver CRNP  •  cefUROXime (CEFTIN) tablet 250 mg, 250 mg, oral, Daily, Barrett Stern MD, 250 mg at 01/07/21 0840  •  glucose chewable tablet 16-32 g of dextrose, 16-32 g of dextrose, oral, PRN **OR** dextrose 40 % oral gel 15-30 g of dextrose, 15-30 g of dextrose, oral, PRN **OR** glucagon (GLUCAGEN) injection 1 mg, 1 mg, intramuscular, PRN **OR** dextrose in water injection 12.5 g, 25 mL, intravenous, PRN, Ankita Culver CRNP  •  ferric gluconate (FERRLECIT) 125 mg in sodium chloride 0.9 % 100 mL IVPB, 125 mg, intravenous, Daily, Mila Ortega MD, Last Rate: 100 mL/hr at 01/07/21 1007, 125 mg at 01/07/21 1007  •  furosemide (LASIX) injection 80 mg, 80 mg, intravenous, q12h JESSICA, Jamel Abarca, PA C, 80 mg at 01/07/21 0840  •  guaiFENesin (ROBITUSSIN) 100 mg/5 mL liquid 400 mg, 400 mg, oral, q4h PRN, Guerita Bose CRNP, 400 mg at 01/04/21 0336  •  heparin (porcine) 5,000 unit/mL injection 5,000 Units, 5,000 Units, subcutaneous, q8h Palomo LOPEZ Briana, CRNP, 5,000 Units at 01/07/21 0530  •  insulin aspart U-100 (NovoLOG) pen 3-11 Units, 3-11 Units, subcutaneous, With meals & nightly, Mila Ortega MD, 3 Units at 01/05/21 1212  •  ipratropium-albuteroL (DUO-NEB) 0.5-2.5 mg/3 mL nebulizer solution 3 mL, 3 mL, nebulization, q6H Palomo LOPEZ Briana, CRNP, 3 mL at 01/07/21 0840  •  levothyroxine (SYNTHROID) tablet 50 mcg, 50 mcg, oral, Daily (6:30a), Ankita Culver,  CRNP, 50 mcg at 01/07/21 0543  •  lidocaine HCL (XYLOCAINE) 2 % viscous mucosal solution 5 mL, 5 mL, Mouth/Throat, 3x daily PRN, Mila Ortega MD  •  magnesium sulfate IVPB 2g in 50 mL NSS/D5W/SWFI, 2 g, intravenous, PRN, Ankita Culver CRNP, Stopped at 01/02/21 0837  •  methylPREDNISolone sod suc(PF) (Solu-MEDROL) injection 60 mg, 60 mg, intravenous, q8h INT, Ingrid Acevedo DO, 60 mg at 01/07/21 0529  •  metoprolol (LOPRESSOR) injection 2.5 mg, 2.5 mg, intravenous, q6h PRN, bAelino Medina MD, 2.5 mg at 01/07/21 0019  •  nystatin (MYCOSTATIN) 100,000 unit/gram topical powder, , Topical, BID, Ankita Culver CRNP, Given at 01/07/21 0850  •  nystatin (MYCOSTATIN) 100,000 unit/mL suspension 500,000 Units, 500,000 Units, Swish & Swallow, QID, Mila Ortega MD, 500,000 Units at 01/07/21 0840  •  potassium chloride (KLOR-CON) tablet extended release 20 mEq, 20 mEq, oral, PRN **OR** potassium chloride (KLOR-CON) tablet extended release 40 mEq, 40 mEq, oral, PRN **OR** potassium chloride 20 mEq in 100 mL IVPB  (premix), 20 mEq, intravenous, PRN **OR** potassium chloride (KCL) 40 mEq/250 mL IVPB in NSS 40 mEq, 40 mEq, intravenous, PRN, Mila Ortega MD, Stopped at 01/05/21 1713  •  renal multivitamin tablet 1 tablet, 1 tablet, oral, Daily, Ankita Culver CRNP, 1 tablet at 01/07/21 0840  •  sodium chloride flush 10 mL, 10 mL, intravenous, q8h INT, Mila Ortega MD, 10 mL at 01/07/21 0851  •  sodium chloride flush 10 mL, 10 mL, intravenous, PRN, Mila Ortega MD    ASSESSMENT:  Principal Problem:    Metabolic acidosis  Active Problems:    Severe sepsis (CMS/HCC)    Encephalopathy    Pneumobilia    Acute on chronic renal failure (CMS/HCC)    Hyperkalemia    Acute cystitis with hematuria    Hypoglycemia    COPD (chronic obstructive pulmonary disease) (CMS/HCC)    History of colon cancer    Type 2 MI (myocardial infarction) (CMS/HCC)    Hyponatremia    History of CVA (cerebrovascular accident)    Chronic atrial  fibrillation (CMS/HCC)    Septic shock (CMS/HCC)    Leukocytosis    Anemia    Lactic acidosis    Hematuria    Palliative care by specialist    SOB (shortness of breath)    FTT (failure to thrive) in adult    Pain    Debility      PLAN:  1. DANY superimposed on CKD - secondary to ATN in setting of septic shock - creatinine peaked at 6.4 - improved to 3.8 but now in the low 4's.   1. Diuretics increased to 80 mg IV BID yesterday  2. Avoid NSAIDs and other nephrotoxins    3. Poor prognosis. Age and comorbidities should well inform goals of care.   - pt not a suitable candidate for dialysis.   2. Hyponatremia - corrected - monitor with diuresis  3. Hyperkalemia - Resolved - will continue to monitor with diuresis as she may become hypokalemic  4. Uro-sepsis/Septic Shock - continues on Abx per ID;   5. Hematuria - urology is following - urine back to being pink tinged this am  6. Pulmonary Edema: c/w IV lasix IVP 80mg BID.     - continue to monitor I/O's. Goal UO 2-3L today. Check BMP daily.     Inderjit Sutherland MD

## 2021-01-07 NOTE — PLAN OF CARE
Nutrition Interventions/ Recommendations:   1. Continue Pureed Diet with Nectar Thick Liquids       - Adjust per SLP recommendations       - Change Boost Glucose Control to Boost Pudding        - Feeding assistance; 1:1 at meals       - Monitor/encourage PO intakes        - If consistent with desired level of care may need to consider enteral access for supplemental TF.  If in agreement please consult RD for TF recommendations.   2. Treat/trend lab/lytes      - Correct/replace prn  3. Daily weight; monitor I/O  4. Continue Renal MVI  5. Monitor BG while on steroids

## 2021-01-07 NOTE — PROGRESS NOTES
Patient: Nancy Minor  Location: [facility] 3A 3009  MRN: [id number]  Today's date: 1/7/2021   Pt left bedside chair with personal items in place and call bell within reach, posey alarm intact. Pt reports no further questions for OT at this time and all needs are met. RN notified      Alberta is a 89 y.o. female admitted on 12/28/2020 with Severe sepsis (CMS/HCC). Principal problem is Metabolic acidosis.    Past Medical History  Alberta has a past medical history of Atrial fibrillation (CMS/HCC), Chronic hypotension, Chronic kidney disease, Chronic kidney failure, Colon cancer (CMS/HCC), COPD (chronic obstructive pulmonary disease) (CMS/HCC), Low BP, Stroke (cerebrum) (CMS/HCC), and UTI (urinary tract infection).    History of Present Illness   Here with metabolic acidosis, no previous ST per chart review; from home    OT Vitals    Date/Time [facility]   01/07/21 1148 -- vitals obtained by OT this session MM      OT Pain    Date/Time Pain Type Pref Pain Scale Rating: Rest Rating: [facility]   01/07/21 1148 Pain Assessment;Post Activity word (verbal rating pain scale) 0 - no pain 0 - no pain MM          Prior Living Environment      Most Recent Value   Living Arrangements  house   Living Environment Comment  Pt unreliable historian,  per EMR lives in [facility] w/dtr [facility] and MICHEL[place] in [facility] w/5 GUILLERMO. They have purchased a home with 0 GUILLERMO and are preparing to move in late January.          Prior Level of Function      Most Recent Value   Dominant Hand  right   Ambulation  assistive equipment   Transferring  assistive equipment   Toileting  assistive person   Bathing  assistive person   Dressing  assistive person   Eating  assistive person   Communication  understands/communicates without difficulty   Swallowing  swallows foods/liquids without difficulty   Baseline Diet/Method of Nutritional Intake  regular solids, thin liquids   Past History of Dysphagia  none documented   Prior Level of Function Comment  --  [per chart review,pt amb 10-15' 1x/day w/RW at baseline]   Assistive Device/Animal Currently Used at Home  walker, front-wheeled          Occupational Profile      Most Recent Value   Reason for Services/Referral  ADL / amb dysfunction   Occupational History/Life Experiences  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  yes     General Observations of Patient  pt rec'd supine in bed, agreeable to participate in therapy     Existing Precautions/Restrictions  fall;oxygen therapy device and L/min     Limitations/Impairments  safety/cognitive        Cognition/Psychosocial    Affect/Mental Status (Cognitive)  flat/blunted affect     Orientation Status (Cognition)  oriented to;person     Follows Commands (Cognition)  follows one step commands;over 90% accuracy;verbal cues/prompting required     Cognitive Function (Cognitive)  executive function deficit;safety deficit     Executive Function Deficit (Cognition)  moderate deficit;insight/awareness of deficits;initiation;judgment     Memory Deficit (Cognitive)  moderate deficit;recall, biographical information     Safety Deficit (Cognitive)  moderate deficit;ability to follow commands;awareness of need for assistance;insight into deficits/self awareness;judgment;problem solving     Comment, Cognition  Pt overall cooperative and follows one step commands appropriately with reorientation provided        Bed Mobility    Real, Roll Left  maximum assist (25-49% patient effort)     Real, Supine to Sit  maximum assist (25-49% patient  effort)     Verbal Cues (Supine to Sit)  hand placement;safety;technique     Assistive Device (Bed Mobility)  bed rails;head of bed elevated     Comment (Bed Mobility)  Pt requires increased time and assistance to bring BLE to EOB and boost into EOB sitting 2/2 generalized weakness and fatigue        Transfers    Transfers  stand pivot transfer        Sit to Stand Transfer    [place], Sit to Stand Transfer  dependent (less than 25% patient effort)     Verbal Cues  safety;technique;preparatory posture     Assistive Device  --    HHA    Comment  Pt required depedent assistance to rise into stance 2/2 generalized weakness and fatigue        Stand to Sit Transfer    [place], Stand to Sit Transfer  maximum assist (25-49% patient effort)     Verbal Cues  safety;technique;hand placement     Assistive Device  --    HHA    Comment  Pt had improvements in stance to support self, required max assist with poor eccentric control        Stand Pivot Transfer    [place], Stand Pivot/Stand Step Transfer  maximum assist (25-49% patient effort)     Assistive Device  --    HHA    Comment  Pt had overall improvements in participating to support self upright for SPT to bedside chair        Safety Issues, Functional Mobility    Safety Issues Affecting Function (Mobility)  awareness of need for assistance;insight into deficits/self awareness;judgment;problem solving;safety precaution awareness     Impairments Affecting Function (Mobility)  balance;endurance/activity tolerance;postural/trunk control;strength     Cognitive Impairments, Mobility Safety/Performance  awareness, need for assistance;insight into deficits/self awareness     Comment, Safety Issues/Impairments (Mobility)  Pt has dec endurance/activity tolerance and generalized weakness, requires dependent to max A to SPT to bedside chair. Pt tolerated stable O2 on 2L this session        Balance    Balance Assessment  sitting static balance;sitting dynamic balance;sit  to stand dynamic balance;standing static balance;standing dynamic balance     Static Sitting Balance  mild impairment     Dynamic Sitting Balance  moderate impairment     Sit to Stand Dynamic Balance  severe impairment     Static Standing Balance  severe impairment     Dynamic Standing Balance  severe impairment     Comment, Balance  dep to max A to support self in stance 2/2 generalized weakness and fatigue        Lower Body Dressing    [place] Assistance  dons/doffs left sock;dons/doffs right sock     [place]  maximum assist (25-49% patient effort)     Position  sitting up in bed     Adaptive Equipment  none     Comment  Pt able to initiate lifting legs to assist with task, overall max A to complete task 2/2 weakness        Grooming    Self-Performance  brushes/elizabeth hair     [place]  maximum assist (25-49% patient effort)     Position  sitting up in bed     Adaptive Equipment  none     Comment  Pt required physical assistance to place comb into hand, pt able to maintain grasp and assist with stroking hair with increased assistance to support at elbow to raise arm to fxl reach head        BADL Safety/Performance    Impairments, BADL Safety/Performance  balance;endurance/activity tolerance;strength;trunk/postural control     Cognitive Impairments, BADL Safety/Performance  awareness, need for assistance;insight into deficits/self awareness;judgment;problem solving/reasoning;safety precaution awareness     Skilled BADL Treatment/Intervention  BADL process/adaptation training     Progress in BADL Status  improvement noted        AM-PAC (TM) - ADL (Current Function)    Putting on and taking off regular lower body clothing?  1 - Total     Bathing?  1 - Total     Toileting?  1 - Total     Putting on/taking off regular upper body clothing?  2 - A Lot     How much help for taking care of personal grooming?  2 - A Lot     Eating meals?  3 - A Little     AM-PAC (TM) ADL Score  10        Therapy Assessment/Plan (OT)     Rehab Potential (OT)  good, to achieve stated therapy goals     Therapy Frequency (OT)  2-3 times/wk        Progress Summary (OT)    Daily Outcome Statement (OT)  Pt seen for OT session. Pt requires max A for bed mobility, dep to max A for fxl transfers, max A SPT to bedside chair, dep LBD and max A grooming. Pt appears to be limited by dec balance, dec endurance/activity tolerance, generalized weakness and fatigue. Will continue to follow for OT to address POC. Rec SNF upon d/c to increase indep with adls/fxl transfers.     Symptoms Noted During/After Treatment  fatigue        Therapy Plan Review/Discharge Plan (OT)    OT Recommended Discharge Disposition  skilled nursing facility     Anticipated Equipment Needs At Discharge (OT)  --    TBD at next level of care                  Education provided this session. See the Patient Education summary report for full details.         OT Goals      Most Recent Value   Bed Mobility Goal 1   Activity/Assistive Device  bed mobility activities, all at 01/04/2021 1133   [place]  minimum assist (75% or more patient effort) at 01/04/2021 1133   Time Frame  by discharge at 01/04/2021 1133   Progress/Outcome  goal ongoing at 01/07/2021 1147   Transfer Goal 1   Activity/Assistive Device  all transfers at 01/04/2021 1133   [place]  moderate assist (50-74% patient effort) at 01/04/2021 1133   Time Frame  by discharge at 01/04/2021 1133   Progress/Outcome  goal ongoing at 01/07/2021 1147   Bathing Goal 1   Activity/Assistive Device  bathing skills, all at 01/04/2021 1133   [place]  moderate assist (50-74% patient effort) at 01/04/2021 1133   Time Frame  by discharge at 01/04/2021 1133   Progress/Outcome  goal ongoing at 01/07/2021 1147   Dressing Goal 1   Activity/Adaptive Equipment  dressing skills, all at 01/04/2021 1133   [place]  minimum assist (75% or more patient effort) at 01/04/2021 1133   Time Frame  by discharge at 01/04/2021 1133   Progress/Outcome   goal ongoing at 01/07/2021 1147   Toileting Goal 1   Activity/Assistive Device  toileting skills, all at 01/04/2021 1133   [place]  minimum assist (75% or more patient effort) at 01/04/2021 1133   Time Frame  by discharge at 01/04/2021 1133   Progress/Outcome  goal ongoing at 01/07/2021 1147

## 2021-01-07 NOTE — PROGRESS NOTES
"Patient: Nancy Minor  Location: [facility] 3A 3009  MRN: [id number]  Today's date: 1/7/2021  Speech Pathology: Therapy session    SLP Diagnosis: Pt w/ ongoing confusion and oral pharyngeal dysphagia    Recommendations:  1. Cont w/ puree and nectars. Pt may have ice chips 1@ time, regular ice cream or water ice in small bites (pt seems to prefer less than 1/2 tsp bites)  2.  3.      Summary/Handoff:  Daily Outcome Statement (SLP): Pt seen for b/s swallow tx followup. Pt awake, initially interested in eating/drinking but loses interest quickly. Pt takes less than 25% of a sherbert and approx 2-3 oz of fluid and says \"that's enough\". Pt tolerated ice and sherbert but demonstrates impulsivity w/ thins by cup and incd WOB. Pt conts to tolerate nectars by cup. Cont w/ currnet diet, allow single ice chips 1@ time, regular ice cream and water ice in small amounts (pt seems to prefer less than 1/2 tsp). SLP to follow and advance as tolerated.       Session Notes:            Dietary Orders   (From admission, onward)             Start     Ordered    01/04/21 1042  Dietary nutrition supplements  Once     Question Answer Comment   Select Supplement (PH): Magic Cup Wildberry    Meal period Lunch Dinner        01/04/21 1041    01/04/21 1041  Dietary nutrition supplements  Once     Question Answer Comment   Select Supplement (PH): Boost Glucose Control Chocolate    Meal period Breakfast        01/04/21 1041    01/04/21 1040  Adult Diet Pureed Texture; Nectar Thick Liquids; RD/LDN may adjust order  Diet effective now     Comments: Family tells us the patient likes soup, and yogurt  Nectars by Teaspoon   Question Answer Comment   Diet Texture Pureed Texture    Fluid Consistency: Nectar Thick Liquids    Delegation of Authority. Diet orders written by PA/Clark may not be adjusted by RD/LDNs. RD/LDN may adjust order        01/04/21 1041                Results from last 7 days   Lab Units 01/07/21  0344 01/06/21  0411 " 01/05/21  0501   WBC K/uL 20.74* 17.71* 17.81*   HEMOGLOBIN g/dL 8.1* 7.8* 7.7*   HEMATOCRIT % 25.4* 25.0* 24.7*   PLATELETS K/uL 257 234 200          Patient left with call bell in reach and alarms as found.      Alberta is a 89 y.o. female admitted on 12/28/2020 with Severe sepsis (CMS/HCC). Principal problem is Metabolic acidosis.    Past Medical History  Alberta has a past medical history of Atrial fibrillation (CMS/HCC), Chronic hypotension, Chronic kidney disease, Chronic kidney failure, Colon cancer (CMS/HCC), COPD (chronic obstructive pulmonary disease) (CMS/HCC), Low BP, Stroke (cerebrum) (CMS/HCC), and UTI (urinary tract infection).    History of Present Illness   Here with metabolic acidosis, no previous ST per chart review; from home    SLP Pain    Date/Time Pain Type Pref Pain Scale Rating: [facility]   01/07/21 1001 Pain Assessment word (verbal rating pain scale) 0 - no pain EVM          Prior Living Environment      Most Recent Value   Living Arrangements  house   Living Environment Comment  Pt unreliable historian,  per EMR lives in [facility] w/dtr [facility] and [place] in [facility] w/5 GUILLERMO. They have purchased a home with 0 GUILLERMO and are preparing to move in late January.          Prior Level of Function      Most Recent Value   Dominant Hand  right   Ambulation  assistive equipment   Transferring  assistive equipment   Toileting  assistive person   Bathing  assistive person   Dressing  assistive person   Eating  assistive person   Communication  understands/communicates without difficulty   Swallowing  swallows foods/liquids without difficulty   Baseline Diet/Method of Nutritional Intake  regular solids, thin liquids   Past History of Dysphagia  none documented   Prior Level of Function Comment  -- [per chart review,pt amb 10-15' 1x/day w/RW at baseline]   Assistive Device/Animal Currently Used at Home  walker, front-wheeled          SLP Evaluation and Treatment - 01/07/21 1001        Time Calculation    Start Time   1001     Stop Time  1016     Time Calculation (min)  15 min        Session Details    Document Type  daily treatment/progress note     Mode of Treatment  individual therapy;speech language pathology        General Information    Patient Profile Reviewed?  yes     General Observations of Patient  in bed, Ox2     Existing Precautions/Restrictions  aspiration;fall;modified diet     Limitations/Impairments  safety/cognitive;swallowing        Functional Communication Measures    FCM: Swallowing  4-->Level 4        General Swallowing Observations    Current Diet/Method of Nutritional Intake (General Swallowing Observations, NIS)  nectar thick liquids;pureed solids     Signs/Symptoms of Aspiration (Current Diet)  none        Food and Liquid Trials (NIS)    Patient Positioning  head of bed elevated (specify degrees)     Comment, Thin Liquids  trials w/ sherbert and single ice chips w/o s/s; pt w/ cups sips and does  not follow directions for slow rate or smallsip, tries to put her face in cup and drink continuosly w/ some incd WOB but denies SOB.      Comment, Nectar Thick Liquids  no s/s        Swallowing Intervention    Dysphagia/Swallowing Interventions  monitor tolerance of;current diet without evidence of aspiration;advanced diet/liquid texture trials        AM-PAC (TM) - Cognition (Current Function)    Following/understanding a 10-15 minute speech or presentation?  2 - A lot     Understanding familiar people during ordinary conversations?  3 - A little     Remembering to take medications at the appropriate time?  1 - Unable     Remembering where things were placed or put away?  2 - A lot     Remembering a list of 3 or 4 errands without writing it down?  1 - Unable     Taking care of complicated tasks?  1 - Unable     AM-PAC (TM) Cognition Score  10        Therapy Assessment/Plan (SLP)    SLP Diagnosis  Pt w/ ongoing confusion and oral pharyngeal dysphagia     Rehab Potential (SLP Eval)  fair, will monitor progress  "closely     Therapy Frequency (SLP)  3 times/wk     Criteria for Skilled Therapeutic Interventions Met (SLP Eval)  skilled criteria for speech language intervention met     Problem List (SLP)  swallowing, cognition     Functional Level at Time of Evaluation (SLP)  awake, confused at times     Planned Therapy Interventions (SLP)  po trials, education        Daily Progress Summary (SLP)    Daily Outcome Statement (SLP)  Pt seen for b/s swallow tx followup. Pt awake, initially interested in eating/drinking but loses interest quickly. Pt takes less than 25% of a sherbert and approx 2-3 oz of fluid and says \"that's enough\". Pt tolerated ice and sherbert but demonstrates impulsivity w/ thins by cup and incd WOB. Pt conts to tolerate nectars by cup. Cont w/ currnet diet, allow single ice chips 1@ time, regular ice cream and water ice in small amounts (pt seems to prefer less than 1/2 tsp). SLP to follow and advance as tolerated.         Therapy Plan Review/Discharge Plan (SLP)    Therapy Plan Review (SLP)  care plan/treatment goals reviewed;participants included;patient                  Education provided this session. See the Patient Education summary report for full details.    SLP Goals      Most Recent Value   Pharyngeal Swallow Goal 1   Activity  safely tolerate, recommended diet/liquid level, with no clinical signs/symptoms of aspiration at 01/02/2021 0950   [place]  independently at 01/02/2021 0950   Time Frame  long-term goal (LTG), by discharge at 01/02/2021 0950   Progress/Outcome  goal ongoing at 01/07/2021 1001        "

## 2021-01-07 NOTE — PROGRESS NOTES
Infectious Disease Progress Note    Patient Name: Nancy Minor  MR#: [id number]  : 10/6/1923  Admission Date: 2020  Date: 21   Time: 9:37 AM   Author: Barrett Stern MD    Major Events: none    Antibiotics:    Anti-infectives (From admission, onward)    Start     Dose/Rate Route Frequency Ordered Stop    21 1400  nystatin (MYCOSTATIN) 100,000 unit/mL suspension 500,000 Units      500,000 Units Swish & Swallow 4 times daily 21 1049      21 1030  cefUROXime (CEFTIN) tablet 250 mg      250 mg oral Daily 21 0931      20  nystatin (MYCOSTATIN) 100,000 unit/gram topical powder       Topical 2 times daily 20            Subjective     Review of Systems  Remained afebrile overnight without acute events overnight    Objective     Vital Signs:    Patient Vitals for the past 72 hrs:   BP Temp Temp src Pulse Resp SpO2 Weight   21 0747 -- -- -- 88 -- -- --   21 0714 112/64 36.4 °C (97.6 °F) -- (!) 102 18 93 % --   21 0553 -- -- -- -- -- -- 52.2 kg (115 lb)   21 0322 (!) 110/55 36.7 °C (98 °F) Axillary 97 18 92 % --   21 0036 -- -- -- (!) 102 -- -- --   21 0019 -- -- -- (!) 128 -- -- --   21 0007 114/69 36.7 °C (98 °F) Axillary (!) 118 18 95 % --   21 1931 138/68 36.2 °C (97.1 °F) Axillary (!) 104 18 95 % --   21 1926 -- -- -- 88 -- -- --   21 1845 114/62 36.6 °C (97.8 °F) Axillary 99 18 98 % --   21 1800 114/60 -- -- 100 18 93 % --   21 1600 109/60 36.5 °C (97.7 °F) Axillary 98 18 97 % --   21 1400 111/61 -- -- (!) 109 18 98 % --   21 1200 111/71 36.4 °C (97.5 °F) Rectal (!) 115 18 92 % --   21 1129 123/71 -- -- (!) 114 -- 99 % --   21 1115 116/77 -- -- (!) 135 -- -- --   21 1110 (!) 116/59 -- -- (!) 127 -- 100 % --   21 1000 (!) 112/57 -- -- (!) 116 18 92 % --   21 0800 116/76 36.3 °C (97.4 °F) Rectal (!) 110 18 97 % --   21 0600 123/60 36.3 °C  (97.3 °F) Rectal (!) 104 20 97 % 52.3 kg (115 lb 3.2 oz)   01/06/21 0528 -- -- -- (!) 130 -- -- --   01/06/21 0410 116/74 36.1 °C (97 °F) Axillary (!) 116 20 93 % --   01/06/21 0215 -- (!) 35.9 °C (96.6 °F) Axillary -- -- -- --   01/06/21 0200 120/70 (!) 35.7 °C (96.2 °F) Rectal (!) 109 20 99 % --   01/06/21 0021 -- -- -- (!) 120 -- -- --   01/06/21 0020 -- (!) 35.7 °C (96.3 °F) Rectal (!) 105 -- 96 % --   01/06/21 0000 (!) 118/58 36.3 °C (97.3 °F) Axillary (!) 103 20 95 % --   01/05/21 2219 -- -- -- (!) 130 -- -- --   01/05/21 2200 122/67 -- -- (!) 111 (!) 22 96 % --   01/05/21 2144 -- (!) 35.8 °C (96.4 °F) Rectal -- -- -- --   01/05/21 2015 -- (!) 35.4 °C (95.8 °F) Rectal -- -- -- --   01/05/21 2000 124/78 (!) 35.7 °C (96.3 °F) Axillary (!) 124 20 97 % --   01/05/21 1920 -- -- -- (!) 110 -- -- --   01/05/21 1800 123/66 -- -- (!) 112 20 93 % --   01/05/21 1600 112/74 (!) 35.8 °C (96.4 °F) Axillary (!) 128 20 96 % --   01/05/21 1400 116/69 -- -- (!) 107 18 93 % --   01/05/21 1200 114/61 36.1 °C (97 °F) Oral (!) 122 18 95 % --   01/05/21 1000 101/65 -- -- (!) 124 20 93 % --   01/05/21 0845 128/66 -- -- (!) 130 -- 93 % --   01/05/21 0836 (!) 88/61 -- -- (!) 120 -- 96 % --   01/05/21 0800 (!) 83/62 -- -- (!) 117 18 98 % --   01/05/21 0600 (!) 96/58 -- -- (!) 118 20 (!) 84 % --   01/05/21 0400 101/62 (!) 35.9 °C (96.6 °F) Oral (!) 114 18 98 % --   01/05/21 0200 (!) 94/52 -- -- (!) 114 20 96 % --   01/05/21 0000 (!) 109/58 36.2 °C (97.1 °F) Oral (!) 114 20 (!) 84 % --   01/04/21 2200 120/66 -- -- (!) 119 18 97 % --   01/04/21 2000 108/69 -- -- 99 20 93 % --   01/04/21 1800 102/63 -- -- (!) 105 18 93 % --   01/04/21 1600 (!) 95/54 36.4 °C (97.6 °F) Oral 100 20 95 % --   01/04/21 1500 -- -- -- 97 20 95 % --   01/04/21 1313 109/61 -- -- 97 20 93 % --   01/04/21 1213 122/62 -- -- (!) 111 20 97 % --   01/04/21 1202 -- 36.4 °C (97.5 °F) Oral (!) 111 20 96 % --   01/04/21 1145 119/69 -- -- (!) 121 -- 98 % --   01/04/21 1140  106/72 -- -- (!) 120 -- -- --   21 1133 (!) 89/57 -- -- (!) 120 -- 94 % --   21 1113 (!) 99/54 -- -- (!) 116 20 95 % --   21 1013 (!) 104/58 -- -- (!) 107 20 96 % --   21 1000 -- -- -- (!) 118 20 96 % --       Temp (72hrs), Av.2 °C (97.1 °F), Min:35.4 °C (95.8 °F), Max:36.7 °C (98 °F)      Physical Exam:  General appearance: NAD  Head: NCAT  Lungs: wheezing bilateral  Heart: regular rate and rhythm  Abdomen: soft, non-tender  Extremities: edema none  Skin: no rashes  Neurologic: grossly normal    Lines, Drains, Airways, Wounds:  Midline Catheter 21 (Active)   Number of days: 4       Peripheral IV 20 Anterior;Left;Upper Arm (Active)   Number of days: 10       Urethral Catheter 16 Fr (Active)   Number of days: 10       Rash 20 0001 Right lateral hip patch (Active)   Number of days: 9       Wound Other (comment) Buttock (Active)   Number of days: 9       Labs:    CBC Results       21                    0344 0411 0501         WBC 20.74 17.71 17.81         RBC 3.05 3.02 2.96         HGB 8.1 7.8 7.7         HCT 25.4 25.0 24.7         MCV 83.3 82.8 83.4         MCH 26.6 25.8 26.0         MCHC 31.9 31.2 31.2          234 200                   BMP Results       21                    0344 0411 0501          139 133         K 3.5 3.8 3.4         Cl 104 104 103         CO2 19 19 15         Glucose 134 116 118          112 106         Creatinine 4.4 4.3 4.3         Calcium 8.7 8.9 8.4         Anion Gap 17 16 15         EGFR 9.3 9.6 9.6         Comment for BUN at 0344 on 21: Checked by dilution      Comment for BUN at 0411 on 21: Checked by dilution    Comment for BUN at 0501 on 21: Consistent with previous results  Quantity not sufficient for repeat        PT/PTT Results       21                    0434 2311 1614         PTT 65 76 68         Comment for PTT at 0434 on  01/02/21: The Standard Therapeutic Range for Heparin is 68 to 101 seconds.    Comment for PTT at 2311 on 01/01/21: The Standard Therapeutic Range for Heparin is 68 to 101 seconds.    Comment for PTT at 1614 on 01/01/21: Result rechecked    The Standard Therapeutic Range for Heparin is 68 to 101 seconds.      UA Results       12/28/20                          1627           Color Red           Clarity Turbid           Glucose Proper identification not possible due to color interference.           Bilirubin Proper identification not possible due to color interference.           Ketones Proper identification not possible due to color interference.           Sp Grav 1.017           Blood +3           Ph Proper identification not possible due to color interference.           Protein Proper identification not possible due to color interference.           Urobilinogen Proper identification not possible due to color interference.           Nitrite Proper identification not possible due to color interference.           Leuk Est +3           WBC Too Numerous To Count           RBC Too Numerous To Count           Bacteria +3           Comment for Blood at 1627 on 12/28/20: The sensitivity of the occult blood test is equivalent to approximately 4 intact RBC/HPF.      Lactate Results       12/30/20 12/29/20 12/29/20                    1205 0924 0524         Lactate 1.3 4.1 6.5                       Microbiology Results     Procedure Component Value Units Date/Time    SARS-CoV-2 (COVID-19), PCR Nasopharynx [[id number]]  (Normal) Collected: 01/04/21 1625    Specimen: Nasopharyngeal Swab from Nasopharynx Updated: 01/05/21 1043    Narrative:      The following orders were created for panel order SARS-CoV-2 (COVID-19), PCR Nasopharynx.  Procedure                               Abnormality         Status                     ---------                               -----------         ------                     SARS-CoV-2 (COVID-19),  P...[[id number]]  Normal              Final result                 Please view results for these tests on the individual orders.    SARS-CoV-2 (COVID-19), PCR Nasopharynx [[id number]]  (Normal) Collected: 01/04/21 1625    Specimen: Nasopharyngeal Swab from Nasopharynx Updated: 01/05/21 1043     SARS-CoV-2 (COVID-19) Negative    SARS-CoV-2 (COVID-19), PCR Nasopharynx [[id number]]  (Normal) Collected: 12/28/20 1840    Specimen: Nasopharyngeal Swab from Nasopharynx Updated: 12/28/20 2018    Narrative:      The following orders were created for panel order SARS-CoV-2 (COVID-19), PCR Nasopharynx.  Procedure                               Abnormality         Status                     ---------                               -----------         ------                     SARS-CoV-2 (COVID-19), P...[[id number]]  Normal              Final result                 Please view results for these tests on the individual orders.    SARS-CoV-2 (COVID-19), PCR Nasopharynx [[id number]]  (Normal) Collected: 12/28/20 1840    Specimen: Nasopharyngeal Swab from Nasopharynx Updated: 12/28/20 2018     SARS-CoV-2 (COVID-19) Negative    Narrative:      Nursing instructions: Obtain nasopharyngeal swab ONLY.  Send swab in viral transport media.    Blood Culture Blood, Venous [[id number]]  (Abnormal) Collected: 12/28/20 1648    Specimen: Blood, Venous Updated: 01/03/21 1409     Culture **Positive Culture**      Klebsiella pneumoniae ssp pneumoniae     Gram Stain Result Gram negative bacilli    Urine culture Urine, Clean Catch [[id number]]  (Abnormal)  (Susceptibility) Collected: 12/28/20 1627    Specimen: Urine, Clean Catch Updated: 12/31/20 0830     Urine Culture **Positive Culture**      >1 x 10^5 CFU/mL Klebsiella pneumoniae ssp pneumoniae    Blood Culture Blood, Venous [[id number]]  (Abnormal)  (Susceptibility) Collected: 12/28/20 1603    Specimen: Blood, Venous Updated: 01/03/21 1408     Culture **Positive Culture**      Klebsiella pneumoniae ssp  pneumoniae     Gram Stain Result Gram negative bacilli    Blood Culture PCR Panel Blood, Venous [[id number]]  (Abnormal) Collected: 12/28/20 1603    Specimen: Blood, Venous Updated: 12/29/20 1309     Acinetobacter calcoaceticus-baumannii Not Detected     Candida albicans Not Detected     Candida auris Not Detected     Candida glabrata Not Detected     Candida krusei Not Detected     Candida parapsilosis Not Detected     Candida tropicalis Not Detected     Cryptococcus neoformans/gattii Not Detected     Enterococcus faecalis Not Detected     Enterococcus faecium Not Detected     Enterobacter cloacae complex Not Detected     Escherichia coli Not Detected     Klebsiella oxytoca Not Detected     Klebsiella pneumoniae Detected     Klebsiella aerogenes Not Detected     Proteus Not Detected     Salmonella species Not Detected     Serratia marcescens Not Detected     Haemophilus influenzae Not Detected     Listeria monocytogenes Not Detected     Neiseria meningitidis Not Detected     Pseudomonas aeruginosa Not Detected     Stenotrophomonas maltophilia Not Detected     Bacteroides fragilis Not Detected     Staph (not aureus) Not Detected     Staphylococcus aureus Not Detected     Staphylococcus ludgnensis Not Detected     Staphylococcus epidermidis Not Detected     Streptococcus Not Detected     Streptococcus agalactiae (Group B) Not Detected     Streptococcus pneumoniae Not Detected     Streptococcus pyogenes (Group A) Not Detected     KPC (Carbapenem Resistance Gene) Not Detected     Damon/B (Vancomycin Resistance Gene) Not Applicable     mecA/C Not Applicable     mecA/C and MREJ (MRSA) Not Applicable     CTX-M (ESBL) Not Detected     IMP Not Detected     mcr-1 Not Detected     NDM Not Detected     OXA-48-like Not Detected     VIM Not Detected     Comment: See below    Narrative:      This positive blood culture was tested with a rapid molecular panel that detects Enterococcus faecalis, Enterococcus faecium, Listeria  monocytogenes, Staphylococcus species, Staphylococcus aureus, Staphylococcus lugdunensis, Staphylococcus epidermidis, Streptococcus agalactiae (Group B), Streptococcus pneumonia, Streptococcus pyogenes (Group A), Acinetobacter calcoaceticus-baumannii complex, Bacteroides fragilis, Haemophilus influenza, Neisseria meningitides, Pseudomonas aeruginosa, Stenotrophomonas maltophilia , Salmonella spp.,  Enterobacter cloacae complex, Escherichia coli, Klebsiella oxytoca, Klebsiella pneumoniae group, Klebsiella aerogenes , Proteus species, Serratia marcescens, Candida albicans, Candida auris, Candida glabrata, Candida krusei, Cryptococcus neoformans/gattii, Candida parapsilosis, and Candida tropicalis.          Pathology Results     ** No results found for the last 720 hours. **          Echo:     Cardiac Imaging   TRANSTHORACIC ECHO (TTE) COMPLETE 12/30/2020    Narrative · Mitral valve thickening. Severe mitral annular calcification.  · Severe mitral valve stenosis.  · Mean gradient = 10.00 mmHg. HR = 110 bpm.  · Hyperdynamic LV systolic function. Estimated EF >75%.  · Mildly dilated LA.  · Tricuspid valve structure is grossly normal. Mild tricuspid valve   regurgitation.  · Aortic valve structure is normal. Mild aortic valve regurgitation. No   aortic valve stenosis. A bioprosthetic aortic valve is present.  · Moderately dilated RA.  · Moderately dilated RV. Mildly reduced RV systolic function.  · Mild mitral valve regurgitation.     Estimated PASP= 51mmHg by TR jet method.         Imaging:    Radiology Imaging   XR CHEST 1 VW    Narrative CLINICAL HISTORY: Hypoxia.  Wheezing.    COMMENT:  A portable upright AP view of the chest was performed.    Comparison: 12/31/2020    Cardiac monitoring leads obscure portions of the underlying lungs.  There is  pleural-parenchymal disease at the left much greater than right lung bases which  appears progressed compared to the prior.  In addition, there is interstitial  prominence and  perihilar airspace disease, left greater than right, which is  progressed compared to the prior.  There is also increased airspace disease in  the left greater than right lung bases.  A more focal area of airspace disease  is also present in the right upper lobe.  The heart is enlarged.  Aortic  atherosclerosis is present.  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  Degenerative changes are present in the regional osseous structures and the  bones are diffusely demineralized.      Impression IMPRESSION:  Progression of pulmonary edema and pleural effusions, left greater than right,  with new/increasing perihilar, lower lobe and right upper lobe airspace disease.       Assessment     1. Klebsiella bacteremia due to UTI      Plan      1. Continue cefuroxime for 3 more days to complete 14 day course.

## 2021-01-07 NOTE — PLAN OF CARE
Problem: Adult Inpatient Plan of Care  Goal: Plan of Care Review  Outcome: Progressing  Flowsheets (Taken 1/7/2021 1503)  Progress: improving  Plan of Care Reviewed With: patient  Outcome Summary: PT session completed.  Incr'd asst to EOB and trxf to chair.  Max to Dep level of asst for essential mobility.  Pt non-ambulatory as of this session.  Cont per PT POC.  [facility] mob: 8.

## 2021-01-07 NOTE — CONSULTS
Nutrition Note           Clinical Course: Patient is a 89 y.o. female who was admitted on 12/28/2020 with a diagnosis of Hyperkalemia [E87.5]  Acute cystitis with hematuria [N30.01]  Severe sepsis (CMS/HCC) [A41.9, R65.20]  Acute renal failure, unspecified acute renal failure type (CMS/[facility]) [N17.9].   Past Medical History:   Diagnosis Date   • Atrial fibrillation (CMS/[facility])    • Chronic hypotension    • Chronic kidney disease    • Chronic kidney failure    • Colon cancer (CMS/HCC)    • COPD (chronic obstructive pulmonary disease) (CMS/HCC)    • Low BP    • Stroke (cerebrum) (CMS/[facility])    • UTI (urinary tract infection)      Past Surgical History:   Procedure Laterality Date   • CARDIAC SURGERY     • CENTRAL LINE  12/29/2020        • COLON SURGERY     • HIP SURGERY     • JOINT REPLACEMENT     • KNEE SURGERY         Nutrition Interventions/ Recommendations:   1. Continue Pureed Diet with Nectar Thick Liquids       - Adjust per SLP recommendations       - Change Boost Glucose Control to Boost Pudding        - Feeding assistance; 1:1 at meals       - Monitor/encourage PO intakes        - If consistent with desired level of care may need to consider enteral access for supplemental TF.  If in agreement please consult RD for TF recommendations.   2. Treat/trend lab/lytes      - Correct/replace prn  3. Daily weight; monitor I/O  4. Continue Renal MVI  5. Monitor BG while on steroids           Nutrition Status Classification: Moderate nutritional compromise       Dietary Orders   (From admission, onward)             Start     Ordered    01/04/21 1042  Dietary nutrition supplements  Once     Question Answer Comment   Select Supplement (PH): Magic Cup Wildberry    Meal period Lunch Dinner        01/04/21 1041    01/04/21 1041  Dietary nutrition supplements  Once     Question Answer Comment   Select Supplement (PH): Boost Glucose Control Chocolate    Meal period Breakfast        01/04/21 1041    01/04/21 1040  Adult Diet Pureed  Texture; Nectar Thick Liquids; RD/LDN may adjust order  Diet effective now     Comments: Family tells us the patient likes soup, and yogurt  Nectars by [employer]   Question Answer Comment   Diet Texture Pureed Texture    Fluid Consistency: Nectar Thick Liquids    Delegation of Authority. Diet orders written by PA/Clark may not be adjusted by RD/LDNs. RD/LDN may adjust order        01/04/21 1041                    Reason for Assessment  Reason For Assessment: physician consult, per organizational policy(Consulted to speak with daughter regarding meal preferences)  Diagnosis: renal disease    [facility] Nutrition Screen Tool  Has patient lost weight without trying?: 0-->No  If yes,how much weight has been lost?: 0-->Patient has not lost weight  Has patient been eating poorly due to decreased appetite?: 0-->No  [facility] Nutrition Screen Score: 0    Nutrition/Diet History  Typical Food/Fluid Intake: good   Diet Prior to Admission: regular  Appetite Prior to Admission: Good-50-75%  Intake (%): 25%  Meal/Snack Patterns: 3 meals daily, but smaller portions  Supplemental Drinks/Foods/Additives: no  Functional Status: ambulatory(daughter cooks/shops +  home health aid)  Food Allergies: (NKFA)  Factors Affecting Nutritional Intake: difficulty/impaired swallowing    Physical Findings  Overall Physical Appearance: (frail-elderly appearing)  Gastrointestinal: (WNL)  Last Bowel Movement: 01/06/21  Skin: edema, pressure injury(stage II buttock )    Last Bowel Movement: 01/06/21    Nutrition Order  Nutrition Order Review: meets nutritional requirements  Nutrition Order Comments: Pureed Diet with Nectar Thick Liquids; Magic Cup daily    Anthropometrics  Height: 152.4 cm (5')    Weights (last 7 days)     Date/Time   Weight    01/07/21 0553   52.2 kg (115 lb)    01/06/21 0600   52.3 kg (115 lb 3.2 oz)    01/03/21 0600   56.8 kg (125 lb 4.8 oz)    01/02/21 0434   59.5 kg (131 lb 1.6 oz)    01/01/21 0433   57.3 kg (126 lb 4.8 oz)    12/31/20 0548    55.4 kg (122 lb 1.6 oz)                   Current Weight  Weight Method: Bed scale  Weight: 52.2 kg (115 lb)    Ideal Body Weight (IBW)  Ideal Body Weight (IBW) (kg): 45.86  % Ideal Body Weight: 114.73         Body Mass Index (BMI)  BMI (Calculated): 22.5  BMI (kg/m2): 22.7  BMI Assessment: BMI 18.5-24.9: normal      Labs/Procedures/Meds  Lab Results Reviewed: reviewed, pertinent      Results from last 7 days   Lab Units 01/07/21 0344 01/06/21 0411 01/05/21  0501   SODIUM mEQ/L 140 139 133*   POTASSIUM mEQ/L 3.5* 3.8 3.4*   CHLORIDE mEQ/L 104 104 103   CO2 mEQ/L 19* 19* 15*   BUN mg/dL 115* 112* 106*   CREATININE mg/dL 4.4* 4.3* 4.3*   GLUCOSE mg/dL 134* 116* 118*   CALCIUM mg/dL 8.7* 8.9 8.4*      Results from last 7 days   Lab Units 01/07/21 0344 01/06/21 0411 01/05/21  0501   ALK PHOS IU/L 68 74 71   BILIRUBIN TOTAL mg/dL 0.7 0.8 0.6   ALBUMIN g/dL 2.3* 2.3* 2.2*   ALT IU/L 26 25 24   AST IU/L 17 14* 17          No results found for: HGBA1C  Lab Results   Component Value Date    AHFYRYHB88 >1,500 (H) 01/03/2021     Lab Results   Component Value Date    CALCIUM 8.7 (L) 01/07/2021     Results from last 7 days   Lab Units 01/07/21 0344 01/06/21 0411 01/05/21  0501   WBC K/uL 20.74* 17.71* 17.81*   HEMOGLOBIN g/dL 8.1* 7.8* 7.7*   HEMATOCRIT % 25.4* 25.0* 24.7*   PLATELETS K/uL 257 234 200      Results from last 7 days   Lab Units 01/03/21  0336   IRON ug/dL 17*   TIBC ug/dL 336          Results from last 7 days   Lab Units 01/07/21  0344 01/06/21 0411 01/05/21  0501   MAGNESIUM mg/dL 2.2 2.2 2.3         Ref. Range 1/6/2021 07:25 1/6/2021 11:02 1/6/2021 17:31 1/6/2021 22:02 1/7/2021 07:20   POCT Bedside Glucose Latest Ref Range: 70 - 99 mg/dL 117 (H) 112 (H) 131 (H) 150 (H) 140 (H)         Diagnostic Tests/Procedures  Diagnostic Test/Procedure Reviewed: reviewed, pertinent    Medications  Pertinent Medications Reviewed: reviewed, pertinent  • atorvastatin  10 mg oral Daily (6p)   • budesonide  0.5 mg  nebulization BID (6a, 6p)   • cefUROXime  250 mg oral Daily   • ferric gluconate (FERRLECIT) IVPB  125 mg intravenous Daily   • furosemide  80 mg intravenous q12h JESSICA   • heparin (porcine)  5,000 Units subcutaneous q8h JESSICA   • insulin aspart U-100  3-11 Units subcutaneous With meals & nightly   • ipratropium-albuteroL  3 mL nebulization q6H JESSICA   • levothyroxine  50 mcg oral Daily (6:30a)   • methylPREDNISolone sodium succinate  60 mg intravenous q8h INT   • nystatin   Topical BID   • nystatin  500,000 Units Swish & Swallow QID   • renal multivitamin  1 tablet oral Daily   • sodium chloride  10 mL intravenous q8h INT         Estimated/Assessed Needs  Additional Documentation: Calorie Requirements (Group), Protein Requirements (Group), Fluid Requirements (Group)    Calorie Requirements  Estimated kCal Needs: Actual Body Weight(46kg)  Estimated Calorie Need Method: kcal/kg  Calorie/kg Recommended: 30-35  Calorie Recommendations: 1444-7969      Protein Requirements  Recommended Dosing Weight (Estimated Protein Needs): Actual Body Weight  Est Protein Requirement Amount (gms/kg): (.6-.8)  Protein Recommendations: 28-37    Fluid Requirements  Fluid Recommendation (mL): 25-30ml  Recommended Fluid Needs Dosing Weight: Actual Body Weight  Fluid Requirements (mL/day): (1660-2881)        PES  Statement: PES Statement  Nutrition Diagnosis: Inadequate Oral Intake  Related To:: (age/illness/modified diet)  As Evidenced By:: 25% PO intakes  Nutritional Needs Met?: No, Stays the same                                         Clinical Comments:     Pt seen in follow-up.  Admitted with AMS and fatigue, found to have septic shock in setting of UTI with bacteremia, DANY on CKD, COPD exacerbation, and anemia. Ongoing GOC discussions- Palliative following with plan for Hospice discussion 1/8 per progress notes.  Patient with poor PO intake at last review due to not liking the pureed food and unfortunately continues at this time.  Per RN did  eat 50% of oatmeal this AM with 1:1 feeding assistance.  Spoke with daughter/POA Arabella regarding food preferences and was provided phone number to call in menu selections to see if getting her mother foods she likes help with overall PO intake.  SLP evaluated yesterday without change in diet recommendations.  Wt remains up by 15 lbs since admission, c/w +2-3 edema and estimated 15lbs of fluid accumulation.  Ongoing diuresis.  Stage II pressure injury as per above.   Will adjust supplements to comply with diet and continue to follow for needs.        Goals:  Patient will meet > 75% of estimated nutrition needs       Monitor and Evaluation:   1. % PO intake  2. Trends in weights/labs/lytes  3. Medications  4. GI function  5. I/O  6. Medical course        Discussed with: RN & Daughter Arabella        Date: 01/07/21  Signature: Lesia Walker RD

## 2021-01-07 NOTE — PLAN OF CARE
Problem: Adult Inpatient Plan of Care  Goal: Plan of Care Review  Outcome: Progressing  Flowsheets (Taken 1/7/2021 1523)  Progress: improving  Plan of Care Reviewed With: patient  Outcome Summary: Pt seen for OT session. Pt requires max A for bed mobility, dep to max A for fxl transfers, max A SPT to bedside chair, dep LBD and max A grooming. Pt appears to be limited by dec balance, dec endurance/activity tolerance, generalized weakness and fatigue. Will continue to follow for OT to address POC. Rec SNF upon d/c to increase indep with adls/fxl transfers.     Problem: Self-Care Deficit  Goal: Improved Ability to Complete Activities of Daily Living  Outcome: Progressing

## 2021-01-07 NOTE — PATIENT CARE CONFERENCE
Care Progression Rounds Note  Date: 1/7/2021  Time: 10:16 AM     Patient Name: Nancy Minor     Medical Record Number: [id number]   YOB: 1923  Sex: Female      Room/Bed: 3009    Admitting Diagnosis: Hyperkalemia [E87.5]  Acute cystitis with hematuria [N30.01]  Severe sepsis (CMS/HCC) [A41.9, R65.20]  Acute renal failure, unspecified acute renal failure type (CMS/HCC) [N17.9]   Admit Date/Time: 12/28/2020  3:35 PM    Primary Diagnosis: Metabolic acidosis  Principal Problem: Metabolic acidosis    GMLOS: 4.8  Anticipated Discharge Date: 1/8/2021    AM-PAC  Mobility Score: 6    Discharge Planning:  Living Arrangements: house  Anticipated Discharge Disposition: home with home health services, skilled nursing facility    Barriers to Discharge:  Barriers to Discharge: Medical issues not resolved  Comment: O2 @ 4L, lasix, IV abx, IV steroids, pall f/u    Participants:  social work/services, advanced practice provider, nursing

## 2021-01-07 NOTE — PROGRESS NOTES
Hospital Medicine Service -  Daily Progress Note       SUBJECTIVE     Interval History: No acute events overnight. Patient not improving. Continues to feel weak. Unable to answer much question.     OBJECTIVE        Vital signs in last 24 hours:  Temp:  [36.2 °C (97.1 °F)-36.7 °C (98 °F)] 36.4 °C (97.6 °F)  Heart Rate:  [] 88  Resp:  [18] 18  BP: (109-138)/(55-77) 112/64  I/O last 3 completed shifts:  In: 200 [IV Piggyback:200]  Out: 1975 [Urine:1975]    PHYSICAL EXAMINATION        GEN: Frail; not in acute distress  HEENT: normocephalic; atraumatic  NECK: no JVD; no bruits  CARDIO: regular rate and rhythm; no murmurs or rubs  RESP: Scattered b/l wheezing  ABD: soft, non-distended, non-tender, normal bowel sounds  EXT: no cyanosis, clubbing, bilateral lower extremity swelling, 1+ right upper extremity swelling ecchymosis right forearm, mild swelling left upper extremity  SKIN: clean, dry, warm, and intact  MUSCULOSKELETAL: no injury or deformity  NEURO: alert and oriented x 3; nonfocal  BEHAVIOR/EMOTIONAL: appropriate; cooperative     LABS / IMAGING / TELE        Labs  Lab Results   Component Value Date    WBC 20.74 (H) 01/07/2021    HGB 8.1 (L) 01/07/2021    HCT 25.4 (L) 01/07/2021    MCV 83.3 01/07/2021     01/07/2021     Lab Results   Component Value Date    GLUCOSE 134 (H) 01/07/2021    CALCIUM 8.7 (L) 01/07/2021     01/07/2021    K 3.5 (L) 01/07/2021    CO2 19 (L) 01/07/2021     01/07/2021     (HH) 01/07/2021    CREATININE 4.4 (H) 01/07/2021     Lab Results   Component Value Date    INR 1.2 12/30/2020       Imaging  Ct Abdomen Pelvis Without Iv Contrast    Addendum Date: 12/28/2020    Additionally noted in the impression should be incidentally noted pneumobilia, which is a nonspecific finding in a patient status post cholecystectomy. Correlate clinically.  Further evaluation with right upper quadrant ultrasound could be considered.    Result Date: 12/28/2020  IMPRESSION: 1.   Limited evaluation in the absence of intravenous or oral contrast and due to patient motion.  No definite hydronephrosis or renal calculi as clinically questioned, noting limited evaluation of the distal ureters due to the patient's right hip prosthesis. 2.  Circumferential bladder wall thickening and intravesicular air within the urinary bladder, concerning for urinary tract infection. 3.  Age-indeterminate mild compression deformities of T11, T12 and L3.    X-ray Chest 1 View    Result Date: 1/5/2021  IMPRESSION: Progression of pulmonary edema and pleural effusions, left greater than right, with new/increasing perihilar, lower lobe and right upper lobe airspace disease.    X-ray Chest 1 View    Result Date: 12/31/2020  IMPRESSION: 1.  Patchy interstitial opacity, with pulmonary vascular congestion, which may represent multifocal airspace disease or pulmonary edema. 2.  Left basilar airspace disease, new/increased from prior.     X-ray Chest 1 View    Result Date: 12/28/2020  IMPRESSION: Linear atelectasis or scar in the left midlung zone. COMMENT: Erect AP portable view of the chest was performed.  We have no prior studies for comparison. The heart size is towards the upper limits of normal.  A TAVR prosthesis is noted.  The pulmonary vasculature is not engorged. Linear opacity in the left midlung zone is noted, likely an area of discoid atelectasis or scar.  The lung fields are otherwise clear, although EKG leads and wires could obscure small lesions.  No pleural effusions are seen. No hilar abnormality is seen.  There is calcification of the thoracic aorta. Bilateral rotator cuff tears are suspected.      ECG/Telemetry  I have independently reviewed the telemetry. No events for the last 24 hours.Atrial fib 100-120/'    ASSESSMENT AND PLAN      Septic shock (CMS/[facility])-Klebsiella   Assessment & Plan  Secondary to UTI with Klebsiella bacteremia  Improving  Cont Ceftin for total 14 days  History of UTIs in the  past  CT abdomen also showing circumferential bladder wall thickening and intravesicular air incidental pneumobilia which is a nonspecific finding in a patient who had cholecystectomy  Blood cultures and urine cultures grew Klebsiella   Status post Levophed in ICU  PT/OT eval  Seen by speech and recommending puréed diet with nectar thick liquids  Palliative care consult appreciated.  Patient is limited code- she does not want chest compressions or mechanical ventilation.  Ok for medications and shock  Heparin subcu for DVT prophylaxis  Updated daughter (POA) discussed poor prognosis. She wants to continue diuresis and see whether she will improve. She may need hospice placement, if she does not improve  Poor prognosis sec to multiple medical problems, poor po inake, failure to thrive  Discussed with  and   Per palliative, if no improvement by 1/8/20, may transition to home hospice.       Chronic atrial fibrillation (CMS/[facility])  Assessment & Plan  Heart rate 120-140/'  History of atrial fibrillation in the past  Amiodarone drip discontinued   Metoprolol IV as needed for atrial fibrillation   anticoagulation held sec to hematuria    History of CVA (cerebrovascular accident)  Assessment & Plan  Not on aspirin at home  We will hold off to statin for now    Hyponatremia   Assessment & Plan  Resolved    Type 2 MI (myocardial infarction) (CMS/[facility])  Assessment & Plan  Second to demand ischemia  In the setting of DANY and severe sepsis  EKG nonischemic    History of colon cancer  Assessment & Plan  Remote history of colon cancer  Appears to be under remission    COPD (chronic obstructive pulmonary disease) (CMS/[facility])  Assessment & Plan  Currently in exacerbation  Continue IV Solu-Medrol 40 mg every 8 hours, albuterol nebs  Continue scheduled albuterol      Acute on chronic renal failure (CMS/[facility])  Assessment & Plan  Secondary to  ATN /CKD  Now with volume overload from aggressive IV fluid  Pt is  +9 L this  admission  Received Lasix 80 IV prn  Cr 5.3 on admission  Baseline cr 1.4 6/19   Nephrology following  Chest Xray Shows worsening fluid overload  On IV lasix 80mg q12h.   No significant improvement    Hematuria  Urine back to pink tinged this morning.   Urology is following  Ok to initiate anticoagulation per urology    Metabolic encephalopathy   in the setting of septic shock  Resolved now    Severe mitral stenosis  Outpatient follow-up with cardiology    Anemia  B12 level elevated  Iron deficiency panel consistent with anemia of chronic disease and iron deficiency  Would give IV iron  Follow hemoglobin and hemoglobin 8.1 today  Baseline hemoglobin 11     VTE Assessment: Pad[place]    Code Status: Limited Code  Estimated discharge date: 1/8/2021     Marcelino Sutherland MD  1/7/2021  10:16 AM

## 2021-01-07 NOTE — PLAN OF CARE
Problem: Adult Inpatient Plan of Care  Goal: Plan of Care Review  Outcome: Progressing  Flowsheets  Taken 1/7/2021 1244  Progress: improving  Plan of Care Reviewed With: patient  Taken 1/6/2021 1450  Outcome Summary: swallow tx completed     Problem: Swallowing Impairment  Goal: Improved Swallowing Without Aspiration  Intervention: Optimize Eating and Swallowing  Flowsheets  Taken 1/6/2021 2000 by Judy Orourke, CHANDLER  Aspiration Precautions:   awake/alert before oral intake   feeding supervision/assistance provided   upright posture maintained  Taken 1/3/2021 2130 by Christa Caldwell, RN  Swallowing Techniques: Dysphagia: appropriate positioning encouraged

## 2021-01-08 NOTE — NURSING NOTE
Patient was a RRT for hypoxia, afib in 140s, and lethargy.  O2 sats dropped to 70%. Placed on BiPap which brought O2 sats to 100%.  Cardizem gtt started to control HR, along with IV lopressor

## 2021-01-08 NOTE — NURSING NOTE
Patient was a RRT for hypoxia, afib in 140s, and lethargy.  O2 sats dropped to 70%. Placed on BiPap which brought O2 sats to 100%.  Cardizem gtt started to control HR, along with IV lopressor. Blood cultures, ABG, and labs drawn.  500 ml bolus given.  [facility] [place] notified on WBC 31 and lactate 2.8.  Given IV Levaquin and Gentamicin.  HR became more controlled at 90s.  /56.  [facility] [place] notified and said they want to keep Cardizem gtt at 10 mg.  Urine culture sent.  Arabella, patients daughter, updated.  Family meeting today to discuss hospice.

## 2021-01-08 NOTE — PROGRESS NOTES
spoke with the family to discuss hospice philosophy and plan of care.  The patient is GIP appropriate for symptom management of dyspnea  The patient will be transitioned to the [facility] LOC for Hospice.

## 2021-01-08 NOTE — SIGNIFICANT EVENT
RRT called for hypoxia, tachypnea and tachcardia: acute on chronic respiratory failure    Pt. Is a 89 yo with PMH + chronic atrial fib on Eliquis, COPD, mild dementia, T2DM, previous CVA.  She was admitted 12/28/2020 with increased confusion, septic shock and acute renal failure with hyperkalemia. She was weak, partially oriented, with signs of sepsis, hyperkalemia, hypoglycemia and acute on chronic renal failure.   Blood cultures from 12/28 + for Klebsiella pneumoniae. She was treated with steroids , nebulizer treatments and Rocephin from 12/29 - 1/4. She then was changed to po Ceftin.  Last night pt. Was pleasantly confused according to nurse and was able to speak w/o problem, but just before 5AM she was found in acute on chronic respiratory failure. ABG showed hypoxia and hypercapnia.  Gen: weak unable to speak 2nd to dyspnea and tachypnia, labored in obvious distress   VS: pOX -86% on NRBM, /80  HR - 138/min  RR=40  T-99.8 ax.  Skin: warm, flushed, no rash  HEENT: initially eyes open but not focusing, oropharynx dry, PERRLA   Neck:   No JVD  Chest:  Poor air movement, rhonchi throughout     Cardiac:  Irregularly irregular, tachycardic, pounding HR with leam waves  Abdomen: non-distended, no obvious tenderness, mass or organomegaly  : tyson draining bloody urine  Extremities: intact pulses, no edema  Neurologic:  No focal finding, more alert once on BiPAP    EKG: showed atrial fib with RVR, Rate 132-150,.   CXR: worsening bilateral airspace disease and bilateral effusions    ABG Results       01/08/21 12/29/20 12/29/20                    0426 0612 0018         pH 7.34 7.52 7.31         pCO2 41 24 18         pO2 46 85 93         HCO3 21.9 24 14         Base Excess -3.5 -1.6 -14.7         Source Of Oxygen nrb -- --         Comment for pH at 0018 on 12/29/20: Temperature Corrected pH(T)    Comment for pCO2 at 0018 on 12/29/20: Temperature Corrected pCO2(T)    Comment for pO2 at 0018 on 12/29/20:  Temperature Corrected pO2(T)   Lactate: 2.8   BNP: 882  Results from last 7 days   Lab Units 01/08/21  0449 01/07/21 0344 01/06/21  0411   WBC K/uL 31.43* 20.74* 17.71*   HEMOGLOBIN g/dL 8.1* 8.1* 7.8*   HEMATOCRIT % 26.5* 25.4* 25.0*   PLATELETS K/uL 317 257 234     Results from last 7 days   Lab Units 01/07/21 0344 01/06/21  0411 01/05/21  0501   SODIUM mEQ/L 140 139 133*   POTASSIUM mEQ/L 3.5* 3.8 3.4*   CHLORIDE mEQ/L 104 104 103   CO2 mEQ/L 19* 19* 15*   BUN mg/dL 115* 112* 106*   CREATININE mg/dL 4.4* 4.3* 4.3*   CALCIUM mg/dL 8.7* 8.9 8.4*   ALBUMIN g/dL 2.3* 2.3* 2.2*   BILIRUBIN TOTAL mg/dL 0.7 0.8 0.6   ALK PHOS IU/L 68 74 71   ALT IU/L 26 25 24   AST IU/L 17 14* 17   GLUCOSE mg/dL 134* 116* 118*     Recent Labs   Lab 01/08/21  0449 01/02/21  0434 01/01/21  2311 01/01/21  1614  12/30/20  1230   INR 1.3  --   --   --   --  1.2   PTT  --  65* 76* 68*   < > 47*    < > = values in this interval not displayed.     Results from last 7 days   Lab Units 01/08/21 0449   TROPONIN I ng/mL 0.29*     Impression:  1. Acute on chronic respiratory failure with hypoxia and hypercapnia. Started BiPAP at 15/6  FIO2-100%. Pt.'s own respirations down to 24 and TV approx. 450. Giving morphine 2 mg x for air hunger.  2. Severe sepsis: with WBC up to 31K and lactate 2.8. The sudden worsening of her dyspnea and hypoxia may indicate aspiration, but the WBC has been climbing.  Re-cultured blood and urine, started Levaquin and single dose of synergistic gent.  3. Elevated BNP: iin light of her renal failure and creatine of 4.4, this does not reflect CHF. There is no edema, no JVD, and clinical exam reflects dehydration. No Lasix Control HR.  4. Atrial fib with RVR: Cardizem bolus 5mg IV followed by Cardizem infusion. Tolerating 5mg / hr with HR . BP  levated at 165/80. Pt. is due for her IV Lopressor and we will titrate up to 10 mg/hr.   5. Gen: Pt. Is limited code- not to be intubated. She will not be moved to higher level  of care as she is more stable now. I tried to contact her daughter - but got no answer. I left a message and encouraged her to call joselin.  Total CC time- 60 min.  Keyla Amato MD

## 2021-01-08 NOTE — DISCHARGE SUMMARY
Inpatient Discharge Summary    BRIEF OVERVIEW  Admitting Provider:  H&P Notes 12/9/2020 to 1/8/2021         Date of Service   Author Author Type Status Note Type File Time    12/28/20 1947  Dipika Crisostomo MD Physician Addendum H&P 12/29/20 0146          Attending Provider: Marcelino Sutherland MD Attending phys phone: [phone]  Primary Care Physician at Discharge: Angela Sweeney CRNP [phone]    Admission Date: 12/28/2020     Discharge Date: 1/8/2021    Primary Discharge Diagnosis  Metabolic acidosis    Secondary Discharge Diagnosis  Active Hospital Problems    Diagnosis Date Noted   • Palliative care by specialist 01/03/2021   • SOB (shortness of breath) 01/03/2021   • FTT (failure to thrive) in adult 01/03/2021   • Pain 01/03/2021   • Debility 01/03/2021   • Leukocytosis 12/31/2020   • Anemia 12/31/2020   • Lactic acidosis 12/31/2020   • Hematuria 12/31/2020   • Chronic atrial fibrillation (CMS/[facility]) 12/29/2020   • Septic shock (CMS/[facility]) 12/29/2020   • Severe sepsis (CMS/[facility]) 12/28/2020   • Encephalopathy 12/28/2020   • Pneumobilia 12/28/2020   • Acute on chronic renal failure (CMS/[facility]) 12/28/2020   • Hyperkalemia 12/28/2020   • Metabolic acidosis 12/28/2020   • Acute cystitis with hematuria 12/28/2020   • Hypoglycemia 12/28/2020   • COPD (chronic obstructive pulmonary disease) (CMS/[facility]) 12/28/2020   • History of colon cancer 12/28/2020   • Type 2 MI (myocardial infarction) (CMS/[facility]) 12/28/2020   • Hyponatremia 12/28/2020   • History of CVA (cerebrovascular accident) 12/28/2020      Resolved Hospital Problems   No resolved problems to display.       DETAILS OF HOSPITAL STAY    Operative Procedures Performed      Consults:   Consult Notes 12/9/2020 to 1/8/2021         Date of Service   Author Author Type Status Note Type File Time    01/07/21 1039  Lesia Walker RD Registered Dietitian Signed Consults 01/07/21 1102    01/06/21 1501  Noel Linda RN Case Manager Signed Consults 01/06/21 1502     01/03/21 1255  Kiara Steele CRNP Nurse Practitioner Signed Consults 01/03/21 1349    01/01/21 0756  Lakisha Springer PA C Physician Assistant Cosign Needed Consults 01/01/21 0812    12/31/20 1150  Dorcas Aldanaca  Signed Consults 12/31/20 1257    12/30/20 1106  Rosalinda Hernandze RN Registered Nurse Signed Consults 12/30/20 1109    12/29/20 1321  Ingrid Eason RD Registered Dietitian Signed Consults 12/29/20 1331    12/29/20 1122  Ingrid Acevedo DO Physician Addendum Consults 12/29/20 1201    12/29/20 1100  Marita Cortes CRNP Nurse Practitioner Attested Consults 12/29/20 1220    12/29/20 1030  Favian Quinn MD Physician Addendum Consults 12/29/20 1041    12/29/20 0920  Barrett Stern MD Physician Signed Consults 12/29/20 0928    12/28/20 2309  Damir Clarke PA C Physician Assistant Signed Consults 12/28/20 2327    12/28/20 2129  Zhang Poole DO Physician Signed Consults 12/28/20 2143          Consult Orders During Admission:  IP CONSULT TO NUTRITION SERVICES  IP CONSULT TO NEPHROLOGY  IP CONSULT TO GENERAL SURGERY  IP CONSULT TO INFECTIOUS DISEASE  IP CONSULT TO CARDIAC SURGERY  IP CONSULT TO CARDIOLOGY  IP CONSULT TO WOUND OSTOMY CONTINENCE  IP CONSULT TO UROLOGY  IP CONSULT TO IV TEAM  IP CONSULT TO PAIN/PALLIATIVE CARE  IP CONSULT TO HOSPICE  IP CONSULT TO NUTRITION SERVICES       Imaging  Ct Abdomen Pelvis Without Iv Contrast    Addendum Date: 12/28/2020    Additionally noted in the impression should be incidentally noted pneumobilia, which is a nonspecific finding in a patient status post cholecystectomy. Correlate clinically.  Further evaluation with right upper quadrant ultrasound could be considered.    Result Date: 12/28/2020  IMPRESSION: 1.  Limited evaluation in the absence of intravenous or oral contrast and due to patient motion.  No definite hydronephrosis or renal calculi as clinically questioned, noting limited evaluation of the distal ureters due to the  patient's right hip prosthesis. 2.  Circumferential bladder wall thickening and intravesicular air within the urinary bladder, concerning for urinary tract infection. 3.  Age-indeterminate mild compression deformities of T11, T12 and L3.    X-ray Chest 1 View    Result Date: 1/8/2021  IMPRESSION: 1.  Multifocal airspace disease, slightly progressed when compared to prior examination. 2.  Small bilateral pleural effusions, left greater than right, new on the right when compared to prior.     X-ray Chest 1 View    Result Date: 1/5/2021  IMPRESSION: Progression of pulmonary edema and pleural effusions, left greater than right, with new/increasing perihilar, lower lobe and right upper lobe airspace disease.    X-ray Chest 1 View    Result Date: 12/31/2020  IMPRESSION: 1.  Patchy interstitial opacity, with pulmonary vascular congestion, which may represent multifocal airspace disease or pulmonary edema. 2.  Left basilar airspace disease, new/increased from prior.     X-ray Chest 1 View    Result Date: 12/28/2020  IMPRESSION: Linear atelectasis or scar in the left midlung zone. COMMENT: Erect AP portable view of the chest was performed.  We have no prior studies for comparison. The heart size is towards the upper limits of normal.  A TAVR prosthesis is noted.  The pulmonary vasculature is not engorged. Linear opacity in the left midlung zone is noted, likely an area of discoid atelectasis or scar.  The lung fields are otherwise clear, although EKG leads and wires could obscure small lesions.  No pleural effusions are seen. No hilar abnormality is seen.  There is calcification of the thoracic aorta. Bilateral rotator cuff tears are suspected.        Presenting Problem/History of Present Illness  Severe sepsis (CMS/HCC)     89 y.o. female with a past medical history of CKD, history of stroke, remote history of colon cancer, COPD, lives at home with son, presented to the emergency room with several days of feeling confused,  extremely weak, decreased oral intake.  Her urine started to become darker and saw blood today which prompted her son to get her to the emergency room.  Patient unable to provide any HPI or review of system.  Partially oriented.     ER work-up consistent with severe renal failure, hyperkalemia with no EKG changes with metabolic acidosis, UTI and severe sepsis.  Received fluids, antibiotics, discussed with on-call nephrology who recommended bicarb drip.  Continue to monitor the labs.  Low threshold for hemodialysis.     ICU nurse practitioner who could talk to the son who was at the bedside in the emergency room wanted everything to be done and full CODE STATUS    Exam on Day of Discharge  Patient seen and examined on day of discharge.    Hospital Course by Problem    Septic shock (CMS/[facility])-Klebsiella   Assessment & Plan  Secondary to UTI with Klebsiella bacteremia  Improving  Cont Ceftin for total 14 days  History of UTIs in the past  CT abdomen also showing circumferential bladder wall thickening and intravesicular air incidental pneumobilia which is a nonspecific finding in a patient who had cholecystectomy  Blood cultures and urine cultures grew Klebsiella   Status post Levophed in ICU  PT/OT stephonal  Seen by speech and recommending puréed diet with nectar thick liquids  Palliative care consult appreciated.  Patient is limited code- she does not want chest compressions or mechanical ventilation.  Ok for medications and shock  Heparin subcu for DVT prophylaxis  Updated daughter (POA) discussed poor prognosis. She wants to continue diuresis and see whether she will improve. She may need hospice placement, if she does not improve  Poor prognosis sec to multiple medical problems, poor po inake, failure to thrive  Discussed with  and   Per palliative, if no improvement by 1/8/20, may transition to home hospice.  Patient had RRT overnight and was placed on bipap. Patient continues to decline. Family  meeting held. Patient was transitioned to inpatient hospice.          Chronic atrial fibrillation (CMS/[facility])  Assessment & Plan  Heart rate 120-140/'  History of atrial fibrillation in the past  Amiodarone drip discontinued   Metoprolol IV as needed for atrial fibrillation   anticoagulation held sec to hematuria     History of CVA (cerebrovascular accident)  Assessment & Plan  Not on aspirin at home  We will hold off to statin for now     Hyponatremia   Assessment & Plan  Resolved     Type 2 MI (myocardial infarction) (CMS/[facility])  Assessment & Plan  Second to demand ischemia  In the setting of DANY and severe sepsis  EKG nonischemic     History of colon cancer  Assessment & Plan  Remote history of colon cancer  Appears to be under remission     COPD (chronic obstructive pulmonary disease) (CMS/[facility])  Assessment & Plan  Currently in exacerbation  Continue IV Solu-Medrol 40 mg every 8 hours, albuterol nebs  Continue scheduled albuterol        Acute on chronic renal failure (CMS/[facility])  Assessment & Plan  Secondary to  ATN /CKD  Now with volume overload from aggressive IV fluid  Pt is  +9 L this admission  Received Lasix 80 IV prn  Cr 5.3 on admission  Baseline cr 1.4 6/19   Nephrology following  Chest Xray Shows worsening fluid overload  On IV lasix 80mg q12h.   No significant improvement       Discharge Orders  PENDING ORDERS (720h ago, onward)         Ordered     Activity as tolerated  Until discontinued,   Discontinued:  --,   Status:  Canceled      Signed and Held     Advance diet as tolerated  Until discontinued,   Discontinued:  --,   Status:  Canceled     Comments: comfort    Signed and Held     Admit to Hospice  Once,   Discontinued:  --,   Status:  Canceled      Signed and Held     Vital signs  Every shift,   Discontinued:  --,   Status:  Canceled     Comments: And as needed to assess comfort. No rectal temps, no BP's, no O2 sat checks, no end tidal CO2 monitoring.    Signed and Held     Pain Assessment  Every  shift,   Discontinued:  --,   Status:  Canceled     Comments: and as needed to assess comfort.    Signed and Held     Skin assessment  Every shift,   Discontinued:  --,   Status:  Canceled      Signed and Held     Hospice Instructions  Until discontinued,   Discontinued:  --,   Status:  Canceled     Comments: No labs, x-rays or other diagnostic tests without permission of the Hospice team.    Signed and Held     Hospice Assessment Instructions  Until discontinued,   Discontinued:  --,   Status:  Canceled     Comments: Assess pain and symptoms every shift and as needed.  Document pain and symptom severity and interventions.  Reassess after all interventions. Document required POSS.  Hold parameters do not apply for comfort care or inpatient hospice patients.    Signed and Held     Notify Hospice Provider Team  Until discontinued,   Discontinued:  --,   Status:  Canceled     Comments: For any change in condition or in symptoms; including pain, anxiety, nausea, constipation, respiratory difficulty, or insomnia.    Signed and Held     Fall precautions  Continuous,   Discontinued:  --,   Status:  Canceled      Signed and Held     Insert urinary catheter  (Urinary catheter insertion and maintenance)  Once,   Discontinued:  --,   Status:  Canceled     Comments: Insert 16 Fr. Samson as needed for urinary retention or comfort. Inflate balloon 5cc. Contact attending provider if alternate size catheter needed.    Signed and Held     Urinary catheter care and maintenance - urinary catheter to gravity  (Urinary catheter insertion and maintenance)  Until discontinued,   Discontinued:  --,   Status:  Canceled      Signed and Held     Follow nurse driven protocol for indwelling urinary catheter care and management  (Urinary catheter insertion and maintenance)  Until discontinued,   Discontinued:  --,   Status:  Canceled      Signed and Held     Oral care  Until discontinued,   Discontinued:  --,   Status:  Canceled      Signed and  "Held     Oral suction  As needed,   Discontinued:  --,   Status:  Canceled     Comments: As needed for comfort    Signed and Held     Position for comfort  Until discontinued,   Discontinued:  --,   Status:  Canceled      Signed and Held     Bowel care  As needed,   Discontinued:  --,   Status:  Canceled     Comments: document all bowel movements and monitor for constipation; utilize constipation medications as ordered    Signed and Held     Oxygen Therapy - Nasal Cannula; 2 lpm  As needed,   Discontinued:  --,   Status:  Canceled     Comments: As needed for dyspnea    Signed and Held     Do Not Resuscitate (Allow Natural Death)  Continuous,   Discontinued:  --,   Status:  Canceled      Signed and Held     HYDROmorphone in 0.9 % NaCl (DILAUDID) 6 mg/30 mL (0.2 mg/mL) PCA syringe  (Hydromorphone Patient Controlled Analgesia (PCA))  Continuous,   Discontinued:  --,   Status:  Canceled      Signed and Held     Hospice & Comfort-Care patients: Authorized Agent Controlled Administration (AACA)  (Hydromorphone Patient Controlled Analgesia (PCA))  Until discontinued,   Discontinued:  --,   Status:  Canceled     Comments: Hospice & Comfort-Care patients: Authorized Agent Controlled Administration (AACA)    1. The RN is approved as an \"\"Authorized Agent\" to administer Opioid medications via the PCA pump.  The RN should press the dosing button on the PCA when the patient verbalizes, or shows non-verbal  signs of, discomfort or respiratory distress. Doses can also be administered in anticipation of painful  events such as wound care, repositioning or any other occurrence that may be uncomfortable for the  Patient.    (a) Hospice: Patients are considered to be \"Hospice patients\" if they are on GIP (General Inpatient)  level of Hospice care and have Hospice admission orders.    (b) Comfort-Care: Patients are considered to be \"Comfort Care\" if the Comfort Care order set has  been used, and aggressive medical therapies have been " discontinued.    ADMINISTRATIVE POLICY AND PROCEDURE MANUAL  Subject: Pain Medication (Analgesic) Guidelines  [email or web address]    Signed and Held     End Tidal CO2 monitoring is not required for Comfort Care and Hospice patients on a PCA.  (Hydromorphone Patient Controlled Analgesia (PCA))  Until discontinued,   Discontinued:  --,   Status:  Canceled      Signed and Held     No Naloxone  (Hydromorphone Patient Controlled Analgesia (PCA))  Until discontinued,   Discontinued:  --,   Status:  Canceled      Signed and Held     PCA Nursing Documentation  (Hydromorphone Patient Controlled Analgesia (PCA))  Every 4 hours (3a, 7a, 11a, 3p, 7p, 11p),   Discontinued:  --,   Status:  Canceled      Signed and Held     bisacodyL (DULCOLAX) 10 mg suppository 10 mg  Daily PRN,   Discontinued:  --,   Status:  Canceled      Signed and Held     acetaminophen (TYLENOL) suppository 650 mg  Every 6 hours PRN,   Discontinued:  --,   Status:  Canceled      Signed and Held     glycopyrrolate (ROBINUL) injection 0.2 mg  Every 4 hours PRN,   Discontinued:  --,   Status:  Canceled      Signed and Held     LORazepam (ATIVAN) injection 1 mg  Every 4 hours PRN,   Discontinued:  --,   Status:  Canceled      Signed and Held               Medication List      ASK your doctor about these medications    atorvastatin 10 mg tablet  Commonly known as: LIPITOR  Take 10 mg by mouth daily.  Dose: 10 mg     INCRUSE ELLIPTA 62.5 mcg/actuation blister with device  Inhale 1 puff daily.  Dose: 1 puff  Generic drug: umeclidinium     levothyroxine 50 mcg tablet  Commonly known as: SYNTHROID  Take 50 mcg by mouth daily.  Dose: 50 mcg     metoprolol succinate XL 25 mg 24 hr tablet  Commonly known as: TOPROL-XL  Take 25 mg by mouth daily.  Dose: 25 mg                  Outpatient Follow-Ups            In 1 week [facility] in [place]        Referrals:  No orders of the  defined types were placed in this encounter.        Test Results Pending at Discharge  Unresulted Labs (From admission, onward)     Start     Ordered    01/08/21 1200  Troponin I  Now then every 6 hours,   Status:  Canceled     Question:  Release to patient  Answer:  Immediate   Start Status     01/08/21 1200 Needs to be Collected Details       01/08/21 0433    01/08/21 0702  Lactic Acid (Repeat)  Once     Question:  Release to patient  Answer:  Immediate    01/08/21 0701    01/08/21 0430  Blood Culture Blood, Venous  (Blood Cultures X 2, Peripheral)  Once     Question:  Release to patient  Answer:  Immediate    01/08/21 0429    01/08/21 0430  Blood Culture Blood, Venous  (Blood Cultures X 2, Peripheral)  Once     Comments: From a different site than #1.     Question:  Release to patient  Answer:  Immediate    01/08/21 0429    01/04/21 0600  Magnesium  Daily,   Status:  Canceled     Question:  Release to patient  Answer:  Immediate   Start Status     01/08/21 0600 Needs to be Collected Details       01/03/21 0855    12/28/20 1855  Troponin I  Every 6 hours,   Status:  Canceled     Comments: Until peak     Question:  Release to patient  Answer:  Immediate   Start Status     12/30/20 1600 Collected (12/30/20 1230) Details       12/28/20 1854                Discharge Disposition  Hospice/Medical Facility - [facility]  Code Status at Discharge: DNR (A.N.D.)  Physician Order for Life-Sustaining Treatment Document Status      No documents found

## 2021-01-08 NOTE — PROGRESS NOTES
Palliative Care Prolonged Non-Face-to-face Management    Please note that this is a separately billed procedure and all time spent here was not spent evaluating the patient and/or is in excess of standard review of the records.   Please see today's progress note for symptom management.   Time in: 1400  Time out: 1458  I spoke to patient's daughter/POA Arabella Cruz and 3 other children Marilia, Dana Guzman,  who could not visit due to COVID-19 restrictions. Joined by Dr. KEO Sutherland, Attending for clinical update    We reviewed the complex medical condition of this 89yoF with urosepsis, renal failure, dysphagia, FTT (albumin 2.2), Afib difficult to control due to hypotension and now hypothermia.  She has had a precipitous decline with RRT overnight and now a bleeding event.  She is unresponsive, on bipap, appears to be near end of life.    Lengthy discussion about goals of care and POA and siblings all agree they wish to secure patient comfort, they wish to transition to hospice and will travel to see patient.  They are aware patient may die before they can arrive as many are flying in from distant states.  Much emotional support provided and all questions answered.    Hospice Liaison joined phone conversation and reviewed plan       Patient has a very extensive POA from [place] on EMR  Medical POA is Arabella Jones Ashanti Cruz with alternate if Arabella were  to be Marilia Chastity Diaz  Plan is for transition to hospice  Family allowed visitation per Nurse Manager   [facility] liaison will f/u with consents.     We spoke for 58 minutes about prognosis, signs and symptoms, and goals of care. I offered much support in this uncertain time.  I also reviewed his records from previous hospitalizations and spoke to Dr. Sutherland about prognosis, symptom         Radha Ramirez, DREAD  Office [phone]

## 2021-01-08 NOTE — PROGRESS NOTES
Infectious Disease Progress Note    Patient Name: Nancy Minor  MR#: [id number]  : 10/6/1923  Admission Date: 2020  Date: 21   Time: 9:08 AM   Author: Barrett Stern MD    Major Events: developed respiratory distress overnight and is on bipap this AM    Antibiotics:    Anti-infectives (From admission, onward)    Start     Dose/Rate Route Frequency Ordered Stop    21 0600  levoFLOXacin (LEVAQUIN) IVPB 500 mg      500 mg  100 mL/hr over 60 Minutes intravenous Every 48 hours interval 21 0512 21 0559    21 1400  nystatin (MYCOSTATIN) 100,000 unit/mL suspension 500,000 Units      500,000 Units Swish & Swallow 4 times daily 21 1049      20  nystatin (MYCOSTATIN) 100,000 unit/gram topical powder       Topical 2 times daily 20            Subjective     Review of Systems  On bipap this AM. Remains afebrile.    Objective     Vital Signs:    Patient Vitals for the past 72 hrs:   BP Temp Temp src Pulse Resp SpO2 Weight   21 0831 (!) 95/50 (!) 35.8 °C (96.5 °F) Axillary 60 17 100 % --   21 0746 -- -- -- 78 -- -- --   21 0721 (!) 119/58 -- -- 97 -- -- --   21 0638 (!) 136/59 -- -- 91 -- -- --   21 0604 (!) 122/56 -- -- 85 -- -- --   21 0547 (!) 163/77 -- -- (!) 120 -- -- --   21 0534 (!) 163/77 -- -- (!) 116 12 100 % --   21 0504 (!) 154/80 -- -- (!) 102 (!) 21 100 % --   21 0316 -- 36.9 °C (98.5 °F) Rectal -- 20 94 % --   21 0315 -- -- -- (!) 104 -- -- --   21 2343 (!) 111/53 (!) 35.6 °C (96 °F) Rectal 98 (!) 22 98 % --   21 2300 (!) 111/53 -- -- (!) 129 20 -- --   21 2200 (!) 116/58 -- -- 100 -- -- --   21 2018 116/65 36.1 °C (97 °F) Axillary 89 20 92 % --   21 1858 -- -- -- (!) 103 -- -- --   21 1722 -- -- -- 93 -- -- --   21 1600 116/69 36.3 °C (97.4 °F) Axillary (!) 132 20 96 % --   21 1137 (!) 130/59 36.7 °C (98.1 °F) -- (!) 104 16 100 % --    01/07/21 0747 -- -- -- 88 -- -- --   01/07/21 0714 112/64 36.4 °C (97.6 °F) -- (!) 102 18 93 % --   01/07/21 0553 -- -- -- -- -- -- 52.2 kg (115 lb)   01/07/21 0322 (!) 110/55 36.7 °C (98 °F) Axillary 97 18 92 % --   01/07/21 0036 -- -- -- (!) 102 -- -- --   01/07/21 0019 -- -- -- (!) 128 -- -- --   01/07/21 0007 114/69 36.7 °C (98 °F) Axillary (!) 118 18 95 % --   01/06/21 1931 138/68 36.2 °C (97.1 °F) Axillary (!) 104 18 95 % --   01/06/21 1926 -- -- -- 88 -- -- --   01/06/21 1845 114/62 36.6 °C (97.8 °F) Axillary 99 18 98 % --   01/06/21 1800 114/60 -- -- 100 18 93 % --   01/06/21 1600 109/60 36.5 °C (97.7 °F) Axillary 98 18 97 % --   01/06/21 1400 111/61 -- -- (!) 109 18 98 % --   01/06/21 1200 111/71 36.4 °C (97.5 °F) Rectal (!) 115 18 92 % --   01/06/21 1129 123/71 -- -- (!) 114 -- 99 % --   01/06/21 1115 116/77 -- -- (!) 135 -- -- --   01/06/21 1110 (!) 116/59 -- -- (!) 127 -- 100 % --   01/06/21 1000 (!) 112/57 -- -- (!) 116 18 92 % --   01/06/21 0800 116/76 36.3 °C (97.4 °F) Rectal (!) 110 18 97 % --   01/06/21 0600 123/60 36.3 °C (97.3 °F) Rectal (!) 104 20 97 % 52.3 kg (115 lb 3.2 oz)   01/06/21 0528 -- -- -- (!) 130 -- -- --   01/06/21 0410 116/74 36.1 °C (97 °F) Axillary (!) 116 20 93 % --   01/06/21 0215 -- (!) 35.9 °C (96.6 °F) Axillary -- -- -- --   01/06/21 0200 120/70 (!) 35.7 °C (96.2 °F) Rectal (!) 109 20 99 % --   01/06/21 0021 -- -- -- (!) 120 -- -- --   01/06/21 0020 -- (!) 35.7 °C (96.3 °F) Rectal (!) 105 -- 96 % --   01/06/21 0000 (!) 118/58 36.3 °C (97.3 °F) Axillary (!) 103 20 95 % --   01/05/21 2219 -- -- -- (!) 130 -- -- --   01/05/21 2200 122/67 -- -- (!) 111 (!) 22 96 % --   01/05/21 2144 -- (!) 35.8 °C (96.4 °F) Rectal -- -- -- --   01/05/21 2015 -- (!) 35.4 °C (95.8 °F) Rectal -- -- -- --   01/05/21 2000 124/78 (!) 35.7 °C (96.3 °F) Axillary (!) 124 20 97 % --   01/05/21 1920 -- -- -- (!) 110 -- -- --   01/05/21 1800 123/66 -- -- (!) 112 20 93 % --   01/05/21 1600 112/74 (!) 35.8  °C (96.4 °F) Axillary (!) 128 20 96 % --   21 1400 116/69 -- -- (!) 107 18 93 % --   21 1200 114/61 36.1 °C (97 °F) Oral (!) 122 18 95 % --   21 1000 101/65 -- -- (!) 124 20 93 % --       Temp (72hrs), Av.2 °C (97.1 °F), Min:35.4 °C (95.8 °F), Max:36.9 °C (98.5 °F)      Physical Exam:  General appearance: lethargic  Head: bipap mask in place  Lungs: decreased breath sounds b/l  Heart: regular rate and rhythm  Abdomen: soft, non-tender  Extremities: edema none  Skin: no rashes  Neurologic: following commands    Lines, Drains, Airways, Wounds:  Midline Catheter 21 (Active)   Number of days: 5       Peripheral IV 20 Anterior;Left;Upper Arm (Active)   Number of days: 11       Urethral Catheter 16 Fr (Active)   Number of days: 11       Rash 20 0001 Right lateral hip patch (Active)   Number of days: 10       Wound Other (comment) Buttock (Active)   Number of days: 10       Labs:    CBC Results       21                    0449 0344 0411         WBC 31.43 20.74 17.71         RBC 3.07 3.05 3.02         HGB 8.1 8.1 7.8         HCT 26.5 25.4 25.0         MCV 86.3 83.3 82.8         MCH 26.4 26.6 25.8         MCHC 30.6 31.9 31.2          257 234         Comment for WBC at 0449 on 21: ALL RESULTS HAVE BEEN CHECKED. This result has been called to KEYSHAWN CHAVARRIA by LAILA RIZO on 21 at 05:17, and has been read back.       BMP Results       21                    0449 0344 0411          140 139         K 3.4 3.5 3.8         Cl 107 104 104         CO2 21 19 19         Glucose 239 134 116          115 112         Creatinine 4.4 4.4 4.3         Calcium 8.5 8.7 8.9         Anion Gap 15 17 16         EGFR 9.3 9.3 9.6         Comment for BUN at 0449 on 21: Checked by dilution  Consistent with previous results      Comment for BUN at 0344 on 21: Checked by dilution      Comment for BUN at 0411 on 21: Checked by  dilution      PT/PTT Results       01/08/21 01/02/21 01/01/21                    0449 0434 2311         PT 16.0 -- --         INR 1.3 -- --         PTT -- 65 76         Comment for INR at 0449 on 01/08/21: Moderate Intensity Anticoagulation = 2.0 to 3.0, High Intensity = 2.5 to 3.5    Comment for PTT at 0434 on 01/02/21: The Standard Therapeutic Range for Heparin is 68 to 101 seconds.    Comment for PTT at 2311 on 01/01/21: The Standard Therapeutic Range for Heparin is 68 to 101 seconds.      UA Results       01/08/21                          0625           Color Red           Clarity Turbid           Glucose Proper identification not possible due to color interference.           Bilirubin Proper identification not possible due to color interference.           Ketones Proper identification not possible due to color interference.           Sp Grav 1.011           Blood +3           Ph Proper identification not possible due to color interference.           Protein Proper identification not possible due to color interference.           Urobilinogen Proper identification not possible due to color interference.           Nitrite Proper identification not possible due to color interference.           Leuk Est +3           WBC 10 TO 20           RBC Too Numerous To Count           Bacteria None Seen           Comment for Blood at 0625 on 01/08/21: The sensitivity of the occult blood test is equivalent to approximately 4 intact RBC/HPF.      Lactate Results       01/08/21 12/30/20 12/29/20                    0449 1205 0924         Lactate 2.8 1.3 4.1                       Microbiology Results     Procedure Component Value Units Date/Time    SARS-CoV-2 (COVID-19), PCR Nasopharynx [[id number]]  (Normal) Collected: 01/04/21 1625    Specimen: Nasopharyngeal Swab from Nasopharynx Updated: 01/05/21 1043    Narrative:      The following orders were created for panel order SARS-CoV-2 (COVID-19), PCR Nasopharynx.  Procedure                                Abnormality         Status                     ---------                               -----------         ------                     SARS-CoV-2 (COVID-19), P...[[id number]]  Normal              Final result                 Please view results for these tests on the individual orders.    SARS-CoV-2 (COVID-19), PCR Nasopharynx [[id number]]  (Normal) Collected: 01/04/21 1625    Specimen: Nasopharyngeal Swab from Nasopharynx Updated: 01/05/21 1043     SARS-CoV-2 (COVID-19) Negative    SARS-CoV-2 (COVID-19), PCR Nasopharynx [[id number]]  (Normal) Collected: 12/28/20 1840    Specimen: Nasopharyngeal Swab from Nasopharynx Updated: 12/28/20 2018    Narrative:      The following orders were created for panel order SARS-CoV-2 (COVID-19), PCR Nasopharynx.  Procedure                               Abnormality         Status                     ---------                               -----------         ------                     SARS-CoV-2 (COVID-19), P...[[id number]]  Normal              Final result                 Please view results for these tests on the individual orders.    SARS-CoV-2 (COVID-19), PCR Nasopharynx [[id number]]  (Normal) Collected: 12/28/20 1840    Specimen: Nasopharyngeal Swab from Nasopharynx Updated: 12/28/20 2018     SARS-CoV-2 (COVID-19) Negative    Narrative:      Nursing instructions: Obtain nasopharyngeal swab ONLY.  Send swab in viral transport media.    Blood Culture Blood, Venous [[id number]]  (Abnormal) Collected: 12/28/20 1648    Specimen: Blood, Venous Updated: 01/03/21 1409     Culture **Positive Culture**      Klebsiella pneumoniae ssp pneumoniae     Gram Stain Result Gram negative bacilli    Urine culture Urine, Clean Catch [[id number]]  (Abnormal)  (Susceptibility) Collected: 12/28/20 1627    Specimen: Urine, Clean Catch Updated: 12/31/20 0830     Urine Culture **Positive Culture**      >1 x 10^5 CFU/mL Klebsiella pneumoniae ssp pneumoniae    Blood Culture Blood, Venous  [[id number]]  (Abnormal)  (Susceptibility) Collected: 12/28/20 1603    Specimen: Blood, Venous Updated: 01/03/21 1408     Culture **Positive Culture**      Klebsiella pneumoniae ssp pneumoniae     Gram Stain Result Gram negative bacilli    Blood Culture PCR Panel Blood, Venous [[id number]]  (Abnormal) Collected: 12/28/20 1603    Specimen: Blood, Venous Updated: 12/29/20 1309     Acinetobacter calcoaceticus-baumannii Not Detected     Candida albicans Not Detected     Candida auris Not Detected     Candida glabrata Not Detected     Candida krusei Not Detected     Candida parapsilosis Not Detected     Candida tropicalis Not Detected     Cryptococcus neoformans/gattii Not Detected     Enterococcus faecalis Not Detected     Enterococcus faecium Not Detected     Enterobacter cloacae complex Not Detected     Escherichia coli Not Detected     Klebsiella oxytoca Not Detected     Klebsiella pneumoniae Detected     Klebsiella aerogenes Not Detected     Proteus Not Detected     Salmonella species Not Detected     Serratia marcescens Not Detected     Haemophilus influenzae Not Detected     Listeria monocytogenes Not Detected     Neiseria meningitidis Not Detected     Pseudomonas aeruginosa Not Detected     Stenotrophomonas maltophilia Not Detected     Bacteroides fragilis Not Detected     Staph (not aureus) Not Detected     Staphylococcus aureus Not Detected     Staphylococcus ludgnensis Not Detected     Staphylococcus epidermidis Not Detected     Streptococcus Not Detected     Streptococcus agalactiae (Group B) Not Detected     Streptococcus pneumoniae Not Detected     Streptococcus pyogenes (Group A) Not Detected     KPC (Carbapenem Resistance Gene) Not Detected     Damon/B (Vancomycin Resistance Gene) Not Applicable     mecA/C Not Applicable     mecA/C and MREJ (MRSA) Not Applicable     CTX-M (ESBL) Not Detected     IMP Not Detected     mcr-1 Not Detected     NDM Not Detected     OXA-48-like Not Detected     VIM Not Detected      Comment: See below    Narrative:      This positive blood culture was tested with a rapid molecular panel that detects Enterococcus faecalis, Enterococcus faecium, Listeria monocytogenes, Staphylococcus species, Staphylococcus aureus, Staphylococcus lugdunensis, Staphylococcus epidermidis, Streptococcus agalactiae (Group B), Streptococcus pneumonia, Streptococcus pyogenes (Group A), Acinetobacter calcoaceticus-baumannii complex, Bacteroides fragilis, Haemophilus influenza, Neisseria meningitides, Pseudomonas aeruginosa, Stenotrophomonas maltophilia , Salmonella spp.,  Enterobacter cloacae complex, Escherichia coli, Klebsiella oxytoca, Klebsiella pneumoniae group, Klebsiella aerogenes , Proteus species, Serratia marcescens, Candida albicans, Candida auris, Candida glabrata, Candida krusei, Cryptococcus neoformans/gattii, Candida parapsilosis, and Candida tropicalis.          Pathology Results     ** No results found for the last 720 hours. **          Echo:     Cardiac Imaging   TRANSTHORACIC ECHO (TTE) COMPLETE 12/30/2020    Narrative · Mitral valve thickening. Severe mitral annular calcification.  · Severe mitral valve stenosis.  · Mean gradient = 10.00 mmHg. HR = 110 bpm.  · Hyperdynamic LV systolic function. Estimated EF >75%.  · Mildly dilated LA.  · Tricuspid valve structure is grossly normal. Mild tricuspid valve   regurgitation.  · Aortic valve structure is normal. Mild aortic valve regurgitation. No   aortic valve stenosis. A bioprosthetic aortic valve is present.  · Moderately dilated RA.  · Moderately dilated RV. Mildly reduced RV systolic function.  · Mild mitral valve regurgitation.     Estimated PASP= 51mmHg by TR jet method.         Imaging:    Radiology Imaging   XR CHEST 1 VW    Narrative CLINICAL HISTORY:  89-year-old female with respiratory failure    COMMENT:  A portable erect AP view of the chest was obtained at 4:41 AM on  1/8/2021    COMPARISON:  Chest radiograph from 1/5/2021    Cardiac leads  overlie the chest.  Postsurgical changes of aortic valvuloplasty  are again noted.  A midline catheter is noted in the right upper extremity, with  the catheter tip projecting over the expected location of the axillary vein.  Multifocal airspace disease is noted bilaterally, most notable in the right  upper and left lower lung zones, similar to slightly progressed when compared to  prior examination.  There are small bilateral layering pleural effusions, left  greater than right, new on the right when compared to prior.  There is no  pneumothorax.  There is stable cardiomegaly.      Impression IMPRESSION:  1.  Multifocal airspace disease, slightly progressed when compared to prior  examination.  2.  Small bilateral pleural effusions, left greater than right, new on the right  when compared to prior.             Assessment     1. Klebsiella bacteremia due to UTI - repeat blood cx are pending    2. Acute hypoxic respiratory failure - on bipap today. Repeat CXR shows multifocal airspace disease, likely volume overload rather than pneumonia. Leukocytosis worsened due to steroid administration.      Plan      1. Can continue levofloxacin for 2 more days while waiting for repeat blood cx.

## 2021-01-08 NOTE — H&P
Patient admitted to inpatient hospice.  Please see today's discharge summary.     Marcelino Sutherland MD

## 2021-01-08 NOTE — PROGRESS NOTES
NEPHROLOGY PROGRESS NOTE    Subjective:     Pt seen and examined  On BIPAP      Review of Systems   Unable to perform ROS: Severe respiratory distress       Vitals:    01/08/21 0638 01/08/21 0721 01/08/21 0746 01/08/21 0831   BP: (!) 136/59 (!) 119/58  (!) 95/50   BP Location: Left upper arm Left upper arm  Left upper arm   Patient Position: Lying   Lying   Pulse: 91 97 78 60   Resp:    17   Temp:    (!) 35.8 °C (96.5 °F)   TempSrc:    Axillary   SpO2:    100%   Weight:       Height:             Intake/Output Summary (Last 24 hours) at 1/8/2021 1048  Last data filed at 1/7/2021 2015  Gross per 24 hour   Intake --   Output 1025 ml   Net -1025 ml       Physical Exam  Constitutional:       Comments: On BIPAP   HENT:      Nose: Nose normal.      Mouth/Throat:      Mouth: Mucous membranes are moist.   Eyes:      Pupils: Pupils are equal, round, and reactive to light.   Cardiovascular:      Rate and Rhythm: Normal rate.      Pulses: Normal pulses.   Pulmonary:      Effort: Respiratory distress present.   Abdominal:      General: Abdomen is flat.   Musculoskeletal:         General: Swelling present.   Skin:     General: Skin is warm.      Capillary Refill: Capillary refill takes less than 2 seconds.   Neurological:      General: No focal deficit present.         LABS:  Results from last 7 days   Lab Units 01/08/21  0449   SODIUM mEQ/L 143   POTASSIUM mEQ/L 3.4*   CHLORIDE mEQ/L 107   CO2 mEQ/L 21*   BUN mg/dL 133*   CREATININE mg/dL 4.4*   EGFR mL/min/1.73m*2 9.3*   GLUCOSE mg/dL 239*   CALCIUM mg/dL 8.5*   ALBUMIN g/dL 2.5*   WBC K/uL 31.43*   HEMOGLOBIN g/dL 8.1*   HEMATOCRIT % 26.5*   PLATELETS K/uL 317       Meds:    Current Facility-Administered Medications:   •  acetaminophen (TYLENOL) tablet 650 mg, 650 mg, oral, q4h PRN, Guerita Bose CRNP, 650 mg at 01/07/21 0543  •  atorvastatin (LIPITOR) tablet 10 mg, 10 mg, oral, Daily (6p), Ankita Culver CRNP, 10 mg at 01/07/21 1651  •  budesonide (PULMICORT) 0.5  mg/2 mL nebulizer solution 0.5 mg, 0.5 mg, nebulization, BID (6a, 6p), Ankita Culver CRNP, 0.5 mg at 01/08/21 0934  •  calcium gluconate 1,000 mg in sodium chloride 0.9 % 50 mL IVPB, 1 g, intravenous, 2x daily PRN, Ankita Culver CRNP  •  glucose chewable tablet 16-32 g of dextrose, 16-32 g of dextrose, oral, PRN **OR** dextrose 40 % oral gel 15-30 g of dextrose, 15-30 g of dextrose, oral, PRN **OR** glucagon (GLUCAGEN) injection 1 mg, 1 mg, intramuscular, PRN **OR** dextrose in water injection 12.5 g, 25 mL, intravenous, PRN, Ankita Culver CRNP  •  dilTIAZem HCl in sodium chloride (CARDIZEM) 125 mg/125 mL (1 mg/mL) infusion, 5-15 mg/hr, intravenous, Titrated, Ingrid Peterson PA C, Last Rate: 5 mL/hr at 01/08/21 1036, 5 mg/hr at 01/08/21 1036  •  ferric gluconate (FERRLECIT) 125 mg in sodium chloride 0.9 % 100 mL IVPB, 125 mg, intravenous, Daily, Mila Ortega MD, Last Rate: 100 mL/hr at 01/08/21 1042, 125 mg at 01/08/21 1042  •  furosemide (LASIX) injection 80 mg, 80 mg, intravenous, q12h [facility], Jamel Abarca PA C, 80 mg at 01/07/21 2200  •  guaiFENesin (ROBITUSSIN) 100 mg/5 mL liquid 400 mg, 400 mg, oral, q4h PRN, Guerita Bose CRNP, 400 mg at 01/04/21 0336  •  heparin (porcine) 5,000 unit/mL injection 5,000 Units, 5,000 Units, subcutaneous, q8h Palomo LOPEZ Briana, CRNP, 5,000 Units at 01/08/21 0553  •  insulin aspart U-100 (NovoLOG) pen 3-11 Units, 3-11 Units, subcutaneous, With meals & nightly, Mila Ortega MD, 3 Units at 01/07/21 1652  •  ipratropium-albuteroL (DUO-NEB) 0.5-2.5 mg/3 mL nebulizer solution 3 mL, 3 mL, nebulization, q6H JESSICA, Ankita Culver CRNP, 3 mL at 01/08/21 0933  •  [START ON 1/9/2021] levoFLOXacin (LEVAQUIN) IVPB 500 mg, 500 mg, intravenous, q48h INT, Keyla Amato MD  •  levothyroxine (SYNTHROID) tablet 50 mcg, 50 mcg, oral, Daily (6:30a), Ankita Culver CRNP, 50 mcg at 01/07/21 0543  •  lidocaine HCL (XYLOCAINE) 2 % viscous mucosal solution 5 mL, 5  mL, Mouth/Throat, 3x daily PRN, Mila Ortega MD  •  magnesium sulfate IVPB 2g in 50 mL NSS/D5W/SWFI, 2 g, intravenous, PRN, Ankita Culver CRNP, Stopped at 01/02/21 0837  •  methylPREDNISolone sod suc(PF) (Solu-MEDROL) injection 40 mg, 40 mg, intravenous, q8h INT, Ingrid Acevedo DO, 40 mg at 01/08/21 0559  •  metoprolol (LOPRESSOR) injection 2.5 mg, 2.5 mg, intravenous, q6h PRN, Abelino Medina MD, 2.5 mg at 01/08/21 0547  •  morphine injection 2 mg, 2 mg, intravenous, q3h PRN, Keyal Amato MD, 2 mg at 01/08/21 0606  •  nystatin (MYCOSTATIN) 100,000 unit/gram topical powder, , Topical, BID, Ankita Culver CRNP, Given at 01/08/21 1035  •  nystatin (MYCOSTATIN) 100,000 unit/mL suspension 500,000 Units, 500,000 Units, Swish & Swallow, QID, Mila Ortega MD, 500,000 Units at 01/08/21 1035  •  potassium chloride (KLOR-CON) tablet extended release 20 mEq, 20 mEq, oral, PRN **OR** potassium chloride (KLOR-CON) tablet extended release 40 mEq, 40 mEq, oral, PRN **OR** potassium chloride 20 mEq in 100 mL IVPB  (premix), 20 mEq, intravenous, PRN **OR** potassium chloride (KCL) 40 mEq/250 mL IVPB in NSS 40 mEq, 40 mEq, intravenous, PRN, Mila Ortega MD, Stopped at 01/05/21 1713  •  renal multivitamin tablet 1 tablet, 1 tablet, oral, Daily, Ankita Culver CRNP, 1 tablet at 01/07/21 0840  •  sodium chloride flush 10 mL, 10 mL, intravenous, q8h INT, Mila Ortega MD, 10 mL at 01/08/21 0934  •  sodium chloride flush 10 mL, 10 mL, intravenous, PRN, Mila Ortega MD    ASSESSMENT:  Principal Problem:    Metabolic acidosis  Active Problems:    Severe sepsis (CMS/HCC)    Encephalopathy    Pneumobilia    Acute on chronic renal failure (CMS/HCC)    Hyperkalemia    Acute cystitis with hematuria    Hypoglycemia    COPD (chronic obstructive pulmonary disease) (CMS/HCC)    History of colon cancer    Type 2 MI (myocardial infarction) (CMS/HCC)    Hyponatremia    History of CVA (cerebrovascular accident)    Chronic  atrial fibrillation (CMS/HCC)    Septic shock (CMS/HCC)    Leukocytosis    Anemia    Lactic acidosis    Hematuria    Palliative care by specialist    SOB (shortness of breath)    FTT (failure to thrive) in adult    Pain    Debility      PLAN:      DANY on CKD  Likely due to ATN  Creatinine stable at 4.4  Good response to IV lasix  She is not a candidate for renal replacement therapy  Poor prognosis, transition to hospice care is appropriate    Gurmeet Head DO

## 2021-01-08 NOTE — PROGRESS NOTES
Patient: Nancy Minor  Location: [facility] 3A 3009  MRN: [id number]  Today's date: 1/8/2021    Attempted to see patient for therapy. Unable due to medical hold. Pt now on BiPap following RRT d/t hypoxia yesterday. Per RN, not appropriate at this time, family mtg to be held today to discuss poc/goc. ST to f/u at later time if appropriate

## 2021-01-08 NOTE — NURSING NOTE
Writer rec'd inp hospice orders and set up PCA pump according to order set in MAR with 2nd RN. Pt nodded yes when asked if she was more comfortable. Pt with blood on incontinence pad since 2p, [facility] [place] aware. Pad changed, new gown and linens applied. Plan is for Pt to come off bipap at 2200 so family can visit at 2300 tonight. Command center aware and supervisor aware. Writer will notify respiratory.

## 2021-01-08 NOTE — PROGRESS NOTES
"[facility] Palliative Care Progress Note    Patient Name: Nancy Marinelli  Patient MRN: [id number]  Date / Time: 1/8/2021 4:10 PM   Attending Physician: Marcelino Sutherland MD    This is Hospital Day: 12      Capacity for Medical Decision Making: pt has no capacity for medical decision making at this time due to clinical decline     Advance Directives:  Existence of Advance Directives:    Document Type(s):   Durable power of : Arabella Cruz.  Living Will: requested for file  Healthcare Proxy: Arabellagi Kaiservijaya, Relation: Relationship: daughter   Emergency Contact: Arabella Cruz; Relation: Daughter; deysi marinelli; Relation: Son; figueroa marinelli; Relation: Relative; apple marinelli; Relation: Son     Conversation/Goals of Care:  Goals of care, review hospice     Interval History (Subjective)  Source: pt, RN, Attending, daughter/POA Arabella Cruz and multiple adult children     Pt is sleeping, not arousable, unable to report ROS, appears uncomfortable and when being washed new bleeding observed by RN.  Overnight pt had an RRT event RRT called for hypoxia, tachypnea and tachcardia: acute on chronic respiratory failure and was treated for RAfib.  Very poor prognosis per medical team       Palliative Assessment and Plan  Current Palliative Performance Status: 20%   Baseline Palliative Performance Status: Unknown   Preferred Setting for Care: home if possible, currently needs 24/7 nursing care  Estimated Palliative Prognosis: poor, life expectancy hours to days  Patient Prognostic Awareness: unclear  Patient Status: Disease state: Deteriorating despite treatments.  Functional status: Bedridden.  Mental status: alert, oriented and anxious.  Nutritional status: albumin below 2.5 g/dL.  Psychosocial issues: anxiety about hospitalization and health issues.  Family support: The patient receives support from her children..       Debility  Assessment & Plan  Patient with weakness, fatigue      1/6, reports things \"seem harder " "for me\" but remains hopeful that she can \"get better\"    PT/OT/ST  Likely to need rehab if in line with goals of care however pt wishes to go home and be with her family    Pain  Assessment & Plan  Denies pain at this time but states that she overall does not feel good today.      Creatinine 4.3 now   Acetaminophen for pain prn (x1/24)          PDMP (outside record) with no findings  No meds for pain on home med list      FTT (failure to thrive) in adult  Assessment & Plan  Pt arrived with poor po for several days, albumin 2.1  Remote CVA, chronic dysphagia, only likes certain foods - does NOT like eggs    1/5 Appetite poor. Albumin 2.2.     Pureed diet with nectar thick  Aspiration precautions  Speech following for dysphagia and diet management  Asked Nutrition to reach out to ASHLY Bell to discuss pt food preferences to promote willingness to eat    SOB (shortness of breath)  Assessment & Plan  Sob in setting of sepsis and known COPD   IV Solu-Medrol albuterol nebs, albuterol per medical team  Fluid overload- + 22 L at one point      1/6- Unstable Continues to have significant dyspnea and is receiving diuretic therapy. Supplemental oxygen at 4 L    Overnight needed Hypothermia protocol with Bare Hugger      Continue diuresis per medical team though Creatinine is climbing and currently 4.3   Nephrology following    Palliative care by specialist  Assessment & Plan  89yoF with urosepsis, renal failure, dysphagia, FTT (albumin 2.2), Afib difficult to control due to hypotension.  Pt is very frail, will not take po and is declining     Family meeting scheduled for today at 3pm with all 5 adult children to discuss goals of care  See ACP Note dated 1/6/20   Code status confirmed for LIMITED CODE:   No compressions, intubation or mechanical ventilation, okay for ACLS medications as well as Bag/Valve mask and BiPAp and Defib/cardioversion  Patient has a very extensive POA from [place] on [facility]  Medical POA is Arabella Jones " Ashanti Cruz with alternate if Arabella were  to be Marilia Diaz  Likely to need rehab on discharge if in line with goals of care and she recovers from current illness however, pt and family inclined to prefer her return to home if able.        Current Medications:  •  acetaminophen, 650 mg, oral, q4h PRN  •  atorvastatin, 10 mg, oral, Daily (6p)  •  budesonide, 0.5 mg, nebulization, BID (6a, 6p)  •  calcium gluconate, 1 g, intravenous, 2x daily PRN  •  glucose, 16-32 g of dextrose, oral, PRN **OR** dextrose, 15-30 g of dextrose, oral, PRN **OR** glucagon, 1 mg, intramuscular, PRN **OR** dextrose in water, 25 mL, intravenous, PRN  •  dilTIAZem, 5-15 mg/hr, intravenous, Titrated  •  ferric gluconate (FERRLECIT) IVPB, 125 mg, intravenous, Daily  •  furosemide, 80 mg, intravenous, q12h JESSICA  •  guaiFENesin, 400 mg, oral, q4h PRN  •  heparin (porcine), 5,000 Units, subcutaneous, q8h JESSICA  •  insulin aspart U-100, 3-11 Units, subcutaneous, With meals & nightly  •  ipratropium-albuteroL, 3 mL, nebulization, q6H JESSICA  •  [START ON 2021] levoFLOXacin, 500 mg, intravenous, q48h INT  •  levothyroxine, 50 mcg, oral, Daily (6:30a)  •  lidocaine HCL, 5 mL, Mouth/Throat, 3x daily PRN  •  magnesium sulfate, 2 g, intravenous, PRN  •  methylPREDNISolone sodium succinate, 40 mg, intravenous, q8h INT  •  metoprolol, 2.5 mg, intravenous, q6h PRN  •  morphine, 2 mg, intravenous, q3h PRN  •  nystatin, , Topical, BID  •  nystatin, 500,000 Units, Swish & Swallow, QID  •  potassium chloride, 20 mEq, oral, PRN **OR** potassium chloride, 40 mEq, oral, PRN **OR** potassium chloride, 20 mEq, intravenous, PRN **OR** potassium chloride, 40 mEq, intravenous, PRN  •  renal multivitamin, 1 tablet, oral, Daily  •  sodium chloride, 10 mL, intravenous, q8h INT  •  sodium chloride, 10 mL, intravenous, PRN    Objective    Physical Exam:  General:   frail, at risk for eol  Eyes:  Sclera Anicteric  ENMT:  Moosem  CV:  HRR,  pallor  Extremities: no edema  Lungs: bipap with increased wob  Abdomen:  non distended  Neuro:  unresponsive  Skin:  No rash, no jaundice      Vitals:  Vitals:    01/08/21 1548   BP:    Pulse: 99   Resp:    Temp:    SpO2:        Intake & Output:  I/O last 3 completed shifts:  In: -   Out: 2225 [Urine:2225]    Laboratory Studies:    CBC Results       01/08/21 01/07/21 01/06/21                    0449 0344 0411         WBC 31.43 20.74 17.71         RBC 3.07 3.05 3.02         HGB 8.1 8.1 7.8         HCT 26.5 25.4 25.0         MCV 86.3 83.3 82.8         MCH 26.4 26.6 25.8         MCHC 30.6 31.9 31.2          257 234         Comment for WBC at 0449 on 01/08/21: ALL RESULTS HAVE BEEN CHECKED. This result has been called to KEYSHAWN CHAVARRIA by LAILA RIZO on 01 08 21 at 05:17, and has been read back.         CMP Results       01/08/21 01/07/21 01/06/21                    0449 0344 0411          140 139         K 3.4 3.5 3.8         Cl 107 104 104         CO2 21 19 19         Glucose 239 134 116          115 112         Creatinine 4.4 4.4 4.3         Calcium 8.5 8.7 8.9         Anion Gap 15 17 16         AST 16 17 14         ALT 27 26 25         Albumin 2.5 2.3 2.3         EGFR 9.3 9.3 9.6         Comment for BUN at 0449 on 01/08/21: Checked by dilution  Consistent with previous results      Comment for BUN at 0344 on 01/07/21: Checked by dilution      Comment for BUN at 0411 on 01/06/21: Checked by dilution            Imaging and Other Studies:     Radiology Imaging   XR CHEST 1 VW    Narrative CLINICAL HISTORY:  89-year-old female with respiratory failure    COMMENT:  A portable erect AP view of the chest was obtained at 4:41 AM on  1/8/2021    COMPARISON:  Chest radiograph from 1/5/2021    Cardiac leads overlie the chest.  Postsurgical changes of aortic valvuloplasty  are again noted.  A midline catheter is noted in the right upper extremity, with  the catheter tip projecting over the expected location of the  axillary vein.  Multifocal airspace disease is noted bilaterally, most notable in the right  upper and left lower lung zones, similar to slightly progressed when compared to  prior examination.  There are small bilateral layering pleural effusions, left  greater than right, new on the right when compared to prior.  There is no  pneumothorax.  There is stable cardiomegaly.      Impression IMPRESSION:  1.  Multifocal airspace disease, slightly progressed when compared to prior  examination.  2.  Small bilateral pleural effusions, left greater than right, new on the right  when compared to prior.                                                                             Cardiac Imaging   TRANSTHORACIC ECHO (TTE) COMPLETE 12/30/2020    Narrative · Mitral valve thickening. Severe mitral annular calcification.  · Severe mitral valve stenosis.  · Mean gradient = 10.00 mmHg. HR = 110 bpm.  · Hyperdynamic LV systolic function. Estimated EF >75%.  · Mildly dilated LA.  · Tricuspid valve structure is grossly normal. Mild tricuspid valve   regurgitation.  · Aortic valve structure is normal. Mild aortic valve regurgitation. No   aortic valve stenosis. A bioprosthetic aortic valve is present.  · Moderately dilated RA.  · Moderately dilated RV. Mildly reduced RV systolic function.  · Mild mitral valve regurgitation.     Estimated PASP= 51mmHg by TR jet method.         Discussed with RN, Attending, [facility] hospice liaison, daughter/POA and 3 other adult children..     DREAD Briggs  Palliative Care Nurse Practitioner  [facility]  [phone] [facility] office  [address] [place] [id number]  Pager [phone]

## 2021-01-08 NOTE — PATIENT CARE CONFERENCE
Care Progression Rounds Note  Date: 1/8/2021  Time: 10:23 AM     Patient Name: Nancy Minor     Medical Record Number: [id number]   YOB: 1923  Sex: Female      Room/Bed: 3009    Admitting Diagnosis: Hyperkalemia [E87.5]  Acute cystitis with hematuria [N30.01]  Severe sepsis (CMS/HCC) [A41.9, R65.20]  Acute renal failure, unspecified acute renal failure type (CMS/HCC) [N17.9]   Admit Date/Time: 12/28/2020  3:35 PM    Primary Diagnosis: Metabolic acidosis  Principal Problem: Metabolic acidosis    GMLOS: 4.8  Anticipated Discharge Date: 1/8/2021    AM-PAC  Mobility Score: 8    Discharge Planning:  Living Arrangements: house  Anticipated Discharge Disposition: home with home health services, skilled nursing facility    Barriers to Discharge:  Barriers to Discharge: Medical issues not resolved  Comment: Rapid response, started on IV abx, BIPAP, Cardizem drip    Participants:  nursing, social work/services

## 2021-01-08 NOTE — PROGRESS NOTES
Patient: Nancy Minor  Location: [facility] 3A 3009  MRN: [id number]  Today's date: 1/8/2021    Attempted to see patient for therapy. Unable due to medical hold.       Patient now on Bipap after RRT and in severe resp distress per physician. Hold therapy services at this time.

## 2021-01-08 NOTE — NURSING NOTE
Updated Arabella, patients POA and daughter, on last night events.  Answered questions until she verbalized understanding.  Family meeting to take place today to discuss transition to hospice.

## 2021-01-08 NOTE — PROGRESS NOTES
Family is planning EOL visitation around 10pm today.  Plan is to continue Bipap until after that visitation.  We have started low dose Dilaudid PCA for dyspnea.      Recommendations for removal of bipap   :    Administer loading doses of opioids and benzodiazepines for control of pain, breathlessness, fear and anxiety    - Opioids - there is no ceiling to opioid requirements. The dose must be individualized to control the patient symptoms  1. For opioid naïve patients  a. administer IV dilaudid at 0.2-2 mg  boluses every 10 minutes until the resolution of symptoms.   b. Calculate the total dose of dilaudid needed to attain resolution of symptoms.   c. Start opioid infusion at half of that dose and institute bolus dosing equal to the infusion dose or double the infusion dose. (i.e. if patient needs 1.5 mg of IV dilaudid to attain comfort, start basal at 0.8 mg and bolus dosing at 0.8 mg to 1.5 mg q10 minutes    - Benzodiazepines  2. Administer lorazepam or midazolam 1-2 mg IV bolus  3. If patient requires more than one bolus of benzodiazepine prior to of bipap,  and titrate for control of anxiety  - Anticholinergics  4. For patients with noisy secretions consider bolusing glycopyrrolate 0.2-0.4mg IV prior to extubation and then q6h as needed  5. For ongoing heavy secretions consider adding scopolamine patch q72h            In addition.   - Wean oxygen to off. Do not provide supplemental oxygen  - If patient exhibits tachypnea, labored breathing, accessory muscle use, nasal flaring, tachycardia, hypertension, diaphoresis, grimacing, restlessness, or excess/noisy secretions following extubation, call provider to titrate opioids, benzodiazepines, and anticholinergics as described above, readjusting basal and bolus to provide comfort. This may be done with the pump outside the room to limit provider’s risk of exposure. The combination of an opioid plus benzodiazepine is indicated because opioids provide relief of dyspnea  and pain, while suppressing cough, whereas benzodiazepines provide sedation, and anxiolysis. Benzodiazepines also offer anticonvulsant effects that may protect from hypoxemia-related seizures.      DREAD Kumar  Palliative care  [phone]

## 2021-01-09 PROBLEM — Z51.5 HOSPICE CARE PATIENT: Status: ACTIVE | Noted: 2021-01-01

## 2021-01-09 NOTE — CONSULTS
"Was paged to support family at bedside when Mrs. Minor, \"Pablo\" .  Prayed with her children Dana and Arnaldo.  Additional children arrived Arabella Morales, and Marilia, [employer] were provided to family and each family member placed their heart on their mother and each have their heart with them.  Pablo had five children, ten grandchildren, and thirteen great grandchildren  "

## 2021-01-09 NOTE — PROGRESS NOTES
"Non-billable note    I received a message from patient's daughter, Dana, she is asking that the opioid be reduced to give her mom the opportunity to be more aware. I discussed the dying process, that even with a reduction in the opioid her mom may not become more aware. She expressed understanding and will relay this to other family members who may have different expectations. Dana also requested her mom come home on hospice and I discussed the risk of that and she is also requesting a family member be present at the bedside ATC, she does not want her mom to die alone.     I did speak with the nursing supervisor who reviewed the end-of-life visitor policy, it states if someone is imminent (which patient is imminent, BP 69/40 and pulse ox 79%) that allows for 3 visitors \"at any time\". The visitation plan for today and tomorrow is 1 visitor present at bedside at any time with additional 2 visitors present but only for an hour.    I spoke with Dana again and relayed the above information.     Will reduce the the continuous infusion to 0.1 mg/hr and maintain the PCA dose at 0.2 mg/hr; she has received a total of 4.7 mg since the dilaudid PCA started. Dana is agreeable to increasing the dilaudid dose if her mom shows any signs of discomfort. The patient was very comfortable on my exam with RR of 12 per minute and no accessory muscle use.   "

## 2021-01-09 NOTE — NURSING NOTE
[facility] PCA pump recorded and cleared. Pt has little urine output, +1 radial pulse, and slightly rattled breathing.

## 2021-01-09 NOTE — DISCHARGE SUMMARY
.     Internal Medicine  Death Pronouncement Note     SUMMARY OF EVENT   This is a 89 y.o. year-old female who was admitted on 2021 with  Hospice and was being treated for Hospice care patient.     Patient was admitted to inpatient hospice 21. Patient  this morning.     PRELIMINARY CAUSE OF DEATH      Preliminary Cause of Death- Due to/Consequence of: multi organ failure    Date of death pronouncement: 21    Time of death pronouncement: 10:55 AM      PHYSICAL EXAM   Central and peripheral pulses were not palpable, pupils were fixed and dilated, no heartsounds were auscultated, the patient did not have any native respirations, and the patient was unresponsive to painful, tactile, and verbal stimuli.       NOTIFICATIONS      Family Member(s) Notified (please list name(s)): Shaneka    Attending of Record or Covering Physician Notified (please list name): Dr. Sutherland    Medical Examiner/ Case? (if yes, please list the person in the 's Office that was contacted): no   AUTOPSY      Autopsy requested by family member/POA: Yes/No: no

## 2021-01-13 LAB
BACTERIA BLD CULT: NORMAL
BACTERIA BLD CULT: NORMAL

## 2022-02-18 NOTE — H&P
Hospital Medicine Service -  History & Physical        CHIEF COMPLAINT     Chief Complaint   Patient presents with   • Altered Mental Status   • Fatigue        HISTORY OF PRESENT ILLNESS      89 y.o. female with a past medical history of CKD, history of stroke, remote history of colon cancer, COPD, lives at home with son, presented to the emergency room with several days of feeling confused, extremely weak, decreased oral intake.  Her urine started to become darker and saw blood today which prompted her son to get her to the emergency room.  Patient unable to provide any HPI or review of system.  Partially oriented.    ER work-up consistent with severe renal failure, hyperkalemia with no EKG changes with metabolic acidosis, UTI and severe sepsis.  Received fluids, antibiotics, discussed with on-call nephrology who recommended bicarb drip.  Continue to monitor the labs.  Low threshold for hemodialysis.    ICU nurse practitioner who could talk to the son who was at the bedside in the emergency room wanted everything to be done and full CODE STATUS.    PAST MEDICAL AND SURGICAL HISTORY      Past Medical History:   Diagnosis Date   • Chronic hypotension    • Chronic kidney disease    • Chronic kidney failure    • Colon cancer (CMS/[facility])    • COPD (chronic obstructive pulmonary disease) (CMS/[facility])    • Stroke (cerebrum) (CMS/[facility])    • UTI (urinary tract infection)        Past Surgical History:   Procedure Laterality Date   • CARDIAC SURGERY     • COLON SURGERY     • HIP SURGERY     • JOINT REPLACEMENT     • KNEE SURGERY         MEDICATIONS      Prior to Admission medications    Medication Sig Start Date End Date Taking? Authorizing Provider   atorvastatin (LIPITOR) 10 mg tablet Take 10 mg by mouth daily.   Yes ProviderKalpesh MD   levothyroxine (SYNTHROID) 50 mcg tablet Take 50 mcg by mouth daily.   Yes ProviderKalpesh MD   metoprolol succinate XL (TOPROL-XL) 25 mg 24 hr tablet Take 25 mg by mouth daily.      Problem: Adult Inpatient Plan of Care  Goal: Plan of Care Review  Outcome: Met  Goal: Patient-Specific Goal (Individualized)  Outcome: Met  Goal: Absence of Hospital-Acquired Illness or Injury  Outcome: Met  Goal: Optimal Comfort and Wellbeing  Outcome: Met  Goal: Readiness for Transition of Care  Outcome: Met     Problem:  Fall Injury Risk  Goal: Absence of Fall, Infant Drop and Related Injury  Outcome: Met     Problem: Infection  Goal: Absence of Infection Signs and Symptoms  Outcome: Met     Problem: Adjustment to Role Transition (Postpartum Vaginal Delivery)  Goal: Successful Maternal Role Transition  Outcome: Met     Problem: Bleeding (Postpartum Vaginal Delivery)  Goal: Hemostasis  Outcome: Met     Problem: Infection (Postpartum Vaginal Delivery)  Goal: Absence of Infection Signs/Symptoms  Outcome: Met     Problem: Pain (Postpartum Vaginal Delivery)  Goal: Acceptable Pain Control  Outcome: Met      Yes Kalpesh Alanis MD   umeclidinium (INCRUSE ELLIPTA) 62.5 mcg/actuation blister with device Inhale 1 puff daily.   Yes Kalpesh Alanis MD   metoprolol tartrate (LOPRESSOR) 25 mg tablet Take 25 mg by mouth once.  12/28/20 Yes Kalpesh Alanis MD       ALLERGIES      Patient has no known allergies.    FAMILY HISTORY      Family History   Family history unknown: Yes       SOCIAL HISTORY      Social History     Socioeconomic History   • Marital status:      Spouse name: None   • Number of children: None   • Years of education: None   • Highest education level: None   Occupational History   • None   Social Needs   • Financial resource strain: None   • Food insecurity     Worry: None     Inability: None   • Transportation needs     Medical: None     Non-medical: None   Tobacco Use   • Smoking status: Never Smoker   • Smokeless tobacco: Never Used   Substance and Sexual Activity   • Alcohol use: Yes   • Drug use: Not Currently   • Sexual activity: None   Lifestyle   • Physical activity     Days per week: None     Minutes per session: None   • Stress: None   Relationships   • Social connections     Talks on phone: None     Gets together: None     Attends [employer] service: None     Active member of club or organization: None     Attends meetings of clubs or organizations: None     Relationship status: None   • Intimate partner violence     Fear of current or ex partner: None     Emotionally abused: None     Physically abused: None     Forced sexual activity: None   Other Topics Concern   • None   Social History Narrative    Lives at home, with son- care giver.       REVIEW OF SYSTEMS        Unable to obtain due to encephalopathy          PHYSICAL EXAMINATION      Temp:  [34.1 °C (93.3 °F)-36.2 °C (97.1 °F)] 36.2 °C (97.1 °F)  Heart Rate:  [] 129  Resp:  [19-36] 36  BP: ()/(36-65) 110/65  There is no height or weight on file to calculate BMI.    General exam : appears age stated, very  frail appearing, tachypneic  Head: atraumatic, normocephalic  Eyes : PERRLA, EOMI, no pallor, no icterus  ENT: no lesions, oropharynx pink, mucous membranes moist   Neck: supple, no Lymph nodes, no Thyromegaly, no JVD   CVS : normal rate, normal rhythm, S1 and S2 heard, no murmurs, rubs or gallops  Resp: Reduced respiratory rate, clear to auscultation bilaterally  Abdomen : soft, Nt, BS +, no organomegaly   Extremities : no edema, no cyanosis   MSK: ++ DJD, no joint swellings, no joint tenderness   Skin: Cold and clammy, good capillary refill, no mottling present.  Neuro: Patient oriented to self and date of birth, not oriented to place or time.  Following simple commands, moving all extremities..  Psych: normal mood.cooperative      LABS / IMAGING / EKG        Labs  CBC Results       12/28/20                          1603           WBC 30.68           RBC 5.45           HGB 13.8           HCT 44.2           MCV 81.1           MCH 25.3           MCHC 31.2                      Comment for WBC at 1603 on 12/28/20: RESULTS CHECKED. This result has been called to ANDRESSA VARGHESE by Jennifer Christianson on 12 28 20 at 16:38, and has been read back.         CMP Results       12/28/20 12/28/20 12/28/20 2245 1928 1603          132 127         K 5.8 6.7 7.9         Cl 104 101 98         CO2 9 12 10         Glucose 288 281 88          161 160         Creatinine 6.0 6.2 6.4         Calcium 8.0 8.8 9.6         Anion Gap 19 19 19         AST -- -- 20         ALT -- -- 24         Albumin -- -- 2.7         EGFR 6.5 6.3 6.0         Comment for K at 1928 on 12/28/20: Results obtained on plasma. Plasma Potassium values may be up to 0.4 mEQ/L less than serum values. The differences may be greater for patients with high platelet or white cell counts.    Comment for K at 1603 on 12/28/20: Results obtained on plasma. Plasma Potassium values may be up to 0.4 mEQ/L less than serum values. The differences  may be greater for patients with high platelet or white cell counts.    Comment for BUN at 2245 on 12/28/20: Checked by dilution    Comment for BUN at 1928 on 12/28/20: Checked by dilution    Comment for BUN at 1603 on 12/28/20: Checked by dilution          Troponin I Results       12/28/20 12/28/20 12/28/20                    2245 1928 1603         Troponin I 0.04 0.05 0.06         Comment for Troponin I at 1603 on 12/28/20: Result requires test to be repeated on new specimen 4-6 hours after the original.        Microbiology Results     Procedure Component Value Units Date/Time    SARS-CoV-2 (COVID-19), PCR Nasopharynx [[id number]]  (Normal) Collected: 12/28/20 1840    Specimen: Nasopharyngeal Swab from Nasopharynx Updated: 12/28/20 2018    Narrative:      The following orders were created for panel order SARS-CoV-2 (COVID-19), PCR Nasopharynx.  Procedure                               Abnormality         Status                     ---------                               -----------         ------                     SARS-CoV-2 (COVID-19), P...[[id number]]  Normal              Final result                 Please view results for these tests on the individual orders.    SARS-CoV-2 (COVID-19), PCR Nasopharynx [[id number]]  (Normal) Collected: 12/28/20 1840    Specimen: Nasopharyngeal Swab from Nasopharynx Updated: 12/28/20 2018     SARS-CoV-2 (COVID-19) Negative    Narrative:      Nursing instructions: Obtain nasopharyngeal swab ONLY.  Send swab in viral transport media.        UA Results       12/28/20                          1627           Color Red           Clarity Turbid           Glucose Proper identification not possible due to color interference.           Bilirubin Proper identification not possible due to color interference.           Ketones Proper identification not possible due to color interference.           Sp Grav 1.017           Blood +3           Ph Proper identification not possible due to color  interference.           Protein Proper identification not possible due to color interference.           Urobilinogen Proper identification not possible due to color interference.           Nitrite Proper identification not possible due to color interference.           Leuk Est +3           WBC Too Numerous To Count           RBC Too Numerous To Count           Bacteria +3           Comment for Blood at 1627 on 12/28/20: The sensitivity of the occult blood test is equivalent to approximately 4 intact RBC/HPF.          Imaging  CT ABDOMEN PELVIS WITHOUT IV CONTRAST   Final Result   Addendum 1 of 1   Additionally noted in the impression should be incidentally noted    pneumobilia,   which is a nonspecific finding in a patient status post cholecystectomy.   Correlate clinically.  Further evaluation with right upper quadrant    ultrasound   could be considered.      Final      X-RAY CHEST 1 VIEW   Final Result   IMPRESSION: Linear atelectasis or scar in the left midlung zone.      COMMENT: Erect AP portable view of the chest was performed.  We have no prior   studies for comparison.      The heart size is towards the upper limits of normal.  A TAVR prosthesis is   noted.  The pulmonary vasculature is not engorged.      Linear opacity in the left midlung zone is noted, likely an area of discoid   atelectasis or scar.  The lung fields are otherwise clear, although EKG leads   and wires could obscure small lesions.  No pleural effusions are seen.      No hilar abnormality is seen.  There is calcification of the thoracic aorta.      Bilateral rotator cuff tears are suspected.      ECG 12 lead         ECG 12 lead    (Results Pending)         ECG/Telemetry  reviewed by me  Sinus tachycardia, no tall T waves.    ASSESSMENT AND PLAN           * Metabolic acidosis  Acute on chronic renal failure (CMS/HCC)  Encephalopathy  Assessment & Plan  Patient presenting with generalized weakness, confusion, decreased oral intake to the  emergency room  Lives at home with the son  ER work-up significant for profound metabolic acidosis, hyperkalemia and severe renal failure  Nephrology contacted and started on bicarb drip  Watching for improvement on bicarb drip otherwise would need hemodialysis  Son w would want everything to be tried  Continue bicarb drip  Monitoring EKG  Checking BMP, ABG every 6 hours.    Hyperkalemia  Assessment & Plan  Received hyperkalemia protocol in the emergency room with calcium gluconate on bicarb drip  IV Insulin and dextrose  Potassium trending down  If no improvement with the the protocol will need hemodialysis in the next 12 hours   no significant hyperkalemia related EKG changes.    Septic shock (CMS/[facility])  Acute cystitis with hematuria  Pneumobilia  Assessment & Plan  History of UTIs in the past  Found to be severe septic in the emergency room with UTI  CT abdomen also showing incidental pneumobilia however no abdominal symptoms-consulted surgery  Blood cultures drawn  Urine culture sent  Receiving antibiotics  Blood pressure drop in the ICU, needing IV Levophed  Central line placed  As per the son her baseline blood pressures always run low with systolic in 80s to 90s.  Keeping the map goal of 60 and above.  We will follow microbiology results  Consulting ID    Chronic atrial fibrillation (CMS/[facility])  Assessment & Plan  History of atrial fibrillation in the past  Was taking Eliquis in the past which has been discontinued  Was supposed to take baby aspirin which she is not on on home medication  Currently in rapid atrial fibrillation  Given her renal functions not ideal to start on amiodarone drip or give digoxin  Will give IV beta-blocker along with fluid boluses, currently on Levophed for septic shock.     Hypoglycemia  Assessment & Plan  Secondary to severe sepsis  Continue Accu-Cheks every 2 hours  Hyperglycemia protocol    History of CVA (cerebrovascular accident)  Assessment & Plan  Not on aspirin at home  We  will hold off to statin for now    Hyponatremia  Assessment & Plan  Due to severe hypovolemia and severe sepsis  Continuing IV fluid resuscitation  Checking BMP every 6 hours.    Type 2 MI (myocardial infarction) (CMS/[facility])  Assessment & Plan  In the setting of DANY and severe sepsis  Continue to trend troponins.  EKG nonischemic    History of colon cancer  Assessment & Plan  Remote history of colon cancer  Appears to be under remission    COPD (chronic obstructive pulmonary disease) (CMS/[facility])  Assessment & Plan  Stable  Continue scheduled albuterol in the setting of hyperkalemia    Anticipate more than 2 midnight stay in the hospital  Currently meeting critical care criteria given her severe metabolic acidosis and DANY and severe sepsis.    VTE Assessment: Padua  4  VTE Prophylaxis Plan: Subcutaneous insulin  Code Status: Full Code       Dipika Crisostomo MD  12/28/2020

## 2023-08-13 NOTE — PROGRESS NOTES
Called patient's daughter Arabella at 2100 to update patient's status and confirm family visitation tonight, she's appreciative and requested nurse to call Ximena instead. Nurse called daughter Ximena at 2200 and confirmed that 4 members of the family will be visiting at 2300. Took patient off of BIPAP and B/L soft restraints at 2330. Family arrived at 2330, nurse provided additional updates, all questions answered, privacy provided, will continue to monitor.    Spoke with pts daughter Vic Claros, update given.       Daria Camacho RN  08/13/23 [id number]